# Patient Record
Sex: MALE | Race: WHITE | Employment: OTHER | URBAN - METROPOLITAN AREA
[De-identification: names, ages, dates, MRNs, and addresses within clinical notes are randomized per-mention and may not be internally consistent; named-entity substitution may affect disease eponyms.]

---

## 2017-01-10 RX ORDER — ISOSORBIDE DINITRATE 10 MG/1
TABLET ORAL
Qty: 270 TABLET | Refills: 0 | Status: SHIPPED | OUTPATIENT
Start: 2017-01-10 | End: 2017-05-18 | Stop reason: SDUPTHER

## 2017-01-26 LAB
ALBUMIN SERPL-MCNC: 4.3 G/DL
ALP BLD-CCNC: 75 U/L
ALT SERPL-CCNC: 24 U/L
AST SERPL-CCNC: 24 U/L
BASOPHILS ABSOLUTE: 0.1 /ΜL
BASOPHILS RELATIVE PERCENT: 1.2 %
BILIRUB SERPL-MCNC: 0.8 MG/DL (ref 0.1–1.4)
BUN BLDV-MCNC: 20 MG/DL
CALCIUM SERPL-MCNC: 9 MG/DL
CHLORIDE BLD-SCNC: 106 MMOL/L
CHOLESTEROL, TOTAL: 142 MG/DL
CHOLESTEROL/HDL RATIO: 3.6
CO2: 25 MMOL/L
CREAT SERPL-MCNC: 0.97 MG/DL
EOSINOPHILS ABSOLUTE: 0.1 /ΜL
EOSINOPHILS RELATIVE PERCENT: 2.4 %
GFR CALCULATED: NORMAL
GLUCOSE BLD-MCNC: 105 MG/DL
HCT VFR BLD CALC: 39 % (ref 41–53)
HDLC SERPL-MCNC: 39 MG/DL (ref 35–70)
HEMOGLOBIN: 13 G/DL (ref 13.5–17.5)
LDL CHOLESTEROL CALCULATED: 77 MG/DL (ref 0–160)
LYMPHOCYTES ABSOLUTE: 1.5 /ΜL
LYMPHOCYTES RELATIVE PERCENT: 26.5 %
MCH RBC QN AUTO: 30.6 PG
MCHC RBC AUTO-ENTMCNC: 33.4 G/DL
MCV RBC AUTO: 91.8 FL
MONOCYTES ABSOLUTE: 0.6 /ΜL
MONOCYTES RELATIVE PERCENT: 10 %
NEUTROPHILS ABSOLUTE: 3.4 /ΜL
NEUTROPHILS RELATIVE PERCENT: 59.9 %
PLATELET # BLD: 229 K/ΜL
PMV BLD AUTO: 7.8 FL
POTASSIUM SERPL-SCNC: 4.5 MMOL/L
RBC # BLD: 4.25 10^6/ΜL
SODIUM BLD-SCNC: 139 MMOL/L
TOTAL PROTEIN: 7.1
TRIGL SERPL-MCNC: 128 MG/DL
VLDLC SERPL CALC-MCNC: NORMAL MG/DL
WBC # BLD: 5.8 10^3/ML

## 2017-02-08 ENCOUNTER — OFFICE VISIT (OUTPATIENT)
Dept: CARDIOLOGY CLINIC | Age: 72
End: 2017-02-08

## 2017-02-08 VITALS
HEART RATE: 72 BPM | DIASTOLIC BLOOD PRESSURE: 72 MMHG | HEIGHT: 69 IN | WEIGHT: 298.4 LBS | OXYGEN SATURATION: 96 % | SYSTOLIC BLOOD PRESSURE: 116 MMHG | BODY MASS INDEX: 44.2 KG/M2

## 2017-02-08 DIAGNOSIS — I25.10 ATHEROSCLEROSIS OF NATIVE CORONARY ARTERY WITHOUT ANGINA PECTORIS, UNSPECIFIED WHETHER NATIVE OR TRANSPLANTED HEART: Primary | ICD-10-CM

## 2017-02-08 DIAGNOSIS — I10 ESSENTIAL HYPERTENSION, BENIGN: ICD-10-CM

## 2017-02-08 PROCEDURE — G8417 CALC BMI ABV UP PARAM F/U: HCPCS | Performed by: INTERNAL MEDICINE

## 2017-02-08 PROCEDURE — G8598 ASA/ANTIPLAT THER USED: HCPCS | Performed by: INTERNAL MEDICINE

## 2017-02-08 PROCEDURE — G8427 DOCREV CUR MEDS BY ELIG CLIN: HCPCS | Performed by: INTERNAL MEDICINE

## 2017-02-08 PROCEDURE — 1123F ACP DISCUSS/DSCN MKR DOCD: CPT | Performed by: INTERNAL MEDICINE

## 2017-02-08 PROCEDURE — 4040F PNEUMOC VAC/ADMIN/RCVD: CPT | Performed by: INTERNAL MEDICINE

## 2017-02-08 PROCEDURE — G8484 FLU IMMUNIZE NO ADMIN: HCPCS | Performed by: INTERNAL MEDICINE

## 2017-02-08 PROCEDURE — 1036F TOBACCO NON-USER: CPT | Performed by: INTERNAL MEDICINE

## 2017-02-08 PROCEDURE — 3017F COLORECTAL CA SCREEN DOC REV: CPT | Performed by: INTERNAL MEDICINE

## 2017-02-08 PROCEDURE — 99214 OFFICE O/P EST MOD 30 MIN: CPT | Performed by: INTERNAL MEDICINE

## 2017-02-08 PROCEDURE — 93000 ELECTROCARDIOGRAM COMPLETE: CPT | Performed by: INTERNAL MEDICINE

## 2017-05-18 RX ORDER — ISOSORBIDE DINITRATE 10 MG/1
TABLET ORAL
Qty: 270 TABLET | Refills: 0 | Status: SHIPPED | OUTPATIENT
Start: 2017-05-18 | End: 2017-09-10 | Stop reason: SDUPTHER

## 2017-09-12 RX ORDER — ISOSORBIDE DINITRATE 10 MG/1
TABLET ORAL
Qty: 270 TABLET | Refills: 5 | Status: SHIPPED | OUTPATIENT
Start: 2017-09-12 | End: 2022-09-07 | Stop reason: SDUPTHER

## 2017-12-18 ENCOUNTER — OFFICE VISIT (OUTPATIENT)
Dept: CARDIOLOGY CLINIC | Age: 72
End: 2017-12-18

## 2017-12-18 VITALS
WEIGHT: 297 LBS | HEIGHT: 69 IN | DIASTOLIC BLOOD PRESSURE: 72 MMHG | BODY MASS INDEX: 43.99 KG/M2 | OXYGEN SATURATION: 97 % | HEART RATE: 64 BPM | SYSTOLIC BLOOD PRESSURE: 112 MMHG

## 2017-12-18 DIAGNOSIS — I10 ESSENTIAL HYPERTENSION, BENIGN: ICD-10-CM

## 2017-12-18 DIAGNOSIS — I25.10 ATHEROSCLEROSIS OF NATIVE CORONARY ARTERY WITHOUT ANGINA PECTORIS, UNSPECIFIED WHETHER NATIVE OR TRANSPLANTED HEART: ICD-10-CM

## 2017-12-18 DIAGNOSIS — M54.6 ACUTE MIDLINE THORACIC BACK PAIN: Primary | ICD-10-CM

## 2017-12-18 DIAGNOSIS — E78.2 MIXED HYPERLIPIDEMIA: ICD-10-CM

## 2017-12-18 PROCEDURE — 4040F PNEUMOC VAC/ADMIN/RCVD: CPT | Performed by: NURSE PRACTITIONER

## 2017-12-18 PROCEDURE — G8598 ASA/ANTIPLAT THER USED: HCPCS | Performed by: NURSE PRACTITIONER

## 2017-12-18 PROCEDURE — G8417 CALC BMI ABV UP PARAM F/U: HCPCS | Performed by: NURSE PRACTITIONER

## 2017-12-18 PROCEDURE — 3017F COLORECTAL CA SCREEN DOC REV: CPT | Performed by: NURSE PRACTITIONER

## 2017-12-18 PROCEDURE — G8484 FLU IMMUNIZE NO ADMIN: HCPCS | Performed by: NURSE PRACTITIONER

## 2017-12-18 PROCEDURE — 1123F ACP DISCUSS/DSCN MKR DOCD: CPT | Performed by: NURSE PRACTITIONER

## 2017-12-18 PROCEDURE — 99214 OFFICE O/P EST MOD 30 MIN: CPT | Performed by: NURSE PRACTITIONER

## 2017-12-18 PROCEDURE — G8427 DOCREV CUR MEDS BY ELIG CLIN: HCPCS | Performed by: NURSE PRACTITIONER

## 2017-12-18 PROCEDURE — 1036F TOBACCO NON-USER: CPT | Performed by: NURSE PRACTITIONER

## 2017-12-18 NOTE — COMMUNICATION BODY
 NITROSTAT 0.4 MG SL tablet DISSOLVE 1 TAB UNDER TONGUE FOR CHEST PAIN - IF PAIN REMAINS AFTER 5 MIN, CALL 911 AND REPEAT DOSE. MAX 3 TABS IN 15 MINUTES 25 tablet 1       REVIEW OF SYSTEMS:    MUSCULOSKELETAL: + new muscle back      Objective:   PHYSICAL EXAM:    Vitals:    12/18/17 1115 12/18/17 1147   BP: 100/70 112/72   Site:  Left Arm   Cuff Size:  Large Adult   Pulse: 64    SpO2: 97%    Weight: 297 lb (134.7 kg)    Height: 5' 9\" (1.753 m)          VITALS:  /70   Pulse 64   Ht 5' 9\" (1.753 m)   Wt 297 lb (134.7 kg)   SpO2 97%   BMI 43.86 kg/m²    CONSTITUTIONAL: Cooperative, no apparent distress, and appears well nourished / over weight  PSYCH: Calm affect. SKIN: Warm and dry. HEENT: Sclera non-icteric, normocephalic, neck supple, no elevation of JVP, normal carotid pulses with no bruits and thyroid normal size. LUNGS:  No increased work of breathing and clear to auscultation, no crackles or wheezing  CARDIOVASCULAR:  Regular rate 68 and rhythm with no murmurs, gallops, rubs, or abnormal heart sounds, normal PMI. The apical impulses not displaced  JVP less than 8 cm H2O  Heart tones are crisp and normal  Cervical veins are not engorged  The carotid upstroke is normal in amplitude and contour without delay or bruit  JVP is not elevated  ABDOMEN:  Normal bowel sounds, non-distended and non-tender to palpation  EXT: No edema, no calf tenderness. Pulses are present bilaterally. DATA:        LABS: 10/16/17:    Na+ 140 K+ 4.9 chl 107 CO2 25 BUN 25 creatinine 0.86 glu 103   H/H 12.7/36.9    trig 140 HDL 33 LDL 78      Assessment:   1. Back pain   ~reproducible with movements    ~likely musculoskeletal in nature   ~has gotten relieve using [place] Dream which contains topical glucosamine chondroitin     2.  Atherosclerosis of native coronary artery of native heart without angina pectoris   ~stable : denies angina  ~CAD s/p PTCA RCA '95, '96, '04 & '09  ~instent stenosis RCA at 40%, FFR 0.89 ~taking isordil bid ; DAPT / statin / BB    3. Essential hypertension, benign   ~controlled    4. Mixed hyperlipidemia   ~HDL near goal otherwise well controlled  ~atorvastatin / zetia             I had the opportunity to review the clinical symptoms and presentation of RAMY Watkins. Plan:     1. May continue to use [place] Dream or   Hold ASA and take Aleve 220 mg bid for one week routinely then prn; resume ASA afterwards   2. F/U as scheduled    ~inquiring about taking Contrave (bupropion naltrexone) for weight loss. He will discuss with his primary cardiologist     Overall the patient is stable from CV standpoint    I have addresed the patient's cardiac risk factors and adjusted pharmacologic treatment as needed. In addition, I have reinforced the need for patient directed risk factor modification. Further evaluation will be based upon the patient's clinical course and testing results. All questions and concerns were addressed to the patient. Alternatives to my treatment were discussed. The patient is not currently smoking. The risks related to smoking were reviewed with the patient. Recommend maintaining a smoke-free lifestyle. Patient is on a beta-blocker  Patient is on an ace-i  Patient is on a statin    Dual Antiplatelet therapy / anti-coagulation has been recommended / prescribed for this patient. Education conducted on adverse reactions including bleeding was discussed. The patient verbalizes understanding not to stop medications without discussing with us. Discussed exercise: 30-60 minutes 7 days/week. Discussed Low saturated fat diet. Thank you for allowing to us to participate in the care of New Latisha.

## 2017-12-18 NOTE — PROGRESS NOTES
therapy / anti-coagulation has been recommended / prescribed for this patient. Education conducted on adverse reactions including bleeding was discussed. The patient verbalizes understanding not to stop medications without discussing with us. Discussed exercise: 30-60 minutes 7 days/week. Discussed Low saturated fat diet. Thank you for allowing to us to participate in the care of New Latisha.

## 2017-12-18 NOTE — LETTER
[address] [place] [address] Cardiology - [facility] Hollie Bernabe [address] [address]  Phone: [phone]  Fax: [phone]    Gissell Harris NP        December 18, 2017     Swapna Congress, [address] [address]    Patient: Mirna Lombard  MR Number: [id number]  YOB: 1945  Date of Visit: 12/18/2017    Dear Dr. Barcenas Congress:        Oriana Judd is 71 y.o. male who presents today with a history of CAD s/p PTCA RCA '95, '96, '04 & '09, HTN and hyperlipidemia . His last cath in August '15 showed patent stents but with instent stenosis of the RCA at 40%, FFR 0.89. CHIEF COMPLAINT / HPI:  Follow Up secondary to having a pain in his back. When he coughs or clears his throat, it hurts in his mid back. Moving certain ways hurt. He'd been putting crown molding up in his granddaughter's bedroom and doing a lot of stretching. His discomfort started a couple of days ago. He has not tried taking OTC tylenol. He used [place] Dream twice. Its helped. His family \"made\" him come in. Subjective:   He denies significant chest pain. There is no SOB/CHAVEZ. The patient denies orthopnea/PND. The patient does not have swelling. The patients weight is down a few pounds over the past few months . The patient is not experiencing palpitations or dizziness. These symptoms  show no change since the  last  OV. With regard to medication therapy the patient has been compliant with prescribed regimen. They have tolerated therapy to date. Current Outpatient Prescriptions   Medication Sig Dispense Refill    isosorbide dinitrate (ISORDIL) 10 MG tablet Take 1 tablet by mouth 3  times daily : TAKES  tablet 5    aspirin EC 81 MG EC tablet Take 1 tablet by mouth daily 30 tablet 3    metoprolol (LOPRESSOR) 50 MG tablet Take 50 mg by mouth 2 times daily      multivitamin (THERAGRAN) per tablet Take 1 tablet by mouth daily.  Takes 1/2 in am and 1/2 in pm      lisinopril (PRINIVIL;ZESTRIL) 20 MG tablet Take 20 mg by mouth daily.  pantoprazole (PROTONIX) 40 MG tablet Take 40 mg by mouth daily.  atorvastatin (LIPITOR) 80 MG tablet Take 80 mg by mouth daily.  ezetimibe (ZETIA) 10 MG tablet Take 10 mg by mouth daily.  clopidogrel (PLAVIX) 75 MG tablet Take 75 mg by mouth daily.  NITROSTAT 0.4 MG SL tablet DISSOLVE 1 TAB UNDER TONGUE FOR CHEST PAIN - IF PAIN REMAINS AFTER 5 MIN, CALL 911 AND REPEAT DOSE. MAX 3 TABS IN 15 MINUTES 25 tablet 1       REVIEW OF SYSTEMS:    MUSCULOSKELETAL: + new muscle back      Objective:   PHYSICAL EXAM:    Vitals:    12/18/17 1115 12/18/17 1147   BP: 100/70 112/72   Site:  Left Arm   Cuff Size:  Large Adult   Pulse: 64    SpO2: 97%    Weight: 297 lb (134.7 kg)    Height: 5' 9\" (1.753 m)          VITALS:  /70   Pulse 64   Ht 5' 9\" (1.753 m)   Wt 297 lb (134.7 kg)   SpO2 97%   BMI 43.86 kg/m²    CONSTITUTIONAL: Cooperative, no apparent distress, and appears well nourished / over weight  PSYCH: Calm affect. SKIN: Warm and dry. HEENT: Sclera non-icteric, normocephalic, neck supple, no elevation of JVP, normal carotid pulses with no bruits and thyroid normal size. LUNGS:  No increased work of breathing and clear to auscultation, no crackles or wheezing  CARDIOVASCULAR:  Regular rate 68 and rhythm with no murmurs, gallops, rubs, or abnormal heart sounds, normal PMI. The apical impulses not displaced  JVP less than 8 cm H2O  Heart tones are crisp and normal  Cervical veins are not engorged  The carotid upstroke is normal in amplitude and contour without delay or bruit  JVP is not elevated  ABDOMEN:  Normal bowel sounds, non-distended and non-tender to palpation  EXT: No edema, no calf tenderness. Pulses are present bilaterally.     DATA:        LABS: 10/16/17:    Na+ 140 K+ 4.9 chl 107 CO2 25 BUN 25 creatinine 0.86 glu 103   H/H 12.7/36.9    trig 140 HDL 33 LDL 78 Assessment:   1. Back pain   ~reproducible with movements    ~likely musculoskeletal in nature   ~has gotten relieve using [place] Dream which contains topical glucosamine chondroitin     2. Atherosclerosis of native coronary artery of native heart without angina pectoris   ~stable : denies angina  ~CAD s/p PTCA RCA '95, '96, '04 & '09  ~instent stenosis RCA at 40%, FFR 0.89   ~taking isordil bid ; DAPT / statin / BB    3. Essential hypertension, benign   ~controlled    4. Mixed hyperlipidemia   ~HDL near goal otherwise well controlled  ~atorvastatin / zetia             I had the opportunity to review the clinical symptoms and presentation of RAMY Watkins. Plan:     1. May continue to use [place] Dream or   Hold ASA and take Aleve 220 mg bid for one week routinely then prn; resume ASA afterwards   2. F/U as scheduled    ~inquiring about taking Contrave (bupropion naltrexone) for weight loss. He will discuss with his primary cardiologist     Overall the patient is stable from CV standpoint    I have addresed the patient's cardiac risk factors and adjusted pharmacologic treatment as needed. In addition, I have reinforced the need for patient directed risk factor modification. Further evaluation will be based upon the patient's clinical course and testing results. All questions and concerns were addressed to the patient. Alternatives to my treatment were discussed. The patient is not currently smoking. The risks related to smoking were reviewed with the patient. Recommend maintaining a smoke-free lifestyle. Patient is on a beta-blocker  Patient is on an ace-i  Patient is on a statin    Dual Antiplatelet therapy / anti-coagulation has been recommended / prescribed for this patient. Education conducted on adverse reactions including bleeding was discussed. The patient verbalizes understanding not to stop medications without discussing with us. Discussed exercise: 30-60 minutes 7 days/week. Discussed Low saturated fat diet. Thank you for allowing to us to participate in the care of Sameer Good. If you have questions, please do not hesitate to call me. I look forward to following D along with you.     Sincerely,        Nabila Brunner NP

## 2018-02-08 ENCOUNTER — OFFICE VISIT (OUTPATIENT)
Dept: CARDIOLOGY CLINIC | Age: 73
End: 2018-02-08

## 2018-02-08 VITALS
DIASTOLIC BLOOD PRESSURE: 60 MMHG | OXYGEN SATURATION: 97 % | WEIGHT: 289 LBS | HEIGHT: 69 IN | BODY MASS INDEX: 42.8 KG/M2 | SYSTOLIC BLOOD PRESSURE: 108 MMHG | HEART RATE: 56 BPM

## 2018-02-08 DIAGNOSIS — G47.33 OSA (OBSTRUCTIVE SLEEP APNEA): ICD-10-CM

## 2018-02-08 DIAGNOSIS — I10 ESSENTIAL HYPERTENSION: ICD-10-CM

## 2018-02-08 DIAGNOSIS — E78.2 MIXED HYPERLIPIDEMIA: ICD-10-CM

## 2018-02-08 DIAGNOSIS — I25.10 CORONARY ARTERY DISEASE INVOLVING NATIVE CORONARY ARTERY OF NATIVE HEART WITHOUT ANGINA PECTORIS: ICD-10-CM

## 2018-02-08 DIAGNOSIS — I50.32 CHRONIC DIASTOLIC CONGESTIVE HEART FAILURE (HCC): Primary | ICD-10-CM

## 2018-02-08 PROCEDURE — 99214 OFFICE O/P EST MOD 30 MIN: CPT | Performed by: INTERNAL MEDICINE

## 2018-02-08 PROCEDURE — 1036F TOBACCO NON-USER: CPT | Performed by: INTERNAL MEDICINE

## 2018-02-08 PROCEDURE — 4040F PNEUMOC VAC/ADMIN/RCVD: CPT | Performed by: INTERNAL MEDICINE

## 2018-02-08 PROCEDURE — G8484 FLU IMMUNIZE NO ADMIN: HCPCS | Performed by: INTERNAL MEDICINE

## 2018-02-08 PROCEDURE — G8417 CALC BMI ABV UP PARAM F/U: HCPCS | Performed by: INTERNAL MEDICINE

## 2018-02-08 PROCEDURE — 3017F COLORECTAL CA SCREEN DOC REV: CPT | Performed by: INTERNAL MEDICINE

## 2018-02-08 PROCEDURE — G8427 DOCREV CUR MEDS BY ELIG CLIN: HCPCS | Performed by: INTERNAL MEDICINE

## 2018-02-08 PROCEDURE — 1123F ACP DISCUSS/DSCN MKR DOCD: CPT | Performed by: INTERNAL MEDICINE

## 2018-02-08 PROCEDURE — G8598 ASA/ANTIPLAT THER USED: HCPCS | Performed by: INTERNAL MEDICINE

## 2018-02-08 NOTE — LETTER
[address] [place] [address] Cardiology - [address] [place] [address] [address] [facility] [address]  Phone: [phone]  Fax: [phone]    Radha Bettencourt MD        February 8, 2018     Jorge Alberto Leong, [address] [facility] [address]    Patient: Konrad Campos  MR Number: [id number]  YOB: 1945  Date of Visit: 2/8/2018    Dear Dr. Jorge Alberto Leong: Thank you for the request for consultation for Jaime Cody to me for the evaluation of Heart failure. Below are the relevant portions of my assessment and plan of care. Assessment:  - Diastolic heart failure - stable   - CAD - patent stents RCA on cath Sept 2015  - Hyperlipidemia; Total Cholesterol  - Hypertension BP: (108)/(60)   - SALLIE     Recommendation:  - He continues to do well and I will continue his current antianginal therapy (Isordil, Lopressor). His LDL is well controlled (67 Jan 18') and will continue high dose Lipitor and Zetia.   - His BP is controlled on Lisinopril. - I will see him back in 12 months. If you have questions, please do not hesitate to call me. I look forward to following D along with you.     Sincerely,        Radha Bettencourt MD

## 2018-02-08 NOTE — COMMUNICATION BODY
Assessment:  - Diastolic heart failure - stable   - CAD - patent stents RCA on cath Sept 2015  - Hyperlipidemia; Total Cholesterol  - Hypertension BP: (108)/(60)   - SALLIE     Recommendation:  - He continues to do well and I will continue his current antianginal therapy (Isordil, Lopressor). His LDL is well controlled (67 Jan 18') and will continue high dose Lipitor and Zetia.   - His BP is controlled on Lisinopril. - I will see him back in 12 months.

## 2018-02-08 NOTE — PROGRESS NOTES
Section of Cardiology                                     Cardiovascular Evaluation      PATIENT: Raysa Garzon  DATE: 2018  MRN: [id number]  CSN: [id number]  : 1945    Primary Care Doctor: Soila Dallas MD    Reason for evaluation:   6 Month Follow-Up for CAD    History of present illness:   Raysa Garzon is a 72 y.o. patient who presents with a history of CAD s/p PTCA RCA '95, '96, '04 & '09, HTN and hyperlipidemia . Gilbert Rodgers has a history of coronary stenting,most recently  to RCA. At this time, he had two cypher stents and one xience stent to the RCA. Last stress test 3/11/14. He wears CPAP at night for obstructive sleep apnea. In the past with stent placement he had improvement of his shortness of breath. Today he states he is doing well. He states he has been watching a lot of TV to watch the political issues. He states he is continuing to gain weight. He c/o bilateral arm numbness when he wakes up in the morning. He has been tolerating his medications well. He stays active taking care of his grand children. He denied chest pain, palpitations, syncope, dizziness or shortness of breath. Today he presents for follow up of CHF, CAD, HLD, HTN. He reports feeling well overall. He is taking all medications as prescribed and tolerating them well. Denies chest pain, shortness of breath, edema, dizziness, palpitations and syncope. Past Medical History:   has a past medical history of Arthritis; CAD (coronary artery disease); GERD (gastroesophageal reflux disease); Hyperlipidemia; and Hypertension. Surgical History:   has a past surgical history that includes Coronary angioplasty with stent; Coronary angioplasty (08/26/15); eye surgery; and eye surgery. Social History:   reports that he has quit smoking. He has never used smokeless tobacco. He reports that he drinks alcohol. He reports that he does not use drugs.      Family History:  No evidence for sudden JVD       Lungs:   Clear to auscultation bilaterally, respirations unlabored   Chest Wall:  No tenderness or deformity       Heart:  Regular rhythm and normal rate; S1, S2 are normal; no murmur noted; no rub or gallop       Abdomen:   Soft, non-tender, bowel sounds active all four quadrants,  no masses, no organomegaly       Extremities: Extremities normal, atraumatic, no cyanosis or edema   Pulses: 2+ and symmetric       Skin: Skin color, texture, turgor normal, no rashes or lesions       Pysch: Normal mood and affect       Neurologic: Normal gross motor and sensory exam.       DIAGNOSTIC WORK UP:  Lab Data:  CBC:   Lab Results   Component Value Date    WBC 9.8 11/09/2017    HGB 12.5 11/09/2017    HCT 37.2 11/09/2017    MCV 92.5 11/09/2017     11/09/2017     BMP:   Lab Results   Component Value Date     11/09/2017    K 4.2 11/09/2017    CL 99 11/09/2017    CO2 26 11/09/2017    BUN 19 11/09/2017    CREATININE 0.7 11/09/2017    CALCIUM 9.2 11/09/2017    GFRAA >60 11/09/2017    GFRAA >60 05/17/2013     LFTS:   Lab Results   Component Value Date    ALT 17 11/09/2017    AST 19 11/09/2017    ALKPHOS 79 11/09/2017    PROT 7.3 11/09/2017    AGRATIO 1.2 11/09/2017    BILITOT 1.0 11/09/2017     PT/INR:   No components found for: PTPATIENT,  PTINR  LIPID PANEL:   No components found for: CHLPL  Lab Results   Component Value Date    TRIG 128 01/26/2017    TRIG 112 08/26/2015    TRIG 109 07/27/2015     Lab Results   Component Value Date    HDL 39 01/26/2017    HDL 44 08/26/2015    HDL 46 07/27/2015     Lab Results   Component Value Date    LDLCALC 77 01/26/2017    LDLCALC 66 08/26/2015    LDLCALC 66 07/27/2015     TSH:   No results found for: TSH  FREET4:   No results found for: Quillian Curio:   No components found for: FREET3  BNP:   No results found for: BNP    Procedure/Imaging:  I have reviewed the below testing personally and my interpretation is below.     STRESS TEST:  3/11/14   Summary  There is normal isotope uptake at stress and rest. There is no evidence of  myocardial ischemia or scar. There are no regional wall motion abnormalities. Normal left ventricular systolic function with ejection fraction of 61 %. Normal myocardial perfusion study. Recommendation  Normal perfusion study but noted to have ischemic ST changes during stress. Patient may have balanced ischemia. Depending on clinical appropriateness  cardiac catheterization can be considered. CATH:  2009   3 CECI prox/mid RCA, 40% mid LAD, 20% LM    CATH: 09/2015  IMPRESSION:  1. Patent proximal to mid-right coronary artery stents with 40% discrete  in-stent restenosis of the mid-right coronary artery, stent, fractional flow  reserve across the lesion was 0.89, which is insignificant. 2. Normal left ventricular systolic function with ejection fraction of 55%. 3. Normal Left ventricular end-diastolic pressure, 11 mmHg    ECHO: 2/20/15 02/20/2015  Normal left ventricular systolic function, with estimated ejection fraction of 55%. There is mild concentric left ventricular hypertrophy. No regional wall motion abnormalities noted. Diastolic filling parameters suggests grade II diastolic dysfunction. Systolic pulmonary artery pressure (SPAP) is normal and estimated at 28 mmHg  (RA pressure 3 mmHg). Assessment:  - Diastolic heart failure - stable   - CAD - patent stents RCA on cath Sept 2015  - Hyperlipidemia; Total Cholesterol  - Hypertension BP: (108)/(60)   - SALLIE    Recommendation:  - He continues to do well and I will continue his current antianginal therapy (Isordil, Lopressor). His LDL is well controlled (67 Jan 18') and will continue high dose Lipitor and Zetia.   - His BP is controlled on Lisinopril. - I will see him back in 12 months. If you have questions, please do not hesitate to call me. I look forward to following RAMY Flores along with you.       Francisco Mckeon MD, [facility]  [phone] [facility] office  [phone]

## 2018-08-09 ENCOUNTER — OFFICE VISIT (OUTPATIENT)
Dept: ORTHOPEDIC SURGERY | Age: 73
End: 2018-08-09

## 2018-08-09 VITALS
HEIGHT: 68 IN | BODY MASS INDEX: 42.44 KG/M2 | SYSTOLIC BLOOD PRESSURE: 110 MMHG | HEART RATE: 59 BPM | WEIGHT: 280 LBS | DIASTOLIC BLOOD PRESSURE: 64 MMHG

## 2018-08-09 DIAGNOSIS — E66.01 OBESITY, CLASS III, BMI 40-49.9 (MORBID OBESITY) (HCC): ICD-10-CM

## 2018-08-09 DIAGNOSIS — M25.561 RIGHT KNEE PAIN, UNSPECIFIED CHRONICITY: Primary | ICD-10-CM

## 2018-08-09 DIAGNOSIS — M17.11 PRIMARY OSTEOARTHRITIS OF RIGHT KNEE: ICD-10-CM

## 2018-08-09 PROCEDURE — 1036F TOBACCO NON-USER: CPT | Performed by: ORTHOPAEDIC SURGERY

## 2018-08-09 PROCEDURE — G8417 CALC BMI ABV UP PARAM F/U: HCPCS | Performed by: ORTHOPAEDIC SURGERY

## 2018-08-09 PROCEDURE — 1123F ACP DISCUSS/DSCN MKR DOCD: CPT | Performed by: ORTHOPAEDIC SURGERY

## 2018-08-09 PROCEDURE — 99203 OFFICE O/P NEW LOW 30 MIN: CPT | Performed by: ORTHOPAEDIC SURGERY

## 2018-08-09 PROCEDURE — 1101F PT FALLS ASSESS-DOCD LE1/YR: CPT | Performed by: ORTHOPAEDIC SURGERY

## 2018-08-09 PROCEDURE — G8427 DOCREV CUR MEDS BY ELIG CLIN: HCPCS | Performed by: ORTHOPAEDIC SURGERY

## 2018-08-09 PROCEDURE — 3017F COLORECTAL CA SCREEN DOC REV: CPT | Performed by: ORTHOPAEDIC SURGERY

## 2018-08-09 PROCEDURE — L3170 FOOT PLAS HEEL STABI PRE OTS: HCPCS | Performed by: ORTHOPAEDIC SURGERY

## 2018-08-09 PROCEDURE — G8598 ASA/ANTIPLAT THER USED: HCPCS | Performed by: ORTHOPAEDIC SURGERY

## 2018-08-09 PROCEDURE — 4040F PNEUMOC VAC/ADMIN/RCVD: CPT | Performed by: ORTHOPAEDIC SURGERY

## 2018-08-09 NOTE — PROGRESS NOTES
Chief Complaint    Knee Pain (right knee x 6 months, atraumatic, started after using orthotics again for foot pain. )      History of Present Illness:  Salo Mann is a 68 y.o. male comes in today for evaluation treatment of ongoing right knee pain. The patient reports about a 6 month to 12 month history of pain in his right knee. He initially began seeing Dr. Cannon Cowden who has done a MRI and cortisone injection along with Visco supplementation his right knee with little or no improvement. He did have some initial relief with a cortisone injection but this has continued to cause pain and discomfort. He reports mainly medial knee pain with intermittent anterior knee pain. Weightbearing activities increase his pain and discomfort. Because of his lack of improvement and is for a 2nd opinion referred to us by his primary care physician Dr. Pilar Abbott.     Pain Assessment  Location of Pain: Knee  Location Modifiers: Right  Severity of Pain: 5  Quality of Pain: Aching, Throbbing, Sharp  Frequency of Pain: Intermittent  Limiting Behavior: Yes  Result of Injury: No  Work-Related Injury: No  Are there other pain locations you wish to document?: No]       Medical History:  Past Medical History:   Diagnosis Date    Arthritis     CAD (coronary artery disease)     GERD (gastroesophageal reflux disease)     Hyperlipidemia     Hypertension      Patient Active Problem List    Diagnosis Date Noted    Coronary atherosclerosis 09/29/2011     Priority: High    Mixed hyperlipidemia 09/29/2011     Priority: Medium    Essential hypertension 09/29/2011     Priority: Medium    Chronic diastolic congestive heart failure ([facility] [address] 02/08/2018    Coronary artery disease involving native coronary artery of native heart without angina pectoris 02/08/2018    Shortness of breath 02/20/2015    SALLIE (obstructive sleep apnea) 02/20/2015     Current Outpatient Prescriptions   Medication Sig Dispense Refill    isosorbide dinitrate (ISORDIL) 10 MG tablet Take 1 tablet by mouth 3  times daily 270 tablet 5    NITROSTAT 0.4 MG SL tablet DISSOLVE 1 TAB UNDER TONGUE FOR CHEST PAIN - IF PAIN REMAINS AFTER 5 MIN, CALL 911 AND REPEAT DOSE. MAX 3 TABS IN 15 MINUTES 25 tablet 1    aspirin EC 81 MG EC tablet Take 1 tablet by mouth daily 30 tablet 3    metoprolol (LOPRESSOR) 50 MG tablet Take 50 mg by mouth 2 times daily      multivitamin (THERAGRAN) per tablet Take 1 tablet by mouth daily. Takes 1/2 in am and 1/2 in pm      lisinopril (PRINIVIL;ZESTRIL) 20 MG tablet Take 20 mg by mouth daily.  pantoprazole (PROTONIX) 40 MG tablet Take 40 mg by mouth daily.  atorvastatin (LIPITOR) 80 MG tablet Take 80 mg by mouth daily.  ezetimibe (ZETIA) 10 MG tablet Take 10 mg by mouth daily.  clopidogrel (PLAVIX) 75 MG tablet Take 75 mg by mouth daily. No current facility-administered medications for this visit. Review of Systems:  Relevant review of systems reviewed and available in the patient's chart    Vital Signs:  Vitals:    08/09/18 0950   BP: 110/64   Pulse: 59       General Exam:   Constitutional: Patient is adequately groomed with no evidence of malnutrition  DTRs: Deep tendon reflexes are intact  Mental Status: The patient is oriented to time, place and person. The patient's mood and affect are appropriate. Lymphatic: The lymphatic examination bilaterally reveals all areas to be without enlargement or induration. Vascular: Examination reveals no swelling or calf tenderness. Peripheral pulses are palpable and 2+. Neurological: The patient has good coordination. There is no weakness or sensory deficit. Body mass index is 42.57 kg/m². Right Knee Examination:    Inspection:  No erythema or signs of infection. There are no cutaneous lesions    Palpation:  There is tenderness to palpation along the medial joint line.     Range of Motion:  3 extension to 120 of active flexion. Strength:  4+/5 quadriceps and hamstrings    Special Tests: The knee is stable to varus valgus stressing/anterior posterior drawer. Negative Homans test.                                 Skin: There are no rashes, ulcerations or lesions. Gait: mildly antalgic favoring the right side    Reflex 2+ patellar    Examination of the left knee reveals intact skin. There is no focal tenderness. The patient demonstrates full painless range of motion with regard to flexion and extension. Strength is 5/5 throughout all planes. Ligamentous stability is grossly intact. Examination of the right hip reveals intact skin. The patient demonstrates full painless range of motion with regards to flexion, abduction, internal and external rotation. There is no tenderness about the greater trochanter. There is a negative straight leg raise against resistance. Strength is 5/5 throughout all planes. Radiology:   X-rays obtained and reviewed in office:  Views 4 views including AP weightbearing, PA flexed weightbearing, lateral, and skyline  Location  right knee:    Impression:   There is advanced osteoarthritis of his medial compartment with sclerosis and osteophyte formation present. The patellofemoral joint demonstrates end-stage patellofemoral arthritis  No fractures are identified. Impression:  Encounter Diagnoses   Name Primary?  Right knee pain, unspecified chronicity Yes    Primary osteoarthritis of right knee        Office Procedures:  Orders Placed This Encounter   Procedures    XR KNEE RIGHT (MIN 4 VIEWS)       Treatment Plan:  I discussed with the patient the nature of osteoarthritis of the knee. We talked about treatment of arthritis and the various options that are involved with this. The patient understands that the treatments can vary from essentially doing nothing to a total joint replacement arthroplasty for arthritis.  I then went on to describe the utilization of glucosamine and chondroitin sulfate as a joint nutrition product. We talked about the fact that this is essentially a joint vitamin with typically minimal side effects. We also talked about utilization of prescription over-the-counter anti-inflammatory medications as the next option. We also discussed the possibility of brace wear or orthotic wear if the patient has significant varus alignment. We then went on to discuss the possibility of Visco supplementation with hyaluronate products. We talked about the typical course of this type of treatment and the fact that often times in the treatment for significant arthritis, this is successful less than half the time. We also talked about the corticosteroid injections and the fact that this can give a brief window of relief, but does not cure the problem; in fact, the pain often has a rebound effect in 6-10 weeks after the steroid has worn off. We also discussed arthroscopy surgery in attempts to debride the joint, but the fact that this is relatively unreliable treatment in the face of significant arthritis. It can occasionally be used, particularly if there is significant meniscus pathology. Lastly we discussed total joint replacement arthroplasty as the final and definitive step in treatment of arthritis. Patient realizes the magnitude of this type of treatment as well as having voiced a general understanding to the duration of the prosthesis. The patient voiced understanding to these continuum of treatment options. At this time the patient has failed conservative treatment with cortisone injection of supplementation. He is going to eventually require a total knee replacement but we do have some medical optimization and needs to be achieved. We would like for him to achieve a body mass index below 40 kg/m². We've discussed that this is likely approximately a 25-30 pound weight loss.   We've recommended aquatic therapy and the [facility] weight loss [facility] to help aid in his weight loss goal's. We've also recommended using a cane or crutch for protective weightbearing to alleviate the stress in the knee. We'll also get him fitted for a medial  brace and lateral heel wedges. W    bahman also discussed the use of medication such as Celebrex. He's previously taken this before in the past but has been told by his primary care physician that they would like to avoid him taking this. We will like for him to contact his primary care physician to see if he might be able to take a low-dose Celebrex either daily or every other day to help decrease some of his current symptoms. He understands that this will have to be cleared through his primary care physician 1st.  This would be temporary until we would be able to proceed with knee replacement surgery.

## 2018-08-20 ENCOUNTER — TELEPHONE (OUTPATIENT)
Dept: ORTHOPEDIC SURGERY | Age: 73
End: 2018-08-20

## 2018-08-21 DIAGNOSIS — M17.11 PRIMARY OSTEOARTHRITIS OF RIGHT KNEE: ICD-10-CM

## 2018-08-21 PROCEDURE — L1845 KO DOUBLE UPRIGHT PRE CST: HCPCS | Performed by: ORTHOPAEDIC SURGERY

## 2018-08-23 ENCOUNTER — OFFICE VISIT (OUTPATIENT)
Dept: BARIATRICS/WEIGHT MGMT | Age: 73
End: 2018-08-23

## 2018-08-23 VITALS
HEIGHT: 69 IN | SYSTOLIC BLOOD PRESSURE: 132 MMHG | BODY MASS INDEX: 43.69 KG/M2 | WEIGHT: 295 LBS | HEART RATE: 56 BPM | DIASTOLIC BLOOD PRESSURE: 84 MMHG

## 2018-08-23 DIAGNOSIS — M17.11 OSTEOARTHRITIS OF RIGHT KNEE, UNSPECIFIED OSTEOARTHRITIS TYPE: ICD-10-CM

## 2018-08-23 DIAGNOSIS — E66.01 MORBID OBESITY WITH BMI OF 40.0-44.9, ADULT (HCC): Primary | ICD-10-CM

## 2018-08-23 DIAGNOSIS — Z79.899 HIGH RISK MEDICATIONS (NOT ANTICOAGULANTS) LONG-TERM USE: ICD-10-CM

## 2018-08-23 DIAGNOSIS — Z71.3 DIETARY COUNSELING AND SURVEILLANCE: ICD-10-CM

## 2018-08-23 DIAGNOSIS — G47.33 OSA (OBSTRUCTIVE SLEEP APNEA): ICD-10-CM

## 2018-08-23 DIAGNOSIS — I10 ESSENTIAL HYPERTENSION: ICD-10-CM

## 2018-08-23 DIAGNOSIS — E66.01 MORBID OBESITY WITH BMI OF 40.0-44.9, ADULT (HCC): ICD-10-CM

## 2018-08-23 LAB
T4 FREE: 0.9 NG/DL (ref 0.9–1.8)
TSH REFLEX: 4.94 UIU/ML (ref 0.27–4.2)

## 2018-08-23 PROCEDURE — 3017F COLORECTAL CA SCREEN DOC REV: CPT | Performed by: FAMILY MEDICINE

## 2018-08-23 PROCEDURE — G8427 DOCREV CUR MEDS BY ELIG CLIN: HCPCS | Performed by: FAMILY MEDICINE

## 2018-08-23 PROCEDURE — 4040F PNEUMOC VAC/ADMIN/RCVD: CPT | Performed by: FAMILY MEDICINE

## 2018-08-23 PROCEDURE — 1123F ACP DISCUSS/DSCN MKR DOCD: CPT | Performed by: FAMILY MEDICINE

## 2018-08-23 PROCEDURE — G8417 CALC BMI ABV UP PARAM F/U: HCPCS | Performed by: FAMILY MEDICINE

## 2018-08-23 PROCEDURE — G8598 ASA/ANTIPLAT THER USED: HCPCS | Performed by: FAMILY MEDICINE

## 2018-08-23 PROCEDURE — 99204 OFFICE O/P NEW MOD 45 MIN: CPT | Performed by: FAMILY MEDICINE

## 2018-08-23 PROCEDURE — 1101F PT FALLS ASSESS-DOCD LE1/YR: CPT | Performed by: FAMILY MEDICINE

## 2018-08-23 PROCEDURE — 1036F TOBACCO NON-USER: CPT | Performed by: FAMILY MEDICINE

## 2018-08-23 ASSESSMENT — ENCOUNTER SYMPTOMS
RESPIRATORY NEGATIVE: 1
EYES NEGATIVE: 1
GASTROINTESTINAL NEGATIVE: 1

## 2018-08-23 ASSESSMENT — PATIENT HEALTH QUESTIONNAIRE - PHQ9
SUM OF ALL RESPONSES TO PHQ QUESTIONS 1-9: 0
SUM OF ALL RESPONSES TO PHQ9 QUESTIONS 1 & 2: 0
1. LITTLE INTEREST OR PLEASURE IN DOING THINGS: 0
2. FEELING DOWN, DEPRESSED OR HOPELESS: 0
SUM OF ALL RESPONSES TO PHQ QUESTIONS 1-9: 0

## 2018-08-23 NOTE — PROGRESS NOTES
medications to help you lose weight? [] Yes  [x] No    Have you ever been on a prescribed meal replacement program?  [] Yes  [x] No    The patient denies any significant cardiac or psychiatric disease. The patient denies a history thyroid disease. The patient denies a history of glaucoma. The patient denies a history of seizure disorders/epiliepsy. Dietitian's assessment reviewed and addressed with patient. Reviewed:  [x] Nutrition and the importance of regular protein intake       [x] Hidden CHO/carbohydrate sources  [x] Alcohol use  [x] Tobacco use  [x] Drug use- Denies   [x] Importance of exercise and reducing sedentary time  [x] PHQ-2: 0      Allergies   Allergen Reactions    Lasix [Furosemide]      Calf pain    Neosporin [Neomycin-Polymyx-Gramicid]      rash    Polysporin [Bacitracin-Polymyxin B]      rash         Current Outpatient Prescriptions:     isosorbide dinitrate (ISORDIL) 10 MG tablet, Take 1 tablet by mouth 3  times daily, Disp: 270 tablet, Rfl: 5    NITROSTAT 0.4 MG SL tablet, DISSOLVE 1 TAB UNDER TONGUE FOR CHEST PAIN - IF PAIN REMAINS AFTER 5 MIN, CALL 911 AND REPEAT DOSE. MAX 3 TABS IN 15 MINUTES, Disp: 25 tablet, Rfl: 1    aspirin EC 81 MG EC tablet, Take 1 tablet by mouth daily, Disp: 30 tablet, Rfl: 3    metoprolol (LOPRESSOR) 50 MG tablet, Take 50 mg by mouth 2 times daily, Disp: , Rfl:     multivitamin (THERAGRAN) per tablet, Take 1 tablet by mouth daily. Takes 1/2 in am and 1/2 in pm, Disp: , Rfl:     lisinopril (PRINIVIL;ZESTRIL) 20 MG tablet, Take 20 mg by mouth daily. , Disp: , Rfl:     pantoprazole (PROTONIX) 40 MG tablet, Take 40 mg by mouth daily. , Disp: , Rfl:     atorvastatin (LIPITOR) 80 MG tablet, Take 80 mg by mouth daily. , Disp: , Rfl:     ezetimibe (ZETIA) 10 MG tablet, Take 10 mg by mouth daily. , Disp: , Rfl:     clopidogrel (PLAVIX) 75 MG tablet, Take 75 mg by mouth daily.   , Disp: , Rfl:     Patient Active Problem List   Diagnosis    Mixed hyperlipidemia    Essential hypertension    Coronary atherosclerosis    Shortness of breath    SALLIE (obstructive sleep apnea)    Chronic diastolic congestive heart failure (HCC)    Coronary artery disease involving native coronary artery of native heart without angina pectoris       Past Medical History:   Diagnosis Date    Arthritis     CAD (coronary artery disease)     GERD (gastroesophageal reflux disease)     Hyperlipidemia     Hypertension     Sleep apnea        Past Surgical History:   Procedure Laterality Date    CORONARY ANGIOPLASTY  08/26/15    CORONARY ANGIOPLASTY WITH STENT PLACEMENT      EYE SURGERY      left eye    EYE SURGERY      right eye       Family History   Problem Relation Age of Onset    Heart Disease Mother     Heart Disease Father     Coronary Art Dis Father        Review of Systems   Constitutional:        Weight gain    HENT: Negative. Eyes: Negative. Respiratory: Negative. Cardiovascular: Negative. Gastrointestinal: Negative. Endocrine: Negative. Musculoskeletal: Negative. Neurological: Negative. Psychiatric/Behavioral: Negative. Physical Exam   Constitutional: He is oriented to person, place, and time. He appears well-developed and well-nourished. HENT:   Head: Normocephalic. Eyes: Conjunctivae and EOM are normal.   Neck: Normal range of motion. Neck supple. No thyromegaly present. Cardiovascular: Normal rate, regular rhythm and normal heart sounds. Exam reveals no gallop and no friction rub. No murmur heard. Pulmonary/Chest: Effort normal and breath sounds normal. No respiratory distress. He has no wheezes. He has no rales. Abdominal: Soft. He exhibits no mass. There is no tenderness. There is no rebound and no guarding. obese   Musculoskeletal: He exhibits no edema. Lymphadenopathy:     He has no cervical adenopathy. Neurological: He is alert and oriented to person, place, and time. Skin: Skin is warm and dry. hypertension I10 401.9 Stable on treatment. Explained that a 5-10% weight loss can also help improve this. 5. SALLIE (obstructive sleep apnea) G47.33 327.23 Consouled on the importance of sleep in weight management. Encourage compliance with CPAP. 6. High risk medications (not anticoagulants) long-term use Z79.899 V58.69 TSH with Reflex         Nutrition:  [] LCHF/Ketogenic [x] Low carb/low-calorie diet [] Low-calorie diet     []Maintenance        FITTE:   [] Cardio [] Resistance/stength exercises   [x] ACSM recommendations (150 minutes/week)-[facility] access provided to start aquatic exercises    [] Prevention of weight gain (150-250 minutes/week)        Behavior:   [x] Motivational interviewing performed    [] Referral for counseling  [x] Discussed strategies to overcome habits/challenges for focus      [] Stress management   [x] Stimulus control  [x] Sleep hygiene      Orders Placed This Encounter   Procedures    TSH with Reflex     Standing Status:   Future     Standing Expiration Date:   8/23/2019       No Follow-up on file. Greater than 50% of this 45 minute visit was used in direct counseling. This dictation was performed with a verbal recognition program (Dragon) and all efforts were made to ensure accuracy of this dictation. It is possible that there are still dictated errors within this note. If so, please bring any errors to my attention for correction.

## 2018-08-23 NOTE — PROGRESS NOTES
patient to feel comfortably full. Most days the patient eats until he is satisfied- stuffed. Patient describes level of activity as no. Needs to have knee replacement- plans to start water therapy. Goals  Weight: 195 lbs   Health Improvement: Needs to have a knee replacement     Assessment  Nutritional Needs: RMR=(9.99 x 134.1) + (6.25 x 175.3) - (4.92 x 73 y.o.) +5= 2086 kcal x 1.4 (sedentary activity factor)= 2920 kcal - 1000 (for 2 lb weight loss/week)= 1920 kcal.    Plan  Plan/Recommendations: General weight loss/lifestyle modification strategies discussed (elicit support from others; identify saboteurs; non-food rewards, etc). Optifast:  N/A  Diet Medications:  N/A     PES Statement:  Overweight/Obesity related to increased caloric intake and decreased physical activity as evidenced by BMI. Body mass index is 41.35 kg/m². Provided 1500 kcal LC meal plan and sample menu, limit liquid calories, include water therapy     Will follow up as necessary.     Valery Vogt

## 2018-08-23 NOTE — PATIENT INSTRUCTIONS
Patient Education        Learning About Obesity  What is obesity? Obesity means having so much body fat that your health is in danger. Having too much body fat can lead to type 2 diabetes, heart disease, high blood pressure, arthritis, sleep apnea, and stroke. Even if you don't feel bad now, think about these health risks. Do they seem like a good reason to start on a new path toward a healthier weight? Or do you have another personal, powerful reason for wanting to lose weight? Whatever it is, keep it in mind. It can be hard to change eating habits and exercise habits. But with your own reason and plan, you can do it. How do you know if your weight is in the obesity range? To know if your weight is in the obesity range, your doctor looks at your body mass index (BMI) and waist size. Your BMI is a number that is calculated from your weight and your height. To figure your BMI for yourself, get a BMI table from your doctor or use an online tool, such as http://www.mccollum.com/ on the [employer] of [employer]. A healthy BMI is from 18.5 to 24.9. If your BMI is from 30.0 to 39.9, you are considered to have obesity. If your BMI is over 40.0, you are considered to have extreme obesity. What causes obesity? When you take in more calories than you burn off, you gain weight. How you eat, how active you are, and other things affect how your body uses calories and whether you gain weight. If you have family members who have too much body fat, you may have inherited a tendency to gain weight. And your family also helps form your eating and lifestyle habits, which can lead to obesity. Also, our busy lives make it harder to plan and cook healthy meals. For many of us, it's easier to reach for prepared foods, go out to eat, or go to the drive-through. But these foods are often high in saturated fat and calories. Portions are often too large.   What can you do to reach a healthy weight? Focus on health, not diets. Diets are hard to stay on and don't work in the long run. It is very hard to stay with a diet that includes lots of big changes in your eating habits. Instead of a diet, focus on lifestyle changes that will improve your health and achieve the right balance of energy and calories. To lose weight, you need to burn more calories than you take in. You can do it by eating healthy foods in reasonable amounts and becoming more active, even a little bit every day. Making small changes over time can add up to a lot. Make a plan for change. Many people have found that naming their reasons for change and staying focused on their plan can make a big difference. Work with your doctor to create a plan that is right for you. · Ask yourself: Karin Macedo are my personal, most powerful reasons for wanting this change? What will my life look like when I've made the change? \"  · Set your long-term goal. Make it specific, such as \"I will lose x pounds. \"  · Break your long-term goal into smaller, short-term goals. Make these small steps specific and within your reach, things you know you can do. These steps are what keep you going from day to day. Talk with your doctor about other weight-loss options. If you have a BMI in a certain range and have not been able to lose weight with diet and exercise, medicine or surgery may be an option for you. Before your doctor will prescribe medicines or surgery, he or she will probably want you to be more active and follow your healthy eating plan for a period of time. These habits are key lifelong changes for managing your weight, with or without other medical treatment. And these changes can help you avoid weight-related health problems. How can you stay on your plan for change? Be ready. Choose to start during a time when there are few events that might trigger slip-ups, like holidays, social events, and high-stress periods.   Decide on your first

## 2018-09-12 ENCOUNTER — HOSPITAL ENCOUNTER (OUTPATIENT)
Dept: PHYSICAL THERAPY | Age: 73
Setting detail: THERAPIES SERIES
Discharge: HOME | End: 2018-09-12
Payer: MEDICARE

## 2018-09-12 PROCEDURE — 97161 PT EVAL LOW COMPLEX 20 MIN: CPT

## 2018-09-12 PROCEDURE — G8978 MOBILITY CURRENT STATUS: HCPCS

## 2018-09-12 PROCEDURE — G8979 MOBILITY GOAL STATUS: HCPCS

## 2018-09-12 PROCEDURE — 97530 THERAPEUTIC ACTIVITIES: CPT

## 2018-09-12 NOTE — FLOWSHEET NOTE
Physical Therapy Aquatic Flow Sheet  Date:  9/12/2018    Patient Name:  Peter Briones    Medical/Treatment Diagnosis Information:  · Diagnosis: R knee pain, OA  Insurance/Certification information:  PT Insurance Information: Medicare  Physician Information:  Referring Practitioner: Joy Wilkinson MD  Plan of care signed (Y/N):  N  Visit# / total visits:  1/10     Pain level: /10   Electronically signed by:  Torrie Liang PT    Medicare Cap (if applicable):  178 = total amount used, updated 9/12/2018    Key  B= Belt DB= Dumbells T= Theratube   H= Hydrotone N= Noodles W= Weights   P= Paddles S= Speedo equipment K= Kickboard     Exercises/Activities   Warm-up/Amb    Exercises      Slow forward   nv  HR/TR  nv    Slow sideways  nv  Marches  nv    Slow backwards  nv  Mini-squats  nv    Medium forward    4-way SLR  nv    Medium sideways    Hip circles/fig 8  nv    Small shuffle    Hamstring curls  nv    Jog    Knee extension      Braiding    Pelvic tilts  nv    Bicycling  nv  Scap squeezes          Shoulder flex/ext      Functional    Shoulder abd/add      Step    Shoulder H. abd/add      Lifting    Shoulder IR/ER      Hand to opp knee    Rowing      Push down squat    Bilateral pull down      UE PNF    Push/pull      LE PNF    Push downs      Wall push ups    Arm circles      SLS  nv  Elbow flex/ext          Chin tuck      Stretching    UT shrugs/rolls      Gastroc/Soleus    Rocking horse      Hamstring          SKTC    Other      Piriformis          Hip flexor          Ladder pull          Pec stretch          Post deltoid           Time In:      Timed Code Treatment Minutes:       Total Treatment Minutes:      Treatment/Activity Tolerance:   [] Patient tolerated treatment well [] Patient limited by fatigue   [] Patient limited by pain [] Patient limited by other medical complications  [] Other:     Prognosis: [] Good [] Fair  [] Poor    Patient Requires Follow-up:  [] Yes  [] No    Plan: [] Continue per plan of

## 2018-09-25 ENCOUNTER — HOSPITAL ENCOUNTER (OUTPATIENT)
Dept: PHYSICAL THERAPY | Age: 73
Setting detail: THERAPIES SERIES
Discharge: HOME | End: 2018-09-25
Payer: MEDICARE

## 2018-09-25 PROCEDURE — 97150 GROUP THERAPEUTIC PROCEDURES: CPT

## 2018-09-25 PROCEDURE — 97113 AQUATIC THERAPY/EXERCISES: CPT

## 2018-09-25 NOTE — FLOWSHEET NOTE
Requires Follow-up:  [x] Yes  [] No    Plan: [x] Continue per plan of care [] Alter current plan (see comments)   [] Plan of care initiated [] Hold pending MD visit [] Discharge    See Weekly Progress Note: [] Yes  [x] No  Next due:

## 2018-09-27 ENCOUNTER — HOSPITAL ENCOUNTER (OUTPATIENT)
Dept: PHYSICAL THERAPY | Age: 73
Setting detail: THERAPIES SERIES
Discharge: HOME | End: 2018-09-27
Payer: MEDICARE

## 2018-09-27 PROCEDURE — 97113 AQUATIC THERAPY/EXERCISES: CPT

## 2018-09-27 PROCEDURE — 97150 GROUP THERAPEUTIC PROCEDURES: CPT

## 2018-09-27 NOTE — FLOWSHEET NOTE
Physical Therapy Aquatic Flow Sheet  Date:  9/27/2018    Patient Name:  Kenneth Pat    Medical/Treatment Diagnosis Information:  · Diagnosis: R knee pain, OA  Insurance/Certification information:  PT Insurance Information: Medicare  Physician Information:  Referring Practitioner: Tucker Og MD  Plan of care signed (Y/N):  N  Visit# / total visits:  3/10     Pain level: 5/10   Electronically signed by:  Erin Malcolm PT    Medicare Cap (if applicable):  032 = total amount used, updated 9/27/2018    Key  B= Belt DB= Dumbells T= Theratube   H= Hydrotone N= Noodles W= Weights   P= Paddles S= Speedo equipment K= Kickboard     Exercises/Activities   Warm-up/Amb    Exercises      Slow forward   2 laps  HR/TR  20 B    Slow sideways    2 laps  Marches  2 min    Slow backwards    2 laps  Mini-squats  x15    Medium forward    4-way SLR  x15 B    Medium sideways    Hip circles/fig 8  x15 B    Small shuffle    Hamstring curls  x15 B    Jog    Knee extension      Braiding    Pelvic tilts  [id number]    Bicycling  2 min  Scap squeezes          Shoulder flex/ext      Functional    Shoulder abd/add      Step    Shoulder H. abd/add      Lifting    Shoulder IR/ER      Hand to opp knee    Rowing      Push down squat    Bilateral pull down      UE PNF    Push/pull      LE PNF    Push downs      Wall push ups    Arm circles      SLS  [id number]  Elbow flex/ext          Chin tuck      Stretching    UT shrugs/rolls      Gastroc/Soleus    Rocking horse      Hamstring          SKTC    Other      Piriformis          Hip flexor          Ladder pull          Pec stretch          Post deltoid           Time In:  1403    Timed Code Treatment Minutes:  15    Total Treatment Minutes:  44    Treatment/Activity Tolerance:   [x] Patient tolerated treatment well [] Patient limited by fatigue   [] Patient limited by pain [] Patient limited by other medical complications  [] Other:     Prognosis: [x] Good [] Fair  [] Poor    Patient Requires Follow-up:  [x] Yes  [] No    Plan: [x] Continue per plan of care [] Alter current plan (see comments)   [] Plan of care initiated [] Hold pending MD visit [] Discharge    See Weekly Progress Note: [] Yes  [x] No  Next due:

## 2018-10-02 ENCOUNTER — HOSPITAL ENCOUNTER (OUTPATIENT)
Dept: PHYSICAL THERAPY | Age: 73
Setting detail: THERAPIES SERIES
Discharge: HOME | End: 2018-10-02
Payer: MEDICARE

## 2018-10-02 PROCEDURE — 97113 AQUATIC THERAPY/EXERCISES: CPT

## 2018-10-02 PROCEDURE — 97150 GROUP THERAPEUTIC PROCEDURES: CPT

## 2018-10-04 ENCOUNTER — HOSPITAL ENCOUNTER (OUTPATIENT)
Dept: PHYSICAL THERAPY | Age: 73
Setting detail: THERAPIES SERIES
Discharge: HOME | End: 2018-10-04
Payer: MEDICARE

## 2018-10-04 PROCEDURE — 97113 AQUATIC THERAPY/EXERCISES: CPT

## 2018-10-04 PROCEDURE — 97150 GROUP THERAPEUTIC PROCEDURES: CPT

## 2018-10-05 ENCOUNTER — TELEPHONE (OUTPATIENT)
Dept: BARIATRICS/WEIGHT MGMT | Age: 73
End: 2018-10-05

## 2018-10-05 NOTE — TELEPHONE ENCOUNTER
[facility] He had an appointment yesterday in [facility] and was in the wrong building which made him miss the appointment. He made another appointment, but claims [facility] before because he has questions about what the appointment entails. I scheduled him another appointment in [facility] and his number to reach him is [phone].

## 2018-10-09 ENCOUNTER — HOSPITAL ENCOUNTER (OUTPATIENT)
Dept: PHYSICAL THERAPY | Age: 73
Setting detail: THERAPIES SERIES
Discharge: HOME | End: 2018-10-09
Payer: MEDICARE

## 2018-10-09 PROCEDURE — 97113 AQUATIC THERAPY/EXERCISES: CPT

## 2018-10-09 PROCEDURE — 97150 GROUP THERAPEUTIC PROCEDURES: CPT

## 2018-10-11 ENCOUNTER — HOSPITAL ENCOUNTER (OUTPATIENT)
Dept: PHYSICAL THERAPY | Age: 73
Setting detail: THERAPIES SERIES
Discharge: HOME | End: 2018-10-11
Payer: MEDICARE

## 2018-10-11 PROCEDURE — 97150 GROUP THERAPEUTIC PROCEDURES: CPT

## 2018-10-11 PROCEDURE — 97113 AQUATIC THERAPY/EXERCISES: CPT

## 2018-10-11 NOTE — FLOWSHEET NOTE
Physical Therapy Aquatic Flow Sheet  Date:  10/11/2018    Patient Name:  Jovana Gary    Medical/Treatment Diagnosis Information:  · Diagnosis: R knee pain, OA  Insurance/Certification information:  PT Insurance Information: Medicare  Physician Information:  Referring Practitioner: Red Castellanos MD  Plan of care signed (Y/N):  N  Visit# / total visits:  7/10     Pain level: 1-2/10   Electronically signed by:  Niru Seaman PT, DPT #[id number]    Medicare Cap (if applicable):  227 = total amount used, updated 10/11/2018    Key  B= Belt DB= Dumbells T= Theratube   H= Hydrotone N= Noodles W= Weights   P= Paddles S= Speedo equipment K= Kickboard     Exercises/Activities   Warm-up/Amb    Exercises      Slow forward   2 laps  HR/TR  20 B    Slow sideways    2 laps  Marches  3 min    Slow backwards    2 laps  Mini-squats  3x15    Medium forward    4-way SLR  x25 B    Medium sideways    Hip circles/fig 8  x20 B    Small shuffle    Hamstring curls  x30 B    Jog    Knee extension      Braiding    Pelvic tilts  [id number]    Bicycling  3'  Scap squeezes          Shoulder flex/ext      Functional    Shoulder abd/add      Step  3x10  Shoulder H. abd/add      Lifting    Shoulder IR/ER      Hand to opp knee    Rowing      Push down squat    Bilateral pull down      UE PNF    Push/pull      LE PNF    Push downs      Wall push ups    Arm circles      SLS  [id number] B  Elbow flex/ext          Chin tuck      Stretching    UT shrugs/rolls      Gastroc/Soleus  2x30 sec B  Rocking horse      Hamstring          SKTC    Other      Piriformis          Hip flexor          Ladder pull          Pec stretch          Post deltoid           Time In:  1400    Timed Code Treatment Minutes:  15    Total Treatment Minutes:  35    Treatment/Activity Tolerance:   [x] Patient tolerated treatment well [] Patient limited by fatigue   [] Patient limited by pain [] Patient limited by other medical complications  [] Other:     Prognosis: [x] Good [] Fair  []

## 2018-10-16 ENCOUNTER — HOSPITAL ENCOUNTER (OUTPATIENT)
Dept: PHYSICAL THERAPY | Age: 73
Setting detail: THERAPIES SERIES
Discharge: HOME | End: 2018-10-16
Payer: MEDICARE

## 2018-10-16 ENCOUNTER — HOSPITAL ENCOUNTER (OUTPATIENT)
Dept: PHYSICAL THERAPY | Age: 73
Setting detail: THERAPIES SERIES
End: 2018-10-16
Payer: MEDICARE

## 2018-10-16 PROCEDURE — 97113 AQUATIC THERAPY/EXERCISES: CPT

## 2018-10-16 PROCEDURE — 97150 GROUP THERAPEUTIC PROCEDURES: CPT

## 2018-10-18 ENCOUNTER — HOSPITAL ENCOUNTER (OUTPATIENT)
Dept: PHYSICAL THERAPY | Age: 73
Setting detail: THERAPIES SERIES
Discharge: HOME | End: 2018-10-18
Payer: MEDICARE

## 2018-10-18 PROCEDURE — 97110 THERAPEUTIC EXERCISES: CPT

## 2018-10-18 NOTE — FLOWSHEET NOTE
be indep in HEP   Short term goal 2: pt will perform 20 mins of stationary biking 2-3 times per week ind  Short term goal 3: pt will improve SL balance time to 15 sec or greater B without UE support  Short term goal 4: pt will demonstrate decreased Trendelenberg B during gait  Short term goal 5: pt will demonstrate improved core activation during ther ex and functional activity            Plan: [x] Continue per plan of care [] Alter current plan (see comments)   [x] Plan of care initiated [] Hold pending MD visit [] Discharge      Plan for Next Session:   See above      Re-Certification Due Date:  11/12/2018    Signature:  Abram Yadav, PT  , DPT #[id number]

## 2018-10-18 NOTE — FLOWSHEET NOTE
Physical Therapy    Pt d/c from aquatic therapy today, all remaining aquatic sessions cancelled as pt will be participating in land based PT 2x/wk for 4 wks at this time.     Luiz Desir, PT, DPT #[id number]

## 2018-10-23 ENCOUNTER — APPOINTMENT (OUTPATIENT)
Dept: PHYSICAL THERAPY | Age: 73
End: 2018-10-23
Payer: MEDICARE

## 2018-10-24 ENCOUNTER — HOSPITAL ENCOUNTER (OUTPATIENT)
Dept: PHYSICAL THERAPY | Age: 73
Setting detail: THERAPIES SERIES
Discharge: HOME | End: 2018-10-24
Payer: MEDICARE

## 2018-10-24 PROCEDURE — 97110 THERAPEUTIC EXERCISES: CPT

## 2018-10-24 NOTE — FLOWSHEET NOTE
Physical Therapy Daily Treatment Note    Date:  10/24/2018    Patient Name:  Tisha Alvarado    :  1945  MRN: [id number]  Restrictions/Precautions:    Medical/Treatment Diagnosis Information:  · Diagnosis: R knee pain, OA  Insurance/Certification information:  PT Insurance Information: Medicare  Physician Information:  Referring Practitioner: Erika Garner MD  Plan of care signed (Y/N):  N  Visit# / total visits:  10/17     G-Code (if applicable):         PT G-Codes  Functional Assessment Tool Used: LEFS  Score: At initial eval-33  10/18-56/80 (70% physical function)  Functional Limitation: Mobility: Walking and moving around  Mobility: Walking and Moving Around Current Status (): At least 40 percent but less than 60 percent impaired, limited or restricted  Mobility: Walking and Moving Around Goal Status (): At least 20 percent but less than 40 percent impaired, limited or restricted    Medicare Cap (if applicable):  532 = total amount used, updated 10/24/2018    Time in:  9:26    Timed Treatment: 38 Total Treatment Time:  44  ________________________________________________________________________________________    Pain Level:    0-5/10  SUBJECTIVE:  No pain at rest, 5/10 with activity. Plans to go to the pool after today's visit. OBJECTIVE: good balance. Needs cues to remain straight with hip ABD due to weakness.     Exercise/Equipment Resistance/Repetitions Other comments          recumbent bike X5', lvl 1 HEP 2-3 x/wk for 10-15 mins            Bridges with SB under LEs 2x10      clamshells x15 B Added to HEP     TA sets  -with march  -with KFO NV  NV  NV      SLR 2x10 R Added to HEP     Hip machine ABD 10# 2x10 B With cues     Hip stacking 2x30\" B with 8# ball      Standing balance   1/2 roll x1' without LOB  Rocker board A/P, lat x1' each     TKE with SC [place] 5\"x15 R      FSU 4\" x10 B                                                       Other Therapeutic Activities:      Manual

## 2018-10-25 ENCOUNTER — APPOINTMENT (OUTPATIENT)
Dept: PHYSICAL THERAPY | Age: 73
End: 2018-10-25
Payer: MEDICARE

## 2018-10-26 ENCOUNTER — HOSPITAL ENCOUNTER (OUTPATIENT)
Dept: PHYSICAL THERAPY | Age: 73
Setting detail: THERAPIES SERIES
Discharge: HOME | End: 2018-10-26
Payer: MEDICARE

## 2018-10-26 PROCEDURE — 97110 THERAPEUTIC EXERCISES: CPT

## 2018-10-26 NOTE — FLOWSHEET NOTE
Physical Therapy Daily Treatment Note    Date:  10/26/2018    Patient Name:  Roge Henderson    :  1945  MRN: [id number]  Restrictions/Precautions:    Medical/Treatment Diagnosis Information:  · Diagnosis: R knee pain, OA  Insurance/Certification information:  PT Insurance Information: Medicare  Physician Information:  Referring Practitioner: Manish Meeks MD  Plan of care signed (Y/N):  N  Visit# / total visits:       G-Code (if applicable):         PT G-Codes  Functional Assessment Tool Used: LEFS  Score: At initial eval-33  10/18-56/80 (70% physical function)  Functional Limitation: Mobility: Walking and moving around  Mobility: Walking and Moving Around Current Status (): At least 40 percent but less than 60 percent impaired, limited or restricted  Mobility: Walking and Moving Around Goal Status (): At least 20 percent but less than 40 percent impaired, limited or restricted    Medicare Cap (if applicable):  809*** = total amount used, updated 10/26/2018    Time in:  9:26    Timed Treatment: 36 Total Treatment Time:  ***  ________________________________________________________________________________________    Pain Level:    0-5/10  SUBJECTIVE:  No c/o from LV. Has not started HEP from LV yet. OBJECTIVE: good balance. Needs cues to remain straight with hip ABD due to weakness.     Exercise/Equipment Resistance/Repetitions Other comments     TG x5'    recumbent bike X5', lvl 1 after tx HEP 2-3 x/wk for 10-15 mins            Bridges with SB under LEs 2x12      clamshells x15 B HEP     TA sets  -with march  -with KFO NV  NV  NV      SLR  HEP     Hip machine ABD 10# 2x10 B With cues     Hip stacking 2x30\" B with 8# ball      Standing balance   Rocker board A/P, lat x1' each     TKE with SC [place] 5\"x15 R Yellow NV     FSU 4\" 2x10 R                                                       Other Therapeutic Activities:      Manual Treatments:         Modalities:      Test/Measurements:

## 2018-10-30 ENCOUNTER — APPOINTMENT (OUTPATIENT)
Dept: PHYSICAL THERAPY | Age: 73
End: 2018-10-30
Payer: MEDICARE

## 2018-10-31 ENCOUNTER — HOSPITAL ENCOUNTER (OUTPATIENT)
Dept: PHYSICAL THERAPY | Age: 73
Setting detail: THERAPIES SERIES
Discharge: HOME | End: 2018-10-31
Payer: MEDICARE

## 2018-10-31 PROCEDURE — 97110 THERAPEUTIC EXERCISES: CPT

## 2018-10-31 NOTE — FLOWSHEET NOTE
Physical Therapy Daily Treatment Note    Date:  10/31/2018    Patient Name:  Mark Lagos    :  1945  MRN: [id number]  Restrictions/Precautions:    Medical/Treatment Diagnosis Information:  · Diagnosis: R knee pain, OA  Insurance/Certification information:  PT Insurance Information: Medicare  Physician Information:  Referring Practitioner: Kendall Case MD  Plan of care signed (Y/N):  N  Visit# / total visits:       G-Code (if applicable):         PT G-Codes  Functional Assessment Tool Used: LEFS  Score: At initial eval-33  10/18-56/80 (70% physical function)  Functional Limitation: Mobility: Walking and moving around  Mobility: Walking and Moving Around Current Status (): At least 40 percent but less than 60 percent impaired, limited or restricted  Mobility: Walking and Moving Around Goal Status (): At least 20 percent but less than 40 percent impaired, limited or restricted    Medicare Cap (if applicable):  741 = total amount used, updated 10/31/2018    Time in:  10:02    Timed Treatment: 34 Total Treatment Time:  48  ________________________________________________________________________________________    Pain Level:    7/10  SUBJECTIVE:  No c/o from LV. Has not started HEP from  yet. OBJECTIVE: limited by R knee pain today. Exercise/Equipment Resistance/Repetitions Other comments     TG x5'    recumbent bike  HEP 2-3 x/wk for 10-15 mins     Nu step seat 10 L3->L5 x10'      Bridges with SB under LEs 2x12      clamshells x15 B HEP     TA sets  -with march  -with KFO NV  NV  NV      SLR 2x10 R HEP     Hip machine ABD 10#1x15 B With cues     Hip stacking 2x30\" B with 8# ball      Standing balance   Rocker board A/P, lat x1' each     TKE with SC yellow 5\"x15 R      FSU 4\" 2x10 B                                                       Other Therapeutic Activities:      Manual Treatments:         Modalities:      Test/Measurements:           ASSESSMENT: remains status quo.   R knee

## 2018-11-01 ENCOUNTER — APPOINTMENT (OUTPATIENT)
Dept: PHYSICAL THERAPY | Age: 73
End: 2018-11-01
Payer: MEDICARE

## 2018-11-08 ENCOUNTER — HOSPITAL ENCOUNTER (OUTPATIENT)
Dept: PHYSICAL THERAPY | Age: 73
Setting detail: THERAPIES SERIES
Discharge: HOME | End: 2018-11-08
Payer: MEDICARE

## 2018-11-08 PROCEDURE — 97110 THERAPEUTIC EXERCISES: CPT

## 2018-11-08 NOTE — FLOWSHEET NOTE
Physical Therapy Daily Treatment Note    Date:  2018    Patient Name:  Belkys Vigil    :  1945  MRN: [id number]  Restrictions/Precautions:    Medical/Treatment Diagnosis Information:  · Diagnosis: R knee pain, OA  Insurance/Certification information:  PT Insurance Information: Medicare  Physician Information:  Referring Practitioner: Annita Marshall MD  Plan of care signed (Y/N):  N  Visit# / total visits:       G-Code (if applicable):         PT G-Codes  Functional Assessment Tool Used: LEFS  Score: At initial eval-33  10/18-56/80 (70% physical function)  Functional Limitation: Mobility: Walking and moving around  Mobility: Walking and Moving Around Current Status (): At least 40 percent but less than 60 percent impaired, limited or restricted  Mobility: Walking and Moving Around Goal Status (): At least 20 percent but less than 40 percent impaired, limited or restricted    Medicare Cap (if applicable):  315= total amount used, updated 2018    Time in:  9:32    Timed Treatment: 28 Total Treatment Time:  47  ________________________________________________________________________________________    Pain Level:    7/10  SUBJECTIVE:  \"sore as can be\"  OBJECTIVE: limited by R knee pain today. Exercise/Equipment Resistance/Repetitions Other comments     TG x5'    recumbent bike  HEP 2-3 x/wk for 10-15 mins          Bridges with SB under LEs 2x15      clamshells x15 B HEP     TA sets  -with march  -with KFO NV  NV  NV      SLR 2x10 R HEP     Hip machine ABD 10#1x15 B With cues     Hip stacking 2x30\" B with 8# ball      Standing balance   Rocker board A/P, lat x1' each     TKE with SC yellow 5\"x15 R      FSU 4\" 2x10 B                                         Nu step seat 12 L5xx10'             Other Therapeutic Activities:      Manual Treatments:         Modalities:      Test/Measurements:           ASSESSMENT: remains status quo. R knee painful today.     Treatment/Activity

## 2018-11-12 ENCOUNTER — HOSPITAL ENCOUNTER (OUTPATIENT)
Dept: PHYSICAL THERAPY | Age: 73
Setting detail: THERAPIES SERIES
Discharge: HOME | End: 2018-11-12
Payer: MEDICARE

## 2018-11-12 PROCEDURE — 97110 THERAPEUTIC EXERCISES: CPT

## 2018-11-12 NOTE — FLOWSHEET NOTE
Physical Therapy Daily Treatment Note    Date:  2018    Patient Name:  Mich Ruggiero    :  1945  MRN: [id number]  Restrictions/Precautions:    Medical/Treatment Diagnosis Information:  · Diagnosis: R knee pain, OA  Insurance/Certification information:  PT Insurance Information: Medicare  Physician Information:  Referring Practitioner: Storm Palacios MD  Plan of care signed (Y/N):  N  Visit# / total visits:       G-Code (if applicable):         PT G-Codes  Functional Assessment Tool Used: LEFS  Score: At initial eval-33  10/18-56/80 (70% physical function)  Functional Limitation: Mobility: Walking and moving around  Mobility: Walking and Moving Around Current Status (): At least 40 percent but less than 60 percent impaired, limited or restricted  Mobility: Walking and Moving Around Goal Status (): At least 20 percent but less than 40 percent impaired, limited or restricted    Medicare Cap (if applicable):  807= total amount used, updated 2018    Time in:  9:30    Timed Treatment: 30 Total Treatment Time:  40  ________________________________________________________________________________________    Pain Level:   2/10  SUBJECTIVE:  Feeling OK after his PT visits. OBJECTIVE:     Exercise/Equipment Resistance/Repetitions Other comments     TG x5'    recumbent bike  HEP 2-3 x/wk for 10-15 mins          Bridges with SB under LEs 2x15      clamshells x15 B HEP     TA sets  -with march  -with KFO NV  NV  NV      SLR 3x12 R HEP     Hip machine ABD 10#1x15 B With cues     Hip stacking 2x30\" B with 8# ball      Standing balance   Rocker board A/P, lat x1' each     TKE with SC yellow 5\"x15 R      FSU                                          Nu step seat 12 L5x10'             Other Therapeutic Activities:      Manual Treatments:         Modalities:      Test/Measurements:           ASSESSMENT: remains status quo. R knee painful today.     Treatment/Activity Tolerance:   [x] Patient

## 2018-11-14 ENCOUNTER — HOSPITAL ENCOUNTER (OUTPATIENT)
Dept: PHYSICAL THERAPY | Age: 73
Setting detail: THERAPIES SERIES
Discharge: HOME | End: 2018-11-14
Payer: MEDICARE

## 2018-11-14 PROCEDURE — 97110 THERAPEUTIC EXERCISES: CPT

## 2018-11-19 ENCOUNTER — HOSPITAL ENCOUNTER (OUTPATIENT)
Dept: PHYSICAL THERAPY | Age: 73
Setting detail: THERAPIES SERIES
Discharge: HOME | End: 2018-11-19
Payer: MEDICARE

## 2018-11-19 PROCEDURE — 97110 THERAPEUTIC EXERCISES: CPT

## 2018-11-21 ENCOUNTER — HOSPITAL ENCOUNTER (OUTPATIENT)
Dept: PHYSICAL THERAPY | Age: 73
Setting detail: THERAPIES SERIES
Discharge: HOME | End: 2018-11-21
Payer: MEDICARE

## 2018-11-21 PROCEDURE — G8980 MOBILITY D/C STATUS: HCPCS

## 2018-11-21 PROCEDURE — G8979 MOBILITY GOAL STATUS: HCPCS

## 2018-11-21 PROCEDURE — 97110 THERAPEUTIC EXERCISES: CPT

## 2018-11-29 ENCOUNTER — OFFICE VISIT (OUTPATIENT)
Dept: BARIATRICS/WEIGHT MGMT | Age: 73
End: 2018-11-29
Payer: MEDICARE

## 2018-11-29 VITALS
DIASTOLIC BLOOD PRESSURE: 74 MMHG | RESPIRATION RATE: 16 BRPM | WEIGHT: 268 LBS | BODY MASS INDEX: 39.69 KG/M2 | HEIGHT: 69 IN | HEART RATE: 60 BPM | SYSTOLIC BLOOD PRESSURE: 134 MMHG

## 2018-11-29 DIAGNOSIS — R79.89 ELEVATED TSH: ICD-10-CM

## 2018-11-29 DIAGNOSIS — E66.9 OBESITY (BMI 30-39.9): Primary | ICD-10-CM

## 2018-11-29 PROCEDURE — 99213 OFFICE O/P EST LOW 20 MIN: CPT | Performed by: NURSE PRACTITIONER

## 2018-11-29 PROCEDURE — G8417 CALC BMI ABV UP PARAM F/U: HCPCS | Performed by: NURSE PRACTITIONER

## 2018-11-29 PROCEDURE — 1123F ACP DISCUSS/DSCN MKR DOCD: CPT | Performed by: NURSE PRACTITIONER

## 2018-11-29 PROCEDURE — G8484 FLU IMMUNIZE NO ADMIN: HCPCS | Performed by: NURSE PRACTITIONER

## 2018-11-29 PROCEDURE — 3017F COLORECTAL CA SCREEN DOC REV: CPT | Performed by: NURSE PRACTITIONER

## 2018-11-29 PROCEDURE — G8598 ASA/ANTIPLAT THER USED: HCPCS | Performed by: NURSE PRACTITIONER

## 2018-11-29 PROCEDURE — 1036F TOBACCO NON-USER: CPT | Performed by: NURSE PRACTITIONER

## 2018-11-29 PROCEDURE — G8427 DOCREV CUR MEDS BY ELIG CLIN: HCPCS | Performed by: NURSE PRACTITIONER

## 2018-11-29 PROCEDURE — 4040F PNEUMOC VAC/ADMIN/RCVD: CPT | Performed by: NURSE PRACTITIONER

## 2018-11-29 PROCEDURE — 1101F PT FALLS ASSESS-DOCD LE1/YR: CPT | Performed by: NURSE PRACTITIONER

## 2018-11-29 ASSESSMENT — ENCOUNTER SYMPTOMS
RESPIRATORY NEGATIVE: 1
GASTROINTESTINAL NEGATIVE: 1

## 2018-11-29 NOTE — PROGRESS NOTES
08/23/2018   Component Date Value Ref Range Status    TSH 08/23/2018 4.94* 0.27 - 4.20 uIU/mL Final    T4 Free 08/23/2018 0.9  0.9 - 1.8 ng/dL Final         Assessment and Plan:    ICD-10-CM    1. Obesity (BMI 30-39. 9) E66.9 Congratulated on weight loss so far. Reinforced meal plan and dietician's recommendations. Goal to include breakfast daily. Continue to reduce alcohol intake. Increase water intake. Continue exercise as tolerate. 1500 calorie low carb meal plan    Follow up in  3 months   2. Elevated TSH R79.89 Recommended follow up with PCP to discuss results and potentially initiate treatment. Nutrition plan: [] LCHF/Ketogenic   [x] Modified low-calorie diet (low carb/low-fazal)               [] Low-calorie diet    []Maintenance       []Other    Exercise: [x]Cardio     []Resistance/strength training      []ACSM recommendations (150 minutes/week in active weight loss)                              Behavior: [x]Motivational interviewing performed    [] Referralfor counseling                         [x]Discussed strategies to overcome habits/challenges for focus         [] Stress management   [] Stimulus control                    [] Sleep hygiene    Reviewed:  [x] Nutrition and the importance of regularprotein intake  [x] Hidden carbohydrate sources  [x] Alcohol use  [x] Tobacco use   [x] Importance of exercise and reducing sedentary time  [x] Labs reviewed- TSH elevated (4.94), normal T4 Free       No orders of the defined types were placed in this encounter.       Follow up in 3 months

## 2018-11-29 NOTE — PATIENT INSTRUCTIONS
Key Low Carb, High Healthy Fat Dietary Points:    - Meats (preferably organic or grass fed) are great sources of protein and are low in carbohydrates. Jacek Golds with coconut, olive, avocado, or almond oils. - When buying dairy, choose regular or full fat options. - Choose vegetables that grow above ground as they are generally lower in carbohydrates. - Avoid bread, rice, potatoes, pasta and all sources of simple sugars (desserts, soda, breakfast cereals). - Choose beverages that are calorie and sugar free, such as water or flavored tsai. - When eating fruit, choose berries as a snack option a few times a week.

## 2018-12-04 ENCOUNTER — OFFICE VISIT (OUTPATIENT)
Dept: ORTHOPEDIC SURGERY | Age: 73
End: 2018-12-04
Payer: MEDICARE

## 2018-12-04 VITALS — WEIGHT: 268.08 LBS | HEIGHT: 69 IN | BODY MASS INDEX: 39.71 KG/M2

## 2018-12-04 DIAGNOSIS — E66.9 OBESITY WITH BODY MASS INDEX OF 30.0-39.9: ICD-10-CM

## 2018-12-04 DIAGNOSIS — M17.11 PRIMARY OSTEOARTHRITIS OF RIGHT KNEE: Primary | ICD-10-CM

## 2018-12-04 PROCEDURE — 3017F COLORECTAL CA SCREEN DOC REV: CPT | Performed by: PHYSICIAN ASSISTANT

## 2018-12-04 PROCEDURE — 1101F PT FALLS ASSESS-DOCD LE1/YR: CPT | Performed by: PHYSICIAN ASSISTANT

## 2018-12-04 PROCEDURE — 99213 OFFICE O/P EST LOW 20 MIN: CPT | Performed by: PHYSICIAN ASSISTANT

## 2018-12-04 PROCEDURE — G8484 FLU IMMUNIZE NO ADMIN: HCPCS | Performed by: PHYSICIAN ASSISTANT

## 2018-12-04 PROCEDURE — G8598 ASA/ANTIPLAT THER USED: HCPCS | Performed by: PHYSICIAN ASSISTANT

## 2018-12-04 PROCEDURE — G8427 DOCREV CUR MEDS BY ELIG CLIN: HCPCS | Performed by: PHYSICIAN ASSISTANT

## 2018-12-04 PROCEDURE — 1036F TOBACCO NON-USER: CPT | Performed by: PHYSICIAN ASSISTANT

## 2018-12-04 PROCEDURE — 1123F ACP DISCUSS/DSCN MKR DOCD: CPT | Performed by: PHYSICIAN ASSISTANT

## 2018-12-04 PROCEDURE — G8417 CALC BMI ABV UP PARAM F/U: HCPCS | Performed by: PHYSICIAN ASSISTANT

## 2018-12-04 PROCEDURE — 4040F PNEUMOC VAC/ADMIN/RCVD: CPT | Performed by: PHYSICIAN ASSISTANT

## 2018-12-29 ENCOUNTER — TELEPHONE (OUTPATIENT)
Dept: OTHER | Age: 73
End: 2018-12-29

## 2019-01-22 ENCOUNTER — TELEPHONE (OUTPATIENT)
Dept: BARIATRICS/WEIGHT MGMT | Age: 74
End: 2019-01-22

## 2019-01-28 DIAGNOSIS — M17.11 PRIMARY OSTEOARTHRITIS OF RIGHT KNEE: Primary | ICD-10-CM

## 2019-01-30 ENCOUNTER — NURSE ONLY (OUTPATIENT)
Dept: ORTHOPEDIC SURGERY | Age: 74
End: 2019-01-30
Payer: MEDICARE

## 2019-01-30 DIAGNOSIS — M17.11 PRIMARY OSTEOARTHRITIS OF RIGHT KNEE: Primary | ICD-10-CM

## 2019-01-30 PROCEDURE — 99999 PR OFFICE/OUTPT VISIT,PROCEDURE ONLY: CPT | Performed by: PHYSICIAN ASSISTANT

## 2019-02-06 ENCOUNTER — NURSE ONLY (OUTPATIENT)
Dept: ORTHOPEDIC SURGERY | Age: 74
End: 2019-02-06
Payer: MEDICARE

## 2019-02-06 DIAGNOSIS — M17.11 PRIMARY OSTEOARTHRITIS OF RIGHT KNEE: Primary | ICD-10-CM

## 2019-02-06 PROCEDURE — 99999 PR OFFICE/OUTPT VISIT,PROCEDURE ONLY: CPT | Performed by: PHYSICIAN ASSISTANT

## 2019-02-07 ENCOUNTER — OFFICE VISIT (OUTPATIENT)
Dept: BARIATRICS/WEIGHT MGMT | Age: 74
End: 2019-02-07
Payer: MEDICARE

## 2019-02-07 VITALS
SYSTOLIC BLOOD PRESSURE: 132 MMHG | RESPIRATION RATE: 16 BRPM | BODY MASS INDEX: 39.25 KG/M2 | DIASTOLIC BLOOD PRESSURE: 84 MMHG | HEART RATE: 70 BPM | HEIGHT: 69 IN | WEIGHT: 265 LBS

## 2019-02-07 DIAGNOSIS — E66.9 OBESITY (BMI 30-39.9): Primary | ICD-10-CM

## 2019-02-07 PROCEDURE — 1036F TOBACCO NON-USER: CPT | Performed by: NURSE PRACTITIONER

## 2019-02-07 PROCEDURE — 99213 OFFICE O/P EST LOW 20 MIN: CPT | Performed by: NURSE PRACTITIONER

## 2019-02-07 PROCEDURE — G8598 ASA/ANTIPLAT THER USED: HCPCS | Performed by: NURSE PRACTITIONER

## 2019-02-07 PROCEDURE — 4040F PNEUMOC VAC/ADMIN/RCVD: CPT | Performed by: NURSE PRACTITIONER

## 2019-02-07 PROCEDURE — G8417 CALC BMI ABV UP PARAM F/U: HCPCS | Performed by: NURSE PRACTITIONER

## 2019-02-07 PROCEDURE — 3017F COLORECTAL CA SCREEN DOC REV: CPT | Performed by: NURSE PRACTITIONER

## 2019-02-07 PROCEDURE — 1101F PT FALLS ASSESS-DOCD LE1/YR: CPT | Performed by: NURSE PRACTITIONER

## 2019-02-07 PROCEDURE — G8427 DOCREV CUR MEDS BY ELIG CLIN: HCPCS | Performed by: NURSE PRACTITIONER

## 2019-02-07 PROCEDURE — G8484 FLU IMMUNIZE NO ADMIN: HCPCS | Performed by: NURSE PRACTITIONER

## 2019-02-07 PROCEDURE — 1123F ACP DISCUSS/DSCN MKR DOCD: CPT | Performed by: NURSE PRACTITIONER

## 2019-02-07 ASSESSMENT — ENCOUNTER SYMPTOMS
GASTROINTESTINAL NEGATIVE: 1
RESPIRATORY NEGATIVE: 1

## 2019-02-13 ENCOUNTER — NURSE ONLY (OUTPATIENT)
Dept: ORTHOPEDIC SURGERY | Age: 74
End: 2019-02-13
Payer: MEDICARE

## 2019-02-13 ENCOUNTER — OFFICE VISIT (OUTPATIENT)
Dept: CARDIOLOGY CLINIC | Age: 74
End: 2019-02-13
Payer: MEDICARE

## 2019-02-13 VITALS
BODY MASS INDEX: 39.4 KG/M2 | HEIGHT: 69 IN | DIASTOLIC BLOOD PRESSURE: 70 MMHG | OXYGEN SATURATION: 98 % | WEIGHT: 266 LBS | HEART RATE: 62 BPM | SYSTOLIC BLOOD PRESSURE: 118 MMHG

## 2019-02-13 DIAGNOSIS — E66.9 CLASS 2 OBESITY WITH BODY MASS INDEX (BMI) OF 39.0 TO 39.9 IN ADULT, UNSPECIFIED OBESITY TYPE, UNSPECIFIED WHETHER SERIOUS COMORBIDITY PRESENT: ICD-10-CM

## 2019-02-13 DIAGNOSIS — I50.32 CHRONIC DIASTOLIC CONGESTIVE HEART FAILURE (HCC): ICD-10-CM

## 2019-02-13 DIAGNOSIS — I25.10 CORONARY ARTERY DISEASE INVOLVING NATIVE CORONARY ARTERY OF NATIVE HEART WITHOUT ANGINA PECTORIS: Primary | ICD-10-CM

## 2019-02-13 DIAGNOSIS — I10 ESSENTIAL HYPERTENSION: ICD-10-CM

## 2019-02-13 DIAGNOSIS — E78.2 MIXED HYPERLIPIDEMIA: ICD-10-CM

## 2019-02-13 DIAGNOSIS — M17.11 PRIMARY OSTEOARTHRITIS OF RIGHT KNEE: Primary | ICD-10-CM

## 2019-02-13 DIAGNOSIS — I25.10 ATHEROSCLEROSIS OF NATIVE CORONARY ARTERY WITHOUT ANGINA PECTORIS, UNSPECIFIED WHETHER NATIVE OR TRANSPLANTED HEART: ICD-10-CM

## 2019-02-13 PROCEDURE — G8417 CALC BMI ABV UP PARAM F/U: HCPCS | Performed by: INTERNAL MEDICINE

## 2019-02-13 PROCEDURE — G8598 ASA/ANTIPLAT THER USED: HCPCS | Performed by: INTERNAL MEDICINE

## 2019-02-13 PROCEDURE — 99213 OFFICE O/P EST LOW 20 MIN: CPT | Performed by: INTERNAL MEDICINE

## 2019-02-13 PROCEDURE — 4040F PNEUMOC VAC/ADMIN/RCVD: CPT | Performed by: INTERNAL MEDICINE

## 2019-02-13 PROCEDURE — 1036F TOBACCO NON-USER: CPT | Performed by: INTERNAL MEDICINE

## 2019-02-13 PROCEDURE — 1123F ACP DISCUSS/DSCN MKR DOCD: CPT | Performed by: INTERNAL MEDICINE

## 2019-02-13 PROCEDURE — 99999 PR OFFICE/OUTPT VISIT,PROCEDURE ONLY: CPT | Performed by: PHYSICIAN ASSISTANT

## 2019-02-13 PROCEDURE — 3017F COLORECTAL CA SCREEN DOC REV: CPT | Performed by: INTERNAL MEDICINE

## 2019-02-13 PROCEDURE — G8427 DOCREV CUR MEDS BY ELIG CLIN: HCPCS | Performed by: INTERNAL MEDICINE

## 2019-02-13 PROCEDURE — 1101F PT FALLS ASSESS-DOCD LE1/YR: CPT | Performed by: INTERNAL MEDICINE

## 2019-02-13 PROCEDURE — G8484 FLU IMMUNIZE NO ADMIN: HCPCS | Performed by: INTERNAL MEDICINE

## 2019-02-28 ENCOUNTER — HOSPITAL ENCOUNTER (OUTPATIENT)
Dept: PHYSICAL THERAPY | Age: 74
Setting detail: THERAPIES SERIES
Discharge: HOME | End: 2019-02-28
Payer: MEDICARE

## 2019-02-28 PROCEDURE — 97161 PT EVAL LOW COMPLEX 20 MIN: CPT

## 2019-02-28 PROCEDURE — 97110 THERAPEUTIC EXERCISES: CPT

## 2019-03-05 ENCOUNTER — HOSPITAL ENCOUNTER (OUTPATIENT)
Dept: PHYSICAL THERAPY | Age: 74
Setting detail: THERAPIES SERIES
Discharge: HOME | End: 2019-03-05
Payer: MEDICARE

## 2019-03-05 PROCEDURE — 97150 GROUP THERAPEUTIC PROCEDURES: CPT

## 2019-03-05 PROCEDURE — 97113 AQUATIC THERAPY/EXERCISES: CPT

## 2019-03-07 ENCOUNTER — HOSPITAL ENCOUNTER (OUTPATIENT)
Dept: PHYSICAL THERAPY | Age: 74
Setting detail: THERAPIES SERIES
Discharge: HOME | End: 2019-03-07
Payer: MEDICARE

## 2019-03-07 PROCEDURE — 97113 AQUATIC THERAPY/EXERCISES: CPT

## 2019-03-07 PROCEDURE — 97150 GROUP THERAPEUTIC PROCEDURES: CPT

## 2019-03-12 ENCOUNTER — HOSPITAL ENCOUNTER (OUTPATIENT)
Dept: PHYSICAL THERAPY | Age: 74
Setting detail: THERAPIES SERIES
Discharge: HOME | End: 2019-03-12
Payer: MEDICARE

## 2019-03-12 PROCEDURE — 97113 AQUATIC THERAPY/EXERCISES: CPT

## 2019-03-12 PROCEDURE — 97150 GROUP THERAPEUTIC PROCEDURES: CPT

## 2019-03-14 ENCOUNTER — HOSPITAL ENCOUNTER (OUTPATIENT)
Dept: PHYSICAL THERAPY | Age: 74
Setting detail: THERAPIES SERIES
Discharge: HOME | End: 2019-03-14
Payer: MEDICARE

## 2019-03-14 PROCEDURE — 97113 AQUATIC THERAPY/EXERCISES: CPT

## 2019-03-14 PROCEDURE — 97150 GROUP THERAPEUTIC PROCEDURES: CPT

## 2019-03-19 ENCOUNTER — HOSPITAL ENCOUNTER (OUTPATIENT)
Dept: PHYSICAL THERAPY | Age: 74
Setting detail: THERAPIES SERIES
Discharge: HOME | End: 2019-03-19
Payer: MEDICARE

## 2019-03-19 PROCEDURE — 97113 AQUATIC THERAPY/EXERCISES: CPT

## 2019-03-19 PROCEDURE — 97150 GROUP THERAPEUTIC PROCEDURES: CPT

## 2019-03-21 ENCOUNTER — APPOINTMENT (OUTPATIENT)
Dept: PHYSICAL THERAPY | Age: 74
End: 2019-03-21
Payer: MEDICARE

## 2019-03-26 ENCOUNTER — HOSPITAL ENCOUNTER (OUTPATIENT)
Dept: PHYSICAL THERAPY | Age: 74
Setting detail: THERAPIES SERIES
Discharge: HOME | End: 2019-03-26
Payer: MEDICARE

## 2019-03-26 PROCEDURE — 97150 GROUP THERAPEUTIC PROCEDURES: CPT

## 2019-03-26 PROCEDURE — 97113 AQUATIC THERAPY/EXERCISES: CPT

## 2019-03-26 PROCEDURE — 97164 PT RE-EVAL EST PLAN CARE: CPT

## 2019-03-28 ENCOUNTER — HOSPITAL ENCOUNTER (OUTPATIENT)
Dept: PHYSICAL THERAPY | Age: 74
Setting detail: THERAPIES SERIES
Discharge: HOME | End: 2019-03-28
Payer: MEDICARE

## 2019-03-28 PROCEDURE — 97113 AQUATIC THERAPY/EXERCISES: CPT

## 2019-03-28 PROCEDURE — 97150 GROUP THERAPEUTIC PROCEDURES: CPT

## 2019-03-29 ENCOUNTER — OFFICE VISIT (OUTPATIENT)
Dept: ORTHOPEDIC SURGERY | Age: 74
End: 2019-03-29
Payer: MEDICARE

## 2019-03-29 VITALS — BODY MASS INDEX: 39.84 KG/M2 | WEIGHT: 269 LBS | HEIGHT: 69 IN

## 2019-03-29 DIAGNOSIS — M17.11 PRIMARY OSTEOARTHRITIS OF RIGHT KNEE: Primary | ICD-10-CM

## 2019-03-29 PROCEDURE — 99213 OFFICE O/P EST LOW 20 MIN: CPT | Performed by: PHYSICIAN ASSISTANT

## 2019-03-29 PROCEDURE — G8428 CUR MEDS NOT DOCUMENT: HCPCS | Performed by: PHYSICIAN ASSISTANT

## 2019-03-29 PROCEDURE — 4040F PNEUMOC VAC/ADMIN/RCVD: CPT | Performed by: PHYSICIAN ASSISTANT

## 2019-03-29 PROCEDURE — 3017F COLORECTAL CA SCREEN DOC REV: CPT | Performed by: PHYSICIAN ASSISTANT

## 2019-03-29 PROCEDURE — G8598 ASA/ANTIPLAT THER USED: HCPCS | Performed by: PHYSICIAN ASSISTANT

## 2019-03-29 PROCEDURE — G8484 FLU IMMUNIZE NO ADMIN: HCPCS | Performed by: PHYSICIAN ASSISTANT

## 2019-03-29 PROCEDURE — 1123F ACP DISCUSS/DSCN MKR DOCD: CPT | Performed by: PHYSICIAN ASSISTANT

## 2019-03-29 PROCEDURE — G8417 CALC BMI ABV UP PARAM F/U: HCPCS | Performed by: PHYSICIAN ASSISTANT

## 2019-03-29 PROCEDURE — 1036F TOBACCO NON-USER: CPT | Performed by: PHYSICIAN ASSISTANT

## 2019-04-02 ENCOUNTER — HOSPITAL ENCOUNTER (OUTPATIENT)
Dept: PHYSICAL THERAPY | Age: 74
Setting detail: THERAPIES SERIES
Discharge: HOME | End: 2019-04-02
Payer: MEDICARE

## 2019-04-02 PROCEDURE — 97113 AQUATIC THERAPY/EXERCISES: CPT

## 2019-04-02 PROCEDURE — 97150 GROUP THERAPEUTIC PROCEDURES: CPT

## 2019-04-02 NOTE — FLOWSHEET NOTE
Physical Therapy Aquatic Flow Sheet  Date:  4/2/2019    Patient Name:  Micheal Fuller    Restrictions:    Diagnosis:  R knee OA  Treatment Diagnosis:    Insurance/Certification information:  [employer] of care signed (Y/N):    Visit# / total visits:  9/12  Pain level: 4/10  Electronically signed by:  Ben Dawn    Medicare Cap (if applicable):  884= total amount used, updated 4/2/2019    Key  B= Belt DB= Dumbells T= Theratube   H= Hydrotone N= Noodles W= Weights   P= Paddles S= Speedo equipment K= Kickboard     Exercises/Activities   Warm-up/Amb    Exercises      Slow forward  2 laps  HR/TR  20x    Slow sideways  2 laps  Marches  2 min    Slow backwards  2 laps  Mini-squats  20x B    Medium forward    4-way SLR  20x B    Medium sideways    Hip circles/fig 8  20x B    Small shuffle    Hamstring curls  15x B    Jog    Knee extension      Braiding    Pelvic tilts  10x5\"     Bicycling  2 min  Scap squeezes          Shoulder flex/ext  x15    Functional    Shoulder abd/add  x15    Step x15 B  Shoulder H. abd/add  x15    Lifting    Shoulder IR/ER      Hand to opp knee    Rowing      Push down squat    Bilateral pull down      UE PNF    Push/pull      LE PNF    Push downs      Wall push ups    Arm circles      SLS  x60 B  Elbow flex/ext          Chin tuck      Stretching    UT shrugs/rolls      Gastroc/Soleus  2x30 secs B  Rocking horse      Hamstring          SKTC    Other      Piriformis          Hip flexor          Ladder pull          Pec stretch          Post deltoid           Time In:  2:05    Timed Code Treatment Minutes:  15    Total Treatment Minutes:  30    Treatment/Activity Tolerance:   [x] Patient tolerated treatment well [] Patient limited by fatigue   [] Patient limited by pain [] Patient limited by other medical complications  [] Other:     Prognosis: [x] Good [] Fair  [] Poor    Patient Requires Follow-up:  [x] Yes  [] No    Plan: [x] Continue per plan of care [] Alter current plan (see comments)   [] Plan of care initiated [] Hold pending MD visit [] Discharge    See Weekly Progress Note: [] Yes  [x] No  Next due:

## 2019-04-04 ENCOUNTER — HOSPITAL ENCOUNTER (OUTPATIENT)
Dept: PHYSICAL THERAPY | Age: 74
Setting detail: THERAPIES SERIES
Discharge: HOME | End: 2019-04-04
Payer: MEDICARE

## 2019-04-04 PROCEDURE — 97113 AQUATIC THERAPY/EXERCISES: CPT

## 2019-04-04 PROCEDURE — 97150 GROUP THERAPEUTIC PROCEDURES: CPT

## 2019-04-04 NOTE — FLOWSHEET NOTE
Physical Therapy Aquatic Flow Sheet  Date:  4/4/2019    Patient Name:  Noam Reynolds    Restrictions:    Diagnosis:  R knee OA  Treatment Diagnosis:    Insurance/Certification information:  [employer] of care signed (Y/N):    Visit# / total visits:  10/12  Pain level: 2/10  Electronically signed by:  Sky Maria    Medicare Cap (if applicable):  211= total amount used, updated 4/4/2019    Key  B= Belt DB= Dumbells T= Theratube   H= Hydrotone N= Noodles W= Weights   P= Paddles S= Speedo equipment K= Kickboard     Exercises/Activities   Warm-up/Amb    Exercises      Slow forward  2 laps  HR/TR  20x    Slow sideways  2 laps  Marches  2 min    Slow backwards  2 laps  Mini-squats  20x B    Medium forward    4-way SLR  20x B    Medium sideways    Hip circles/fig 8  20x B    Small shuffle    Hamstring curls  15x B    Jog    Knee extension      Braiding    Pelvic tilts  10x5\"     Bicycling  2 min  Scap squeezes          Shoulder flex/ext  x15    Functional    Shoulder abd/add  x15    Step x15 B  Shoulder H. abd/add  x15    Lifting    Shoulder IR/ER  x15    Hand to opp knee    Rowing      Push down squat    Bilateral pull down      UE PNF    Push/pull      LE PNF    Push downs      Wall push ups    Arm circles      SLS  x60 B  Elbow flex/ext          Chin tuck      Stretching    UT shrugs/rolls      Gastroc/Soleus  2x30 secs B  Rocking horse      Hamstring          SKTC    Other      Piriformis          Hip flexor          Ladder pull          Pec stretch          Post deltoid           Time In:  2:05    Timed Code Treatment Minutes:  15    Total Treatment Minutes:  30    Treatment/Activity Tolerance:   [x] Patient tolerated treatment well [] Patient limited by fatigue   [] Patient limited by pain [] Patient limited by other medical complications  [] Other:     Prognosis: [x] Good [] Fair  [] Poor    Patient Requires Follow-up:  [x] Yes  [] No    Plan: [x] Continue per plan of care [] Alter current plan (see comments)   [] Plan of care initiated [] Hold pending MD visit [] Discharge    See Weekly Progress Note: [] Yes  [x] No  Next due:

## 2019-04-09 ENCOUNTER — APPOINTMENT (OUTPATIENT)
Dept: PHYSICAL THERAPY | Age: 74
End: 2019-04-09
Payer: MEDICARE

## 2019-04-11 ENCOUNTER — APPOINTMENT (OUTPATIENT)
Dept: PHYSICAL THERAPY | Age: 74
End: 2019-04-11
Payer: MEDICARE

## 2019-04-16 ENCOUNTER — APPOINTMENT (OUTPATIENT)
Dept: PHYSICAL THERAPY | Age: 74
End: 2019-04-16
Payer: MEDICARE

## 2019-04-18 ENCOUNTER — APPOINTMENT (OUTPATIENT)
Dept: PHYSICAL THERAPY | Age: 74
End: 2019-04-18
Payer: MEDICARE

## 2019-04-23 ENCOUNTER — APPOINTMENT (OUTPATIENT)
Dept: PHYSICAL THERAPY | Age: 74
End: 2019-04-23
Payer: MEDICARE

## 2019-04-25 ENCOUNTER — APPOINTMENT (OUTPATIENT)
Dept: PHYSICAL THERAPY | Age: 74
End: 2019-04-25
Payer: MEDICARE

## 2019-04-30 ENCOUNTER — APPOINTMENT (OUTPATIENT)
Dept: PHYSICAL THERAPY | Age: 74
End: 2019-04-30
Payer: MEDICARE

## 2019-07-25 ENCOUNTER — OFFICE VISIT (OUTPATIENT)
Dept: BARIATRICS/WEIGHT MGMT | Age: 74
End: 2019-07-25
Payer: MEDICARE

## 2019-07-25 VITALS
BODY MASS INDEX: 40.51 KG/M2 | SYSTOLIC BLOOD PRESSURE: 107 MMHG | DIASTOLIC BLOOD PRESSURE: 60 MMHG | HEIGHT: 69 IN | HEART RATE: 60 BPM | WEIGHT: 273.5 LBS

## 2019-07-25 DIAGNOSIS — E66.01 MORBID OBESITY WITH BMI OF 40.0-44.9, ADULT (HCC): Primary | ICD-10-CM

## 2019-07-25 PROCEDURE — 1123F ACP DISCUSS/DSCN MKR DOCD: CPT | Performed by: NURSE PRACTITIONER

## 2019-07-25 PROCEDURE — G8427 DOCREV CUR MEDS BY ELIG CLIN: HCPCS | Performed by: NURSE PRACTITIONER

## 2019-07-25 PROCEDURE — G8417 CALC BMI ABV UP PARAM F/U: HCPCS | Performed by: NURSE PRACTITIONER

## 2019-07-25 PROCEDURE — 3017F COLORECTAL CA SCREEN DOC REV: CPT | Performed by: NURSE PRACTITIONER

## 2019-07-25 PROCEDURE — G8598 ASA/ANTIPLAT THER USED: HCPCS | Performed by: NURSE PRACTITIONER

## 2019-07-25 PROCEDURE — 4040F PNEUMOC VAC/ADMIN/RCVD: CPT | Performed by: NURSE PRACTITIONER

## 2019-07-25 PROCEDURE — 1036F TOBACCO NON-USER: CPT | Performed by: NURSE PRACTITIONER

## 2019-07-25 PROCEDURE — 99213 OFFICE O/P EST LOW 20 MIN: CPT | Performed by: NURSE PRACTITIONER

## 2019-07-25 ASSESSMENT — ENCOUNTER SYMPTOMS
RESPIRATORY NEGATIVE: 1
GASTROINTESTINAL NEGATIVE: 1

## 2019-07-25 NOTE — PROGRESS NOTES
dietician's recommendations. Plan to restart meal plan in a week, once [place] campground. Goal to get back on track with his diet prior to his surgery so he can be eating well during his recovery. 1500 calorie LC meal plan    Follow up in 6 weeks    Z68.41        Nutrition plan: [] LCHF/Ketogenic   [x] Modified low-calorie diet (low carb/low-fazal)               [] Low-calorie diet    []Maintenance       []Other    Exercise: [x]Cardio     []Resistance/strength training      []ACSM recommendations (150 minutes/week in active weight loss)                              Behavior: [x]Motivational interviewing performed    [] Referralfor counseling                         []Discussed strategies to overcome habits/challenges for focus         [] Stress management   [] Stimulus control                    [] Sleep hygiene    Reviewed:  [x] Nutrition and the importance of regularprotein intake  [x] Hidden carbohydrate sources  [x] Alcohol use  [x] Tobacco use   [x] Importance of exercise and reducing sedentary time    Total weight loss: 21.5 pounds      No orders of the defined types were placed in this encounter.       Follow up in 6 weeks

## 2019-07-26 ENCOUNTER — OFFICE VISIT (OUTPATIENT)
Dept: ORTHOPEDIC SURGERY | Age: 74
End: 2019-07-26
Payer: MEDICARE

## 2019-07-26 VITALS — BODY MASS INDEX: 40.73 KG/M2 | HEIGHT: 69 IN | WEIGHT: 275 LBS

## 2019-07-26 DIAGNOSIS — E66.01 CLASS 3 SEVERE OBESITY WITH BODY MASS INDEX (BMI) OF 40.0 TO 44.9 IN ADULT, UNSPECIFIED OBESITY TYPE, UNSPECIFIED WHETHER SERIOUS COMORBIDITY PRESENT (HCC): ICD-10-CM

## 2019-07-26 DIAGNOSIS — M17.11 PRIMARY OSTEOARTHRITIS OF RIGHT KNEE: Primary | ICD-10-CM

## 2019-07-26 PROCEDURE — G8598 ASA/ANTIPLAT THER USED: HCPCS | Performed by: PHYSICIAN ASSISTANT

## 2019-07-26 PROCEDURE — G8427 DOCREV CUR MEDS BY ELIG CLIN: HCPCS | Performed by: PHYSICIAN ASSISTANT

## 2019-07-26 PROCEDURE — 1036F TOBACCO NON-USER: CPT | Performed by: PHYSICIAN ASSISTANT

## 2019-07-26 PROCEDURE — 99214 OFFICE O/P EST MOD 30 MIN: CPT | Performed by: PHYSICIAN ASSISTANT

## 2019-07-26 PROCEDURE — 1123F ACP DISCUSS/DSCN MKR DOCD: CPT | Performed by: PHYSICIAN ASSISTANT

## 2019-07-26 PROCEDURE — 3017F COLORECTAL CA SCREEN DOC REV: CPT | Performed by: PHYSICIAN ASSISTANT

## 2019-07-26 PROCEDURE — G8417 CALC BMI ABV UP PARAM F/U: HCPCS | Performed by: PHYSICIAN ASSISTANT

## 2019-07-26 PROCEDURE — 4040F PNEUMOC VAC/ADMIN/RCVD: CPT | Performed by: PHYSICIAN ASSISTANT

## 2019-07-26 NOTE — PROGRESS NOTES
Chief Complaint    Knee Pain (RT KNEE CONTINUED PAIN, INJECTION HELPED BUT DIDN'T LAST BUT A DAY OR SO)      History of Present Illness:  Markel Moore is a 69 y.o. male presents to the office today for a follow-up visit. Patient being treated for advanced osteoarthritis right knee. He has had pain for greater than 5 years without any recent injury or trauma. His pain symptoms are concentrated over his medial and patellofemoral joint. Increased pain with activities and improvement with rest.  His pain is started affecting his ability to perform ADLs and the quality of his life. He has tried multiple cortisone injections, viscosupplementation injections, braces, heel wedges, and anti-inflammatory medication without significant relief of his pain symptoms. His last injection only provided 1 day of relief. PAIN:   Pain Assessment  Location of Pain: Knee  Location Modifiers: Right  Severity of Pain: 10  Frequency of Pain: Intermittent  Aggravating Factors: Walking, Standing, Stairs  Limiting Behavior: Yes  Result of Injury: No  Work-Related Injury: No  Are there other pain locations you wish to document?: No    The patients chronic pain has gradually worsening over the last 5 years. The patient rates pain on a level of 10/10. Pain impacts patients ability to drive, sleep and climb stairs.       Medical History:     Past Medical History:   Diagnosis Date    Arthritis     CAD (coronary artery disease)     GERD (gastroesophageal reflux disease)     Hyperlipidemia     Hypertension     Sleep apnea      Patient Active Problem List    Diagnosis Date Noted    Coronary atherosclerosis 09/29/2011     Priority: High    Mixed hyperlipidemia 09/29/2011     Priority: Medium    Essential hypertension 09/29/2011     Priority: Medium    Primary osteoarthritis of right knee 12/04/2018    Chronic diastolic congestive heart failure ([facility] [address] 02/08/2018    Coronary artery disease involving native coronary artery of the rehabilitation process involved with this operation and options that involved not only the hospitalization but outpatient physical therapy and independent home exercise program.  The patient also realizes the need for a knee brace and ambulatory aids in the rehabilitation process as well as the very significant role that the patient plays in terms of rehabilitation after this type of operation. The patient also understands that although the patient typically functional by 6-8 weeks postop that it may take 9 months to year for full recovery. All questions were answered.     Demand matching tool completed

## 2019-07-26 NOTE — LETTER
The undersigned represents that he or she is duly authorized to execute to this consent for and on the behalf of the above named patient.    ________________________________             __________________________________________  Witness                                                                         Parent/Spouse/Guardian/Other:_________________    Medical Record#  Insurance  Smartphone:  Yes   Or   No  Email:                 You have signed a consent to have a total joint replacement surgery performed. Before you can proceed with surgery the following things must be done. Please use this form as a checklist.      _____   Please schedule your Physical Therapy functional evaluation. _____   Please take your lab orders and get your blood work done at a [facility].    _____  [facility] will need to follow email/text instructions for [facility] to complete registration and the medical questionnaire prior to your physical therapy evaluation. Do not schedule an appointment with your primary care physician until you have a surgery date. This pre-op exam has to be within 30 days of the surgery. _____  CT Scans will be scheduled by our office.    _____  Information about the pre-op class will be in your surgery packet that will be mailed to you after you are scheduled for surgery. Once you have completed both the labs and the Physical Therapy evaluation please call Daniel Rodriguez @ [phone] to let her know. Once verification of the PT Evaluation and completed labs has been determined you will be called and set up for surgery. This may take 1-2 days to check results and return your phone call.  You will not be called about lab results if everything is normal.

## 2019-08-22 DIAGNOSIS — M25.561 RIGHT KNEE PAIN, UNSPECIFIED CHRONICITY: Primary | ICD-10-CM

## 2019-08-23 ENCOUNTER — TELEPHONE (OUTPATIENT)
Dept: CARDIOLOGY CLINIC | Age: 74
End: 2019-08-23

## 2019-08-23 ENCOUNTER — HOSPITAL ENCOUNTER (OUTPATIENT)
Dept: PHYSICAL THERAPY | Age: 74
Setting detail: THERAPIES SERIES
Discharge: HOME | End: 2019-08-23
Payer: MEDICARE

## 2019-08-23 ENCOUNTER — HOSPITAL ENCOUNTER (OUTPATIENT)
Age: 74
Discharge: HOME | End: 2019-08-23
Payer: MEDICARE

## 2019-08-23 LAB
ALBUMIN SERPL-MCNC: 4.3 G/DL (ref 3.4–5)
ANION GAP SERPL CALCULATED.3IONS-SCNC: 13 MMOL/L (ref 3–16)
APTT: 32.6 SEC (ref 26–36)
BASOPHILS ABSOLUTE: 0.1 K/UL (ref 0–0.2)
BASOPHILS RELATIVE PERCENT: 1.1 %
BILIRUBIN URINE: NEGATIVE
BLOOD, URINE: NEGATIVE
BUN BLDV-MCNC: 26 MG/DL (ref 7–20)
CALCIUM SERPL-MCNC: 9.2 MG/DL (ref 8.3–10.6)
CHLORIDE BLD-SCNC: 102 MMOL/L (ref 99–110)
CLARITY: CLEAR
CO2: 24 MMOL/L (ref 21–32)
COLOR: YELLOW
CREAT SERPL-MCNC: 0.9 MG/DL (ref 0.8–1.3)
EOSINOPHILS ABSOLUTE: 0.1 K/UL (ref 0–0.6)
EOSINOPHILS RELATIVE PERCENT: 2.2 %
GFR AFRICAN AMERICAN: >60
GFR NON-AFRICAN AMERICAN: >60
GLUCOSE BLD-MCNC: 92 MG/DL (ref 70–99)
GLUCOSE URINE: NEGATIVE MG/DL
HCT VFR BLD CALC: 34.7 % (ref 40.5–52.5)
HEMOGLOBIN: 11.8 G/DL (ref 13.5–17.5)
INR BLD: 1.06 (ref 0.86–1.14)
KETONES, URINE: NEGATIVE MG/DL
LEUKOCYTE ESTERASE, URINE: NEGATIVE
LYMPHOCYTES ABSOLUTE: 1.6 K/UL (ref 1–5.1)
LYMPHOCYTES RELATIVE PERCENT: 26 %
MCH RBC QN AUTO: 32.1 PG (ref 26–34)
MCHC RBC AUTO-ENTMCNC: 34 G/DL (ref 31–36)
MCV RBC AUTO: 94.6 FL (ref 80–100)
MICROSCOPIC EXAMINATION: NORMAL
MONOCYTES ABSOLUTE: 0.6 K/UL (ref 0–1.3)
MONOCYTES RELATIVE PERCENT: 9.8 %
NEUTROPHILS ABSOLUTE: 3.8 K/UL (ref 1.7–7.7)
NEUTROPHILS RELATIVE PERCENT: 60.9 %
NITRITE, URINE: NEGATIVE
PDW BLD-RTO: 13.3 % (ref 12.4–15.4)
PH UA: 6.5 (ref 5–8)
PLATELET # BLD: 205 K/UL (ref 135–450)
PMV BLD AUTO: 7.9 FL (ref 5–10.5)
POTASSIUM SERPL-SCNC: 4.6 MMOL/L (ref 3.5–5.1)
PROTEIN UA: NEGATIVE MG/DL
PROTHROMBIN TIME: 12.1 SEC (ref 9.8–13)
RBC # BLD: 3.67 M/UL (ref 4.2–5.9)
SODIUM BLD-SCNC: 139 MMOL/L (ref 136–145)
SPECIFIC GRAVITY UA: 1.01 (ref 1–1.03)
TRANSFERRIN: 248 MG/DL (ref 200–360)
URINE TYPE: NORMAL
UROBILINOGEN, URINE: 1 E.U./DL
WBC # BLD: 6.3 K/UL (ref 4–11)

## 2019-08-23 PROCEDURE — 85730 THROMBOPLASTIN TIME PARTIAL: CPT

## 2019-08-23 PROCEDURE — 82040 ASSAY OF SERUM ALBUMIN: CPT

## 2019-08-23 PROCEDURE — 81003 URINALYSIS AUTO W/O SCOPE: CPT

## 2019-08-23 PROCEDURE — 84466 ASSAY OF TRANSFERRIN: CPT

## 2019-08-23 PROCEDURE — 97110 THERAPEUTIC EXERCISES: CPT

## 2019-08-23 PROCEDURE — 85025 COMPLETE CBC W/AUTO DIFF WBC: CPT

## 2019-08-23 PROCEDURE — 80048 BASIC METABOLIC PNL TOTAL CA: CPT

## 2019-08-23 PROCEDURE — 36415 COLL VENOUS BLD VENIPUNCTURE: CPT

## 2019-08-23 PROCEDURE — 83036 HEMOGLOBIN GLYCOSYLATED A1C: CPT

## 2019-08-23 PROCEDURE — 87086 URINE CULTURE/COLONY COUNT: CPT

## 2019-08-23 PROCEDURE — 85610 PROTHROMBIN TIME: CPT

## 2019-08-23 PROCEDURE — 97161 PT EVAL LOW COMPLEX 20 MIN: CPT

## 2019-08-23 ASSESSMENT — PAIN DESCRIPTION - LOCATION: LOCATION: KNEE

## 2019-08-23 ASSESSMENT — PAIN SCALES - GENERAL: PAINLEVEL_OUTOF10: 9

## 2019-08-23 ASSESSMENT — PAIN DESCRIPTION - ORIENTATION: ORIENTATION: RIGHT

## 2019-08-23 ASSESSMENT — PAIN DESCRIPTION - DESCRIPTORS: DESCRIPTORS: ACHING;STABBING;SHARP

## 2019-08-23 NOTE — PROGRESS NOTES
Physical Therapy  Initial Assessment  Date: 2019  Patient Name: Daphne Jeter  MRN: [id number]  : 1945          Restrictions   none per patient    Subjective   General  Chart Reviewed: Yes  Patient assessed for rehabilitation services?: Yes  Additional Pertinent Hx: CAD, CABG  Family / Caregiver Present: No  Referring Practitioner: CARINE Ayon  Referral Date : 19  Diagnosis: M17.11 R knee OA  General Comment  Comments: PLOF: , indep in ADLs, retired  PT Visit Information  Onset Date: 19  PT Insurance Information: Medicare and [employer]ARP  Subjective  Subjective: Pt reports that he is getting his knee replaced on 19 with Dr. Marcia Lloyd. Seeing Dr. Marcia Lloyd on Monday to confirm surgery date. Pt reports that he is limited with all weight bearing. \"I don't know when it's going to hit, but sometimes it feels like I'm being stabbed with an ice pick. \"  Pain Screening  Patient Currently in Pain: Yes  Pain Assessment  Pain Assessment: 0-10  Pain Level: 9(0/10 at rest, 9/10 when at its worst)  Pain Location: Knee  Pain Orientation: Right  Pain Descriptors: Aching;Stabbing; Sharp  Vital Signs  Patient Currently in Pain: Yes    Social/Functional History  Social/Functional History  Lives With: Spouse  Type of Home: House  Home Layout: Two level; Able to Live on Main level with bedroom/bathroom; Bed/Bath upstairs  Bathroom Shower/Tub: Tub/Shower unit; Walk-in shower  Bathroom Toilet: Standard  [facility]: [facility]  ADL Assistance: Independent  Homemaking Assistance: Independent  Homemaking Responsibilities: Yes  Ambulation Assistance: Independent  Transfer Assistance: Independent  Active : Yes  Mode of Transportation: Car    Objective     Observation/Palpation  Posture: Fair  Palpation: no TTP. No pain or excessive movement with varus and valgus strain  Observation: Pt stands with significant genuvalgus, R worse than L.  During ambulation, pt demonstrates antalgic gait R, R hip in ABD, minimal stance [phone] Fax: [phone]     To: CARINE Graham     From: Roe Kimball PT     Patient: Lai Dawson      : 1945  Diagnosis:  R knee OA    Date: 2019  Treatment Diagnosis:      Physical Therapy Certification/Re-Certification Form  The following patient has been evaluated for physical therapy services and for therapy to continue, Medicare requires monthly physician review of the treatment plan. Please review the attached evaluation and/or summary of the patient's plan of care, and verify that you agree therapy should continue by signing the attached document and sending it back to our office. Plan of Care/Treatment to date:  [x] Therapeutic Exercise   [x] Modalities:  [x] Therapeutic Activity      [] Ultrasound  [] Electrical Stimulation   [x] Gait Training      [] Cervical Traction [] Lumbar Traction  [x] Neuromuscular Re-education  [] Hot/Coldpack [] Iontophoresis    [x] Instruction in HEP      Other:  [x] Manual Therapy       []                        [x] Aquatic Therapy       []                      ? Frequency/Duration:  # Days per week: [] 1 day # Weeks: [] 1 week [] 5 weeks      [x] 2 days? [] 2 weeks [] 6 weeks     [] 3 days   [] 3 weeks [] 7 weeks     [] 4 days   [x] 4 weeks [] 8 weeks    Rehab Potential: [] Excellent [x] Good [] Fair  [] Poor       Electronically signed by:  Roe Kimball PT      If you have any questions or concerns, please don't hesitate to call.   Thank you for your referral.    Physician Signature:________________________________Date:__________________  By signing above, therapists plan is approved by physician

## 2019-08-24 LAB
ESTIMATED AVERAGE GLUCOSE: 105.4 MG/DL
HBA1C MFR BLD: 5.3 %

## 2019-08-25 LAB — URINE CULTURE, ROUTINE: NORMAL

## 2019-08-26 ENCOUNTER — OFFICE VISIT (OUTPATIENT)
Dept: ORTHOPEDIC SURGERY | Age: 74
End: 2019-08-26
Payer: MEDICARE

## 2019-08-26 VITALS — BODY MASS INDEX: 40.14 KG/M2 | HEIGHT: 69 IN | WEIGHT: 271 LBS

## 2019-08-26 DIAGNOSIS — M17.11 PRIMARY OSTEOARTHRITIS OF RIGHT KNEE: Primary | ICD-10-CM

## 2019-08-26 PROCEDURE — G8598 ASA/ANTIPLAT THER USED: HCPCS | Performed by: ORTHOPAEDIC SURGERY

## 2019-08-26 PROCEDURE — 4040F PNEUMOC VAC/ADMIN/RCVD: CPT | Performed by: ORTHOPAEDIC SURGERY

## 2019-08-26 PROCEDURE — G8417 CALC BMI ABV UP PARAM F/U: HCPCS | Performed by: ORTHOPAEDIC SURGERY

## 2019-08-26 PROCEDURE — 1123F ACP DISCUSS/DSCN MKR DOCD: CPT | Performed by: ORTHOPAEDIC SURGERY

## 2019-08-26 PROCEDURE — 3017F COLORECTAL CA SCREEN DOC REV: CPT | Performed by: ORTHOPAEDIC SURGERY

## 2019-08-26 PROCEDURE — 99212 OFFICE O/P EST SF 10 MIN: CPT | Performed by: ORTHOPAEDIC SURGERY

## 2019-08-26 PROCEDURE — L1830 KO IMMOB CANVAS LONG PRE OTS: HCPCS | Performed by: ORTHOPAEDIC SURGERY

## 2019-08-26 PROCEDURE — G8427 DOCREV CUR MEDS BY ELIG CLIN: HCPCS | Performed by: ORTHOPAEDIC SURGERY

## 2019-08-26 PROCEDURE — 1036F TOBACCO NON-USER: CPT | Performed by: ORTHOPAEDIC SURGERY

## 2019-08-26 NOTE — PROGRESS NOTES
02/08/2018    Shortness of breath 02/20/2015    SALLIE (obstructive sleep apnea) 02/20/2015     Past Surgical History:   Procedure Laterality Date    CORONARY ANGIOPLASTY  08/26/15    CORONARY ANGIOPLASTY WITH STENT PLACEMENT      EYE SURGERY      left eye    EYE SURGERY      right eye     Family History   Problem Relation Age of Onset    Heart Disease Mother     Heart Disease Father     Coronary Art Dis Father      Social History     Socioeconomic History    Marital status:      Spouse name: None    Number of children: None    Years of education: None    Highest education level: None   Occupational History    None   Social Needs    Financial resource strain: None    Food insecurity:     Worry: None     Inability: None    Transportation needs:     Medical: None     Non-medical: None   Tobacco Use    Smoking status: Former Smoker    Smokeless tobacco: Never Used    Tobacco comment: Quit smoking 40 years ago   Substance and Sexual Activity    Alcohol use:  Yes     Alcohol/week: 0.0 standard drinks     Comment: 3-4 drinks a week    Drug use: No    Sexual activity: Yes     Partners: Female   Lifestyle    Physical activity:     Days per week: None     Minutes per session: None    Stress: None   Relationships    Social connections:     Talks on phone: None     Gets together: None     Attends Jainism service: None     Active member of club or organization: None     Attends meetings of clubs or organizations: None     Relationship status: None    Intimate partner violence:     Fear of current or ex partner: None     Emotionally abused: None     Physically abused: None     Forced sexual activity: None   Other Topics Concern    None   Social History Narrative    None     Current Outpatient Medications   Medication Sig Dispense Refill    isosorbide dinitrate (ISORDIL) 10 MG tablet Take 1 tablet by mouth 3  times daily (Patient taking differently: Take 1 tablet by mouth 3 times daily (pt takes

## 2019-08-27 ENCOUNTER — HOSPITAL ENCOUNTER (OUTPATIENT)
Dept: PHYSICAL THERAPY | Age: 74
Setting detail: THERAPIES SERIES
Discharge: HOME | End: 2019-08-27
Payer: MEDICARE

## 2019-08-27 PROCEDURE — 97150 GROUP THERAPEUTIC PROCEDURES: CPT

## 2019-08-27 PROCEDURE — 97113 AQUATIC THERAPY/EXERCISES: CPT

## 2019-08-27 NOTE — FLOWSHEET NOTE
Physical Therapy Aquatic Flow Sheet  Date:  8/27/2019    Patient Name:  Vikki Cervantes    Restrictions:    Medical/Treatment Diagnosis Information:  · Diagnosis: M17.11 R knee OA  Insurance/Certification information:  PT Insurance Information: Medicare and [facility] Joseph Canchola  Physician Information:  Referring Practitioner: CARINE Alvarez  Plan of care signed (Y/N):  N  Visit# / total visits:  2/8      Pain level: /10   Electronically signed by:  Morgan Toscano PT    Medicare Cap (if applicable):  312 = total amount used, updated 8/27/2019    Key  B= Belt DB= Dumbells T= Theratube   H= Hydrotone N= Noodles W= Weights   P= Paddles S= Speedo equipment K= Kickboard     Exercises/Activities   Warm-up/Amb    Exercises      Slow forward  2 laps  HR/TR      Slow sideways  2 laps  Marches  2 min     Slow backwards  2 laps  Mini-squats  15x    Medium forward    4-way SLR  15x    Medium sideways    Hip circles/fig 8  15x    Small shuffle    Hamstring curls  15x    Jog    Knee extension  15x    Braiding    Pelvic tilts      Bicycling  2 min  Scap squeezes          Shoulder flex/ext      Functional    Shoulder abd/add      Step  nv  Shoulder H. abd/add      Lifting    Shoulder IR/ER      Hand to opp knee    Rowing      Push down squat    Bilateral pull down      UE PNF    Push/pull      LE PNF    Push downs      Wall push ups    Arm circles      SLS  x60\"   Elbow flex/ext          Chin tuck      Stretching    UT shrugs/rolls      Gastroc/Soleus  2x20\" B  Rocking horse      Hamstring  2x20\" B        SKTC  nv  Other      Piriformis          Hip flexor          Ladder pull          Pec stretch          Post deltoid           Time In:  2:20    Timed Code Treatment Minutes:  15    Total Treatment Minutes:  30    Treatment/Activity Tolerance:   [x] Patient tolerated treatment well [] Patient limited by fatigue   [] Patient limited by pain [] Patient limited by other medical complications  [] Other:     Prognosis: [x] Good [] Fair  [] Poor    Patient Requires Follow-up:  [x] Yes  [] No    Plan: [x] Continue per plan of care [] Alter current plan (see comments)   [] Plan of care initiated [] Hold pending MD visit [] Discharge    See Weekly Progress Note: [] Yes  [x] No  Next due:

## 2019-08-28 ENCOUNTER — HOSPITAL ENCOUNTER (OUTPATIENT)
Dept: PHYSICAL THERAPY | Age: 74
Setting detail: THERAPIES SERIES
Discharge: HOME | End: 2019-08-28
Payer: MEDICARE

## 2019-08-28 ENCOUNTER — TELEPHONE (OUTPATIENT)
Dept: CARDIOLOGY CLINIC | Age: 74
End: 2019-08-28

## 2019-08-28 PROCEDURE — 97110 THERAPEUTIC EXERCISES: CPT | Performed by: PHYSICAL THERAPIST

## 2019-08-28 PROCEDURE — 97161 PT EVAL LOW COMPLEX 20 MIN: CPT | Performed by: PHYSICAL THERAPIST

## 2019-08-28 NOTE — PLAN OF CARE
[address] [place], [address] [address]    Physical Therapy Certification    Dear  Dr Jose David Jauregui,    We had the pleasure of evaluating the following patient for physical therapy services at [address] [place] [address]. A summary of our findings can be found in the initial assessment below. This includes our plan of care. If you have any questions or concerns regarding these findings, please do not hesitate to contact me at the office phone number checked above. Thank you for the referral.       Physician Signature:_______________________________Date:__________________  By signing above (or electronic signature), therapists plan is approved by physician    Patient: oJey Erazo   : 1945   MRN: [id number]  Referring Physician:  Jose David Jauregui      Evaluation Date: 2019      Medical Diagnosis Information:   M17.11 R knee OA     M25.561 R knee pain                                        Insurance information:    [place]   Precautions/ Contra-indications:   Latex Allergy:  [x]NO      []YES  Preferred Language for Healthcare:   [x]English       []other:    SUBJECTIVE: Patient stated complaint:knee oa having surgery    Relevant Medical History:  Functional Disability Index: lefs 69%    Pain Scale: 10/10                [x] Patient history, allergies, meds reviewed. Medical chart reviewed. See intake form. Review Of Systems (ROS):  [x]Performed Review of systems (Integumentary, CardioPulmonary, Neurological) by intake and observation. Intake form has been scanned into medical record. Patient has been instructed to contact their primary care physician regarding ROS issues if not already being addressed at this time.       Co-morbidities/Complexities (which will affect course of rehabilitation):   [x]None           Arthritic conditions   []Rheumatoid arthritis (M05.9)  []Osteoarthritis (M19.91)   Cardiovascular conditions

## 2019-08-28 NOTE — DISCHARGE SUMMARY
GOALS:   Patient stated goal: To be prepared for surgery      Therapist goals for Patient:    Short Term Goals: To be achieved in: 1 weeks  1. Independent in HEP and progression per patient tolerance, in order to prevent re-injury and to prepare for surgery by strength and flexibility therex . Progression Towards Functional goals:  [] Patient is progressing as expected towards functional goals listed. [] Progression is slowed due to complexities listed. [] Progression has been slowed due to co-morbidities.   [x] Plan just implemented, too soon to assess goals progression  [] Other:     ASSESSMENT:  See eval    Treatment/Activity Tolerance:  [x] Patient tolerated treatment well [] Patient limited by fatique  [] Patient limited by pain  [] Patient limited by other medical complications  [] Other:     Prognosis: [x] Good [] Fair  [] Poor          Patient Requires Follow-up: [] Yes  [] No    PLAN: See eval       [] Continue per plan of care [] Alter current plan (see comments)  [] Plan of care initiated [x] Discharge     Electronically signed by: Segun Yanes PT

## 2019-08-29 ENCOUNTER — HOSPITAL ENCOUNTER (OUTPATIENT)
Dept: PHYSICAL THERAPY | Age: 74
Setting detail: THERAPIES SERIES
Discharge: HOME | End: 2019-08-29
Payer: MEDICARE

## 2019-08-29 PROCEDURE — 97113 AQUATIC THERAPY/EXERCISES: CPT

## 2019-08-29 PROCEDURE — 97150 GROUP THERAPEUTIC PROCEDURES: CPT

## 2019-09-03 ENCOUNTER — HOSPITAL ENCOUNTER (OUTPATIENT)
Dept: CT IMAGING | Age: 74
Discharge: HOME | End: 2019-09-03
Payer: MEDICARE

## 2019-09-03 ENCOUNTER — HOSPITAL ENCOUNTER (OUTPATIENT)
Dept: PHYSICAL THERAPY | Age: 74
Setting detail: THERAPIES SERIES
Discharge: HOME | End: 2019-09-03
Payer: MEDICARE

## 2019-09-03 DIAGNOSIS — M25.561 RIGHT KNEE PAIN, UNSPECIFIED CHRONICITY: ICD-10-CM

## 2019-09-03 PROCEDURE — 97113 AQUATIC THERAPY/EXERCISES: CPT

## 2019-09-03 PROCEDURE — 73700 CT LOWER EXTREMITY W/O DYE: CPT

## 2019-09-03 PROCEDURE — 97150 GROUP THERAPEUTIC PROCEDURES: CPT

## 2019-09-05 ENCOUNTER — HOSPITAL ENCOUNTER (OUTPATIENT)
Dept: PHYSICAL THERAPY | Age: 74
Setting detail: THERAPIES SERIES
Discharge: HOME | End: 2019-09-05
Payer: MEDICARE

## 2019-09-05 ENCOUNTER — OFFICE VISIT (OUTPATIENT)
Dept: BARIATRICS/WEIGHT MGMT | Age: 74
End: 2019-09-05
Payer: MEDICARE

## 2019-09-05 VITALS
WEIGHT: 270 LBS | SYSTOLIC BLOOD PRESSURE: 124 MMHG | HEIGHT: 69 IN | HEART RATE: 60 BPM | RESPIRATION RATE: 16 BRPM | BODY MASS INDEX: 39.99 KG/M2 | DIASTOLIC BLOOD PRESSURE: 74 MMHG

## 2019-09-05 DIAGNOSIS — E66.9 OBESITY (BMI 30-39.9): Primary | ICD-10-CM

## 2019-09-05 PROCEDURE — 3017F COLORECTAL CA SCREEN DOC REV: CPT | Performed by: NURSE PRACTITIONER

## 2019-09-05 PROCEDURE — 1123F ACP DISCUSS/DSCN MKR DOCD: CPT | Performed by: NURSE PRACTITIONER

## 2019-09-05 PROCEDURE — 99213 OFFICE O/P EST LOW 20 MIN: CPT | Performed by: NURSE PRACTITIONER

## 2019-09-05 PROCEDURE — 97113 AQUATIC THERAPY/EXERCISES: CPT

## 2019-09-05 PROCEDURE — 1036F TOBACCO NON-USER: CPT | Performed by: NURSE PRACTITIONER

## 2019-09-05 PROCEDURE — 97150 GROUP THERAPEUTIC PROCEDURES: CPT

## 2019-09-05 PROCEDURE — G8417 CALC BMI ABV UP PARAM F/U: HCPCS | Performed by: NURSE PRACTITIONER

## 2019-09-05 PROCEDURE — G8427 DOCREV CUR MEDS BY ELIG CLIN: HCPCS | Performed by: NURSE PRACTITIONER

## 2019-09-05 PROCEDURE — G8598 ASA/ANTIPLAT THER USED: HCPCS | Performed by: NURSE PRACTITIONER

## 2019-09-05 PROCEDURE — 4040F PNEUMOC VAC/ADMIN/RCVD: CPT | Performed by: NURSE PRACTITIONER

## 2019-09-05 ASSESSMENT — ENCOUNTER SYMPTOMS
RESPIRATORY NEGATIVE: 1
GASTROINTESTINAL NEGATIVE: 1

## 2019-09-05 NOTE — FLOWSHEET NOTE
Poor    Patient Requires Follow-up:  [x] Yes  [] No    Plan: [x] Continue per plan of care [] Alter current plan (see comments)   [] Plan of care initiated [] Hold pending MD visit [] Discharge    See Weekly Progress Note: [] Yes  [x] No  Next due:

## 2019-09-06 ENCOUNTER — TELEPHONE (OUTPATIENT)
Dept: ORTHOPEDIC SURGERY | Age: 74
End: 2019-09-06

## 2019-09-10 ENCOUNTER — HOSPITAL ENCOUNTER (OUTPATIENT)
Dept: PHYSICAL THERAPY | Age: 74
Setting detail: THERAPIES SERIES
Discharge: HOME | End: 2019-09-10
Payer: MEDICARE

## 2019-09-10 PROCEDURE — 97150 GROUP THERAPEUTIC PROCEDURES: CPT

## 2019-09-10 PROCEDURE — 97113 AQUATIC THERAPY/EXERCISES: CPT

## 2019-09-10 NOTE — FLOWSHEET NOTE
Physical Therapy Aquatic Flow Sheet  Date:  9/10/2019    Patient Name:  Anamika Ramos    Restrictions:    Medical/Treatment Diagnosis Information:  · Diagnosis: M17.11 R knee OA  Insurance/Certification information:  PT Insurance Information: Medicare and [facility] Joseph Canchola  Physician Information:  Referring Practitioner: CARINE Rothman  Plan of care signed (Y/N):  Y  Visit# / total visits:  6/8      Pain level: 1/10   Electronically signed by:  Nilsa Og PT    Medicare Cap (if applicable):  316.72 = total amount used, updated 9/10/2019    Key  B= Belt DB= Dumbells T= Theratube   H= Hydrotone N= Noodles W= Weights   P= Paddles S= Speedo equipment K= Kickboard     Exercises/Activities   Warm-up/Amb    Exercises      Slow forward  2 laps  HR/TR  x20B    Slow sideways  2 laps  Marches  2 min     Slow backwards  2 laps  Mini-squats  20x    Medium forward    4-way SLR  20x    Medium sideways    Hip circles/fig 8  20x    Small shuffle    Hamstring curls  20x    Jog    Knee extension  20x    Braiding    Pelvic tilts      Bicycling  2 min  Scap squeezes          Shoulder flex/ext      Functional    Shoulder abd/add      Step  15x  Shoulder H. abd/add      Lifting    Shoulder IR/ER      Hand to opp knee    Rowing      Push down squat    Bilateral pull down      UE PNF    Push/pull      LE PNF    Push downs      Wall push ups    Arm circles      SLS  x60\"   Elbow flex/ext          Chin tuck      Stretching    UT shrugs/rolls      Gastroc/Soleus  2x20\" B  Rocking horse      Hamstring  2x20\" B        SKTC    Other      Piriformis          Hip flexor          Ladder pull          Pec stretch          Post deltoid           Time In:  2:00    Timed Code Treatment Minutes:  15    Total Treatment Minutes:  40    Treatment/Activity Tolerance:   [x] Patient tolerated treatment well [] Patient limited by fatigue   [] Patient limited by pain [] Patient limited by other medical complications  [] Other:     Prognosis: [x] Good [] Fair  [] Resolving.

## 2019-09-12 ENCOUNTER — HOSPITAL ENCOUNTER (OUTPATIENT)
Dept: PHYSICAL THERAPY | Age: 74
Setting detail: THERAPIES SERIES
Discharge: HOME | End: 2019-09-12
Payer: MEDICARE

## 2019-09-12 PROCEDURE — 97150 GROUP THERAPEUTIC PROCEDURES: CPT

## 2019-09-12 PROCEDURE — 97113 AQUATIC THERAPY/EXERCISES: CPT

## 2019-09-13 RX ORDER — THIAMINE HCL 100 MG
2500 TABLET ORAL DAILY
COMMUNITY

## 2019-09-13 RX ORDER — KETOCONAZOLE 20 MG/G
CREAM TOPICAL DAILY
COMMUNITY

## 2019-09-13 NOTE — PROGRESS NOTES
Obstructive Sleep Apnea (SALLIE) Screening     Patient:  Kellie Santos    YOB: 1945      Medical Record #:  [id number]                     Date:  9/13/2019     1. Are you a loud and/or regular snorer? []  Yes       [x] No    2. Have you been observed to gasp or stop breathing during sleep? []  Yes       [x] No    3. Do you feel tired or groggy upon awakening or do you awaken with a headache?           []  Yes       [x] No    4. Are you often tired or fatigued during the wake time hours? []  Yes       [x] No    5. Do you fall asleep sitting, reading, watching TV or driving? []  Yes       [x] No    6. Do you often have problems with memory or concentration? []  Yes       [x] No    **If patient's score is ? 3 they are considered high risk for SALLIE. Notify the anesthesiologist of the high risk and document in focus note. Note:  If the patient's BMI is more than 35 kg m¯² , has neck circumference > 40 cm, and/or high blood pressure the risk is greater (© American Sleep Apnea Association, 2006). CPAP, will bring in.

## 2019-09-17 ENCOUNTER — ANESTHESIA EVENT (OUTPATIENT)
Dept: OPERATING ROOM | Age: 74
End: 2019-09-17
Payer: MEDICARE

## 2019-09-17 NOTE — ANESTHESIA PRE PROCEDURE
11.8 08/23/2019    HCT 34.7 08/23/2019     08/23/2019     CMP:     08/23/2019    K 4.6 08/23/2019     08/23/2019    CO2 24 08/23/2019    BUN 26 08/23/2019    CREATININE 0.9 08/23/2019    GLUCOSE 92 08/23/2019    PROT 7.3 11/09/2017    CALCIUM 9.2 08/23/2019    BILITOT 1.0 11/09/2017    ALKPHOS 79 11/09/2017    AST 19 11/09/2017    ALT 17 11/09/2017     Coags:    PROTIME 12.1 08/23/2019    INR 1.06 08/23/2019     Anesthesia Evaluation  Patient summary reviewed and Nursing notes reviewed  Airway: Mallampati: II  TM distance: >3 FB   Neck ROM: limited  Comment: Thick neck girth  Mouth opening: > = 3 FB Dental:          Pulmonary:   (+) sleep apnea: on CPAP,      (-) COPD and asthma                           Cardiovascular:  Exercise tolerance: good (>4 METS),   (+) hypertension:, CAD: obstructive, CABG/stent: no interval change, CHF:, hyperlipidemia    (-)  angina and  CHAVEZ    ECG reviewed      Echocardiogram reviewed         Beta Blocker:  Dose within 24 Hrs      ROS comment: EKG:  S Br 54; nl axis; no acute ischemic changes    ECHO: Normal left ventricular systolic function, with estimated ejection fraction of 55%. There is mild concentric left ventricular hypertrophy. No regional wall motion abnormalities noted. Diastolic filling parameters suggests grade II diastolic dysfunction. Systolic pulmonary artery pressure (SPAP) is normal     Cardiac Cath: 1. Patent proximal to mid-right coronary artery stents with 40% discrete in-stent restenosis of the mid-right coronary artery, stent, fractional flow reserve across the lesion was 0.89, which is insignificant. 2.  Normal left ventricular systolic function with ejection fraction of 55%. 3.  Normal Left ventricular end-diastolic pressure, 11 mmHg.      Neuro/Psych:      (-) seizures, TIA and CVA           GI/Hepatic/Renal:   (+) GERD: well controlled, morbid obesity     (-) liver disease and no renal disease       Endo/Other:    (+) : arthritis: OA., .    (-) diabetes mellitus, hypothyroidism               Abdominal:           Vascular:     - DVT and PE. Anesthesia Plan      spinal     ASA 3     (AC and IPACK Block(s) for post-op pain management per surgeon request.)  Induction: intravenous. MIPS: Prophylactic antiemetics administered. Anesthetic plan and risks discussed with patient and spouse. Plan discussed with CRNA.           Laura Schaffer MD

## 2019-09-18 ENCOUNTER — HOSPITAL ENCOUNTER (OUTPATIENT)
Age: 74
Discharge: HOME | End: 2019-09-19
Attending: ORTHOPAEDIC SURGERY | Admitting: ORTHOPAEDIC SURGERY
Payer: MEDICARE

## 2019-09-18 ENCOUNTER — ANESTHESIA (OUTPATIENT)
Dept: OPERATING ROOM | Age: 74
End: 2019-09-18
Payer: MEDICARE

## 2019-09-18 VITALS
DIASTOLIC BLOOD PRESSURE: 63 MMHG | TEMPERATURE: 98.4 F | RESPIRATION RATE: 8 BRPM | SYSTOLIC BLOOD PRESSURE: 96 MMHG | OXYGEN SATURATION: 99 %

## 2019-09-18 DIAGNOSIS — Z96.651 STATUS POST TOTAL RIGHT KNEE REPLACEMENT: ICD-10-CM

## 2019-09-18 DIAGNOSIS — M17.11 PRIMARY OSTEOARTHRITIS OF RIGHT KNEE: Primary | ICD-10-CM

## 2019-09-18 PROBLEM — E66.01 MORBID OBESITY (HCC): Status: ACTIVE | Noted: 2019-09-18

## 2019-09-18 LAB
ABO/RH: NORMAL
ANTIBODY SCREEN: NORMAL
GLUCOSE BLD-MCNC: 150 MG/DL (ref 70–99)
GLUCOSE BLD-MCNC: 172 MG/DL (ref 70–99)
PERFORMED ON: ABNORMAL
PERFORMED ON: ABNORMAL

## 2019-09-18 PROCEDURE — 97165 OT EVAL LOW COMPLEX 30 MIN: CPT

## 2019-09-18 PROCEDURE — 7100000001 HC PACU RECOVERY - ADDTL 15 MIN: Performed by: ORTHOPAEDIC SURGERY

## 2019-09-18 PROCEDURE — 2580000003 HC RX 258: Performed by: PHYSICIAN ASSISTANT

## 2019-09-18 PROCEDURE — 97110 THERAPEUTIC EXERCISES: CPT

## 2019-09-18 PROCEDURE — 2580000003 HC RX 258: Performed by: ORTHOPAEDIC SURGERY

## 2019-09-18 PROCEDURE — 6360000002 HC RX W HCPCS: Performed by: ORTHOPAEDIC SURGERY

## 2019-09-18 PROCEDURE — 2580000003 HC RX 258: Performed by: ANESTHESIOLOGY

## 2019-09-18 PROCEDURE — 64447 NJX AA&/STRD FEMORAL NRV IMG: CPT | Performed by: ANESTHESIOLOGY

## 2019-09-18 PROCEDURE — C1776 JOINT DEVICE (IMPLANTABLE): HCPCS | Performed by: ORTHOPAEDIC SURGERY

## 2019-09-18 PROCEDURE — APPNB30 APP NON BILLABLE TIME 0-30 MINS: Performed by: PHYSICIAN ASSISTANT

## 2019-09-18 PROCEDURE — 2720000010 HC SURG SUPPLY STERILE: Performed by: ORTHOPAEDIC SURGERY

## 2019-09-18 PROCEDURE — 2500000003 HC RX 250 WO HCPCS: Performed by: NURSE ANESTHETIST, CERTIFIED REGISTERED

## 2019-09-18 PROCEDURE — 2700000000 HC OXYGEN THERAPY PER DAY

## 2019-09-18 PROCEDURE — 7100000000 HC PACU RECOVERY - FIRST 15 MIN: Performed by: ORTHOPAEDIC SURGERY

## 2019-09-18 PROCEDURE — 76942 ECHO GUIDE FOR BIOPSY: CPT | Performed by: ANESTHESIOLOGY

## 2019-09-18 PROCEDURE — 6360000002 HC RX W HCPCS: Performed by: ANESTHESIOLOGY

## 2019-09-18 PROCEDURE — 2500000003 HC RX 250 WO HCPCS: Performed by: ANESTHESIOLOGY

## 2019-09-18 PROCEDURE — 1200000000 HC SEMI PRIVATE

## 2019-09-18 PROCEDURE — C1713 ANCHOR/SCREW BN/BN,TIS/BN: HCPCS | Performed by: ORTHOPAEDIC SURGERY

## 2019-09-18 PROCEDURE — 6360000002 HC RX W HCPCS: Performed by: PHYSICIAN ASSISTANT

## 2019-09-18 PROCEDURE — 6360000002 HC RX W HCPCS: Performed by: NURSE ANESTHETIST, CERTIFIED REGISTERED

## 2019-09-18 PROCEDURE — 2709999900 HC NON-CHARGEABLE SUPPLY: Performed by: ORTHOPAEDIC SURGERY

## 2019-09-18 PROCEDURE — 3600000005 HC SURGERY LEVEL 5 BASE: Performed by: ORTHOPAEDIC SURGERY

## 2019-09-18 PROCEDURE — 36415 COLL VENOUS BLD VENIPUNCTURE: CPT

## 2019-09-18 PROCEDURE — 86901 BLOOD TYPING SEROLOGIC RH(D): CPT

## 2019-09-18 PROCEDURE — 86900 BLOOD TYPING SEROLOGIC ABO: CPT

## 2019-09-18 PROCEDURE — 3700000001 HC ADD 15 MINUTES (ANESTHESIA): Performed by: ORTHOPAEDIC SURGERY

## 2019-09-18 PROCEDURE — 97116 GAIT TRAINING THERAPY: CPT

## 2019-09-18 PROCEDURE — 94761 N-INVAS EAR/PLS OXIMETRY MLT: CPT

## 2019-09-18 PROCEDURE — 6370000000 HC RX 637 (ALT 250 FOR IP): Performed by: ANESTHESIOLOGY

## 2019-09-18 PROCEDURE — 3700000000 HC ANESTHESIA ATTENDED CARE: Performed by: ORTHOPAEDIC SURGERY

## 2019-09-18 PROCEDURE — 2500000003 HC RX 250 WO HCPCS: Performed by: PHYSICIAN ASSISTANT

## 2019-09-18 PROCEDURE — 3600000015 HC SURGERY LEVEL 5 ADDTL 15MIN: Performed by: ORTHOPAEDIC SURGERY

## 2019-09-18 PROCEDURE — 6370000000 HC RX 637 (ALT 250 FOR IP): Performed by: PHYSICIAN ASSISTANT

## 2019-09-18 PROCEDURE — 88311 DECALCIFY TISSUE: CPT

## 2019-09-18 PROCEDURE — 86850 RBC ANTIBODY SCREEN: CPT

## 2019-09-18 PROCEDURE — 88305 TISSUE EXAM BY PATHOLOGIST: CPT

## 2019-09-18 PROCEDURE — 97530 THERAPEUTIC ACTIVITIES: CPT

## 2019-09-18 PROCEDURE — 2500000003 HC RX 250 WO HCPCS: Performed by: ORTHOPAEDIC SURGERY

## 2019-09-18 PROCEDURE — C9290 INJ, BUPIVACAINE LIPOSOME: HCPCS | Performed by: ORTHOPAEDIC SURGERY

## 2019-09-18 PROCEDURE — 97161 PT EVAL LOW COMPLEX 20 MIN: CPT

## 2019-09-18 DEVICE — INSERT TIB BEAR SZ 5 THK11MM KNEE X3 CNDYL STABILIZING: Type: IMPLANTABLE DEVICE | Site: KNEE | Status: FUNCTIONAL

## 2019-09-18 DEVICE — BASEPLATE TIB SZ 5 KNEE TRITANIUM CEM PRI LO PROF TRIATHLON: Type: IMPLANTABLE DEVICE | Site: KNEE | Status: FUNCTIONAL

## 2019-09-18 DEVICE — CEMENT BNE 40 GM RADIOPAQUE BA SIMPLEX P: Type: IMPLANTABLE DEVICE | Site: KNEE | Status: FUNCTIONAL

## 2019-09-18 DEVICE — IMPLANTABLE DEVICE: Type: IMPLANTABLE DEVICE | Site: KNEE | Status: FUNCTIONAL

## 2019-09-18 DEVICE — COMPONENT PAT DIA33MM THK9MM KNEE X3 SYMMETRIC TRIATHLON: Type: IMPLANTABLE DEVICE | Site: PATELLA | Status: FUNCTIONAL

## 2019-09-18 RX ORDER — BUPIVACAINE HYDROCHLORIDE 2.5 MG/ML
INJECTION, SOLUTION EPIDURAL; INFILTRATION; INTRACAUDAL PRN
Status: DISCONTINUED | OUTPATIENT
Start: 2019-09-18 | End: 2019-09-18 | Stop reason: SDUPTHER

## 2019-09-18 RX ORDER — DEXTROSE MONOHYDRATE 25 G/50ML
12.5 INJECTION, SOLUTION INTRAVENOUS PRN
Status: DISCONTINUED | OUTPATIENT
Start: 2019-09-18 | End: 2019-09-19 | Stop reason: HOSPADM

## 2019-09-18 RX ORDER — MIDAZOLAM HYDROCHLORIDE 1 MG/ML
INJECTION INTRAMUSCULAR; INTRAVENOUS PRN
Status: DISCONTINUED | OUTPATIENT
Start: 2019-09-18 | End: 2019-09-18 | Stop reason: SDUPTHER

## 2019-09-18 RX ORDER — ISOSORBIDE DINITRATE 10 MG/1
10 TABLET ORAL 2 TIMES DAILY
Status: DISCONTINUED | OUTPATIENT
Start: 2019-09-18 | End: 2019-09-19 | Stop reason: HOSPADM

## 2019-09-18 RX ORDER — HYDROMORPHONE HCL 110MG/55ML
PATIENT CONTROLLED ANALGESIA SYRINGE INTRAVENOUS PRN
Status: DISCONTINUED | OUTPATIENT
Start: 2019-09-18 | End: 2019-09-18 | Stop reason: SDUPTHER

## 2019-09-18 RX ORDER — PROPOFOL 10 MG/ML
INJECTION, EMULSION INTRAVENOUS PRN
Status: DISCONTINUED | OUTPATIENT
Start: 2019-09-18 | End: 2019-09-18 | Stop reason: SDUPTHER

## 2019-09-18 RX ORDER — SODIUM CHLORIDE, SODIUM LACTATE, POTASSIUM CHLORIDE, CALCIUM CHLORIDE 600; 310; 30; 20 MG/100ML; MG/100ML; MG/100ML; MG/100ML
INJECTION, SOLUTION INTRAVENOUS CONTINUOUS
Status: DISCONTINUED | OUTPATIENT
Start: 2019-09-18 | End: 2019-09-19 | Stop reason: HOSPADM

## 2019-09-18 RX ORDER — ONDANSETRON 2 MG/ML
4 INJECTION INTRAMUSCULAR; INTRAVENOUS
Status: DISCONTINUED | OUTPATIENT
Start: 2019-09-18 | End: 2019-09-18 | Stop reason: HOSPADM

## 2019-09-18 RX ORDER — PANTOPRAZOLE SODIUM 40 MG/1
40 TABLET, DELAYED RELEASE ORAL DAILY
Status: DISCONTINUED | OUTPATIENT
Start: 2019-09-18 | End: 2019-09-19 | Stop reason: HOSPADM

## 2019-09-18 RX ORDER — ATORVASTATIN CALCIUM 80 MG/1
80 TABLET, FILM COATED ORAL DAILY
Status: DISCONTINUED | OUTPATIENT
Start: 2019-09-18 | End: 2019-09-19 | Stop reason: HOSPADM

## 2019-09-18 RX ORDER — SODIUM CHLORIDE 0.9 % (FLUSH) 0.9 %
10 SYRINGE (ML) INJECTION PRN
Status: DISCONTINUED | OUTPATIENT
Start: 2019-09-18 | End: 2019-09-19 | Stop reason: HOSPADM

## 2019-09-18 RX ORDER — MORPHINE SULFATE 4 MG/ML
4 INJECTION, SOLUTION INTRAMUSCULAR; INTRAVENOUS
Status: DISCONTINUED | OUTPATIENT
Start: 2019-09-18 | End: 2019-09-19 | Stop reason: HOSPADM

## 2019-09-18 RX ORDER — NICOTINE POLACRILEX 4 MG
15 LOZENGE BUCCAL PRN
Status: DISCONTINUED | OUTPATIENT
Start: 2019-09-18 | End: 2019-09-19 | Stop reason: HOSPADM

## 2019-09-18 RX ORDER — ACETAMINOPHEN 500 MG
1000 TABLET ORAL ONCE
Status: COMPLETED | OUTPATIENT
Start: 2019-09-18 | End: 2019-09-18

## 2019-09-18 RX ORDER — DOCUSATE SODIUM 100 MG/1
100 CAPSULE, LIQUID FILLED ORAL 2 TIMES DAILY
Status: DISCONTINUED | OUTPATIENT
Start: 2019-09-18 | End: 2019-09-19 | Stop reason: HOSPADM

## 2019-09-18 RX ORDER — FENTANYL CITRATE 50 UG/ML
25 INJECTION, SOLUTION INTRAMUSCULAR; INTRAVENOUS EVERY 5 MIN PRN
Status: DISCONTINUED | OUTPATIENT
Start: 2019-09-18 | End: 2019-09-18 | Stop reason: HOSPADM

## 2019-09-18 RX ORDER — KETOROLAC TROMETHAMINE 30 MG/ML
15 INJECTION, SOLUTION INTRAMUSCULAR; INTRAVENOUS EVERY 6 HOURS
Status: DISCONTINUED | OUTPATIENT
Start: 2019-09-18 | End: 2019-09-19 | Stop reason: HOSPADM

## 2019-09-18 RX ORDER — LISINOPRIL 20 MG/1
20 TABLET ORAL DAILY
Status: DISCONTINUED | OUTPATIENT
Start: 2019-09-18 | End: 2019-09-19 | Stop reason: HOSPADM

## 2019-09-18 RX ORDER — ONDANSETRON 2 MG/ML
4 INJECTION INTRAMUSCULAR; INTRAVENOUS EVERY 6 HOURS PRN
Status: DISCONTINUED | OUTPATIENT
Start: 2019-09-18 | End: 2019-09-19 | Stop reason: HOSPADM

## 2019-09-18 RX ORDER — CELECOXIB 100 MG/1
100 CAPSULE ORAL ONCE
Status: COMPLETED | OUTPATIENT
Start: 2019-09-18 | End: 2019-09-18

## 2019-09-18 RX ORDER — ONDANSETRON 2 MG/ML
INJECTION INTRAMUSCULAR; INTRAVENOUS PRN
Status: DISCONTINUED | OUTPATIENT
Start: 2019-09-18 | End: 2019-09-18 | Stop reason: SDUPTHER

## 2019-09-18 RX ORDER — FAMOTIDINE 20 MG/1
20 TABLET, FILM COATED ORAL 2 TIMES DAILY
Status: DISCONTINUED | OUTPATIENT
Start: 2019-09-18 | End: 2019-09-19

## 2019-09-18 RX ORDER — DEXAMETHASONE SODIUM PHOSPHATE 10 MG/ML
INJECTION INTRAMUSCULAR; INTRAVENOUS PRN
Status: DISCONTINUED | OUTPATIENT
Start: 2019-09-18 | End: 2019-09-18 | Stop reason: SDUPTHER

## 2019-09-18 RX ORDER — CYCLOBENZAPRINE HCL 10 MG
10 TABLET ORAL 3 TIMES DAILY PRN
Status: DISCONTINUED | OUTPATIENT
Start: 2019-09-18 | End: 2019-09-19 | Stop reason: HOSPADM

## 2019-09-18 RX ORDER — GABAPENTIN 300 MG/1
300 CAPSULE ORAL ONCE
Status: COMPLETED | OUTPATIENT
Start: 2019-09-18 | End: 2019-09-18

## 2019-09-18 RX ORDER — LIDOCAINE HYDROCHLORIDE 20 MG/ML
INJECTION, SOLUTION INFILTRATION; PERINEURAL PRN
Status: DISCONTINUED | OUTPATIENT
Start: 2019-09-18 | End: 2019-09-18 | Stop reason: SDUPTHER

## 2019-09-18 RX ORDER — SODIUM CHLORIDE 0.9 % (FLUSH) 0.9 %
10 SYRINGE (ML) INJECTION EVERY 12 HOURS SCHEDULED
Status: DISCONTINUED | OUTPATIENT
Start: 2019-09-18 | End: 2019-09-19 | Stop reason: HOSPADM

## 2019-09-18 RX ORDER — OXYCODONE HYDROCHLORIDE AND ACETAMINOPHEN 5; 325 MG/1; MG/1
1 TABLET ORAL PRN
Status: COMPLETED | OUTPATIENT
Start: 2019-09-18 | End: 2019-09-18

## 2019-09-18 RX ORDER — SODIUM CHLORIDE, SODIUM LACTATE, POTASSIUM CHLORIDE, CALCIUM CHLORIDE 600; 310; 30; 20 MG/100ML; MG/100ML; MG/100ML; MG/100ML
INJECTION, SOLUTION INTRAVENOUS CONTINUOUS
Status: DISCONTINUED | OUTPATIENT
Start: 2019-09-18 | End: 2019-09-18

## 2019-09-18 RX ORDER — PROMETHAZINE HYDROCHLORIDE 25 MG/ML
6.25 INJECTION, SOLUTION INTRAMUSCULAR; INTRAVENOUS
Status: DISCONTINUED | OUTPATIENT
Start: 2019-09-18 | End: 2019-09-18 | Stop reason: HOSPADM

## 2019-09-18 RX ORDER — OXYCODONE HYDROCHLORIDE AND ACETAMINOPHEN 5; 325 MG/1; MG/1
2 TABLET ORAL PRN
Status: COMPLETED | OUTPATIENT
Start: 2019-09-18 | End: 2019-09-18

## 2019-09-18 RX ORDER — DEXTROSE MONOHYDRATE 50 MG/ML
100 INJECTION, SOLUTION INTRAVENOUS PRN
Status: DISCONTINUED | OUTPATIENT
Start: 2019-09-18 | End: 2019-09-19 | Stop reason: HOSPADM

## 2019-09-18 RX ORDER — OXYCODONE HYDROCHLORIDE 5 MG/1
10 TABLET ORAL EVERY 4 HOURS PRN
Status: DISCONTINUED | OUTPATIENT
Start: 2019-09-18 | End: 2019-09-19 | Stop reason: HOSPADM

## 2019-09-18 RX ORDER — FENTANYL CITRATE 50 UG/ML
INJECTION, SOLUTION INTRAMUSCULAR; INTRAVENOUS PRN
Status: DISCONTINUED | OUTPATIENT
Start: 2019-09-18 | End: 2019-09-18 | Stop reason: SDUPTHER

## 2019-09-18 RX ORDER — LIDOCAINE HYDROCHLORIDE 10 MG/ML
2 INJECTION, SOLUTION INFILTRATION; PERINEURAL
Status: DISCONTINUED | OUTPATIENT
Start: 2019-09-18 | End: 2019-09-18 | Stop reason: HOSPADM

## 2019-09-18 RX ORDER — MORPHINE SULFATE 2 MG/ML
2 INJECTION, SOLUTION INTRAMUSCULAR; INTRAVENOUS
Status: DISCONTINUED | OUTPATIENT
Start: 2019-09-18 | End: 2019-09-19 | Stop reason: HOSPADM

## 2019-09-18 RX ORDER — CHOLECALCIFEROL (VITAMIN D3) 125 MCG
2500 CAPSULE ORAL DAILY
Status: DISCONTINUED | OUTPATIENT
Start: 2019-09-18 | End: 2019-09-19 | Stop reason: HOSPADM

## 2019-09-18 RX ORDER — METOPROLOL TARTRATE 50 MG/1
50 TABLET, FILM COATED ORAL 2 TIMES DAILY
Status: DISCONTINUED | OUTPATIENT
Start: 2019-09-18 | End: 2019-09-19 | Stop reason: HOSPADM

## 2019-09-18 RX ORDER — MEPERIDINE HYDROCHLORIDE 50 MG/ML
12.5 INJECTION INTRAMUSCULAR; INTRAVENOUS; SUBCUTANEOUS EVERY 5 MIN PRN
Status: DISCONTINUED | OUTPATIENT
Start: 2019-09-18 | End: 2019-09-18 | Stop reason: HOSPADM

## 2019-09-18 RX ORDER — ACETAMINOPHEN 325 MG/1
650 TABLET ORAL EVERY 6 HOURS
Status: DISCONTINUED | OUTPATIENT
Start: 2019-09-18 | End: 2019-09-19 | Stop reason: HOSPADM

## 2019-09-18 RX ORDER — HYDRALAZINE HYDROCHLORIDE 20 MG/ML
5 INJECTION INTRAMUSCULAR; INTRAVENOUS EVERY 10 MIN PRN
Status: DISCONTINUED | OUTPATIENT
Start: 2019-09-18 | End: 2019-09-18 | Stop reason: HOSPADM

## 2019-09-18 RX ORDER — EZETIMIBE 10 MG/1
10 TABLET ORAL DAILY
Status: DISCONTINUED | OUTPATIENT
Start: 2019-09-18 | End: 2019-09-19 | Stop reason: HOSPADM

## 2019-09-18 RX ORDER — OXYCODONE HYDROCHLORIDE 5 MG/1
5 TABLET ORAL EVERY 4 HOURS PRN
Status: DISCONTINUED | OUTPATIENT
Start: 2019-09-18 | End: 2019-09-19 | Stop reason: HOSPADM

## 2019-09-18 RX ADMIN — DEXAMETHASONE SODIUM PHOSPHATE 10 MG: 10 INJECTION INTRAMUSCULAR; INTRAVENOUS at 08:56

## 2019-09-18 RX ADMIN — HYDROMORPHONE HYDROCHLORIDE 0.5 MG: 1 INJECTION, SOLUTION INTRAMUSCULAR; INTRAVENOUS; SUBCUTANEOUS at 11:30

## 2019-09-18 RX ADMIN — OXYCODONE HYDROCHLORIDE 10 MG: 5 TABLET ORAL at 19:30

## 2019-09-18 RX ADMIN — PANTOPRAZOLE SODIUM 40 MG: 40 TABLET, DELAYED RELEASE ORAL at 18:11

## 2019-09-18 RX ADMIN — HYDROMORPHONE HYDROCHLORIDE 0.5 MG: 1 INJECTION, SOLUTION INTRAMUSCULAR; INTRAVENOUS; SUBCUTANEOUS at 11:39

## 2019-09-18 RX ADMIN — FENTANYL CITRATE 100 MCG: 50 INJECTION INTRAMUSCULAR; INTRAVENOUS at 08:43

## 2019-09-18 RX ADMIN — HYDROMORPHONE HYDROCHLORIDE 0.5 MG: 2 INJECTION INTRAMUSCULAR; INTRAVENOUS; SUBCUTANEOUS at 09:13

## 2019-09-18 RX ADMIN — LISINOPRIL 20 MG: 20 TABLET ORAL at 18:11

## 2019-09-18 RX ADMIN — HYDROMORPHONE HYDROCHLORIDE 0.5 MG: 2 INJECTION INTRAMUSCULAR; INTRAVENOUS; SUBCUTANEOUS at 09:29

## 2019-09-18 RX ADMIN — OXYCODONE HYDROCHLORIDE AND ACETAMINOPHEN 2 TABLET: 5; 325 TABLET ORAL at 14:17

## 2019-09-18 RX ADMIN — HYDROMORPHONE HYDROCHLORIDE 0.5 MG: 1 INJECTION, SOLUTION INTRAMUSCULAR; INTRAVENOUS; SUBCUTANEOUS at 12:08

## 2019-09-18 RX ADMIN — Medication 3 G: at 08:48

## 2019-09-18 RX ADMIN — ACETAMINOPHEN 1000 MG: 500 TABLET ORAL at 07:05

## 2019-09-18 RX ADMIN — CYANOCOBALAMIN TAB 500 MCG 2500 MCG: 500 TAB at 18:13

## 2019-09-18 RX ADMIN — CEFAZOLIN SODIUM 3 G: 10 INJECTION, POWDER, FOR SOLUTION INTRAVENOUS at 18:16

## 2019-09-18 RX ADMIN — BUPIVACAINE HYDROCHLORIDE 20 ML: 2.5 INJECTION, SOLUTION EPIDURAL; INFILTRATION; INTRACAUDAL; PERINEURAL at 07:41

## 2019-09-18 RX ADMIN — ACETAMINOPHEN 650 MG: 325 TABLET ORAL at 22:11

## 2019-09-18 RX ADMIN — PROPOFOL 200 MG: 10 INJECTION, EMULSION INTRAVENOUS at 08:43

## 2019-09-18 RX ADMIN — ISOSORBIDE DINITRATE 10 MG: 10 TABLET ORAL at 20:39

## 2019-09-18 RX ADMIN — FAMOTIDINE 20 MG: 20 TABLET ORAL at 20:39

## 2019-09-18 RX ADMIN — INSULIN LISPRO 1 UNITS: 100 INJECTION, SOLUTION INTRAVENOUS; SUBCUTANEOUS at 20:41

## 2019-09-18 RX ADMIN — DOCUSATE SODIUM 100 MG: 100 CAPSULE, LIQUID FILLED ORAL at 20:39

## 2019-09-18 RX ADMIN — KETOROLAC TROMETHAMINE 15 MG: 30 INJECTION, SOLUTION INTRAMUSCULAR at 22:12

## 2019-09-18 RX ADMIN — SODIUM CHLORIDE, POTASSIUM CHLORIDE, SODIUM LACTATE AND CALCIUM CHLORIDE: 600; 310; 30; 20 INJECTION, SOLUTION INTRAVENOUS at 10:20

## 2019-09-18 RX ADMIN — KETOROLAC TROMETHAMINE 15 MG: 30 INJECTION, SOLUTION INTRAMUSCULAR at 16:42

## 2019-09-18 RX ADMIN — ATORVASTATIN CALCIUM 80 MG: 80 TABLET, FILM COATED ORAL at 18:13

## 2019-09-18 RX ADMIN — CELECOXIB 100 MG: 100 CAPSULE ORAL at 07:06

## 2019-09-18 RX ADMIN — LIDOCAINE HYDROCHLORIDE 60 MG: 20 INJECTION, SOLUTION INFILTRATION; PERINEURAL at 08:43

## 2019-09-18 RX ADMIN — FENTANYL CITRATE 100 MCG: 50 INJECTION INTRAMUSCULAR; INTRAVENOUS at 07:39

## 2019-09-18 RX ADMIN — GABAPENTIN 300 MG: 300 CAPSULE ORAL at 07:06

## 2019-09-18 RX ADMIN — PHENYLEPHRINE HYDROCHLORIDE 100 MCG: 10 INJECTION INTRAVENOUS at 10:06

## 2019-09-18 RX ADMIN — BUPIVACAINE HYDROCHLORIDE 30 ML: 2.5 INJECTION, SOLUTION EPIDURAL; INFILTRATION; INTRACAUDAL; PERINEURAL at 07:43

## 2019-09-18 RX ADMIN — TRANEXAMIC ACID 1500 G: 100 INJECTION, SOLUTION INTRAVENOUS at 08:55

## 2019-09-18 RX ADMIN — MIDAZOLAM HYDROCHLORIDE 2 MG: 2 INJECTION, SOLUTION INTRAMUSCULAR; INTRAVENOUS at 07:39

## 2019-09-18 RX ADMIN — INSULIN LISPRO 1 UNITS: 100 INJECTION, SOLUTION INTRAVENOUS; SUBCUTANEOUS at 18:08

## 2019-09-18 RX ADMIN — ONDANSETRON 4 MG: 2 INJECTION INTRAMUSCULAR; INTRAVENOUS at 08:56

## 2019-09-18 RX ADMIN — HYDROMORPHONE HYDROCHLORIDE 0.5 MG: 1 INJECTION, SOLUTION INTRAMUSCULAR; INTRAVENOUS; SUBCUTANEOUS at 13:32

## 2019-09-18 RX ADMIN — PHENYLEPHRINE HYDROCHLORIDE 100 MCG: 10 INJECTION INTRAVENOUS at 10:04

## 2019-09-18 RX ADMIN — SODIUM CHLORIDE, POTASSIUM CHLORIDE, SODIUM LACTATE AND CALCIUM CHLORIDE: 600; 310; 30; 20 INJECTION, SOLUTION INTRAVENOUS at 07:06

## 2019-09-18 RX ADMIN — ACETAMINOPHEN 650 MG: 325 TABLET ORAL at 16:39

## 2019-09-18 ASSESSMENT — PULMONARY FUNCTION TESTS
PIF_VALUE: 11
PIF_VALUE: 13
PIF_VALUE: 13
PIF_VALUE: 11
PIF_VALUE: 13
PIF_VALUE: 12
PIF_VALUE: 13
PIF_VALUE: 2
PIF_VALUE: 12
PIF_VALUE: 12
PIF_VALUE: 11
PIF_VALUE: 13
PIF_VALUE: 13
PIF_VALUE: 11
PIF_VALUE: 10
PIF_VALUE: 11
PIF_VALUE: 0
PIF_VALUE: 0
PIF_VALUE: 12
PIF_VALUE: 11
PIF_VALUE: 10
PIF_VALUE: 12
PIF_VALUE: 0
PIF_VALUE: 0
PIF_VALUE: 12
PIF_VALUE: 12
PIF_VALUE: 15
PIF_VALUE: 12
PIF_VALUE: 12
PIF_VALUE: 11
PIF_VALUE: 13
PIF_VALUE: 11
PIF_VALUE: 13
PIF_VALUE: 18
PIF_VALUE: 12
PIF_VALUE: 3
PIF_VALUE: 13
PIF_VALUE: 13
PIF_VALUE: 12
PIF_VALUE: 12
PIF_VALUE: 13
PIF_VALUE: 13
PIF_VALUE: 12
PIF_VALUE: 12
PIF_VALUE: 11
PIF_VALUE: 12
PIF_VALUE: 13
PIF_VALUE: 0
PIF_VALUE: 13
PIF_VALUE: 13
PIF_VALUE: 12
PIF_VALUE: 12
PIF_VALUE: 13
PIF_VALUE: 13
PIF_VALUE: 11
PIF_VALUE: 13
PIF_VALUE: 12
PIF_VALUE: 11
PIF_VALUE: 13
PIF_VALUE: 12
PIF_VALUE: 0
PIF_VALUE: 12
PIF_VALUE: 11
PIF_VALUE: 11
PIF_VALUE: 13
PIF_VALUE: 13
PIF_VALUE: 0
PIF_VALUE: 11
PIF_VALUE: 13
PIF_VALUE: 12
PIF_VALUE: 13
PIF_VALUE: 14
PIF_VALUE: 13
PIF_VALUE: 11
PIF_VALUE: 13
PIF_VALUE: 13
PIF_VALUE: 11
PIF_VALUE: 11
PIF_VALUE: 10
PIF_VALUE: 0
PIF_VALUE: 12
PIF_VALUE: 12
PIF_VALUE: 11
PIF_VALUE: 12
PIF_VALUE: 14
PIF_VALUE: 1
PIF_VALUE: 11
PIF_VALUE: 11
PIF_VALUE: 14
PIF_VALUE: 11
PIF_VALUE: 13
PIF_VALUE: 13
PIF_VALUE: 0
PIF_VALUE: 12
PIF_VALUE: 12
PIF_VALUE: 11
PIF_VALUE: 0
PIF_VALUE: 13
PIF_VALUE: 13
PIF_VALUE: 11
PIF_VALUE: 13
PIF_VALUE: 12
PIF_VALUE: 12
PIF_VALUE: 10
PIF_VALUE: 12
PIF_VALUE: 0
PIF_VALUE: 11
PIF_VALUE: 11
PIF_VALUE: 13
PIF_VALUE: 11
PIF_VALUE: 0

## 2019-09-18 ASSESSMENT — PAIN SCALES - GENERAL
PAINLEVEL_OUTOF10: 4
PAINLEVEL_OUTOF10: 10
PAINLEVEL_OUTOF10: 8
PAINLEVEL_OUTOF10: 3
PAINLEVEL_OUTOF10: 7
PAINLEVEL_OUTOF10: 5
PAINLEVEL_OUTOF10: 7
PAINLEVEL_OUTOF10: 4
PAINLEVEL_OUTOF10: 7
PAINLEVEL_OUTOF10: 7
PAINLEVEL_OUTOF10: 6
PAINLEVEL_OUTOF10: 0
PAINLEVEL_OUTOF10: 7

## 2019-09-18 ASSESSMENT — PAIN DESCRIPTION - ORIENTATION
ORIENTATION: RIGHT

## 2019-09-18 ASSESSMENT — PAIN DESCRIPTION - DESCRIPTORS
DESCRIPTORS: ACHING
DESCRIPTORS: ACHING;TIGHTNESS
DESCRIPTORS: ACHING
DESCRIPTORS: ACHING
DESCRIPTORS: ACHING;CONSTANT
DESCRIPTORS: ACHING;BURNING;CONSTANT

## 2019-09-18 ASSESSMENT — PAIN DESCRIPTION - PAIN TYPE
TYPE: SURGICAL PAIN

## 2019-09-18 ASSESSMENT — PAIN DESCRIPTION - LOCATION
LOCATION: KNEE

## 2019-09-18 ASSESSMENT — PAIN DESCRIPTION - ONSET
ONSET: ON-GOING
ONSET: ON-GOING

## 2019-09-18 ASSESSMENT — PAIN DESCRIPTION - FREQUENCY
FREQUENCY: CONTINUOUS
FREQUENCY: CONTINUOUS

## 2019-09-18 ASSESSMENT — PAIN - FUNCTIONAL ASSESSMENT: PAIN_FUNCTIONAL_ASSESSMENT: 0-10

## 2019-09-18 NOTE — PROGRESS NOTES
4 Eyes Skin Assessment     The patient is being assess for   Admission    I agree that 2 RN's have performed a thorough Head to Toe Skin Assessment on the patient. ALL assessment sites listed below have been assessed. Areas assessed by both nurses:   [x]   Head, Face, and Ears   [x]   Shoulders, Back, and Chest, Abdomen  [x]   Arms, Elbows, and Hands   [x]   Coccyx, Sacrum, and Ischium  [x]   Legs, Feet, and Heels        Right upper arm scratch and Left forearm scratch        Co-signer eSignature: {Esignature:[id number]}    Does the Patient have Skin Breakdown?   No          Erik Prevention initiated:  No   Wound Care Orders initiated:  No      WOC nurse consulted for Pressure Injury (Stage 3,4, Unstageable, DTI, NWPT, Complex wounds)and New or Established Ostomies:  No      Primary Nurse eSignature: Electronically signed by Josue Myers RN on 9/18/19 at 7:51 AM

## 2019-09-18 NOTE — PROGRESS NOTES
Bearing Restrictions  Right Lower Extremity Weight Bearing: Weight Bearing As Tolerated  Required Braces or Orthoses  Right Lower Extremity Brace: Knee Immobilizer  Vision/Hearing  Vision: Impaired  Vision Exceptions: Wears glasses for reading  Hearing: Exceptions to [facility]  Hearing Exceptions: Hard of hearing/hearing concerns     Subjective  General  Chart Reviewed: Yes  Patient assessed for rehabilitation services?: Yes  Response To Previous Treatment: Not applicable  Family / Caregiver Present: Yes(wife)  Referring Practitioner: Rc Knapp MD  Referral Date : 09/18/19  Diagnosis: R knee OA  Follows Commands: Within Functional Limits  General Comment  Comments: Pt resting in bed in PACU upon entry, rN cleared pt for therapy  Subjective  Subjective: Pt pleasant and agreeable to PT  Pain Screening  Patient Currently in Pain: Yes  Pain Assessment  Pain Assessment: 0-10(2/10 at rest, 7/10 with ther ex)  Pain Location: Knee  Pain Orientation: Right  Non-Pharmaceutical Pain Intervention(s): Ambulation/Increased Activity;Repositioned; Therapeutic presence;Cold applied  Vital Signs  Patient Currently in Pain: Yes  Pre Treatment Pain Screening  Intervention List: Patient able to continue with treatment    Orientation  Orientation  Overall Orientation Status: Within Normal Limits  Social/Functional History  Social/Functional History  Lives With: Spouse  Type of Home: House  Home Layout: Two level, Able to Live on Main level with bedroom/bathroom  Home Access: Stairs to enter without rails  Entrance Stairs - Number of Steps: 1 BLANCHE at threshhold  Bathroom Shower/Tub: Walk-in shower  Bathroom Toilet: Standard  Bathroom Equipment: Built-in shower seat, Grab bars in [address]: Rolling walker, Cane  ADL Assistance: Independent  Homemaking Responsibilities: No  Ambulation Assistance: Independent(ocassionally needs SPC)  Transfer Assistance: Independent  Active : Yes  Occupation: Retired  Type of occupation: works for railroad  Leisure & Hobbies: hang out with granddaughters, cruise         Objective          AROM RLE (degrees)  RLE General AROM: hip and ankle WFL, knee 0 - 90 degrees  AROM LLE (degrees)  LLE AROM : WNL  Strength RLE  Comment: Not formally assessd, observed to be at least 3+/5 throughout  Strength LLE  Strength LLE: WNL  Comment: groaaly 5/5 throughout        Bed mobility  Scooting: Stand by assistance(to EOB and to scoot to [facility])  Transfers  Sit to Stand: Contact guard assistance  Stand to sit: Contact guard assistance  Ambulation  Ambulation?: Yes  Ambulation 1  Surface: level tile  Device: Standard Walker  Other Apparatus: Knee Immobilizer  Assistance: Contact guard assistance  Quality of Gait: skow but steady step to gait pattern, no LOB noted  Distance: 75 ft  Stairs/Curb  Stairs?: No     Balance  Posture: Good  Sitting - Static: Good  Sitting - Dynamic: Good  Standing - Static: Good;-  Standing - Dynamic: Good;-  Exercises  Straight Leg Raise: x 10 R with assist first 5, indep last 5  Quad Sets: x 10 B  Heelslides: x 10 R  Gluteal Sets: x 10 B  Knee Short Arc Quad: x 10 R  Knee Passive Range of Motion: 0-94 degrees  Ankle Pumps: x 20 B     Plan   Plan  Times per week: BID x 7  Current Treatment Recommendations: Strengthening, Gait Training, ROM, Stair training, Balance Training, Functional Mobility Training, Endurance Training, Transfer Training  Safety Devices  Type of devices:  All fall risk precautions in place, Left in bed, Call light within reach, Nurse notified, Gait belt, Patient at risk for falls      AM-PAC Score  AM-PAC Inpatient Mobility Raw Score : 18 (09/18/19 1500)  AM-PAC Inpatient T-Scale Score : 43.63 (09/18/19 1500)  Mobility Inpatient CMS 0-100% Score: 46.58 (09/18/19 1500)  Mobility Inpatient CMS G-Code Modifier : CK (09/18/19 1500)          Goals  Short term goals  Time Frame for Short term goals: 9/20/19 unless noted  Short term goal 1: Pt will perform bed mobility with mod I by 9/19/19  Short term goal 2: Pt will perform transfers with supervision  Short term goal 3: Pt will ambulate 150 ft with SW and SBA  Short term goal 4: Pt will perform 10+ reps of LE exerecise ofr strengtening and balance  Patient Goals   Patient goals : \"to go home\"       Therapy Time   Individual Concurrent Group Co-treatment   Time In 1320         Time Out 1355         Minutes 35         Timed Code Treatment Minutes: 25 Minutes     If pt is discharged prior to next therapy session, this note will serve as discharge summary.   Rolena Georgette, PT

## 2019-09-18 NOTE — PROGRESS NOTES
Immobilizer    Subjective   General  Chart Reviewed: Yes  Patient assessed for rehabilitation services?: Yes  Family / Caregiver Present: Yes(wife)  Subjective  Subjective: Pt agreeable to therapy  General Comment  Comments: RN approved therapy  Patient Currently in Pain: Yes  Pain Assessment  Pain Assessment: 0-10  Pain Level: 10  Pain Type: Surgical pain  Pain Location: Knee  Pain Orientation: Right  Pain Descriptors: Aching;Burning;Constant  Non-Pharmaceutical Pain Intervention(s): Emotional support;Repositioned      Social/Functional History  Social/Functional History  Lives With: Spouse  Type of Home: House  Home Layout: Two level, Able to Live on Main level with bedroom/bathroom  Bathroom Shower/Tub: Walk-in shower  Bathroom Toilet: Standard  Bathroom Equipment: Built-in shower seat, Grab bars in shower  Home Equipment: Rolling walker, Cane  ADL Assistance: Independent  Homemaking Responsibilities: No  Ambulation Assistance: Independent(ocassionally needs SPC)  Transfer Assistance: Independent  Active : Yes  Occupation: Retired  Type of occupation: works for [employer]  Leisure & Hobbies: hang out with granddaughters, cruise       Objective   Vision: Impaired  Vision Exceptions: Wears glasses for reading  Hearing: Exceptions to [facility]  Hearing Exceptions: Hard of hearing/hearing concerns    Orientation  Overall Orientation Status: Within Functional Limits     Balance  Sitting Balance: Supervision  Standing Balance: Contact guard assistance(bariatric SW)  Standing Balance  Time: ~7-8 minutes   Activity: ambulation with SW in preparation for household distances   Functional Mobility  Functional - Mobility Device: Standard Walker  Activity: Other  Assist Level: Contact guard assistance  ADL  Feeding: Modified independent ;Dentures  UE Dressing: Minimal assistance(don gown)  LE Dressing: Dependent/Total(socks)  Tone RUE  RUE Tone: Normotonic  Tone LUE  LUE Tone: Normotonic  Coordination  Movements Are Fluid And

## 2019-09-18 NOTE — ANESTHESIA POSTPROCEDURE EVALUATION
Department of Anesthesiology  Postprocedure Note    Patient: Vikki Cervantes  MRN: [id number]  YOB: 1945  Date of evaluation: 9/18/2019    Procedure Summary     Date:  09/18/19 Room / Location:  [facility] OR 04 / [facility] OR    Anesthesia Start:  7055 Anesthesia Stop:  7398    Procedure:  RIGHT TOTALKNEE MAKOPLASTY WITH ADDUCTOR CANAL BLOCK FOR PAIN CONTROL             HARRIET BOSS  CPT CODE - 23668 (Right Knee) Diagnosis:       Primary osteoarthritis of right knee      (OSTEOARTHRITIS RIGHT KNEE)    Surgeon:  Kaylee Giang MD Responsible Provider:  Xiomara Sanabria MD    Anesthesia Type:  spinal ASA Status:  3     Anesthesia Type: spinal    Jenna Phase I: Jenna Score: 6    Jenna Phase II:      Last vitals: Reviewed and per EMR flowsheets.      Anesthesia Post Evaluation   Anesthetic Problems: no   Cardiovascular System Stable: yes  Respiratory Function: Airway Patent yes  ETT no  Ventilator no  Level of consciousness: awake, alert and oriented  Post-op pain: adequate analgesia  Hydration Adequate: yes  Nausea/Vomiting:no  Other Issues:     Dania Haji MD

## 2019-09-19 ENCOUNTER — TELEPHONE (OUTPATIENT)
Dept: CARDIOLOGY CLINIC | Age: 74
End: 2019-09-19

## 2019-09-19 ENCOUNTER — APPOINTMENT (OUTPATIENT)
Dept: PHYSICAL THERAPY | Age: 74
End: 2019-09-19
Payer: MEDICARE

## 2019-09-19 VITALS
OXYGEN SATURATION: 99 % | HEIGHT: 69 IN | TEMPERATURE: 98.9 F | BODY MASS INDEX: 40.14 KG/M2 | WEIGHT: 271 LBS | DIASTOLIC BLOOD PRESSURE: 69 MMHG | RESPIRATION RATE: 15 BRPM | SYSTOLIC BLOOD PRESSURE: 127 MMHG | HEART RATE: 64 BPM

## 2019-09-19 LAB
ANION GAP SERPL CALCULATED.3IONS-SCNC: 11 MMOL/L (ref 3–16)
BASOPHILS ABSOLUTE: 0 K/UL (ref 0–0.2)
BASOPHILS RELATIVE PERCENT: 0.4 %
BUN BLDV-MCNC: 29 MG/DL (ref 7–20)
CALCIUM SERPL-MCNC: 8.4 MG/DL (ref 8.3–10.6)
CHLORIDE BLD-SCNC: 103 MMOL/L (ref 99–110)
CO2: 24 MMOL/L (ref 21–32)
CREAT SERPL-MCNC: 0.9 MG/DL (ref 0.8–1.3)
EOSINOPHILS ABSOLUTE: 0 K/UL (ref 0–0.6)
EOSINOPHILS RELATIVE PERCENT: 0.4 %
GFR AFRICAN AMERICAN: >60
GFR NON-AFRICAN AMERICAN: >60
GLUCOSE BLD-MCNC: 104 MG/DL (ref 70–99)
GLUCOSE BLD-MCNC: 118 MG/DL (ref 70–99)
GLUCOSE BLD-MCNC: 125 MG/DL (ref 70–99)
HCT VFR BLD CALC: 31.6 % (ref 40.5–52.5)
HEMOGLOBIN: 10.8 G/DL (ref 13.5–17.5)
LYMPHOCYTES ABSOLUTE: 1.1 K/UL (ref 1–5.1)
LYMPHOCYTES RELATIVE PERCENT: 11.6 %
MCH RBC QN AUTO: 32.2 PG (ref 26–34)
MCHC RBC AUTO-ENTMCNC: 34.3 G/DL (ref 31–36)
MCV RBC AUTO: 94.1 FL (ref 80–100)
MONOCYTES ABSOLUTE: 1 K/UL (ref 0–1.3)
MONOCYTES RELATIVE PERCENT: 10.5 %
NEUTROPHILS ABSOLUTE: 7.3 K/UL (ref 1.7–7.7)
NEUTROPHILS RELATIVE PERCENT: 77.1 %
PDW BLD-RTO: 13.7 % (ref 12.4–15.4)
PERFORMED ON: ABNORMAL
PERFORMED ON: ABNORMAL
PLATELET # BLD: 194 K/UL (ref 135–450)
PMV BLD AUTO: 7.4 FL (ref 5–10.5)
POTASSIUM REFLEX MAGNESIUM: 4.3 MMOL/L (ref 3.5–5.1)
RBC # BLD: 3.36 M/UL (ref 4.2–5.9)
SODIUM BLD-SCNC: 138 MMOL/L (ref 136–145)
WBC # BLD: 9.4 K/UL (ref 4–11)

## 2019-09-19 PROCEDURE — 80048 BASIC METABOLIC PNL TOTAL CA: CPT

## 2019-09-19 PROCEDURE — 2580000003 HC RX 258: Performed by: PHYSICIAN ASSISTANT

## 2019-09-19 PROCEDURE — 6370000000 HC RX 637 (ALT 250 FOR IP): Performed by: PHYSICIAN ASSISTANT

## 2019-09-19 PROCEDURE — 36415 COLL VENOUS BLD VENIPUNCTURE: CPT

## 2019-09-19 PROCEDURE — APPSS45 APP SPLIT SHARED TIME 31-45 MINUTES: Performed by: PHYSICIAN ASSISTANT

## 2019-09-19 PROCEDURE — 97116 GAIT TRAINING THERAPY: CPT

## 2019-09-19 PROCEDURE — 97530 THERAPEUTIC ACTIVITIES: CPT

## 2019-09-19 PROCEDURE — 85025 COMPLETE CBC W/AUTO DIFF WBC: CPT

## 2019-09-19 PROCEDURE — 97110 THERAPEUTIC EXERCISES: CPT

## 2019-09-19 PROCEDURE — 97535 SELF CARE MNGMENT TRAINING: CPT

## 2019-09-19 PROCEDURE — 6360000002 HC RX W HCPCS: Performed by: PHYSICIAN ASSISTANT

## 2019-09-19 RX ORDER — OXYCODONE HYDROCHLORIDE 5 MG/1
5 TABLET ORAL EVERY 6 HOURS PRN
Qty: 28 TABLET | Refills: 0 | Status: SHIPPED | OUTPATIENT
Start: 2019-09-19 | End: 2019-10-01 | Stop reason: SDUPTHER

## 2019-09-19 RX ORDER — MELOXICAM 15 MG/1
15 TABLET ORAL DAILY
Qty: 30 TABLET | Refills: 0 | Status: SHIPPED | OUTPATIENT
Start: 2019-09-19 | End: 2019-09-19 | Stop reason: SDUPTHER

## 2019-09-19 RX ORDER — MELOXICAM 15 MG/1
15 TABLET ORAL DAILY
Qty: 30 TABLET | Refills: 0 | Status: SHIPPED | OUTPATIENT
Start: 2019-09-19 | End: 2020-12-10

## 2019-09-19 RX ORDER — ACETAMINOPHEN 325 MG/1
650 TABLET ORAL EVERY 6 HOURS
Qty: 120 TABLET | Refills: 0 | COMMUNITY
Start: 2019-09-19

## 2019-09-19 RX ADMIN — KETOROLAC TROMETHAMINE 15 MG: 30 INJECTION, SOLUTION INTRAMUSCULAR at 04:19

## 2019-09-19 RX ADMIN — DOCUSATE SODIUM 100 MG: 100 CAPSULE, LIQUID FILLED ORAL at 09:29

## 2019-09-19 RX ADMIN — ISOSORBIDE DINITRATE 10 MG: 10 TABLET ORAL at 09:29

## 2019-09-19 RX ADMIN — ACETAMINOPHEN 650 MG: 325 TABLET ORAL at 04:19

## 2019-09-19 RX ADMIN — KETOROLAC TROMETHAMINE 15 MG: 30 INJECTION, SOLUTION INTRAMUSCULAR at 14:47

## 2019-09-19 RX ADMIN — Medication 10 ML: at 09:34

## 2019-09-19 RX ADMIN — CEFAZOLIN SODIUM 3 G: 10 INJECTION, POWDER, FOR SOLUTION INTRAVENOUS at 01:19

## 2019-09-19 RX ADMIN — LISINOPRIL 20 MG: 20 TABLET ORAL at 09:32

## 2019-09-19 RX ADMIN — ACETAMINOPHEN 650 MG: 325 TABLET ORAL at 16:35

## 2019-09-19 RX ADMIN — OXYCODONE HYDROCHLORIDE 10 MG: 5 TABLET ORAL at 14:47

## 2019-09-19 RX ADMIN — ACETAMINOPHEN 650 MG: 325 TABLET ORAL at 12:02

## 2019-09-19 RX ADMIN — METOPROLOL TARTRATE 50 MG: 50 TABLET ORAL at 09:32

## 2019-09-19 RX ADMIN — OXYCODONE HYDROCHLORIDE 10 MG: 5 TABLET ORAL at 01:27

## 2019-09-19 RX ADMIN — CYANOCOBALAMIN TAB 500 MCG 2500 MCG: 500 TAB at 09:30

## 2019-09-19 RX ADMIN — PANTOPRAZOLE SODIUM 40 MG: 40 TABLET, DELAYED RELEASE ORAL at 09:29

## 2019-09-19 RX ADMIN — APIXABAN 2.5 MG: 2.5 TABLET, FILM COATED ORAL at 09:29

## 2019-09-19 RX ADMIN — KETOROLAC TROMETHAMINE 15 MG: 30 INJECTION, SOLUTION INTRAMUSCULAR at 09:29

## 2019-09-19 RX ADMIN — SODIUM CHLORIDE, POTASSIUM CHLORIDE, SODIUM LACTATE AND CALCIUM CHLORIDE: 600; 310; 30; 20 INJECTION, SOLUTION INTRAVENOUS at 01:19

## 2019-09-19 RX ADMIN — ATORVASTATIN CALCIUM 80 MG: 80 TABLET, FILM COATED ORAL at 09:32

## 2019-09-19 ASSESSMENT — PAIN DESCRIPTION - PAIN TYPE
TYPE: SURGICAL PAIN

## 2019-09-19 ASSESSMENT — PAIN DESCRIPTION - ONSET
ONSET: ON-GOING
ONSET: ON-GOING

## 2019-09-19 ASSESSMENT — PAIN SCALES - GENERAL
PAINLEVEL_OUTOF10: 7
PAINLEVEL_OUTOF10: 8
PAINLEVEL_OUTOF10: 7
PAINLEVEL_OUTOF10: 0
PAINLEVEL_OUTOF10: 4
PAINLEVEL_OUTOF10: 1
PAINLEVEL_OUTOF10: 1
PAINLEVEL_OUTOF10: 0
PAINLEVEL_OUTOF10: 8

## 2019-09-19 ASSESSMENT — PAIN DESCRIPTION - LOCATION
LOCATION: KNEE

## 2019-09-19 ASSESSMENT — PAIN DESCRIPTION - ORIENTATION
ORIENTATION: RIGHT

## 2019-09-19 ASSESSMENT — PAIN DESCRIPTION - DESCRIPTORS
DESCRIPTORS: ACHING
DESCRIPTORS: ACHING

## 2019-09-19 ASSESSMENT — PAIN DESCRIPTION - FREQUENCY
FREQUENCY: CONTINUOUS
FREQUENCY: CONTINUOUS

## 2019-09-19 NOTE — PROGRESS NOTES
or Orthoses?: No(DC KI due to pt able to perform 10 SLR)  Lower Extremity Weight Bearing Restrictions  Right Lower Extremity Weight Bearing: Weight Bearing As Tolerated  Required Braces or Orthoses  Right Lower Extremity Brace: Knee Immobilizer  Subjective   General  Chart Reviewed: Yes  Response To Previous Treatment: Patient with no complaints from previous session. Family / Caregiver Present: No  Referring Practitioner: Gordon Vera MD  Subjective  Subjective: Pt pleasant and agreeable to PT  General Comment  Comments: Pt resting in bed  upon entry, RN cleared pt for therapy  Pain Screening  Patient Currently in Pain: Yes  Pain Assessment  Pain Assessment: 0-10  Pain Level: 1  Pain Type: Surgical pain  Pain Location: Knee  Pain Orientation: Right  Non-Pharmaceutical Pain Intervention(s): Ambulation/Increased Activity;Repositioned; Therapeutic presence;Cold applied  Vital Signs  Patient Currently in Pain: Yes       Orientation  Orientation  Overall Orientation Status: Within Normal Limits       Objective   Bed mobility  Supine to Sit: Supervision(HOB elevated)  Sit to Supine: Supervision  Scooting: Supervision(to EOB)  Transfers  Sit to Stand: Supervision  Stand to sit: Supervision  Bed to Chair: Supervision(with RW)  Ambulation  Ambulation?: Yes  Ambulation 1  Surface: level tile  Device: Standard Walker;Rolling Walker  Assistance: Supervision  Quality of Gait: skow but steady step to gait pattern, no LOB noted  Distance: 75 ft with SW, 150 ft with RW   Comments: Pt has RW at home, trialed RW, pt with safe, slow step to pattern, steady with no LOB     Balance  Posture: Good  Sitting - Static: Good  Sitting - Dynamic: Good  Standing - Static: Good;-  Standing - Dynamic: Good;-  Exercises  Straight Leg Raise: x 10 R indep  Quad Sets: x 15 B  Heelslides: x 15 R  Gluteal Sets: x 15 B  Knee Short Arc Quad: x 15 R  Knee Passive Range of Motion: 0-94 degrees  Ankle Pumps: x 20 B  Comments: pt up in chair for drop and dangle

## 2019-09-19 NOTE — PROGRESS NOTES
Occupational Therapy  Facility/Department: [place] [id number] - MED SURG/ORTHO  Daily Treatment Note  NAME: Gill Boateng  : 1945  MRN: [id number]    Date of Service: 2019    Discharge Recommendations:  Home with assist PRN       Assessment   Performance deficits / Impairments: Decreased functional mobility ; Decreased safe awareness;Decreased balance;Decreased ADL status; Decreased endurance  Assessment: Pt demonstrating progress with functional transfers and mobility, grossly SBA-Supervision. Pt verbalizes understanding of education on car transfers as well as shower safety. Continue OT per POC. Prognosis: Good  OT Education: OT Role;Plan of Care;Transfer Training;ADL Adaptive Strategies  REQUIRES OT FOLLOW UP: Yes  Activity Tolerance  Activity Tolerance: Patient Tolerated treatment well  Safety Devices  Safety Devices in place: Yes  Type of devices: Left in chair;Nurse notified;Call light within reach         Patient Diagnosis(es): The encounter diagnosis was Primary osteoarthritis of right knee. has a past medical history of Arthritis, CAD (coronary artery disease), Cancer ([facility] [address] GERD (gastroesophageal reflux disease), Hyperlipidemia, Hypertension, and Sleep apnea. has a past surgical history that includes Coronary angioplasty with stent; Coronary angioplasty (08/26/15); eye surgery; eye surgery; skin biopsy; Colonoscopy; Endoscopy, colon, diagnostic; Cataract removal (Bilateral); and Total knee arthroplasty (Right, 2019).     Restrictions  Restrictions/Precautions  Restrictions/Precautions: General Precautions, Fall Risk, Weight Bearing  Required Braces or Orthoses?: No(per PT staff d/c KI)  Lower Extremity Weight Bearing Restrictions  Right Lower Extremity Weight Bearing: Weight Bearing As Tolerated  Required Braces or Orthoses  Right Lower Extremity Brace: Knee Immobilizer     Subjective   General  Chart Reviewed: Yes  Patient assessed for rehabilitation services?: Yes  Response to previous

## 2019-09-19 NOTE — CARE COORDINATION
Met with patient at bedside, discussed role of nurse navigator and gave contact information. Reviewed reasons to call with questions or concerns, importance of TEDS, Incentive spirometer, pain medication, and physical therapy. Pt set up with orthovitals and understands when and how to use. Will follow up with call to patient in a few days.     Jose Antonio Venegas  Orthopedic Nurse Navigator  Phone number: [phone]

## 2019-09-20 ENCOUNTER — TELEPHONE (OUTPATIENT)
Dept: ORTHOPEDIC SURGERY | Age: 74
End: 2019-09-20

## 2019-09-20 DIAGNOSIS — Z96.651 STATUS POST TOTAL RIGHT KNEE REPLACEMENT: ICD-10-CM

## 2019-09-20 NOTE — TELEPHONE ENCOUNTER
Pt returned call. Gave pt SRJ message. Pt took Eliquis this AM with ASA and will take tonights dose.  Pt will restart plavix 9/21 AM.  TY

## 2019-09-23 ENCOUNTER — HOSPITAL ENCOUNTER (OUTPATIENT)
Dept: PHYSICAL THERAPY | Age: 74
Setting detail: THERAPIES SERIES
Discharge: HOME | End: 2019-09-23
Payer: MEDICARE

## 2019-09-23 DIAGNOSIS — Z96.651 STATUS POST TOTAL RIGHT KNEE REPLACEMENT: Primary | ICD-10-CM

## 2019-09-23 DIAGNOSIS — M17.11 PRIMARY OSTEOARTHRITIS OF RIGHT KNEE: ICD-10-CM

## 2019-09-23 PROCEDURE — 97161 PT EVAL LOW COMPLEX 20 MIN: CPT | Performed by: PHYSICAL THERAPIST

## 2019-09-23 PROCEDURE — 97110 THERAPEUTIC EXERCISES: CPT | Performed by: PHYSICAL THERAPIST

## 2019-09-23 PROCEDURE — 97016 VASOPNEUMATIC DEVICE THERAPY: CPT | Performed by: PHYSICAL THERAPIST

## 2019-09-23 PROCEDURE — 97112 NEUROMUSCULAR REEDUCATION: CPT | Performed by: PHYSICAL THERAPIST

## 2019-09-23 NOTE — PLAN OF CARE
[facility], [address] [facility] [address], [address] [facility] [address]  Phone: [phone]   Fax:     [phone]       Physical Therapy Certification    Dear Referring Practitioner: Jameel Agustin,    We had the pleasure of evaluating the following patient for physical therapy services at [address] [place] [address]. A summary of our findings can be found in the initial assessment below. This includes our plan of care. If you have any questions or concerns regarding these findings, please do not hesitate to contact me at the office phone number checked above. Thank you for the referral.       Physician Signature:_______________________________Date:__________________  By signing above (or electronic signature), therapists plan is approved by physician      Patient: Carlos Avila   : 1945   MRN: [id number]  Referring Physician: Referring Practitioner: Jameel Agustin      Evaluation Date: 2019      Medical Diagnosis Information:  Diagnosis: PT: R TKR secondary to OA (19)   Treatment Diagnosis: R knee pain secondary to OA M17.11                                         Insurance information: PT Insurance Information: Medicare     Precautions/ Contra-indications: TKR  Latex Allergy:  [x]NO      []YES  Preferred Language for Healthcare:   [x]English       []other:    SUBJECTIVE: Patient stated complaint:Patient reports pain in knee for 2-3 years. Arrives s/p R TKR 19.  Patient went home following DC from hospital.    Relevant Medical History: (-) latex allergy, PM, CA. (+) CAD  Functional Disability Index:  LEFS: 64% disability    Pain Scale: 10/10 (5/10 at rest)  Easing factors: rest, ice, medication  Provocative factors: standing, walking, stairs     Type: []Constant   [x]Intermittent  []Radiating [x]Localized []other:     Numbness/Tingling: numbness knee joint    Occupation/School: Retired    Living Status/Prior Level of Function: Independent with ADLs and IADLs, able to live on main floor. Bedroom/ bathroom on main floor. Arrives with wheeled walker. [address] [employer]. OBJECTIVE:     ROM LEFT RIGHT   HIP Flex     HIP Abd     HIP Ext     HIP IR     HIP ER     Knee ext 0 Lacking 4   Knee Flex 124 66   Ankle PF     Ankle DF     Ankle In     Ankle Ev     Strength  LEFT RIGHT   HIP Flexors 5 NT   HIP Abductors 5    HIP Ext     Hip ER     Knee EXT (quad) 5 Quad atrophy   Knee Flex (HS) 5    Ankle DF     Ankle PF     Ankle Inv     Ankle EV          Circumference  Mid apex  7 cm prox             Reflexes/Sensation:    [x]Dermatomes/Myotomes intact    [x]Reflexes equal and normal bilaterally   []Other:    Joint mobility:   []Normal    [x]Hypo   []Hyper    Palpation: NT    Functional Mobility/Transfers: I with transfers, pain with bed mobility. A of UE for RLE movement    Posture: arrives with compression stocking around knee joint, educated on thigh length    Bandages/Dressings/Incisions: incision covered without drainage    Gait: (include devices/WB status) ambulating with wheeled walker, decreased TKE on RLE     Orthopedic Special Tests: na                       [x] Patient history, allergies, meds reviewed. Medical chart reviewed. See intake form. Review Of Systems (ROS):  [x]Performed Review of systems (Integumentary, CardioPulmonary, Neurological) by intake and observation. Intake form has been scanned into medical record. Patient has been instructed to contact their primary care physician regarding ROS issues if not already being addressed at this time.       Co-morbidities/Complexities (which will affect course of rehabilitation):   []None           Arthritic conditions   []Rheumatoid arthritis (M05.9)  []Osteoarthritis (M19.91)   Cardiovascular conditions   [x]Hypertension (I10)  []Hyperlipidemia (E78.5)  []Angina pectoris (I20)  [x]Atherosclerosis (I70)   Musculoskeletal conditions

## 2019-09-24 ENCOUNTER — APPOINTMENT (OUTPATIENT)
Dept: PHYSICAL THERAPY | Age: 74
End: 2019-09-24
Payer: MEDICARE

## 2019-09-25 ENCOUNTER — TELEPHONE (OUTPATIENT)
Dept: ORTHOPEDIC SURGERY | Age: 74
End: 2019-09-25

## 2019-09-25 DIAGNOSIS — Z96.651 STATUS POST TOTAL RIGHT KNEE REPLACEMENT: ICD-10-CM

## 2019-09-26 ENCOUNTER — HOSPITAL ENCOUNTER (OUTPATIENT)
Dept: PHYSICAL THERAPY | Age: 74
Setting detail: THERAPIES SERIES
Discharge: HOME | End: 2019-09-26
Payer: MEDICARE

## 2019-09-26 ENCOUNTER — APPOINTMENT (OUTPATIENT)
Dept: PHYSICAL THERAPY | Age: 74
End: 2019-09-26
Payer: MEDICARE

## 2019-09-26 PROCEDURE — 97016 VASOPNEUMATIC DEVICE THERAPY: CPT | Performed by: PHYSICAL THERAPIST

## 2019-09-26 PROCEDURE — 97110 THERAPEUTIC EXERCISES: CPT | Performed by: PHYSICAL THERAPIST

## 2019-09-26 PROCEDURE — 97140 MANUAL THERAPY 1/> REGIONS: CPT | Performed by: PHYSICAL THERAPIST

## 2019-09-26 PROCEDURE — 97112 NEUROMUSCULAR REEDUCATION: CPT | Performed by: PHYSICAL THERAPIST

## 2019-10-01 ENCOUNTER — HOSPITAL ENCOUNTER (OUTPATIENT)
Dept: PHYSICAL THERAPY | Age: 74
Setting detail: THERAPIES SERIES
Discharge: HOME | End: 2019-10-01
Payer: MEDICARE

## 2019-10-01 ENCOUNTER — OFFICE VISIT (OUTPATIENT)
Dept: ORTHOPEDIC SURGERY | Age: 74
End: 2019-10-01

## 2019-10-01 DIAGNOSIS — Z96.651 STATUS POST TOTAL RIGHT KNEE REPLACEMENT: Primary | ICD-10-CM

## 2019-10-01 DIAGNOSIS — M17.11 PRIMARY OSTEOARTHRITIS OF RIGHT KNEE: ICD-10-CM

## 2019-10-01 PROCEDURE — 99024 POSTOP FOLLOW-UP VISIT: CPT | Performed by: PHYSICIAN ASSISTANT

## 2019-10-01 PROCEDURE — 97140 MANUAL THERAPY 1/> REGIONS: CPT

## 2019-10-01 PROCEDURE — 97016 VASOPNEUMATIC DEVICE THERAPY: CPT

## 2019-10-01 PROCEDURE — 97110 THERAPEUTIC EXERCISES: CPT

## 2019-10-01 PROCEDURE — 97112 NEUROMUSCULAR REEDUCATION: CPT

## 2019-10-01 RX ORDER — CLOPIDOGREL BISULFATE 75 MG/1
TABLET ORAL
COMMUNITY

## 2019-10-01 RX ORDER — OXYCODONE HYDROCHLORIDE 5 MG/1
5 TABLET ORAL EVERY 6 HOURS PRN
Qty: 28 TABLET | Refills: 0 | Status: SHIPPED | OUTPATIENT
Start: 2019-10-01 | End: 2019-10-15 | Stop reason: SDUPTHER

## 2019-10-02 ENCOUNTER — TELEPHONE (OUTPATIENT)
Dept: ORTHOPEDIC SURGERY | Age: 74
End: 2019-10-02

## 2019-10-03 ENCOUNTER — HOSPITAL ENCOUNTER (OUTPATIENT)
Dept: PHYSICAL THERAPY | Age: 74
Setting detail: THERAPIES SERIES
Discharge: HOME | End: 2019-10-03
Payer: MEDICARE

## 2019-10-03 PROCEDURE — 97112 NEUROMUSCULAR REEDUCATION: CPT | Performed by: PHYSICAL THERAPIST

## 2019-10-03 PROCEDURE — 97110 THERAPEUTIC EXERCISES: CPT | Performed by: PHYSICAL THERAPIST

## 2019-10-03 PROCEDURE — 97140 MANUAL THERAPY 1/> REGIONS: CPT | Performed by: PHYSICAL THERAPIST

## 2019-10-03 PROCEDURE — 97016 VASOPNEUMATIC DEVICE THERAPY: CPT | Performed by: PHYSICAL THERAPIST

## 2019-10-08 ENCOUNTER — HOSPITAL ENCOUNTER (OUTPATIENT)
Dept: PHYSICAL THERAPY | Age: 74
Setting detail: THERAPIES SERIES
Discharge: HOME | End: 2019-10-08
Payer: MEDICARE

## 2019-10-08 PROCEDURE — 97016 VASOPNEUMATIC DEVICE THERAPY: CPT | Performed by: PHYSICAL THERAPIST

## 2019-10-08 PROCEDURE — 97110 THERAPEUTIC EXERCISES: CPT | Performed by: PHYSICAL THERAPIST

## 2019-10-08 PROCEDURE — 97140 MANUAL THERAPY 1/> REGIONS: CPT | Performed by: PHYSICAL THERAPIST

## 2019-10-08 PROCEDURE — 97112 NEUROMUSCULAR REEDUCATION: CPT | Performed by: PHYSICAL THERAPIST

## 2019-10-10 ENCOUNTER — HOSPITAL ENCOUNTER (OUTPATIENT)
Dept: PHYSICAL THERAPY | Age: 74
Setting detail: THERAPIES SERIES
Discharge: HOME | End: 2019-10-10
Payer: MEDICARE

## 2019-10-10 PROCEDURE — 97016 VASOPNEUMATIC DEVICE THERAPY: CPT | Performed by: PHYSICAL THERAPIST

## 2019-10-10 PROCEDURE — 97110 THERAPEUTIC EXERCISES: CPT | Performed by: PHYSICAL THERAPIST

## 2019-10-10 PROCEDURE — 97140 MANUAL THERAPY 1/> REGIONS: CPT | Performed by: PHYSICAL THERAPIST

## 2019-10-10 PROCEDURE — 97112 NEUROMUSCULAR REEDUCATION: CPT | Performed by: PHYSICAL THERAPIST

## 2019-10-11 ENCOUNTER — TELEPHONE (OUTPATIENT)
Dept: ORTHOPEDIC SURGERY | Age: 74
End: 2019-10-11

## 2019-10-11 DIAGNOSIS — Z96.651 STATUS POST TOTAL RIGHT KNEE REPLACEMENT: ICD-10-CM

## 2019-10-15 ENCOUNTER — OFFICE VISIT (OUTPATIENT)
Dept: ORTHOPEDIC SURGERY | Age: 74
End: 2019-10-15

## 2019-10-15 ENCOUNTER — HOSPITAL ENCOUNTER (OUTPATIENT)
Dept: PHYSICAL THERAPY | Age: 74
Setting detail: THERAPIES SERIES
Discharge: HOME | End: 2019-10-15
Payer: MEDICARE

## 2019-10-15 DIAGNOSIS — Z96.651 STATUS POST TOTAL RIGHT KNEE REPLACEMENT: Primary | ICD-10-CM

## 2019-10-15 PROCEDURE — G0283 ELEC STIM OTHER THAN WOUND: HCPCS | Performed by: PHYSICAL THERAPIST

## 2019-10-15 PROCEDURE — 97110 THERAPEUTIC EXERCISES: CPT | Performed by: PHYSICAL THERAPIST

## 2019-10-15 PROCEDURE — 99024 POSTOP FOLLOW-UP VISIT: CPT | Performed by: PHYSICIAN ASSISTANT

## 2019-10-15 PROCEDURE — 97140 MANUAL THERAPY 1/> REGIONS: CPT | Performed by: PHYSICAL THERAPIST

## 2019-10-15 RX ORDER — OXYCODONE HYDROCHLORIDE 5 MG/1
5 TABLET ORAL EVERY 12 HOURS PRN
Qty: 14 TABLET | Refills: 0 | Status: SHIPPED | OUTPATIENT
Start: 2019-10-15 | End: 2019-10-15 | Stop reason: SDUPTHER

## 2019-10-15 RX ORDER — MELOXICAM 15 MG/1
15 TABLET ORAL DAILY PRN
Qty: 90 TABLET | Refills: 0 | Status: CANCELLED | OUTPATIENT
Start: 2019-10-15

## 2019-10-15 RX ORDER — OXYCODONE HYDROCHLORIDE 5 MG/1
5 TABLET ORAL EVERY 12 HOURS PRN
Qty: 14 TABLET | Refills: 0 | Status: SHIPPED | OUTPATIENT
Start: 2019-10-15 | End: 2019-10-22

## 2019-10-16 ENCOUNTER — TELEPHONE (OUTPATIENT)
Dept: ORTHOPEDIC SURGERY | Age: 74
End: 2019-10-16

## 2019-10-17 ENCOUNTER — APPOINTMENT (OUTPATIENT)
Dept: PHYSICAL THERAPY | Age: 74
End: 2019-10-17
Payer: MEDICARE

## 2019-10-18 ENCOUNTER — HOSPITAL ENCOUNTER (OUTPATIENT)
Dept: PHYSICAL THERAPY | Age: 74
Setting detail: THERAPIES SERIES
Discharge: HOME | End: 2019-10-18
Payer: MEDICARE

## 2019-10-18 PROCEDURE — 97140 MANUAL THERAPY 1/> REGIONS: CPT | Performed by: PHYSICAL THERAPIST

## 2019-10-18 PROCEDURE — 97110 THERAPEUTIC EXERCISES: CPT | Performed by: PHYSICAL THERAPIST

## 2019-10-18 PROCEDURE — 97016 VASOPNEUMATIC DEVICE THERAPY: CPT | Performed by: PHYSICAL THERAPIST

## 2019-10-22 ENCOUNTER — HOSPITAL ENCOUNTER (OUTPATIENT)
Dept: PHYSICAL THERAPY | Age: 74
Setting detail: THERAPIES SERIES
Discharge: HOME | End: 2019-10-22
Payer: MEDICARE

## 2019-10-22 PROCEDURE — 97110 THERAPEUTIC EXERCISES: CPT | Performed by: PHYSICAL THERAPIST

## 2019-10-22 PROCEDURE — 97140 MANUAL THERAPY 1/> REGIONS: CPT | Performed by: PHYSICAL THERAPIST

## 2019-10-22 PROCEDURE — 97016 VASOPNEUMATIC DEVICE THERAPY: CPT | Performed by: PHYSICAL THERAPIST

## 2019-10-24 ENCOUNTER — HOSPITAL ENCOUNTER (OUTPATIENT)
Dept: PHYSICAL THERAPY | Age: 74
Setting detail: THERAPIES SERIES
Discharge: HOME | End: 2019-10-24
Payer: MEDICARE

## 2019-10-24 PROCEDURE — 97112 NEUROMUSCULAR REEDUCATION: CPT | Performed by: PHYSICAL THERAPIST

## 2019-10-24 PROCEDURE — 97016 VASOPNEUMATIC DEVICE THERAPY: CPT | Performed by: PHYSICAL THERAPIST

## 2019-10-24 PROCEDURE — 97110 THERAPEUTIC EXERCISES: CPT | Performed by: PHYSICAL THERAPIST

## 2019-10-25 DIAGNOSIS — Z96.651 STATUS POST TOTAL RIGHT KNEE REPLACEMENT: Primary | ICD-10-CM

## 2019-10-25 RX ORDER — OXYCODONE HYDROCHLORIDE 5 MG/1
5 TABLET ORAL 2 TIMES DAILY PRN
Qty: 14 TABLET | Refills: 0 | Status: SHIPPED | OUTPATIENT
Start: 2019-10-25 | End: 2019-10-31 | Stop reason: SDUPTHER

## 2019-10-29 ENCOUNTER — HOSPITAL ENCOUNTER (OUTPATIENT)
Dept: PHYSICAL THERAPY | Age: 74
Setting detail: THERAPIES SERIES
Discharge: HOME | End: 2019-10-29
Payer: MEDICARE

## 2019-10-29 PROCEDURE — 97110 THERAPEUTIC EXERCISES: CPT

## 2019-10-29 PROCEDURE — 97016 VASOPNEUMATIC DEVICE THERAPY: CPT

## 2019-10-29 PROCEDURE — 97112 NEUROMUSCULAR REEDUCATION: CPT

## 2019-10-31 ENCOUNTER — HOSPITAL ENCOUNTER (OUTPATIENT)
Dept: PHYSICAL THERAPY | Age: 74
Setting detail: THERAPIES SERIES
Discharge: HOME | End: 2019-10-31
Payer: MEDICARE

## 2019-10-31 DIAGNOSIS — Z96.651 STATUS POST TOTAL RIGHT KNEE REPLACEMENT: ICD-10-CM

## 2019-10-31 PROCEDURE — 97112 NEUROMUSCULAR REEDUCATION: CPT | Performed by: PHYSICAL THERAPIST

## 2019-10-31 PROCEDURE — 97016 VASOPNEUMATIC DEVICE THERAPY: CPT | Performed by: PHYSICAL THERAPIST

## 2019-10-31 PROCEDURE — 97110 THERAPEUTIC EXERCISES: CPT | Performed by: PHYSICAL THERAPIST

## 2019-10-31 RX ORDER — OXYCODONE HYDROCHLORIDE 5 MG/1
5 TABLET ORAL 2 TIMES DAILY PRN
Qty: 14 TABLET | Refills: 0 | Status: SHIPPED | OUTPATIENT
Start: 2019-10-31 | End: 2019-11-12 | Stop reason: SDUPTHER

## 2019-11-05 ENCOUNTER — HOSPITAL ENCOUNTER (OUTPATIENT)
Dept: PHYSICAL THERAPY | Age: 74
Setting detail: THERAPIES SERIES
Discharge: HOME | End: 2019-11-05
Payer: MEDICARE

## 2019-11-05 PROCEDURE — 97016 VASOPNEUMATIC DEVICE THERAPY: CPT

## 2019-11-05 PROCEDURE — 97112 NEUROMUSCULAR REEDUCATION: CPT

## 2019-11-05 PROCEDURE — 97110 THERAPEUTIC EXERCISES: CPT

## 2019-11-07 ENCOUNTER — HOSPITAL ENCOUNTER (OUTPATIENT)
Dept: PHYSICAL THERAPY | Age: 74
Setting detail: THERAPIES SERIES
Discharge: HOME | End: 2019-11-07
Payer: MEDICARE

## 2019-11-07 PROCEDURE — 97112 NEUROMUSCULAR REEDUCATION: CPT

## 2019-11-07 PROCEDURE — 97016 VASOPNEUMATIC DEVICE THERAPY: CPT

## 2019-11-07 PROCEDURE — 97110 THERAPEUTIC EXERCISES: CPT

## 2019-11-08 ENCOUNTER — APPOINTMENT (OUTPATIENT)
Dept: PHYSICAL THERAPY | Age: 74
End: 2019-11-08
Payer: MEDICARE

## 2019-11-12 ENCOUNTER — OFFICE VISIT (OUTPATIENT)
Dept: ORTHOPEDIC SURGERY | Age: 74
End: 2019-11-12

## 2019-11-12 ENCOUNTER — HOSPITAL ENCOUNTER (OUTPATIENT)
Dept: PHYSICAL THERAPY | Age: 74
Setting detail: THERAPIES SERIES
Discharge: HOME | End: 2019-11-12
Payer: MEDICARE

## 2019-11-12 DIAGNOSIS — Z96.651 STATUS POST TOTAL RIGHT KNEE REPLACEMENT: Primary | ICD-10-CM

## 2019-11-12 DIAGNOSIS — M25.551 PAIN OF RIGHT HIP JOINT: ICD-10-CM

## 2019-11-12 PROCEDURE — 97110 THERAPEUTIC EXERCISES: CPT | Performed by: PHYSICAL THERAPIST

## 2019-11-12 PROCEDURE — 97112 NEUROMUSCULAR REEDUCATION: CPT | Performed by: PHYSICAL THERAPIST

## 2019-11-12 PROCEDURE — 97016 VASOPNEUMATIC DEVICE THERAPY: CPT | Performed by: PHYSICAL THERAPIST

## 2019-11-12 PROCEDURE — 99024 POSTOP FOLLOW-UP VISIT: CPT | Performed by: PHYSICIAN ASSISTANT

## 2019-11-12 RX ORDER — OXYCODONE HYDROCHLORIDE 5 MG/1
5 TABLET ORAL 2 TIMES DAILY PRN
Qty: 14 TABLET | Refills: 0 | Status: SHIPPED | OUTPATIENT
Start: 2019-11-12 | End: 2019-11-19

## 2019-11-14 ENCOUNTER — HOSPITAL ENCOUNTER (OUTPATIENT)
Dept: PHYSICAL THERAPY | Age: 74
Setting detail: THERAPIES SERIES
Discharge: HOME | End: 2019-11-14
Payer: MEDICARE

## 2019-11-14 PROCEDURE — 97112 NEUROMUSCULAR REEDUCATION: CPT | Performed by: PHYSICAL THERAPIST

## 2019-11-14 PROCEDURE — 97016 VASOPNEUMATIC DEVICE THERAPY: CPT | Performed by: PHYSICAL THERAPIST

## 2019-11-14 PROCEDURE — 97110 THERAPEUTIC EXERCISES: CPT | Performed by: PHYSICAL THERAPIST

## 2019-11-19 ENCOUNTER — HOSPITAL ENCOUNTER (OUTPATIENT)
Dept: PHYSICAL THERAPY | Age: 74
Setting detail: THERAPIES SERIES
Discharge: HOME | End: 2019-11-19
Payer: MEDICARE

## 2019-11-19 PROCEDURE — 97016 VASOPNEUMATIC DEVICE THERAPY: CPT | Performed by: PHYSICAL THERAPIST

## 2019-11-19 PROCEDURE — 97140 MANUAL THERAPY 1/> REGIONS: CPT | Performed by: PHYSICAL THERAPIST

## 2019-11-19 PROCEDURE — 97110 THERAPEUTIC EXERCISES: CPT | Performed by: PHYSICAL THERAPIST

## 2019-11-21 ENCOUNTER — HOSPITAL ENCOUNTER (OUTPATIENT)
Dept: PHYSICAL THERAPY | Age: 74
Setting detail: THERAPIES SERIES
Discharge: HOME | End: 2019-11-21
Payer: MEDICARE

## 2019-11-21 DIAGNOSIS — Z96.651 STATUS POST TOTAL RIGHT KNEE REPLACEMENT: Primary | ICD-10-CM

## 2019-11-21 PROCEDURE — 97110 THERAPEUTIC EXERCISES: CPT | Performed by: PHYSICAL THERAPIST

## 2019-11-21 PROCEDURE — 97016 VASOPNEUMATIC DEVICE THERAPY: CPT | Performed by: PHYSICAL THERAPIST

## 2019-11-21 PROCEDURE — 97112 NEUROMUSCULAR REEDUCATION: CPT | Performed by: PHYSICAL THERAPIST

## 2019-11-21 RX ORDER — OXYCODONE HYDROCHLORIDE 5 MG/1
5 TABLET ORAL EVERY 6 HOURS PRN
Qty: 28 TABLET | Refills: 0 | Status: SHIPPED | OUTPATIENT
Start: 2019-11-21 | End: 2019-11-28

## 2019-11-27 ENCOUNTER — HOSPITAL ENCOUNTER (OUTPATIENT)
Dept: PHYSICAL THERAPY | Age: 74
Setting detail: THERAPIES SERIES
Discharge: HOME | End: 2019-11-27
Payer: MEDICARE

## 2019-11-27 PROCEDURE — 97110 THERAPEUTIC EXERCISES: CPT | Performed by: PHYSICAL THERAPIST

## 2019-11-27 PROCEDURE — 97016 VASOPNEUMATIC DEVICE THERAPY: CPT | Performed by: PHYSICAL THERAPIST

## 2019-11-27 PROCEDURE — 97112 NEUROMUSCULAR REEDUCATION: CPT | Performed by: PHYSICAL THERAPIST

## 2019-12-04 RX ORDER — AMOXICILLIN AND CLAVULANATE POTASSIUM 875; 125 MG/1; MG/1
1 TABLET, FILM COATED ORAL 2 TIMES DAILY
Qty: 6 TABLET | Refills: 0 | Status: SHIPPED | OUTPATIENT
Start: 2019-12-04 | End: 2019-12-07

## 2019-12-12 ENCOUNTER — OFFICE VISIT (OUTPATIENT)
Dept: ORTHOPEDIC SURGERY | Age: 74
End: 2019-12-12

## 2019-12-12 VITALS
SYSTOLIC BLOOD PRESSURE: 113 MMHG | BODY MASS INDEX: 40.13 KG/M2 | DIASTOLIC BLOOD PRESSURE: 62 MMHG | HEART RATE: 54 BPM | HEIGHT: 69 IN | WEIGHT: 270.95 LBS

## 2019-12-12 DIAGNOSIS — Z96.651 STATUS POST TOTAL RIGHT KNEE REPLACEMENT: Primary | ICD-10-CM

## 2019-12-12 PROCEDURE — 3017F COLORECTAL CA SCREEN DOC REV: CPT | Performed by: ORTHOPAEDIC SURGERY

## 2019-12-12 PROCEDURE — G8484 FLU IMMUNIZE NO ADMIN: HCPCS | Performed by: ORTHOPAEDIC SURGERY

## 2019-12-12 PROCEDURE — 4040F PNEUMOC VAC/ADMIN/RCVD: CPT | Performed by: ORTHOPAEDIC SURGERY

## 2019-12-12 PROCEDURE — 1036F TOBACCO NON-USER: CPT | Performed by: ORTHOPAEDIC SURGERY

## 2019-12-12 PROCEDURE — 1123F ACP DISCUSS/DSCN MKR DOCD: CPT | Performed by: ORTHOPAEDIC SURGERY

## 2019-12-12 PROCEDURE — 99024 POSTOP FOLLOW-UP VISIT: CPT | Performed by: ORTHOPAEDIC SURGERY

## 2019-12-12 PROCEDURE — G8598 ASA/ANTIPLAT THER USED: HCPCS | Performed by: ORTHOPAEDIC SURGERY

## 2019-12-12 PROCEDURE — G8417 CALC BMI ABV UP PARAM F/U: HCPCS | Performed by: ORTHOPAEDIC SURGERY

## 2019-12-12 PROCEDURE — G8427 DOCREV CUR MEDS BY ELIG CLIN: HCPCS | Performed by: ORTHOPAEDIC SURGERY

## 2020-06-22 RX ORDER — AMOXICILLIN AND CLAVULANATE POTASSIUM 875; 125 MG/1; MG/1
1 TABLET, FILM COATED ORAL 2 TIMES DAILY
Qty: 6 TABLET | Refills: 0 | Status: SHIPPED | OUTPATIENT
Start: 2020-06-22 | End: 2020-06-25

## 2020-11-11 ENCOUNTER — OFFICE VISIT (OUTPATIENT)
Dept: CARDIOLOGY CLINIC | Age: 75
End: 2020-11-11
Payer: MEDICARE

## 2020-11-11 VITALS
HEIGHT: 70 IN | BODY MASS INDEX: 42.45 KG/M2 | HEART RATE: 52 BPM | WEIGHT: 296.5 LBS | DIASTOLIC BLOOD PRESSURE: 66 MMHG | SYSTOLIC BLOOD PRESSURE: 110 MMHG | OXYGEN SATURATION: 98 %

## 2020-11-11 PROCEDURE — 99214 OFFICE O/P EST MOD 30 MIN: CPT | Performed by: NURSE PRACTITIONER

## 2020-11-11 PROCEDURE — 1036F TOBACCO NON-USER: CPT | Performed by: NURSE PRACTITIONER

## 2020-11-11 PROCEDURE — 3017F COLORECTAL CA SCREEN DOC REV: CPT | Performed by: NURSE PRACTITIONER

## 2020-11-11 PROCEDURE — G8427 DOCREV CUR MEDS BY ELIG CLIN: HCPCS | Performed by: NURSE PRACTITIONER

## 2020-11-11 PROCEDURE — 1123F ACP DISCUSS/DSCN MKR DOCD: CPT | Performed by: NURSE PRACTITIONER

## 2020-11-11 PROCEDURE — G8417 CALC BMI ABV UP PARAM F/U: HCPCS | Performed by: NURSE PRACTITIONER

## 2020-11-11 PROCEDURE — 4040F PNEUMOC VAC/ADMIN/RCVD: CPT | Performed by: NURSE PRACTITIONER

## 2020-11-11 PROCEDURE — G8484 FLU IMMUNIZE NO ADMIN: HCPCS | Performed by: NURSE PRACTITIONER

## 2020-11-11 ASSESSMENT — ENCOUNTER SYMPTOMS: SHORTNESS OF BREATH: 1

## 2020-11-11 NOTE — PATIENT INSTRUCTIONS
Echocardiogram and stress test, call [address] [place] [address] to schedule  Fasting blood work  Follow up in 3 months

## 2020-11-11 NOTE — LETTER
[address]   Cardiology Note              Date:  November 11, 2020  Patientname: Zaynab Hidalgo  YOB: 1945    Primary Care physician: Kennedi Santiago MD    HISTORY OF PRESENT ILLNESS: Zaynab Hidalgo is a 77 y.o. male with a history of CAD, CHF, HTN, HLD, SALLIE. He had PTCA RCA 1995, 1996, 2004, 2009. [address] [facility] 2015 showed stable CAD, patent RCA stents. Echo 2/2015 showed EF 55%. Today he presents for follow up for CAD, HTN, HLD. He has noticed occasional fatigue, shortness of breath and right shoulder pain following exertion recently. These symptoms are similar to prior to PCI. He has no chest pain, dizziness, syncope, edema. BP controlled at home. He is typically pretty active. Past Medical History:   has a past medical history of Arthritis, CAD (coronary artery disease), Cancer ([facility] [address] GERD (gastroesophageal reflux disease), Hyperlipidemia, Hypertension, Osteoarthritis, and Sleep apnea. Past Surgical History:   has a past surgical history that includes Coronary angioplasty with stent; Coronary angioplasty (08/26/15); eye surgery; eye surgery; skin biopsy; Colonoscopy; Endoscopy, colon, diagnostic; Cataract removal (Bilateral); Total knee arthroplasty (Right, 9/18/2019); joint replacement; and knee surgery. Home Medications:    Prior to Admission medications    Medication Sig Start Date End Date Taking? Authorizing Provider   clopidogrel (PLAVIX) 75 MG tablet Take by mouth    Historical Provider, MD   acetaminophen (TYLENOL) 325 MG tablet Take 2 tablets by mouth every 6 hours 9/19/19   CARINE Fierro   meloxicam SILVANO RODRIGUEZ [facility]) 15 MG tablet Take 1 tablet by mouth daily 9/19/19   CARINE Fierro   ketoconazole (NIZORAL) 2 % cream Apply topically daily Indications: rash on chest R/T electrode pads Apply topically daily.     Historical Provider, MD   Cyanocobalamin (VITAMIN B-12) 2500 MCG SUBL Place 2,500 mcg under the tongue daily    Historical Provider, MD carbonyl iron (FEOSOL) 45 MG TABS Take by mouth daily    Historical Provider, MD   isosorbide dinitrate (ISORDIL) 10 MG tablet Take 1 tablet by mouth 3  times daily  Patient taking differently: 2 times daily  9/12/17   Marco Antonio Kerns MD   aspirin EC 81 MG EC tablet Take 1 tablet by mouth daily  Patient taking differently: Take 81 mg by mouth daily Indications: patient didnt stop for surgery-OK per Dr. Stone./Dr. Tonny Penaloza.  8/26/15   Marco Antonio Kerns MD   metoprolol (LOPRESSOR) 50 MG tablet Take 50 mg by mouth 2 times daily    Historical Provider, MD   multivitamin [facility]) per tablet Take 1 tablet by mouth daily. Takes 1/2 in am and 1/2 in pm    Historical Provider, MD   lisinopril (PRINIVIL;ZESTRIL) 20 MG tablet Take 20 mg by mouth daily. Historical Provider, MD   pantoprazole (PROTONIX) 40 MG tablet Take 40 mg by mouth daily. Historical Provider, MD   atorvastatin (LIPITOR) 80 MG tablet Take 80 mg by mouth daily. Historical Provider, MD   ezetimibe (ZETIA) 10 MG tablet Take 10 mg by mouth daily. Historical Provider, MD     Allergies:  Lasix [furosemide]; Neosporin [neomycin-polymyx-gramicid]; and Polysporin [bacitracin-polymyxin b]    Social History:   reports that he has quit smoking. He has never used smokeless tobacco. He reports current alcohol use of about 2.0 standard drinks of alcohol per week. He reports that he does not use drugs. Family History: family history includes Coronary Art Dis in his father; Heart Disease in his father and mother. Review of Systems   Review of Systems   Constitutional: Positive for fatigue. Respiratory: Positive for shortness of breath. Cardiovascular: Negative. Neurological: Negative.       OBJECTIVE:    Vital signs:    /66   Pulse 52   Ht 5' 9.5\" (1.765 m)   Wt 296 lb 8 oz (134.5 kg)   SpO2 98%   BMI 43.16 kg/m²      Physical Exam:  Constitutional:  Comfortable and alert, NAD, appears stated age, obese Eyes: PERRL, sclera nonicteric  Neck:  Supple, no masses, no thyroidmegaly, no JVD  Skin:  Warm and dry; no rash or lesions  Heart:  Regular, normal apex, S1 and S2 normal, no M/G/R  Lungs:  Normal respiratory effort; clear; no wheezing/rhonchi/rales  Abdomen: soft, non tender, + bowel sounds  Extremities:  No edema or cyanosis; no clubbing  Neuro: alert and oriented, moves legs and arms equally, normal mood and affect    Data Reviewed:      Echo 2/2015:  Normal left ventricular systolic function, with estimated ejection fraction   of 55%. There is mild concentric left ventricular hypertrophy. No regional wall motion abnormalities noted. Diastolic filling parameters suggests grade II diastolic dysfunction. Systolic pulmonary artery pressure (SPAP) is normal and estimated at 28 mmHg   (RA pressure 3 mmHg). Coronary angiogram 8/26/2015:  INDICATIONS:  Angina equivalent, coronary artery disease with multiple PCIs. PROCEDURES PERFORMED:  1.  Bilateral coronary angiography. 2.  Left heart catheterization. 3.  Left ventriculogram.  4.  Fractional flow reserve of the right coronary artery. 3.  Right iliofemoral angiography. DESCRIPTION:  The right groin was anesthetized, and a short 5-Swedish sheath  was inserted in the right common femoral artery. JL4 and 3DRC catheters were  used to perform the left and right coronary angiographies. A pigtail  catheter was then used to cross the aortic valve in the standard fashion. Pressures were recorded. LV-gram was then done in VILLALTA projection. The  catheter was then pulled back, and gradients were recorded. The patient was  noted to have an in-stent restenosis of the right coronary artery stent, and  decision was made to proceed with the FFR. Sheath was upgraded to 6-Swedish. A JR4 guide was then used to engage the right coronary artery. A [place] FFR  wire was then advanced distal to the lesion.   Hyperemia was then induced by intravenous adenosine. It was 0.89, which was insignificant. IV heparin was  used for anticoagulation. The catheter and the guide were then pulled back,  and right iliofemoral angiography was performed. Then, Perclose was used to  close. There were no complications. FINDINGS:  1. Hemodynamics:  /56, mean of 79, . LVEDP was 11 mmHg. There  was no gradient noted across the aortic valve. 2.  LV-gram revealed normal left ventricular systolic function with ejection  fraction of 55% to 60%. There was trace mitral regurgitation. CORONARY ANGIOGRAPHY:  1. Left main was a large-caliber vessel that bifurcated. It was long. It  was normal.  2.  Left circumflex was a small vessel that continued in the posterior AV  groove as a very small-caliber vessel. It gave off 2 obtuse marginal  branches. The first was small to medium. The second was very small. They  were all normal.  3.  The LAD was a medium-caliber vessel that reached the apex, but did not  wrap around it. It became a very small-caliber vessel close to the apex. It  gave off 2 diagonal branches. The first was very small, the second was  medium-caliber, and they were without significant disease. 4.  The right coronary artery was a large-caliber vessel and dominant. There  were stents noted in the proximal to midsegment, which were patent with a 40%  in-stent restenosis of the stents in the mid-right coronary artery. It gave  off a large-caliber right posterior descending artery that had a 40% stenosis  in the midsegment. The right posterior descending artery reached the apex  and wrapped around it. It also gave off a medium-caliber right  posterolateral branch. IMPRESSION:  1. Patent proximal to mid-right coronary artery stents with 40% discrete  in-stent restenosis of the mid-right coronary artery, stent, fractional flow  reserve across the lesion was 0.89, which is insignificant. 2.  Normal left ventricular systolic function with ejection fraction of 55%. 3.  Normal Left ventricular end-diastolic pressure, 11 mmHg. RECOMMENDATIONS:  The patient's stents are patent, and in-stent restenosis is  insignificant. He never had angina prior to his stent placement, but he is  having angina-equivalent symptoms and will start him on oral nitrates. In  the meantime, he will continue with aspirin, Lipitor, Zetia, as well as  Lopressor. Cardiology Labs Reviewed:   CBC: No results for input(s): WBC, HGB, HCT, PLT in the last 72 hours. BMP:No results for input(s): NA, K, CO2, BUN, CREATININE, LABGLOM, GLUCOSE in the last 72 hours. PT/INR: No results for input(s): PROTIME, INR in the last 72 hours. APTT:No results for input(s): APTT in the last 72 hours. FASTING LIPID PANEL:  Lab Results   Component Value Date    HDL 39 01/26/2017    LDLCALC 77 01/26/2017    TRIG 128 01/26/2017     LIVER PROFILE:No results for input(s): AST, ALT, ALB in the last 72 hours. BNP:   Lab Results   Component Value Date    PROBNP 18 07/27/2015     Reviewed all labs and imaging today    Assessment:   Fatigue: possible angina equivalent  Shortness of breath: possible angina equivalent  Shoulder pain: possible angina equivalent  CAD: concern for progression; patent stents on angiogram 2015; s/p multiple PCIs RCA  Chronic diastolic CHF: compensated  HTN: controlled  HLD: controlled, LDL 63 in 2019, statin  SALLIE    Plan:   1. Echocardiogram and stress test for shortness of breath/fatigue; it has been 5 years since last ischemic evaluation  2. Update yearly labs; lipids/LFTs, BMP, CBC  3. Continue aspirin, plavix, statin, lopressor, isordil, zetia  4. Check BP at home and call the office if consistently out of goal range  5.  Follow up in 3 months    Ernesto Florez, [address]  [phone]

## 2020-11-11 NOTE — PROGRESS NOTES
[facility]   Cardiology Note              Date:  November 11, 2020  Patientname: Aguila Rome  YOB: 1945    Primary Care physician: Sheryl Winkler MD    HISTORY OF PRESENT ILLNESS: Aguila Rome is a 77 y.o. male with a history of CAD, CHF, HTN, HLD, SALLIE. He had PTCA RCA 1995, 1996, 2004, 2009. [address] [facility] 2015 showed stable CAD, patent RCA stents. Echo 2/2015 showed EF 55%. Today he presents for follow up for CAD, HTN, HLD. He has noticed occasional fatigue, shortness of breath and right shoulder pain following exertion recently. These symptoms are similar to prior to PCI. He has no chest pain, dizziness, syncope, edema. BP controlled at home. He is typically pretty active. Past Medical History:   has a past medical history of Arthritis, CAD (coronary artery disease), Cancer ([facility] [address] GERD (gastroesophageal reflux disease), Hyperlipidemia, Hypertension, Osteoarthritis, and Sleep apnea. Past Surgical History:   has a past surgical history that includes Coronary angioplasty with stent; Coronary angioplasty (08/26/15); eye surgery; eye surgery; skin biopsy; Colonoscopy; Endoscopy, colon, diagnostic; Cataract removal (Bilateral); Total knee arthroplasty (Right, 9/18/2019); joint replacement; and knee surgery. Home Medications:    Prior to Admission medications    Medication Sig Start Date End Date Taking? Authorizing Provider   clopidogrel (PLAVIX) 75 MG tablet Take by mouth    Historical Provider, MD   acetaminophen (TYLENOL) 325 MG tablet Take 2 tablets by mouth every 6 hours 9/19/19   CARINE Agustin   meloxicam [facility]) 15 MG tablet Take 1 tablet by mouth daily 9/19/19   CARINE Agustin   ketoconazole (NIZORAL) 2 % cream Apply topically daily Indications: rash on chest R/T electrode pads Apply topically daily.     Historical Provider, MD   Cyanocobalamin (VITAMIN B-12) 2500 MCG SUBL Place 2,500 mcg under the tongue daily    Historical Provider, MD   carbonyl iron (FEOSOL) 45 MG no masses, no thyroidmegaly, no JVD  Skin:  Warm and dry; no rash or lesions  Heart:  Regular, normal apex, S1 and S2 normal, no M/G/R  Lungs:  Normal respiratory effort; clear; no wheezing/rhonchi/rales  Abdomen: soft, non tender, + bowel sounds  Extremities:  No edema or cyanosis; no clubbing  Neuro: alert and oriented, moves legs and arms equally, normal mood and affect    Data Reviewed:      Echo 2/2015:  Normal left ventricular systolic function, with estimated ejection fraction   of 55%. There is mild concentric left ventricular hypertrophy. No regional wall motion abnormalities noted. Diastolic filling parameters suggests grade II diastolic dysfunction. Systolic pulmonary artery pressure (SPAP) is normal and estimated at 28 mmHg   (RA pressure 3 mmHg). Coronary angiogram 8/26/2015:  INDICATIONS:  Angina equivalent, coronary artery disease with multiple PCIs. PROCEDURES PERFORMED:  1.  Bilateral coronary angiography. 2.  Left heart catheterization. 3.  Left ventriculogram.  4.  Fractional flow reserve of the right coronary artery. 3.  Right iliofemoral angiography. DESCRIPTION:  The right groin was anesthetized, and a short 5-Jamaican sheath  was inserted in the right common femoral artery. JL4 and 3DRC catheters were  used to perform the left and right coronary angiographies. A pigtail  catheter was then used to cross the aortic valve in the standard fashion. Pressures were recorded. LV-gram was then done in VILLALTA projection. The  catheter was then pulled back, and gradients were recorded. The patient was  noted to have an in-stent restenosis of the right coronary artery stent, and  decision was made to proceed with the FFR. Sheath was upgraded to 6-Jamaican. A JR4 guide was then used to engage the right coronary artery. A [place] FFR  wire was then advanced distal to the lesion. Hyperemia was then induced by  intravenous adenosine. It was 0.89, which was insignificant.   IV heparin was  used for anticoagulation. The catheter and the guide were then pulled back,  and right iliofemoral angiography was performed. Then, Perclose was used to  close. There were no complications. FINDINGS:  1. Hemodynamics:  /56, mean of 79, . LVEDP was 11 mmHg. There  was no gradient noted across the aortic valve. 2.  LV-gram revealed normal left ventricular systolic function with ejection  fraction of 55% to 60%. There was trace mitral regurgitation. CORONARY ANGIOGRAPHY:  1. Left main was a large-caliber vessel that bifurcated. It was long. It  was normal.  2.  Left circumflex was a small vessel that continued in the posterior AV  groove as a very small-caliber vessel. It gave off 2 obtuse marginal  branches. The first was small to medium. The second was very small. They  were all normal.  3.  The LAD was a medium-caliber vessel that reached the apex, but did not  wrap around it. It became a very small-caliber vessel close to the apex. It  gave off 2 diagonal branches. The first was very small, the second was  medium-caliber, and they were without significant disease. 4.  The right coronary artery was a large-caliber vessel and dominant. There  were stents noted in the proximal to midsegment, which were patent with a 40%  in-stent restenosis of the stents in the mid-right coronary artery. It gave  off a large-caliber right posterior descending artery that had a 40% stenosis  in the midsegment. The right posterior descending artery reached the apex  and wrapped around it. It also gave off a medium-caliber right  posterolateral branch. IMPRESSION:  1. Patent proximal to mid-right coronary artery stents with 40% discrete  in-stent restenosis of the mid-right coronary artery, stent, fractional flow  reserve across the lesion was 0.89, which is insignificant. 2.  Normal left ventricular systolic function with ejection fraction of 55%.   3.  Normal Left ventricular end-diastolic pressure, 11 mmHg. RECOMMENDATIONS:  The patient's stents are patent, and in-stent restenosis is  insignificant. He never had angina prior to his stent placement, but he is  having angina-equivalent symptoms and will start him on oral nitrates. In  the meantime, he will continue with aspirin, Lipitor, Zetia, as well as  Lopressor. Cardiology Labs Reviewed:   CBC: No results for input(s): WBC, HGB, HCT, PLT in the last 72 hours. BMP:No results for input(s): NA, K, CO2, BUN, CREATININE, LABGLOM, GLUCOSE in the last 72 hours. PT/INR: No results for input(s): PROTIME, INR in the last 72 hours. APTT:No results for input(s): APTT in the last 72 hours. FASTING LIPID PANEL:  Lab Results   Component Value Date    HDL 39 01/26/2017    LDLCALC 77 01/26/2017    TRIG 128 01/26/2017     LIVER PROFILE:No results for input(s): AST, ALT, ALB in the last 72 hours. BNP:   Lab Results   Component Value Date    PROBNP 18 07/27/2015     Reviewed all labs and imaging today    Assessment:   Fatigue: possible angina equivalent  Shortness of breath: possible angina equivalent  Shoulder pain: possible angina equivalent  CAD: concern for progression; patent stents on angiogram 2015; s/p multiple PCIs RCA  Chronic diastolic CHF: compensated  HTN: controlled  HLD: controlled, LDL 63 in 2019, statin  SALLIE    Plan:   1. Echocardiogram and stress test for shortness of breath/fatigue; it has been 5 years since last ischemic evaluation  2. Update yearly labs; lipids/LFTs, BMP, CBC  3. Continue aspirin, plavix, statin, lopressor, isordil, zetia  4. Check BP at home and call the office if consistently out of goal range  5.  Follow up in 3 months    Leonid Valverde, PRATEEK-CNP  [facility]  [phone]

## 2020-11-23 ENCOUNTER — TELEPHONE (OUTPATIENT)
Dept: ORTHOPEDIC SURGERY | Age: 75
End: 2020-11-23

## 2020-11-23 RX ORDER — AMOXICILLIN AND CLAVULANATE POTASSIUM 875; 125 MG/1; MG/1
1 TABLET, FILM COATED ORAL 2 TIMES DAILY
Qty: 6 TABLET | Refills: 1 | Status: SHIPPED | OUTPATIENT
Start: 2020-11-23 | End: 2020-11-29

## 2020-11-30 ENCOUNTER — HOSPITAL ENCOUNTER (OUTPATIENT)
Age: 75
Discharge: HOME | End: 2020-11-30
Payer: MEDICARE

## 2020-11-30 LAB
ALBUMIN SERPL-MCNC: 4.1 G/DL (ref 3.4–5)
ALP BLD-CCNC: 83 U/L (ref 40–129)
ALT SERPL-CCNC: 21 U/L (ref 10–40)
ANION GAP SERPL CALCULATED.3IONS-SCNC: 12 MMOL/L (ref 3–16)
AST SERPL-CCNC: 23 U/L (ref 15–37)
BASOPHILS ABSOLUTE: 0.1 K/UL (ref 0–0.2)
BASOPHILS RELATIVE PERCENT: 1.3 %
BILIRUB SERPL-MCNC: 0.5 MG/DL (ref 0–1)
BILIRUBIN DIRECT: <0.2 MG/DL (ref 0–0.3)
BILIRUBIN, INDIRECT: NORMAL MG/DL (ref 0–1)
BUN BLDV-MCNC: 21 MG/DL (ref 7–20)
CALCIUM SERPL-MCNC: 9 MG/DL (ref 8.3–10.6)
CHLORIDE BLD-SCNC: 104 MMOL/L (ref 99–110)
CHOLESTEROL, TOTAL: 128 MG/DL (ref 0–199)
CO2: 22 MMOL/L (ref 21–32)
CREAT SERPL-MCNC: 0.8 MG/DL (ref 0.8–1.3)
EOSINOPHILS ABSOLUTE: 0.2 K/UL (ref 0–0.6)
EOSINOPHILS RELATIVE PERCENT: 3.1 %
GFR AFRICAN AMERICAN: >60
GFR NON-AFRICAN AMERICAN: >60
GLUCOSE BLD-MCNC: 101 MG/DL (ref 70–99)
HCT VFR BLD CALC: 37.6 % (ref 40.5–52.5)
HDLC SERPL-MCNC: 45 MG/DL (ref 40–60)
HEMOGLOBIN: 12.8 G/DL (ref 13.5–17.5)
LDL CHOLESTEROL CALCULATED: 64 MG/DL
LYMPHOCYTES ABSOLUTE: 1.2 K/UL (ref 1–5.1)
LYMPHOCYTES RELATIVE PERCENT: 22.6 %
MCH RBC QN AUTO: 31.6 PG (ref 26–34)
MCHC RBC AUTO-ENTMCNC: 34 G/DL (ref 31–36)
MCV RBC AUTO: 93.1 FL (ref 80–100)
MONOCYTES ABSOLUTE: 0.5 K/UL (ref 0–1.3)
MONOCYTES RELATIVE PERCENT: 8.5 %
NEUTROPHILS ABSOLUTE: 3.5 K/UL (ref 1.7–7.7)
NEUTROPHILS RELATIVE PERCENT: 64.5 %
PDW BLD-RTO: 13.9 % (ref 12.4–15.4)
PLATELET # BLD: 234 K/UL (ref 135–450)
PMV BLD AUTO: 7.8 FL (ref 5–10.5)
POTASSIUM SERPL-SCNC: 4.3 MMOL/L (ref 3.5–5.1)
RBC # BLD: 4.04 M/UL (ref 4.2–5.9)
SODIUM BLD-SCNC: 138 MMOL/L (ref 136–145)
TOTAL PROTEIN: 7.3 G/DL (ref 6.4–8.2)
TRIGL SERPL-MCNC: 96 MG/DL (ref 0–150)
VLDLC SERPL CALC-MCNC: 19 MG/DL
WBC # BLD: 5.5 K/UL (ref 4–11)

## 2020-11-30 PROCEDURE — 80076 HEPATIC FUNCTION PANEL: CPT

## 2020-11-30 PROCEDURE — 80048 BASIC METABOLIC PNL TOTAL CA: CPT

## 2020-11-30 PROCEDURE — 36415 COLL VENOUS BLD VENIPUNCTURE: CPT

## 2020-11-30 PROCEDURE — 85025 COMPLETE CBC W/AUTO DIFF WBC: CPT

## 2020-11-30 PROCEDURE — 80061 LIPID PANEL: CPT

## 2020-12-01 ENCOUNTER — TELEPHONE (OUTPATIENT)
Dept: CARDIOLOGY CLINIC | Age: 75
End: 2020-12-01

## 2020-12-01 NOTE — TELEPHONE ENCOUNTER
Created telephone encounter. Spoke with Akosua Harvey, who is on HIPAA form, relayed message per [employer] regarding LABS. Pt verbalized understanding.

## 2020-12-03 ENCOUNTER — HOSPITAL ENCOUNTER (OUTPATIENT)
Dept: CARDIOLOGY | Age: 75
Discharge: HOME | End: 2020-12-03
Payer: MEDICARE

## 2020-12-03 LAB
LV EF: 55 %
LV EF: 57 %
LVEF MODALITY: NORMAL
LVEF MODALITY: NORMAL

## 2020-12-03 PROCEDURE — 93306 TTE W/DOPPLER COMPLETE: CPT

## 2020-12-03 PROCEDURE — A9502 TC99M TETROFOSMIN: HCPCS | Performed by: NURSE PRACTITIONER

## 2020-12-03 PROCEDURE — 78452 HT MUSCLE IMAGE SPECT MULT: CPT

## 2020-12-03 PROCEDURE — 3430000000 HC RX DIAGNOSTIC RADIOPHARMACEUTICAL: Performed by: NURSE PRACTITIONER

## 2020-12-03 PROCEDURE — 93017 CV STRESS TEST TRACING ONLY: CPT

## 2020-12-03 RX ADMIN — TETROFOSMIN 32.4 MILLICURIE: 1.38 INJECTION, POWDER, LYOPHILIZED, FOR SOLUTION INTRAVENOUS at 12:50

## 2020-12-03 RX ADMIN — TETROFOSMIN 11.8 MILLICURIE: 1.38 INJECTION, POWDER, LYOPHILIZED, FOR SOLUTION INTRAVENOUS at 11:30

## 2020-12-04 ENCOUNTER — TELEPHONE (OUTPATIENT)
Dept: CARDIOLOGY CLINIC | Age: 75
End: 2020-12-04

## 2020-12-04 NOTE — TELEPHONE ENCOUNTER
It would be okay to hold his Plavix 5 to 7 days before the procedure, he should resume the Plavix the day after the procedure. Thanks.

## 2020-12-10 ENCOUNTER — OFFICE VISIT (OUTPATIENT)
Dept: ORTHOPEDIC SURGERY | Age: 75
End: 2020-12-10
Payer: MEDICARE

## 2020-12-10 VITALS — HEIGHT: 70 IN | BODY MASS INDEX: 42.37 KG/M2 | WEIGHT: 296 LBS

## 2020-12-10 PROCEDURE — 99213 OFFICE O/P EST LOW 20 MIN: CPT | Performed by: ORTHOPAEDIC SURGERY

## 2020-12-10 PROCEDURE — G8417 CALC BMI ABV UP PARAM F/U: HCPCS | Performed by: ORTHOPAEDIC SURGERY

## 2020-12-10 PROCEDURE — 1036F TOBACCO NON-USER: CPT | Performed by: ORTHOPAEDIC SURGERY

## 2020-12-10 PROCEDURE — 4040F PNEUMOC VAC/ADMIN/RCVD: CPT | Performed by: ORTHOPAEDIC SURGERY

## 2020-12-10 PROCEDURE — 1123F ACP DISCUSS/DSCN MKR DOCD: CPT | Performed by: ORTHOPAEDIC SURGERY

## 2020-12-10 PROCEDURE — G8427 DOCREV CUR MEDS BY ELIG CLIN: HCPCS | Performed by: ORTHOPAEDIC SURGERY

## 2020-12-10 PROCEDURE — G8484 FLU IMMUNIZE NO ADMIN: HCPCS | Performed by: ORTHOPAEDIC SURGERY

## 2020-12-10 PROCEDURE — 3017F COLORECTAL CA SCREEN DOC REV: CPT | Performed by: ORTHOPAEDIC SURGERY

## 2020-12-10 NOTE — PROGRESS NOTES
Chief Complaint    Follow-up (s/p 15 mos RIGHT TKR Ki, 9/18/19)      History of Present Illness:  Fabián James is a 77 y.o. male. This is a 1 year annual checkup on their right total knee replacement. Brooke Cope doing very well with little to no complaints. He still reports some occasional stiffness and soreness from time to time but this is minimal.  They're preoperative pain is gone. They deny any fevers, chills, constitutional symptoms. 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    GERD (gastroesophageal reflux disease)     Hyperlipidemia     Hypertension     Osteoarthritis     Sleep apnea      Patient Active Problem List    Diagnosis Date Noted    Coronary atherosclerosis 09/29/2011     Priority: High    Mixed hyperlipidemia 09/29/2011     Priority: Medium    Essential hypertension 09/29/2011     Priority: Medium    Primary localized osteoarthritis of right knee 09/18/2019    Status post total right knee replacement 09/18/2019    Morbid obesity ([facility] [address] 09/18/2019    Primary osteoarthritis of right knee 12/04/2018    Chronic diastolic congestive heart failure ([facility] [address] 02/08/2018    CAD (coronary artery disease) 02/08/2018    Shortness of breath 02/20/2015    SALLIE (obstructive sleep apnea) 02/20/2015     Current Outpatient Medications   Medication Sig Dispense Refill    clopidogrel (PLAVIX) 75 MG tablet Take by mouth      acetaminophen (TYLENOL) 325 MG tablet Take 2 tablets by mouth every 6 hours 120 tablet 0    ketoconazole (NIZORAL) 2 % cream Apply topically daily Indications: rash on chest R/T electrode pads Apply topically daily.       Cyanocobalamin (VITAMIN B-12) 2500 MCG SUBL Place 2,500 mcg under the tongue daily      carbonyl iron (FEOSOL) 45 MG TABS Take by mouth daily      isosorbide dinitrate (ISORDIL) 10 MG tablet Take 1 tablet by mouth 3  times daily (Patient taking differently: 2 times daily ) 270 tablet 5    aspirin EC 81 MG EC tablet Take 1 tablet by mouth daily (Patient taking differently: Take 81 mg by mouth daily Indications: patient didnt stop for surgery-OK per Dr. Stone./Dr. Sivan Gao. ) 30 tablet 3    metoprolol (LOPRESSOR) 50 MG tablet Take 50 mg by mouth 2 times daily      multivitamin (THERAGRAN) per tablet Take 1 tablet by mouth daily. Takes 1/2 in am and 1/2 in pm      lisinopril (PRINIVIL;ZESTRIL) 20 MG tablet Take 20 mg by mouth daily.  pantoprazole (PROTONIX) 40 MG tablet Take 40 mg by mouth daily.  atorvastatin (LIPITOR) 80 MG tablet Take 80 mg by mouth daily.  ezetimibe (ZETIA) 10 MG tablet Take 10 mg by mouth daily. No current facility-administered medications for this visit. Review of Systems:  Relevant review of systems  dated 12/10/2020 was reviewed and available in the patient's chart under the media tab. Vital Signs:  Ht 5' 10\" (1.778 m)   Wt 296 lb (134.3 kg)   BMI 42.47 kg/m²     General Exam:   Constitutional: Patient is adequately groomed with no evidence of malnutrition  DTRs: Deep tendon reflexes are intact  Mental Status: The patient is oriented to time, place and person. The patient's mood and affect are appropriate. Lymphatic: The lymphatic examination bilaterally reveals all areas to be without enlargement or induration. Vascular: Examination reveals no swelling or calf tenderness. Peripheral pulses are palpable and 2+. Neurological: The patient has good coordination. There is no weakness or sensory deficit. Right knee Examination:    Inspection:  There is a well-healed surgical incision without any signs of infection.     Palpation:  Nontender to palpation    Range of Motion:  0-120° of knee flexion    Strength:  5/5 quad and hamstring strength    Special Tests:  Stable to varus and valgus stress testing    Skin: There are no rashes, ulcerations or lesions. Gait: Normal gait pattern    Reflex normal deep tendon reflexes    Additional Comments:       Additional Examinations:         Contralateral Exam: Examination of the left knee reveals intact skin. There is no focal tenderness. The patient demonstrates full painless range of motion with regard to flexion and extension. Strength is 5/5 throughout all planes. Ligamentous stability is grossly intact. Radiology:     X-rays obtained and reviewed in office:  Views 3 views including AP, lateral, skyline  Location right knee  Impression there is excellent alignment of the prosthesis with no is a septic or aseptic loosening, polyethylene wear, or periprosthetic fractures. Impression:  Encounter Diagnosis   Name Primary?  History of total knee replacement, right Yes       Office Procedures:  Orders Placed This Encounter   Procedures    XR KNEE RIGHT (3 VIEWS)     Standing Status:   Future     Number of Occurrences:   1     Standing Expiration Date:   12/9/2021       Treatment Plan: At this time the patient is doing very well at 1 year postop. Would recommend a continued maintenance program for strengthening and endurance. She continually work at maintaining healthy bodyweight. We'll plan on seeing them back every 4-5 years for routine checkup or sooner if there is any problems.

## 2020-12-11 ENCOUNTER — TELEPHONE (OUTPATIENT)
Dept: CARDIOLOGY | Age: 75
End: 2020-12-11

## 2020-12-18 ENCOUNTER — TELEPHONE (OUTPATIENT)
Dept: CARDIOLOGY CLINIC | Age: 75
End: 2020-12-18

## 2020-12-18 NOTE — TELEPHONE ENCOUNTER
Pt requesting order for NITRO   Pt is going to [facility] with his Granddaughter and wants to have to be on the safe side.       [facility] [address], [address]   [address] [facility] [employer] [employer], [address] [place] [id number]   Phone:  [phone]  Fax:  [phone]

## 2020-12-21 RX ORDER — NITROGLYCERIN 0.4 MG/1
0.4 TABLET SUBLINGUAL EVERY 5 MIN PRN
Qty: 25 TABLET | Refills: 3 | Status: SHIPPED | OUTPATIENT
Start: 2020-12-21

## 2021-01-08 ENCOUNTER — TELEPHONE (OUTPATIENT)
Dept: CARDIOLOGY CLINIC | Age: 76
End: 2021-01-08

## 2021-01-08 NOTE — TELEPHONE ENCOUNTER
Pt calling. Pt would like to speak to nurse. Pt has been having in his back. Pt tripped and fell. Pt said to be safe he wanted to know if JJP wanted him to still have another stress test? It has not been ordered yet. Please call pt to advise.

## 2021-01-12 NOTE — TELEPHONE ENCOUNTER
I have not seen the patient for 2 years.   He was seen by Benedicto Mallory in November and she had ordered stress test.  Having orthopedic issues okay to switch to [facility]

## 2021-01-12 NOTE — TELEPHONE ENCOUNTER
SHAHLAI Patient had the stress test.  He has follow up with Justice Alatorre in February. He will keep this follow up. Patient denies chest pain, sob, palpitations, dizziness or syncope at this time. He will discuss coronary angiography in more detail with Brianne Brown at that apt. He just wanted to inform us that he had fallen down on a couple of narrow steps while carrying a tub. But he is fine and has no complaints.

## 2021-03-01 NOTE — PROGRESS NOTES
[address]   Cardiology Note              Date:  March 1, 2021  Patientname: Chani Aquino  YOB: 1945    Primary Care physician: Krishan De La Paz MD    HISTORY OF PRESENT ILLNESS: Chani Aquino is a 77 y.o. male with a history of CAD, CHF, HTN, HLD, SALLIE. He had PTCA RCA 1995, 1996, 2004, 2009. [address] [facility] 2015 showed stable CAD, patent RCA stents. Echo 2/2015 showed EF 55%. Stress test 12/2020 abnormal. Echo showed EF 55%. Today he presents for follow up for abnormal stress test, CAD, HTN, HLD. He feels good. Denies chest pain, shortness of breath, palpitations and dizziness. He is pretty active at home with yard work and shoveling snow recently. Home BP controlled. Past Medical History:   has a past medical history of Arthritis, CAD (coronary artery disease), Cancer ([address] GERD (gastroesophageal reflux disease), Hyperlipidemia, Hypertension, Osteoarthritis, and Sleep apnea. Past Surgical History:   has a past surgical history that includes Coronary angioplasty with stent; Coronary angioplasty (08/26/15); eye surgery; eye surgery; skin biopsy; Colonoscopy; Endoscopy, colon, diagnostic; Cataract removal (Bilateral); Total knee arthroplasty (Right, 9/18/2019); joint replacement; and knee surgery. Home Medications:    Prior to Admission medications    Medication Sig Start Date End Date Taking? Authorizing Provider   nitroGLYCERIN (NITROSTAT) 0.4 MG SL tablet Place 1 tablet under the tongue every 5 minutes as needed for Chest pain 12/21/20   Vesna Machuca MD   clopidogrel (PLAVIX) 75 MG tablet Take by mouth    Historical Provider, MD   acetaminophen (TYLENOL) 325 MG tablet Take 2 tablets by mouth every 6 hours 9/19/19   CARINE Belcher   ketoconazole (NIZORAL) 2 % cream Apply topically daily Indications: rash on chest R/T electrode pads Apply topically daily.     Historical Provider, MD   Cyanocobalamin (VITAMIN B-12) 2500 MCG SUBL Place 2,500 mcg under the tongue daily    Historical Provider, MD   carbonyl iron (FEOSOL) 45 MG TABS Take by mouth daily    Historical Provider, MD   isosorbide dinitrate (ISORDIL) 10 MG tablet Take 1 tablet by mouth 3  times daily  Patient taking differently: 2 times daily  9/12/17   Anjum Welch MD   aspirin EC 81 MG EC tablet Take 1 tablet by mouth daily  Patient taking differently: Take 81 mg by mouth daily Indications: patient didnt stop for surgery-OK per Dr. Stone./Dr. Mai Mandujano.  8/26/15   Anjum Welch MD   metoprolol (LOPRESSOR) 50 MG tablet Take 50 mg by mouth 2 times daily    Historical Provider, MD   multivitamin [facility]) per tablet Take 1 tablet by mouth daily. Takes 1/2 in am and 1/2 in pm    Historical Provider, MD   lisinopril (PRINIVIL;ZESTRIL) 20 MG tablet Take 20 mg by mouth daily. Historical Provider, MD   pantoprazole (PROTONIX) 40 MG tablet Take 40 mg by mouth daily. Historical Provider, MD   atorvastatin (LIPITOR) 80 MG tablet Take 80 mg by mouth daily. Historical Provider, MD   ezetimibe (ZETIA) 10 MG tablet Take 10 mg by mouth daily. Historical Provider, MD     Allergies:  Lasix [furosemide], Neosporin [neomycin-polymyx-gramicid], and Polysporin [bacitracin-polymyxin b]    Social History:   reports that he has quit smoking. He has never used smokeless tobacco. He reports current alcohol use of about 2.0 standard drinks of alcohol per week. He reports that he does not use drugs. Family History: family history includes Coronary Art Dis in his father; Heart Disease in his father and mother. Review of Systems   Review of Systems   Constitutional: Negative for fatigue. Respiratory: Negative for shortness of breath. Cardiovascular: Negative. Neurological: Negative.       OBJECTIVE:    Vital signs:    /68   Pulse 59   Ht 5' 10\" (1.778 m)   Wt (!) 301 lb (136.5 kg)   SpO2 98%   BMI 43.19 kg/m²      Physical Exam:  Constitutional:  Comfortable and alert, NAD, appears stated age, obese  Eyes: PERRL, sclera nonicteric  Neck:  Supple, no masses, no thyroidmegaly, no JVD  Skin:  Warm and dry; no rash or lesions  Heart:  Regular, normal apex, S1 and S2 normal, no M/G/R  Lungs:  Normal respiratory effort; clear; no wheezing/rhonchi/rales  Abdomen: soft, non tender, + bowel sounds  Extremities:  No edema or cyanosis; no clubbing  Neuro: alert and oriented, moves legs and arms equally, normal mood and affect    Data Reviewed:      Stress test 12/2020:  Low normal LVEF 57%    Basal inferolateral hypokinesis    Mixed inferolateral defect consistent with mixed ischemia/scar        Overall, this would be considered an abnormal, intermediate risk, study     Echo 12/2020:   Normal left ventricle systolic function with an estimated ejection fraction   of 55%. No regional wall motion abnormalities are seen. Normal left ventricular diastolic filling pressure. MIld mitral regurgitation. Trace tricuspid regurgitation. Systolic pulmonary artery pressure (SPAP) is normal and estimated at 19 mmHg   (right atrial pressure 3 mmHg). Echo 2/2015:  Normal left ventricular systolic function, with estimated ejection fraction   of 55%. There is mild concentric left ventricular hypertrophy. No regional wall motion abnormalities noted. Diastolic filling parameters suggests grade II diastolic dysfunction. Systolic pulmonary artery pressure (SPAP) is normal and estimated at 28 mmHg   (RA pressure 3 mmHg). Coronary angiogram 8/26/2015:  INDICATIONS:  Angina equivalent, coronary artery disease with multiple PCIs. PROCEDURES PERFORMED:  1.  Bilateral coronary angiography. 2.  Left heart catheterization. 3.  Left ventriculogram.  4.  Fractional flow reserve of the right coronary artery. 3.  Right iliofemoral angiography. DESCRIPTION:  The right groin was anesthetized, and a short 5-Niuean sheath  was inserted in the right common femoral artery.   JL4 and 3DRC catheters were  used to perform the left and right the stents in the mid-right coronary artery. It gave  off a large-caliber right posterior descending artery that had a 40% stenosis  in the midsegment. The right posterior descending artery reached the apex  and wrapped around it. It also gave off a medium-caliber right  posterolateral branch. IMPRESSION:  1. Patent proximal to mid-right coronary artery stents with 40% discrete  in-stent restenosis of the mid-right coronary artery, stent, fractional flow  reserve across the lesion was 0.89, which is insignificant. 2.  Normal left ventricular systolic function with ejection fraction of 55%. 3.  Normal Left ventricular end-diastolic pressure, 11 mmHg. RECOMMENDATIONS:  The patient's stents are patent, and in-stent restenosis is  insignificant. He never had angina prior to his stent placement, but he is  having angina-equivalent symptoms and will start him on oral nitrates. In  the meantime, he will continue with aspirin, Lipitor, Zetia, as well as  Lopressor. Cardiology Labs Reviewed:   CBC: No results for input(s): WBC, HGB, HCT, PLT in the last 72 hours. BMP:No results for input(s): NA, K, CO2, BUN, CREATININE, LABGLOM, GLUCOSE in the last 72 hours. PT/INR: No results for input(s): PROTIME, INR in the last 72 hours. APTT:No results for input(s): APTT in the last 72 hours. FASTING LIPID PANEL:  Lab Results   Component Value Date    HDL 45 11/30/2020    LDLCALC 64 11/30/2020    TRIG 96 11/30/2020     LIVER PROFILE:No results for input(s): AST, ALT, ALB in the last 72 hours. BNP:   Lab Results   Component Value Date    PROBNP 18 07/27/2015     Reviewed all labs and imaging today    Assessment:   Abnormal stress test 12/2020  CAD: concern for progression; stress test abnormal 12/2020; patent stents on angiogram 2015; s/p multiple PCIs RCA  Chronic diastolic CHF: compensated  HTN: controlled  HLD: controlled, LDL 64, statin  SALLIE    Plan:   1. Discussed stress test results with patient.  Given that he now feels well with no symptoms, he would like to defer LHC. If symptoms reoccur, he will let the office know. 2. Continue aspirin, plavix, statin, lopressor, isordil, zetia  3. Check BP at home and call the office if consistently out of goal range  4. Stay active along with a healthy diet  5.  Follow up in 6 months with Dr. Teresa Stewart, APRN-CNP  [facility]  [phone]

## 2021-03-02 ENCOUNTER — OFFICE VISIT (OUTPATIENT)
Dept: CARDIOLOGY CLINIC | Age: 76
End: 2021-03-02
Payer: MEDICARE

## 2021-03-02 VITALS
BODY MASS INDEX: 43.09 KG/M2 | SYSTOLIC BLOOD PRESSURE: 110 MMHG | HEIGHT: 70 IN | WEIGHT: 301 LBS | OXYGEN SATURATION: 98 % | HEART RATE: 59 BPM | DIASTOLIC BLOOD PRESSURE: 68 MMHG

## 2021-03-02 DIAGNOSIS — I25.110 CORONARY ARTERY DISEASE INVOLVING NATIVE CORONARY ARTERY OF NATIVE HEART WITH UNSTABLE ANGINA PECTORIS (HCC): Primary | ICD-10-CM

## 2021-03-02 DIAGNOSIS — I10 ESSENTIAL HYPERTENSION: ICD-10-CM

## 2021-03-02 DIAGNOSIS — E78.5 DYSLIPIDEMIA: ICD-10-CM

## 2021-03-02 PROCEDURE — 3017F COLORECTAL CA SCREEN DOC REV: CPT | Performed by: NURSE PRACTITIONER

## 2021-03-02 PROCEDURE — G8417 CALC BMI ABV UP PARAM F/U: HCPCS | Performed by: NURSE PRACTITIONER

## 2021-03-02 PROCEDURE — 1123F ACP DISCUSS/DSCN MKR DOCD: CPT | Performed by: NURSE PRACTITIONER

## 2021-03-02 PROCEDURE — 4040F PNEUMOC VAC/ADMIN/RCVD: CPT | Performed by: NURSE PRACTITIONER

## 2021-03-02 PROCEDURE — 99214 OFFICE O/P EST MOD 30 MIN: CPT | Performed by: NURSE PRACTITIONER

## 2021-03-02 PROCEDURE — G8427 DOCREV CUR MEDS BY ELIG CLIN: HCPCS | Performed by: NURSE PRACTITIONER

## 2021-03-02 PROCEDURE — 1036F TOBACCO NON-USER: CPT | Performed by: NURSE PRACTITIONER

## 2021-03-02 PROCEDURE — G8484 FLU IMMUNIZE NO ADMIN: HCPCS | Performed by: NURSE PRACTITIONER

## 2021-03-02 ASSESSMENT — ENCOUNTER SYMPTOMS: SHORTNESS OF BREATH: 0

## 2021-03-02 NOTE — LETTER
[address]   Cardiology Note              Date:  March 1, 2021  Patientname: Conrad Vilchis  YOB: 1945    Primary Care physician: Victor Manuel Graham MD    HISTORY OF PRESENT ILLNESS: Conrad Vilchis is a 77 y.o. male with a history of CAD, CHF, HTN, HLD, SALLIE. He had PTCA RCA 1995, 1996, 2004, 2009. [address] [facility] 2015 showed stable CAD, patent RCA stents. Echo 2/2015 showed EF 55%. Stress test 12/2020 abnormal. Echo showed EF 55%. Today he presents for follow up for abnormal stress test, CAD, HTN, HLD. He feels good. Denies chest pain, shortness of breath, palpitations and dizziness. He is pretty active at home with yard work and shoveling snow recently. Home BP controlled. Past Medical History:   has a past medical history of Arthritis, CAD (coronary artery disease), Cancer ([address] GERD (gastroesophageal reflux disease), Hyperlipidemia, Hypertension, Osteoarthritis, and Sleep apnea. Past Surgical History:   has a past surgical history that includes Coronary angioplasty with stent; Coronary angioplasty (08/26/15); eye surgery; eye surgery; skin biopsy; Colonoscopy; Endoscopy, colon, diagnostic; Cataract removal (Bilateral); Total knee arthroplasty (Right, 9/18/2019); joint replacement; and knee surgery. Home Medications:    Prior to Admission medications    Medication Sig Start Date End Date Taking? Authorizing Provider   nitroGLYCERIN (NITROSTAT) 0.4 MG SL tablet Place 1 tablet under the tongue every 5 minutes as needed for Chest pain 12/21/20   Kane Randall MD   clopidogrel (PLAVIX) 75 MG tablet Take by mouth    Historical Provider, MD   acetaminophen (TYLENOL) 325 MG tablet Take 2 tablets by mouth every 6 hours 9/19/19   CARINE Patton   ketoconazole (NIZORAL) 2 % cream Apply topically daily Indications: rash on chest R/T electrode pads Apply topically daily.     Historical Provider, MD Cyanocobalamin (VITAMIN B-12) 2500 MCG SUBL Place 2,500 mcg under the tongue daily    Historical Provider, MD   carbonyl iron (FEOSOL) 45 MG TABS Take by mouth daily    Historical Provider, MD   isosorbide dinitrate (ISORDIL) 10 MG tablet Take 1 tablet by mouth 3  times daily  Patient taking differently: 2 times daily  9/12/17   Marques Richardson MD   aspirin EC 81 MG EC tablet Take 1 tablet by mouth daily  Patient taking differently: Take 81 mg by mouth daily Indications: patient didnt stop for surgery-OK per Dr. Stone./Dr. Charisse Andujar.  8/26/15   Marques Richardson MD   metoprolol (LOPRESSOR) 50 MG tablet Take 50 mg by mouth 2 times daily    Historical Provider, MD   multivitamin [facility]) per tablet Take 1 tablet by mouth daily. Takes 1/2 in am and 1/2 in pm    Historical Provider, MD   lisinopril (PRINIVIL;ZESTRIL) 20 MG tablet Take 20 mg by mouth daily. Historical Provider, MD   pantoprazole (PROTONIX) 40 MG tablet Take 40 mg by mouth daily. Historical Provider, MD   atorvastatin (LIPITOR) 80 MG tablet Take 80 mg by mouth daily. Historical Provider, MD   ezetimibe (ZETIA) 10 MG tablet Take 10 mg by mouth daily. Historical Provider, MD     Allergies:  Lasix [furosemide], Neosporin [neomycin-polymyx-gramicid], and Polysporin [bacitracin-polymyxin b]    Social History:   reports that he has quit smoking. He has never used smokeless tobacco. He reports current alcohol use of about 2.0 standard drinks of alcohol per week. He reports that he does not use drugs. Family History: family history includes Coronary Art Dis in his father; Heart Disease in his father and mother. Review of Systems   Review of Systems   Constitutional: Negative for fatigue. Respiratory: Negative for shortness of breath. Cardiovascular: Negative. Neurological: Negative.       OBJECTIVE:    Vital signs: /68   Pulse 59   Ht 5' 10\" (1.778 m)   Wt (!) 301 lb (136.5 kg)   SpO2 98%   BMI 43.19 kg/m²      Physical Exam:  Constitutional:  Comfortable and alert, NAD, appears stated age, obese  Eyes: PERRL, sclera nonicteric  Neck:  Supple, no masses, no thyroidmegaly, no JVD  Skin:  Warm and dry; no rash or lesions  Heart:  Regular, normal apex, S1 and S2 normal, no M/G/R  Lungs:  Normal respiratory effort; clear; no wheezing/rhonchi/rales  Abdomen: soft, non tender, + bowel sounds  Extremities:  No edema or cyanosis; no clubbing  Neuro: alert and oriented, moves legs and arms equally, normal mood and affect    Data Reviewed:      Stress test 12/2020:  Low normal LVEF 57%    Basal inferolateral hypokinesis    Mixed inferolateral defect consistent with mixed ischemia/scar        Overall, this would be considered an abnormal, intermediate risk, study     Echo 12/2020:   Normal left ventricle systolic function with an estimated ejection fraction   of 55%. No regional wall motion abnormalities are seen. Normal left ventricular diastolic filling pressure. MIld mitral regurgitation. Trace tricuspid regurgitation. Systolic pulmonary artery pressure (SPAP) is normal and estimated at 19 mmHg   (right atrial pressure 3 mmHg). Echo 2/2015:  Normal left ventricular systolic function, with estimated ejection fraction   of 55%. There is mild concentric left ventricular hypertrophy. No regional wall motion abnormalities noted. Diastolic filling parameters suggests grade II diastolic dysfunction. Systolic pulmonary artery pressure (SPAP) is normal and estimated at 28 mmHg   (RA pressure 3 mmHg). Coronary angiogram 8/26/2015:  INDICATIONS:  Angina equivalent, coronary artery disease with multiple PCIs. PROCEDURES PERFORMED:  1.  Bilateral coronary angiography. 2.  Left heart catheterization. 3.  Left ventriculogram.  4.  Fractional flow reserve of the right coronary artery. 3.  Right iliofemoral angiography. DESCRIPTION:  The right groin was anesthetized, and a short 5-Portuguese sheath  was inserted in the right common femoral artery. JL4 and 3DRC catheters were  used to perform the left and right coronary angiographies. A pigtail  catheter was then used to cross the aortic valve in the standard fashion. Pressures were recorded. LV-gram was then done in VILLALTA projection. The  catheter was then pulled back, and gradients were recorded. The patient was  noted to have an in-stent restenosis of the right coronary artery stent, and  decision was made to proceed with the FFR. Sheath was upgraded to 6-Portuguese. A JR4 guide was then used to engage the right coronary artery. A [place] FFR  wire was then advanced distal to the lesion. Hyperemia was then induced by  intravenous adenosine. It was 0.89, which was insignificant. IV heparin was  used for anticoagulation. The catheter and the guide were then pulled back,  and right iliofemoral angiography was performed. Then, Perclose was used to  close. There were no complications. FINDINGS:  1. Hemodynamics:  /56, mean of 79, . LVEDP was 11 mmHg. There  was no gradient noted across the aortic valve. 2.  LV-gram revealed normal left ventricular systolic function with ejection  fraction of 55% to 60%. There was trace mitral regurgitation. CORONARY ANGIOGRAPHY:  1. Left main was a large-caliber vessel that bifurcated. It was long. It  was normal.  2.  Left circumflex was a small vessel that continued in the posterior AV  groove as a very small-caliber vessel. It gave off 2 obtuse marginal  branches. The first was small to medium. The second was very small. They  were all normal.  3.  The LAD was a medium-caliber vessel that reached the apex, but did not  wrap around it. It became a very small-caliber vessel close to the apex. It  gave off 2 diagonal branches.   The first was very small, the second was medium-caliber, and they were without significant disease. 4.  The right coronary artery was a large-caliber vessel and dominant. There  were stents noted in the proximal to midsegment, which were patent with a 40%  in-stent restenosis of the stents in the mid-right coronary artery. It gave  off a large-caliber right posterior descending artery that had a 40% stenosis  in the midsegment. The right posterior descending artery reached the apex  and wrapped around it. It also gave off a medium-caliber right  posterolateral branch. IMPRESSION:  1. Patent proximal to mid-right coronary artery stents with 40% discrete  in-stent restenosis of the mid-right coronary artery, stent, fractional flow  reserve across the lesion was 0.89, which is insignificant. 2.  Normal left ventricular systolic function with ejection fraction of 55%. 3.  Normal Left ventricular end-diastolic pressure, 11 mmHg. RECOMMENDATIONS:  The patient's stents are patent, and in-stent restenosis is  insignificant. He never had angina prior to his stent placement, but he is  having angina-equivalent symptoms and will start him on oral nitrates. In  the meantime, he will continue with aspirin, Lipitor, Zetia, as well as  Lopressor. Cardiology Labs Reviewed:   CBC: No results for input(s): WBC, HGB, HCT, PLT in the last 72 hours. BMP:No results for input(s): NA, K, CO2, BUN, CREATININE, LABGLOM, GLUCOSE in the last 72 hours. PT/INR: No results for input(s): PROTIME, INR in the last 72 hours. APTT:No results for input(s): APTT in the last 72 hours. FASTING LIPID PANEL:  Lab Results   Component Value Date    HDL 45 11/30/2020    LDLCALC 64 11/30/2020    TRIG 96 11/30/2020     LIVER PROFILE:No results for input(s): AST, ALT, ALB in the last 72 hours.   BNP:   Lab Results   Component Value Date    PROBNP 18 07/27/2015     Reviewed all labs and imaging today    Assessment:   Abnormal stress test 12/2020 CAD: concern for progression; stress test abnormal 12/2020; patent stents on angiogram 2015; s/p multiple PCIs RCA  Chronic diastolic CHF: compensated  HTN: controlled  HLD: controlled, LDL 64, statin  SALLIE    Plan:   1. Discussed stress test results with patient. Given that he now feels well with no symptoms, he would like to defer LHC. If symptoms reoccur, he will let the office know. 2. Continue aspirin, plavix, statin, lopressor, isordil, zetia  3. Check BP at home and call the office if consistently out of goal range  4. Stay active along with a healthy diet  5.  Follow up in 6 months with Dr. Sharlene Navarrete, APRN-CNP  [facility]  [phone]

## 2021-03-02 NOTE — PATIENT INSTRUCTIONS
If symptoms worsen, consider angiogram  Continue current medications  Check BP at home and call the office if consistently out of goal range  Stay active along with a healthy diet  Follow up in 6 months with Dr. Fabian Miller

## 2021-05-20 NOTE — TELEPHONE ENCOUNTER
Let pt know that low risk for surgery.  Ok to hold plavix 1 wk prior to surgery The patient is a 72-year-old female, , retired (, non-caregiver domiciled at home with her  with a prior psychiatric diagnosis of ANSON and MDD, 2 previous inpatient psychiatric hospitalizations (most recently [facility] 2/2020), prior SA in 11/2019 by overdosing on pills, no history of NSSIB, no history of violence/aggression, no hx of legal issues, daily marijuana use, PMH of HTN, BIB self due to worsening depression and anxiety in the setting of death of long time therapist one month ago.    Patient reports feeling anxious, able to relate that it could be because of planned discharge, is worried about anxiety getting worse after discharge; denies nausea/ dizziness/ stiffness. Appetite and sleep are adequate. Patient denies active SI/HI.  Plan: Will c/w Venlafaxine ER to 225mg, Mirtazapine 30mg at bedtime (offered to increase to 45mg but patient wants to think about it), and Clonazepam 1mg daily and add 0.5mg at 3:00pm.

## 2021-07-20 ENCOUNTER — TELEPHONE (OUTPATIENT)
Dept: CARDIOLOGY CLINIC | Age: 76
End: 2021-07-20

## 2021-07-20 NOTE — TELEPHONE ENCOUNTER
Pt called stating his right leg is swollen and red. He stated after his last stress test there was a discussion about possibly doing a 2nd stress test. The pt felt more comfortable with getting a message to [facility] regarding this as his wife stated the lower extrem swelling could be due to his cardiac condition. I also scheduled pt to see NPLR on 7-26-21 at 215p. Please send to [facility] if she has anything to add to this.

## 2021-07-21 NOTE — TELEPHONE ENCOUNTER
Pt says he sees NPLR on Monday. He says he is fine and is in the process of leaving the house. He will see NPLR on Monday.

## 2021-07-21 NOTE — TELEPHONE ENCOUNTER
Spoke with patient, he states swelling is to RLE only, no pain and no heat noted to touch. He states no weight gain or SOB. Pt did not have recent HR, BP, or weight. He states he is heading out of town today and he wishes to address with provider on scheduled appointment next week.

## 2021-07-21 NOTE — TELEPHONE ENCOUNTER
Need a little more information. Any pain with swelling or heat? If so, concerned for DVT and would need ultrasound of right leg. Any weight gain, shortness of breath, other symptoms? BP and HR? He had an abnormal stress test last year and deferred LHC because he was feeling well.

## 2021-07-22 ASSESSMENT — ENCOUNTER SYMPTOMS: SHORTNESS OF BREATH: 0

## 2021-07-22 NOTE — PROGRESS NOTES
[facility]   Cardiology Note              Date:  July 22, 2021  Patientname: Sidra Reis  YOB: 1945    Primary Care physician: Sukhdev Frankel MD    HISTORY OF PRESENT ILLNESS: Sidra Reis is a 77 y.o. male with a history of CAD, CHF, HTN, HLD, SALLIE. He had PTCA RCA 1995, 1996, 2004, 2009. [address] [facility] [address] 2015 showed stable CAD, patent RCA stents. Echo 2/2015 showed EF 55%. Stress test 12/2020 abnormal. Echo showed EF 55%. Today he presents for urgent follow up for leg edema and redness. He states that his legs are always swollen, does not think it is worse recently. He has right leg redness. He has no leg pain. He denies chest pain, shortness of breath, palpitations, weight gain, orthopnea and dizziness. He is very active at home and tolerates well. Past Medical History:   has a past medical history of Arthritis, CAD (coronary artery disease), Cancer ([facility] [address] GERD (gastroesophageal reflux disease), Hyperlipidemia, Hypertension, Osteoarthritis, and Sleep apnea. Past Surgical History:   has a past surgical history that includes Coronary angioplasty with stent; Coronary angioplasty (08/26/15); eye surgery; eye surgery; skin biopsy; Colonoscopy; Endoscopy, colon, diagnostic; Cataract removal (Bilateral); Total knee arthroplasty (Right, 9/18/2019); joint replacement; and knee surgery. Home Medications:    Prior to Admission medications    Medication Sig Start Date End Date Taking? Authorizing Provider   nitroGLYCERIN (NITROSTAT) 0.4 MG SL tablet Place 1 tablet under the tongue every 5 minutes as needed for Chest pain 12/21/20   Bennie Tena MD   clopidogrel (PLAVIX) 75 MG tablet Take by mouth    Historical Provider, MD   acetaminophen (TYLENOL) 325 MG tablet Take 2 tablets by mouth every 6 hours 9/19/19   Kandy Ganser, PA   ketoconazole (NIZORAL) 2 % cream Apply topically daily Indications: rash on chest R/T electrode pads Apply topically daily.     Historical Provider, MD m)   Wt 300 lb (136.1 kg)   SpO2 98%   BMI 43.05 kg/m²      Physical Exam:  Constitutional:  Comfortable and alert, NAD, appears stated age, obese  Eyes: PERRL, sclera nonicteric  Neck:  Supple, no masses, no thyroidmegaly, no JVD  Skin:  Warm and dry; no rash or lesions  Heart:  Regular, normal apex, S1 and S2 normal, no M/G/R  Lungs:  Normal respiratory effort; clear; no wheezing/rhonchi/rales  Abdomen: soft, non tender, + bowel sounds  Extremities:  2+ BLE edema, RLE redness, no heat  Neuro: alert and oriented, moves legs and arms equally, normal mood and affect    Data Reviewed:      Stress test 12/2020:  Low normal LVEF 57%    Basal inferolateral hypokinesis    Mixed inferolateral defect consistent with mixed ischemia/scar        Overall, this would be considered an abnormal, intermediate risk, study     Echo 12/2020:   Normal left ventricle systolic function with an estimated ejection fraction   of 55%. No regional wall motion abnormalities are seen. Normal left ventricular diastolic filling pressure. MIld mitral regurgitation. Trace tricuspid regurgitation. Systolic pulmonary artery pressure (SPAP) is normal and estimated at 19 mmHg   (right atrial pressure 3 mmHg). Echo 2/2015:  Normal left ventricular systolic function, with estimated ejection fraction   of 55%. There is mild concentric left ventricular hypertrophy. No regional wall motion abnormalities noted. Diastolic filling parameters suggests grade II diastolic dysfunction. Systolic pulmonary artery pressure (SPAP) is normal and estimated at 28 mmHg   (RA pressure 3 mmHg). Coronary angiogram 8/26/2015:  INDICATIONS:  Angina equivalent, coronary artery disease with multiple PCIs. PROCEDURES PERFORMED:  1.  Bilateral coronary angiography. 2.  Left heart catheterization. 3.  Left ventriculogram.  4.  Fractional flow reserve of the right coronary artery. 3.  Right iliofemoral angiography.   DESCRIPTION:  The right groin was anesthetized, and a short 5-L[place]ian sheath  was inserted in the right common femoral artery. JL4 and 3DRC catheters were  used to perform the left and right coronary angiographies. A pigtail  catheter was then used to cross the aortic valve in the standard fashion. Pressures were recorded. LV-gram was then done in VILLALTA projection. The  catheter was then pulled back, and gradients were recorded. The patient was  noted to have an in-stent restenosis of the right coronary artery stent, and  decision was made to proceed with the FFR. Sheath was upgraded to 6-L[place]ian. A JR4 guide was then used to engage the right coronary artery. A [place] FFR  wire was then advanced distal to the lesion. Hyperemia was then induced by  intravenous adenosine. It was 0.89, which was insignificant. IV heparin was  used for anticoagulation. The catheter and the guide were then pulled back,  and right iliofemoral angiography was performed. Then, Perclose was used to  close. There were no complications. FINDINGS:  1. Hemodynamics:  /56, mean of 79, . LVEDP was 11 mmHg. There  was no gradient noted across the aortic valve. 2.  LV-gram revealed normal left ventricular systolic function with ejection  fraction of 55% to 60%. There was trace mitral regurgitation. CORONARY ANGIOGRAPHY:  1. Left main was a large-caliber vessel that bifurcated. It was long. It  was normal.  2.  Left circumflex was a small vessel that continued in the posterior AV  groove as a very small-caliber vessel. It gave off 2 obtuse marginal  branches. The first was small to medium. The second was very small. They  were all normal.  3.  The LAD was a medium-caliber vessel that reached the apex, but did not  wrap around it. It became a very small-caliber vessel close to the apex. It  gave off 2 diagonal branches. The first was very small, the second was  medium-caliber, and they were without significant disease.   4.  The right coronary artery was a large-caliber vessel and dominant. There  were stents noted in the proximal to midsegment, which were patent with a 40%  in-stent restenosis of the stents in the mid-right coronary artery. It gave  off a large-caliber right posterior descending artery that had a 40% stenosis  in the midsegment. The right posterior descending artery reached the apex  and wrapped around it. It also gave off a medium-caliber right  posterolateral branch. IMPRESSION:  1. Patent proximal to mid-right coronary artery stents with 40% discrete  in-stent restenosis of the mid-right coronary artery, stent, fractional flow  reserve across the lesion was 0.89, which is insignificant. 2.  Normal left ventricular systolic function with ejection fraction of 55%. 3.  Normal Left ventricular end-diastolic pressure, 11 mmHg. RECOMMENDATIONS:  The patient's stents are patent, and in-stent restenosis is  insignificant. He never had angina prior to his stent placement, but he is  having angina-equivalent symptoms and will start him on oral nitrates. In  the meantime, he will continue with aspirin, Lipitor, Zetia, as well as  Lopressor. Cardiology Labs Reviewed:   CBC: No results for input(s): WBC, HGB, HCT, PLT in the last 72 hours. BMP:No results for input(s): NA, K, CO2, BUN, CREATININE, LABGLOM, GLUCOSE in the last 72 hours. PT/INR: No results for input(s): PROTIME, INR in the last 72 hours. APTT:No results for input(s): APTT in the last 72 hours. FASTING LIPID PANEL:  Lab Results   Component Value Date    HDL 45 11/30/2020    LDLCALC 64 11/30/2020    TRIG 96 11/30/2020     LIVER PROFILE:No results for input(s): AST, ALT, ALB in the last 72 hours.   BNP:   Lab Results   Component Value Date    PROBNP 18 07/27/2015     Reviewed all labs and imaging today    Assessment:   Edema: chronic but possibly worse recently  Abnormal stress test 12/2020  CAD: concern for progression; stress test abnormal 12/2020; patent stents on angiogram 2015; s/p multiple PCIs RCA  Chronic diastolic CHF: appears compensated  HTN: controlled  HLD: controlled, LDL 64, statin  SALLIE    Plan:   1. Check BMP, BNP, BLE venous ultrasound due to edema  2. Continue medical management for abnormal stress test/CAD given he is feeling well overall with no angina. Reconsider if symptoms worsen. 3. Continue aspirin, plavix, statin, lopressor, isordil, zetia, lisinopril; maintained on DAPT due to multiple PCIs  4. Compression stockings, low salt diet, elevate legs  5. Check BP at home and call the office if consistently out of goal range  6. Stay active along with a healthy diet  7.  Follow up as planned with Dr. Todd Adams, APRN-CNP  [address]  [phone]

## 2021-07-26 ENCOUNTER — OFFICE VISIT (OUTPATIENT)
Dept: CARDIOLOGY CLINIC | Age: 76
End: 2021-07-26
Payer: MEDICARE

## 2021-07-26 VITALS
BODY MASS INDEX: 42.95 KG/M2 | OXYGEN SATURATION: 98 % | DIASTOLIC BLOOD PRESSURE: 56 MMHG | HEIGHT: 70 IN | WEIGHT: 300 LBS | HEART RATE: 55 BPM | SYSTOLIC BLOOD PRESSURE: 126 MMHG

## 2021-07-26 DIAGNOSIS — R60.0 LOCALIZED EDEMA: Primary | ICD-10-CM

## 2021-07-26 DIAGNOSIS — I25.10 CORONARY ARTERY DISEASE INVOLVING NATIVE CORONARY ARTERY OF NATIVE HEART WITHOUT ANGINA PECTORIS: ICD-10-CM

## 2021-07-26 DIAGNOSIS — E78.5 DYSLIPIDEMIA: ICD-10-CM

## 2021-07-26 DIAGNOSIS — I10 ESSENTIAL HYPERTENSION: ICD-10-CM

## 2021-07-26 PROCEDURE — 4040F PNEUMOC VAC/ADMIN/RCVD: CPT | Performed by: NURSE PRACTITIONER

## 2021-07-26 PROCEDURE — G8417 CALC BMI ABV UP PARAM F/U: HCPCS | Performed by: NURSE PRACTITIONER

## 2021-07-26 PROCEDURE — 99214 OFFICE O/P EST MOD 30 MIN: CPT | Performed by: NURSE PRACTITIONER

## 2021-07-26 PROCEDURE — 1123F ACP DISCUSS/DSCN MKR DOCD: CPT | Performed by: NURSE PRACTITIONER

## 2021-07-26 PROCEDURE — G8427 DOCREV CUR MEDS BY ELIG CLIN: HCPCS | Performed by: NURSE PRACTITIONER

## 2021-07-26 PROCEDURE — 3017F COLORECTAL CA SCREEN DOC REV: CPT | Performed by: NURSE PRACTITIONER

## 2021-07-26 PROCEDURE — 1036F TOBACCO NON-USER: CPT | Performed by: NURSE PRACTITIONER

## 2021-07-26 RX ORDER — MULTIVIT WITH MINERALS/LUTEIN
1000 TABLET ORAL DAILY
COMMUNITY

## 2021-07-26 NOTE — LETTER
[facility]   Cardiology Note              Date:  July 22, 2021  Patientname: Leo Zee  YOB: 1945    Primary Care physician: Awais Mosquera MD    HISTORY OF PRESENT ILLNESS: Leo Zee is a 77 y.o. male with a history of CAD, CHF, HTN, HLD, SALLIE. He had PTCA RCA 1995, 1996, 2004, 2009. [facility] 2015 showed stable CAD, patent RCA stents. Echo 2/2015 showed EF 55%. Stress test 12/2020 abnormal. Echo showed EF 55%. Today he presents for urgent follow up for leg edema and redness. He states that his legs are always swollen, does not think it is worse recently. He has right leg redness. He has no leg pain. He denies chest pain, shortness of breath, palpitations, weight gain, orthopnea and dizziness. He is very active at home and tolerates well. Past Medical History:   has a past medical history of Arthritis, CAD (coronary artery disease), Cancer ([address] GERD (gastroesophageal reflux disease), Hyperlipidemia, Hypertension, Osteoarthritis, and Sleep apnea. Past Surgical History:   has a past surgical history that includes Coronary angioplasty with stent; Coronary angioplasty (08/26/15); eye surgery; eye surgery; skin biopsy; Colonoscopy; Endoscopy, colon, diagnostic; Cataract removal (Bilateral); Total knee arthroplasty (Right, 9/18/2019); joint replacement; and knee surgery. Home Medications:    Prior to Admission medications    Medication Sig Start Date End Date Taking? Authorizing Provider   nitroGLYCERIN (NITROSTAT) 0.4 MG SL tablet Place 1 tablet under the tongue every 5 minutes as needed for Chest pain 12/21/20   Jourdan Yu MD   clopidogrel (PLAVIX) 75 MG tablet Take by mouth    Historical Provider, MD   acetaminophen (TYLENOL) 325 MG tablet Take 2 tablets by mouth every 6 hours 9/19/19   CARINE Andujar   ketoconazole (NIZORAL) 2 % cream Apply topically daily Indications: rash on chest R/T electrode pads Apply topically daily.     Historical Provider, MD Cyanocobalamin (VITAMIN B-12) 2500 MCG SUBL Place 2,500 mcg under the tongue daily    Historical Provider, MD   carbonyl iron (FEOSOL) 45 MG TABS Take by mouth daily    Historical Provider, MD   isosorbide dinitrate (ISORDIL) 10 MG tablet Take 1 tablet by mouth 3  times daily  Patient taking differently: 2 times daily  9/12/17   Dayna Fournier MD   aspirin EC 81 MG EC tablet Take 1 tablet by mouth daily  Patient taking differently: Take 81 mg by mouth daily Indications: patient didnt stop for surgery-OK per Dr. Stone./Dr. Donis Howell.  8/26/15   Dayna Fournier MD   metoprolol (LOPRESSOR) 50 MG tablet Take 50 mg by mouth 2 times daily    Historical Provider, MD   multivitamin [facility]) per tablet Take 1 tablet by mouth daily. Takes 1/2 in am and 1/2 in pm    Historical Provider, MD   lisinopril (PRINIVIL;ZESTRIL) 20 MG tablet Take 20 mg by mouth daily. Historical Provider, MD   pantoprazole (PROTONIX) 40 MG tablet Take 40 mg by mouth daily. Historical Provider, MD   atorvastatin (LIPITOR) 80 MG tablet Take 80 mg by mouth daily. Historical Provider, MD   ezetimibe (ZETIA) 10 MG tablet Take 10 mg by mouth daily. Historical Provider, MD     Allergies:  Lasix [furosemide], Neosporin [neomycin-polymyx-gramicid], and Polysporin [bacitracin-polymyxin b]    Social History:   reports that he has quit smoking. He has never used smokeless tobacco. He reports current alcohol use of about 2.0 standard drinks of alcohol per week. He reports that he does not use drugs. Family History: family history includes Coronary Art Dis in his father; Heart Disease in his father and mother. Review of Systems   Review of Systems   Constitutional: Negative for fatigue. Respiratory: Negative for shortness of breath. Cardiovascular: Positive for leg swelling. Negative for chest pain and palpitations. Neurological: Negative.       OBJECTIVE:    Vital signs:    BP (!) 126/56   Pulse 55   Ht 5' 10\" (1.778 m)   Wt 300 lb (136.1 kg)   SpO2 98%   BMI 43.05 kg/m²      Physical Exam:  Constitutional:  Comfortable and alert, NAD, appears stated age, obese  Eyes: PERRL, sclera nonicteric  Neck:  Supple, no masses, no thyroidmegaly, no JVD  Skin:  Warm and dry; no rash or lesions  Heart:  Regular, normal apex, S1 and S2 normal, no M/G/R  Lungs:  Normal respiratory effort; clear; no wheezing/rhonchi/rales  Abdomen: soft, non tender, + bowel sounds  Extremities:  2+ BLE edema, RLE redness, no heat  Neuro: alert and oriented, moves legs and arms equally, normal mood and affect    Data Reviewed:      Stress test 12/2020:  Low normal LVEF 57%    Basal inferolateral hypokinesis    Mixed inferolateral defect consistent with mixed ischemia/scar        Overall, this would be considered an abnormal, intermediate risk, study     Echo 12/2020:   Normal left ventricle systolic function with an estimated ejection fraction   of 55%. No regional wall motion abnormalities are seen. Normal left ventricular diastolic filling pressure. MIld mitral regurgitation. Trace tricuspid regurgitation. Systolic pulmonary artery pressure (SPAP) is normal and estimated at 19 mmHg   (right atrial pressure 3 mmHg). Echo 2/2015:  Normal left ventricular systolic function, with estimated ejection fraction   of 55%. There is mild concentric left ventricular hypertrophy. No regional wall motion abnormalities noted. Diastolic filling parameters suggests grade II diastolic dysfunction. Systolic pulmonary artery pressure (SPAP) is normal and estimated at 28 mmHg   (RA pressure 3 mmHg). Coronary angiogram 8/26/2015:  INDICATIONS:  Angina equivalent, coronary artery disease with multiple PCIs. PROCEDURES PERFORMED:  1.  Bilateral coronary angiography. 2.  Left heart catheterization. 3.  Left ventriculogram.  4.  Fractional flow reserve of the right coronary artery. 3.  Right iliofemoral angiography.   DESCRIPTION:  The right groin was anesthetized, and a short 5-[place] sheath  was inserted in the right common femoral artery. JL4 and 3DRC catheters were  used to perform the left and right coronary angiographies. A pigtail  catheter was then used to cross the aortic valve in the standard fashion. Pressures were recorded. LV-gram was then done in VILLALTA projection. The  catheter was then pulled back, and gradients were recorded. The patient was  noted to have an in-stent restenosis of the right coronary artery stent, and  decision was made to proceed with the FFR. Sheath was upgraded to 6-St Helenian. A JR4 guide was then used to engage the right coronary artery. A [place] FFR  wire was then advanced distal to the lesion. Hyperemia was then induced by  intravenous adenosine. It was 0.89, which was insignificant. IV heparin was  used for anticoagulation. The catheter and the guide were then pulled back,  and right iliofemoral angiography was performed. Then, Perclose was used to  close. There were no complications. FINDINGS:  1. Hemodynamics:  /56, mean of 79, . LVEDP was 11 mmHg. There  was no gradient noted across the aortic valve. 2.  LV-gram revealed normal left ventricular systolic function with ejection  fraction of 55% to 60%. There was trace mitral regurgitation. CORONARY ANGIOGRAPHY:  1. Left main was a large-caliber vessel that bifurcated. It was long. It  was normal.  2.  Left circumflex was a small vessel that continued in the posterior AV  groove as a very small-caliber vessel. It gave off 2 obtuse marginal  branches. The first was small to medium. The second was very small. They  were all normal.  3.  The LAD was a medium-caliber vessel that reached the apex, but did not  wrap around it. It became a very small-caliber vessel close to the apex. It  gave off 2 diagonal branches. The first was very small, the second was  medium-caliber, and they were without significant disease.   4.  The right coronary artery was a large-caliber vessel and dominant. There  were stents noted in the proximal to midsegment, which were patent with a 40%  in-stent restenosis of the stents in the mid-right coronary artery. It gave  off a large-caliber right posterior descending artery that had a 40% stenosis  in the midsegment. The right posterior descending artery reached the apex  and wrapped around it. It also gave off a medium-caliber right  posterolateral branch. IMPRESSION:  1. Patent proximal to mid-right coronary artery stents with 40% discrete  in-stent restenosis of the mid-right coronary artery, stent, fractional flow  reserve across the lesion was 0.89, which is insignificant. 2.  Normal left ventricular systolic function with ejection fraction of 55%. 3.  Normal Left ventricular end-diastolic pressure, 11 mmHg. RECOMMENDATIONS:  The patient's stents are patent, and in-stent restenosis is  insignificant. He never had angina prior to his stent placement, but he is  having angina-equivalent symptoms and will start him on oral nitrates. In  the meantime, he will continue with aspirin, Lipitor, Zetia, as well as  Lopressor. Cardiology Labs Reviewed:   CBC: No results for input(s): WBC, HGB, HCT, PLT in the last 72 hours. BMP:No results for input(s): NA, K, CO2, BUN, CREATININE, LABGLOM, GLUCOSE in the last 72 hours. PT/INR: No results for input(s): PROTIME, INR in the last 72 hours. APTT:No results for input(s): APTT in the last 72 hours. FASTING LIPID PANEL:  Lab Results   Component Value Date    HDL 45 11/30/2020    LDLCALC 64 11/30/2020    TRIG 96 11/30/2020     LIVER PROFILE:No results for input(s): AST, ALT, ALB in the last 72 hours.   BNP:   Lab Results   Component Value Date    PROBNP 18 07/27/2015     Reviewed all labs and imaging today    Assessment:   Edema: chronic but possibly worse recently  Abnormal stress test 12/2020  CAD: concern for progression; stress test abnormal 12/2020; patent stents on angiogram 2015; s/p multiple PCIs RCA  Chronic diastolic CHF: appears compensated  HTN: controlled  HLD: controlled, LDL 64, statin  SALLIE    Plan:   1. Check BMP, BNP, BLE venous ultrasound due to edema  2. Continue medical management for abnormal stress test/CAD given he is feeling well overall with no angina. Reconsider if symptoms worsen. 3. Continue aspirin, plavix, statin, lopressor, isordil, zetia, lisinopril; maintained on DAPT due to multiple PCIs  4. Compression stockings, low salt diet, elevate legs  5. Check BP at home and call the office if consistently out of goal range  6. Stay active along with a healthy diet  7.  Follow up as planned with Dr. Lyndsey Blanco, APRN-CNP  [facility]  [phone]

## 2021-08-04 ENCOUNTER — HOSPITAL ENCOUNTER (OUTPATIENT)
Age: 76
Discharge: HOME | End: 2021-08-04
Payer: MEDICARE

## 2021-08-04 ENCOUNTER — HOSPITAL ENCOUNTER (OUTPATIENT)
Dept: VASCULAR LAB | Age: 76
Discharge: HOME | End: 2021-08-04
Payer: MEDICARE

## 2021-08-04 DIAGNOSIS — R60.0 LOCALIZED EDEMA: ICD-10-CM

## 2021-08-04 LAB
ANION GAP SERPL CALCULATED.3IONS-SCNC: 13 MMOL/L (ref 3–16)
BASOPHILS ABSOLUTE: 0 K/UL (ref 0–0.2)
BASOPHILS RELATIVE PERCENT: 0.7 %
BUN BLDV-MCNC: 23 MG/DL (ref 7–20)
CALCIUM SERPL-MCNC: 8.9 MG/DL (ref 8.3–10.6)
CHLORIDE BLD-SCNC: 104 MMOL/L (ref 99–110)
CO2: 20 MMOL/L (ref 21–32)
CREAT SERPL-MCNC: 0.6 MG/DL (ref 0.8–1.3)
EOSINOPHILS ABSOLUTE: 0.1 K/UL (ref 0–0.6)
EOSINOPHILS RELATIVE PERCENT: 1.9 %
GFR AFRICAN AMERICAN: >60
GFR NON-AFRICAN AMERICAN: >60
GLUCOSE BLD-MCNC: 104 MG/DL (ref 70–99)
HCT VFR BLD CALC: 35.6 % (ref 40.5–52.5)
HEMOGLOBIN: 12.4 G/DL (ref 13.5–17.5)
LYMPHOCYTES ABSOLUTE: 1.8 K/UL (ref 1–5.1)
LYMPHOCYTES RELATIVE PERCENT: 26 %
MCH RBC QN AUTO: 31.7 PG (ref 26–34)
MCHC RBC AUTO-ENTMCNC: 34.7 G/DL (ref 31–36)
MCV RBC AUTO: 91.3 FL (ref 80–100)
MONOCYTES ABSOLUTE: 0.6 K/UL (ref 0–1.3)
MONOCYTES RELATIVE PERCENT: 8.2 %
NEUTROPHILS ABSOLUTE: 4.4 K/UL (ref 1.7–7.7)
NEUTROPHILS RELATIVE PERCENT: 63.2 %
PDW BLD-RTO: 14.5 % (ref 12.4–15.4)
PLATELET # BLD: 222 K/UL (ref 135–450)
PMV BLD AUTO: 7.9 FL (ref 5–10.5)
POTASSIUM SERPL-SCNC: 4.5 MMOL/L (ref 3.5–5.1)
PRO-BNP: 36 PG/ML (ref 0–449)
RBC # BLD: 3.9 M/UL (ref 4.2–5.9)
SODIUM BLD-SCNC: 137 MMOL/L (ref 136–145)
WBC # BLD: 7 K/UL (ref 4–11)

## 2021-08-04 PROCEDURE — 83880 ASSAY OF NATRIURETIC PEPTIDE: CPT

## 2021-08-04 PROCEDURE — 80048 BASIC METABOLIC PNL TOTAL CA: CPT

## 2021-08-04 PROCEDURE — 85025 COMPLETE CBC W/AUTO DIFF WBC: CPT

## 2021-08-04 PROCEDURE — 93970 EXTREMITY STUDY: CPT

## 2021-08-04 PROCEDURE — 36415 COLL VENOUS BLD VENIPUNCTURE: CPT

## 2021-08-05 ENCOUNTER — TELEPHONE (OUTPATIENT)
Dept: CARDIOLOGY CLINIC | Age: 76
End: 2021-08-05

## 2021-08-05 NOTE — TELEPHONE ENCOUNTER
----- Message from Nicki Merlin, APRN - CNP sent at 8/5/2021  9:40 AM EDT -----  (I am covering for Vikas Cavazos CNP who is away from the office today.)   Labs look good. No changes. Edema and redness to legs not due to CHF. Continue current treatment plan.       Thank you,   Nicki Merlin, APRN - CNP, CNP

## 2021-08-09 ENCOUNTER — TELEPHONE (OUTPATIENT)
Dept: CARDIOLOGY CLINIC | Age: 76
End: 2021-08-09

## 2021-08-09 NOTE — TELEPHONE ENCOUNTER
Created telephone encounter. Per Pt HIPAA from can leave results on machine. LMOM relaying message per NP. Pt to call the office with any concerns.

## 2021-08-09 NOTE — TELEPHONE ENCOUNTER
----- Message from PRATEEK Monson CNP sent at 8/5/2021  9:38 AM EDT -----  (I am covering for Vito Vela CNP who is away from the office today.)   Circulation to legs good. No sign of blockage. Not the cause of his edema and redness.    PRATEEK Monson CNP

## 2021-09-02 ENCOUNTER — OFFICE VISIT (OUTPATIENT)
Dept: CARDIOLOGY CLINIC | Age: 76
End: 2021-09-02
Payer: MEDICARE

## 2021-09-02 VITALS
WEIGHT: 301 LBS | HEIGHT: 70 IN | OXYGEN SATURATION: 97 % | BODY MASS INDEX: 43.09 KG/M2 | DIASTOLIC BLOOD PRESSURE: 60 MMHG | SYSTOLIC BLOOD PRESSURE: 112 MMHG | HEART RATE: 67 BPM

## 2021-09-02 DIAGNOSIS — I10 ESSENTIAL HYPERTENSION: ICD-10-CM

## 2021-09-02 DIAGNOSIS — I50.32 CHRONIC DIASTOLIC CONGESTIVE HEART FAILURE (HCC): ICD-10-CM

## 2021-09-02 DIAGNOSIS — I25.10 CORONARY ARTERY DISEASE, UNSPECIFIED VESSEL OR LESION TYPE, UNSPECIFIED WHETHER ANGINA PRESENT, UNSPECIFIED WHETHER NATIVE OR TRANSPLANTED HEART: ICD-10-CM

## 2021-09-02 DIAGNOSIS — E78.2 MIXED HYPERLIPIDEMIA: Primary | ICD-10-CM

## 2021-09-02 PROCEDURE — 4040F PNEUMOC VAC/ADMIN/RCVD: CPT | Performed by: INTERNAL MEDICINE

## 2021-09-02 PROCEDURE — 3017F COLORECTAL CA SCREEN DOC REV: CPT | Performed by: INTERNAL MEDICINE

## 2021-09-02 PROCEDURE — 1036F TOBACCO NON-USER: CPT | Performed by: INTERNAL MEDICINE

## 2021-09-02 PROCEDURE — G8417 CALC BMI ABV UP PARAM F/U: HCPCS | Performed by: INTERNAL MEDICINE

## 2021-09-02 PROCEDURE — 99214 OFFICE O/P EST MOD 30 MIN: CPT | Performed by: INTERNAL MEDICINE

## 2021-09-02 PROCEDURE — G8427 DOCREV CUR MEDS BY ELIG CLIN: HCPCS | Performed by: INTERNAL MEDICINE

## 2021-09-02 PROCEDURE — 1123F ACP DISCUSS/DSCN MKR DOCD: CPT | Performed by: INTERNAL MEDICINE

## 2021-09-02 NOTE — PATIENT INSTRUCTIONS
Patient Plan:  1. Discussed risks and benefits of coming off of Plavix. -Recommend continuing Plavix at this time. 2. Establish care with a new PCP. 3. Follow up with NP in 1 year.

## 2021-09-02 NOTE — LETTER
[facility]   Cardiovascular Evaluation    PATIENT: Anish Patrick  DATE: 2021  MRN: [id number]  CSN: [id number]  : 1945      Primary Care Doctor: Tulio Smith MD  Reason for evaluation:   6 Month Follow-Up, Coronary Artery Disease, Congestive Heart Failure, Hypertension, and Hyperlipidemia      Subjective:   History of present illness on initial date of evaluation:   Anish Patrick is a 77 y.o. patient who presents for follow up for CAD, HTN, HLD, and diastolic heart failure. He has has history of CAD s/p PTCA RCA , , , . Last cardiac cath was in . At his office visit 2019, he stated he has been feeling well. He is tolerating his medications. Denied any bruising or bleeding. He is able to lay flat to sleep. He wears CPAP nightly. Echo 2015 showed EF 55%. Stress test 2020 abnormal. Echo showed EF 55%. At his office visit 2021, patient presented for urgent follow up for leg edema and redness. He states that his legs are always swollen, does not think it is worse recently. He has right leg redness. He has no leg pain. He denied chest pain, shortness of breath, palpitations, weight gain, orthopnea and dizziness. He is very active at home and tolerates well. Duplex of BLE 2021 was unremarkable. Today, patient reports he feels well. He reports that he has had acid reflux problems for the last few years. He reports he had an EDG last year that was normal. He reports that it typically occurs with activity. He describes the sensation as a pressure. He reports is feels unlike the feeling he felt prior to his [facility]. He reports he is able to mow his yard without any issues. He reports his PCP just retired ([facility]). He is taking all medications as prescribed and tolerates them well. Denies recent issues with bleeding. Patient denies current edema, sob, palpitations, dizziness or syncope.     Patient Active Problem List   Diagnosis    Mixed hyperlipidemia    Essential hypertension    Coronary atherosclerosis    Shortness of breath    SALLIE (obstructive sleep apnea)    Chronic diastolic congestive heart failure (HCC)    CAD (coronary artery disease)    Primary osteoarthritis of right knee    Primary localized osteoarthritis of right knee    Status post total right knee replacement    Morbid obesity ([address]         Past Medical History:   has a past medical history of Arthritis, CAD (coronary artery disease), Cancer ([address] GERD (gastroesophageal reflux disease), Hyperlipidemia, Hypertension, Osteoarthritis, and Sleep apnea. Surgical History:   has a past surgical history that includes Coronary angioplasty with stent; Coronary angioplasty (08/26/15); eye surgery; eye surgery; skin biopsy; Colonoscopy; Endoscopy, colon, diagnostic; Cataract removal (Bilateral); Total knee arthroplasty (Right, 9/18/2019); joint replacement; and knee surgery. Social History:   reports that he has quit smoking. He has never used smokeless tobacco. He reports current alcohol use of about 2.0 standard drinks of alcohol per week. He reports that he does not use drugs. Family History:  No evidence for sudden cardiac death or premature CAD    Home Medications:  Reviewed and are listed in nursing record. and/or listed below  Current Outpatient Medications   Medication Sig Dispense Refill    vitamin E 1000 units capsule Take 1,000 Units by mouth daily      Multiple Vitamins-Minerals (EYE VITAMINS) CAPS Take 2 capsules by mouth 2 times daily      nitroGLYCERIN (NITROSTAT) 0.4 MG SL tablet Place 1 tablet under the tongue every 5 minutes as needed for Chest pain 25 tablet 3    clopidogrel (PLAVIX) 75 MG tablet Take by mouth      acetaminophen (TYLENOL) 325 MG tablet Take 2 tablets by mouth every 6 hours 120 tablet 0    ketoconazole (NIZORAL) 2 % cream Apply topically daily Indications: rash on chest R/T electrode pads Apply topically daily.       Cyanocobalamin (VITAMIN B-12) 2500 MCG SUBL Place 2,500 mcg under the tongue daily      carbonyl iron (FEOSOL) 45 MG TABS Take by mouth daily      isosorbide dinitrate (ISORDIL) 10 MG tablet Take 1 tablet by mouth 3  times daily (Patient taking differently: 2 times daily ) 270 tablet 5    aspirin EC 81 MG EC tablet Take 1 tablet by mouth daily (Patient taking differently: Take 81 mg by mouth daily Indications: patient didnt stop for surgery-OK per Dr. Stone./Dr. Ernst Chen. ) 30 tablet 3    metoprolol (LOPRESSOR) 50 MG tablet Take 50 mg by mouth 2 times daily      multivitamin (THERAGRAN) per tablet Take 1 tablet by mouth daily. Takes 1/2 in am and 1/2 in pm      lisinopril (PRINIVIL;ZESTRIL) 20 MG tablet Take 20 mg by mouth daily.  pantoprazole (PROTONIX) 40 MG tablet Take 40 mg by mouth daily.  atorvastatin (LIPITOR) 80 MG tablet Take 80 mg by mouth daily.  ezetimibe (ZETIA) 10 MG tablet Take 10 mg by mouth daily. No current facility-administered medications for this visit. Allergies:  Lasix [furosemide], Neosporin [neomycin-polymyx-gramicid], and Polysporin [bacitracin-polymyxin b]     Review of Systems:   A 14 point review of symptoms completed. Pertinent positives identified in the HPI, all other review of symptoms negative as below.     Objective:   PHYSICAL EXAM:    Vitals:    09/02/21 1257   BP: 112/60   Pulse: 67   SpO2: 97%    Weight: (!) 301 lb (136.5 kg)     Wt Readings from Last 3 Encounters:   09/02/21 (!) 301 lb (136.5 kg)   07/26/21 300 lb (136.1 kg)   03/02/21 (!) 301 lb (136.5 kg)       General Appearance:  Alert, cooperative, no distress, appears stated age   Head:  Normocephalic, atraumatic   Eyes:  PERRL, conjunctiva/corneas clear   Nose: Nares normal, no drainage or sinus tenderness   Throat: Lips, mucosa, and tongue normal   Neck: Supple, symmetrical, trachea midline, NL thyroid no carotid bruit or JVD   Lungs:   CTAB, respirations unlabored   Chest Wall:  No tenderness or Normal left ventricular systolic function, with estimated ejection fraction of 55%. There is mild concentric left ventricular hypertrophy. No regional wall motion abnormalities noted. Diastolic filling parameters suggests grade II diastolic dysfunction. Systolic pulmonary artery pressure (SPAP) is normal and estimated at 28 mmHg  (RA pressure 3 mmHg). STRESS TEST:  3/11/14   Summary  There is normal isotope uptake at stress and rest. There is no evidence of  myocardial ischemia or scar. There are no regional wall motion abnormalities. Normal left ventricular systolic function with ejection fraction of 61 %. Normal myocardial perfusion study. Recommendation  Normal perfusion study but noted to have ischemic ST changes during stress. Patient may have balanced ischemia. Depending on clinical appropriateness  cardiac catheterization can be considered. Cardiac CATH:  2009   3 CECI prox/mid RCA, 40% mid LAD, 20% LM     Cardiac CATH: 09/2015  IMPRESSION:  1. Patent proximal to mid-right coronary artery stents with 40% discrete  in-stent restenosis of the mid-right coronary artery, stent, fractional flow  reserve across the lesion was 0.89, which is insignificant. 2. Normal left ventricular systolic function with ejection fraction of 55%. 3. Normal Left ventricular end-diastolic pressure, 11 mmHg    VASCULAR/OTHER IMAGING:    Assessment and Plan   Veronica Harrington is a 77 y.o. male who presents today for the following problems:      1. CAD: controlled   - 2009 PCI to RCA  2. HTN: controlled  3. HLD: well Controlled  4. SALLIE   5. Chronic Diastolic CHF: controlled    MD Plan:  1. Patient continues to do very well with no angina or CHF  2. Cholesterol is well controlled on 80 mg of Lipitor  3. Long discussion with patient about do antiplatelet therapy.   He does have some in-stent restenosis and possible stent and stent and after discussion he would like to continue with Plavix and aspirin   - We will continue electively okay to hold plavix for procedures for 1 wk as needed       Patient Active Problem List   Diagnosis    Mixed hyperlipidemia    Essential hypertension    Coronary atherosclerosis    Shortness of breath    SALLIE (obstructive sleep apnea)    Chronic diastolic congestive heart failure (HCC)    CAD (coronary artery disease)    Primary osteoarthritis of right knee    Primary localized osteoarthritis of right knee    Status post total right knee replacement    Morbid obesity ([facility] [address]     Patient Plan:  1. Discussed risks and benefits of coming off of Plavix. -Recommend continuing Plavix at this time. 2. Establish care with a new PCP. 3. Follow up with ME in 1 year. QUALITY MEASURES  1. Tobacco Cessation Counseling: NA  2. Retake of BP if >140/90:   NA  3. Documentation to PCP/referring for new patient:  Sent to PCP at close of office visit  4. CAD patient on anti-platelet: Yes  5. CAD patient on STATIN therapy:  Yes  6. Patient with CHF and aFib on anticoagulation:  NA     This note has been scribed in the presence of Alex Gill MD, by Jose Luis Win RN.      Nicola Rosa MD, personally performed the services described in this documentation as scribed by the above signed scribe in my presence, and it is both accurate and complete to the best of our ability and knowledge. I agree with the details independently gathered by my clinical support staff, while the remaining scribed note accurately describes my personal service to the patient. The above RN is working as a scribe for and in the presence of myself . Working as a scribe, the RN may have prepopulated components of this note with my historical intellectual property under my direct supervision. Any additions to this intellectual property were performed at my direction. Furthermore, the content and accuracy of this note have been reviewed by me to the best of my ability.        Alex Gill MD, [facility] [address] [facility]  [phone] [facility]  [phone] [address]  9/2/2021  1:11 PM

## 2021-09-02 NOTE — PROGRESS NOTES
[address]  [facility]   Cardiovascular Evaluation    PATIENT: Isiah Montgomery  DATE: 2021  MRN: [id number]  CSN: [id number]  : 1945      Primary Care Doctor: Alysha Mcdaniel MD  Reason for evaluation:   6 Month Follow-Up, Coronary Artery Disease, Congestive Heart Failure, Hypertension, and Hyperlipidemia      Subjective:   History of present illness on initial date of evaluation:   Isiah Montgomery is a 77 y.o. patient who presents for follow up for CAD, HTN, HLD, and diastolic heart failure. He has has history of CAD s/p PTCA RCA , , , . Last cardiac cath was in . At his office visit 2019, he stated he has been feeling well. He is tolerating his medications. Denied any bruising or bleeding. He is able to lay flat to sleep. He wears CPAP nightly. Echo 2015 showed EF 55%. Stress test 2020 abnormal. Echo showed EF 55%. At his office visit 2021, patient presented for urgent follow up for leg edema and redness. He states that his legs are always swollen, does not think it is worse recently. He has right leg redness. He has no leg pain. He denied chest pain, shortness of breath, palpitations, weight gain, orthopnea and dizziness. He is very active at home and tolerates well. Duplex of BLE 2021 was unremarkable. Today, patient reports he feels well. He reports that he has had acid reflux problems for the last few years. He reports he had an EDG last year that was normal. He reports that it typically occurs with activity. He describes the sensation as a pressure. He reports is feels unlike the feeling he felt prior to his [facility]. He reports he is able to mow his yard without any issues. He reports his PCP just retired ([facility]). He is taking all medications as prescribed and tolerates them well. Denies recent issues with bleeding. Patient denies current edema, sob, palpitations, dizziness or syncope.     Patient Active Problem List   Diagnosis    Mixed hyperlipidemia    Essential hypertension    Coronary atherosclerosis    Shortness of breath    SALLIE (obstructive sleep apnea)    Chronic diastolic congestive heart failure (HCC)    CAD (coronary artery disease)    Primary osteoarthritis of right knee    Primary localized osteoarthritis of right knee    Status post total right knee replacement    Morbid obesity ([address]         Past Medical History:   has a past medical history of Arthritis, CAD (coronary artery disease), Cancer ([address] GERD (gastroesophageal reflux disease), Hyperlipidemia, Hypertension, Osteoarthritis, and Sleep apnea. Surgical History:   has a past surgical history that includes Coronary angioplasty with stent; Coronary angioplasty (08/26/15); eye surgery; eye surgery; skin biopsy; Colonoscopy; Endoscopy, colon, diagnostic; Cataract removal (Bilateral); Total knee arthroplasty (Right, 9/18/2019); joint replacement; and knee surgery. Social History:   reports that he has quit smoking. He has never used smokeless tobacco. He reports current alcohol use of about 2.0 standard drinks of alcohol per week. He reports that he does not use drugs. Family History:  No evidence for sudden cardiac death or premature CAD    Home Medications:  Reviewed and are listed in nursing record. and/or listed below  Current Outpatient Medications   Medication Sig Dispense Refill    vitamin E 1000 units capsule Take 1,000 Units by mouth daily      Multiple Vitamins-Minerals (EYE VITAMINS) CAPS Take 2 capsules by mouth 2 times daily      nitroGLYCERIN (NITROSTAT) 0.4 MG SL tablet Place 1 tablet under the tongue every 5 minutes as needed for Chest pain 25 tablet 3    clopidogrel (PLAVIX) 75 MG tablet Take by mouth      acetaminophen (TYLENOL) 325 MG tablet Take 2 tablets by mouth every 6 hours 120 tablet 0    ketoconazole (NIZORAL) 2 % cream Apply topically daily Indications: rash on chest R/T electrode pads Apply topically daily.       Cyanocobalamin (VITAMIN B-12) 2500 MCG SUBL Place 2,500 mcg under the tongue daily      carbonyl iron (FEOSOL) 45 MG TABS Take by mouth daily      isosorbide dinitrate (ISORDIL) 10 MG tablet Take 1 tablet by mouth 3  times daily (Patient taking differently: 2 times daily ) 270 tablet 5    aspirin EC 81 MG EC tablet Take 1 tablet by mouth daily (Patient taking differently: Take 81 mg by mouth daily Indications: patient didnt stop for surgery-OK per Dr. Stone./Dr. Ledy Gonzales. ) 30 tablet 3    metoprolol (LOPRESSOR) 50 MG tablet Take 50 mg by mouth 2 times daily      multivitamin (THERAGRAN) per tablet Take 1 tablet by mouth daily. Takes 1/2 in am and 1/2 in pm      lisinopril (PRINIVIL;ZESTRIL) 20 MG tablet Take 20 mg by mouth daily.  pantoprazole (PROTONIX) 40 MG tablet Take 40 mg by mouth daily.  atorvastatin (LIPITOR) 80 MG tablet Take 80 mg by mouth daily.  ezetimibe (ZETIA) 10 MG tablet Take 10 mg by mouth daily. No current facility-administered medications for this visit. Allergies:  Lasix [furosemide], Neosporin [neomycin-polymyx-gramicid], and Polysporin [bacitracin-polymyxin b]     Review of Systems:   A 14 point review of symptoms completed. Pertinent positives identified in the HPI, all other review of symptoms negative as below.     Objective:   PHYSICAL EXAM:    Vitals:    09/02/21 1257   BP: 112/60   Pulse: 67   SpO2: 97%    Weight: (!) 301 lb (136.5 kg)     Wt Readings from Last 3 Encounters:   09/02/21 (!) 301 lb (136.5 kg)   07/26/21 300 lb (136.1 kg)   03/02/21 (!) 301 lb (136.5 kg)       General Appearance:  Alert, cooperative, no distress, appears stated age   Head:  Normocephalic, atraumatic   Eyes:  PERRL, conjunctiva/corneas clear   Nose: Nares normal, no drainage or sinus tenderness   Throat: Lips, mucosa, and tongue normal   Neck: Supple, symmetrical, trachea midline, NL thyroid no carotid bruit or JVD   Lungs:   CTAB, respirations unlabored   Chest Wall:  No tenderness or deformity   Heart:  Regular rhythm and normal rate; S1, S2 are normal;   no murmur noted; no rub or gallop   Abdomen:   Soft, non-tender, +BS x 4, no masses, no organomegaly   Extremities: Extremities normal, atraumatic, no cyanosis or edema   Pulses: 2+ and symmetric   Skin: Skin color, texture, turgor normal, no rashes or lesions   Pysch: Normal mood and affect   Neurologic: Normal gross motor and sensory exam.         LABS   CBC:      Lab Results   Component Value Date    WBC 7.0 2021    RBC 3.90 2021    HGB 12.4 2021    HCT 35.6 2021    MCV 91.3 2021    RDW 14.5 2021     2021     CMP:  Lab Results   Component Value Date     2021    K 4.5 2021    K 4.3 2019     2021    CO2 20 2021    BUN 23 2021    CREATININE 0.6 2021    GFRAA >60 2021    GFRAA >60 2013    AGRATIO 1.2 2017    LABGLOM >60 2021    GLUCOSE 104 2021    PROT 7.3 2020    CALCIUM 8.9 2021    BILITOT 0.5 2020    ALKPHOS 83 2020    AST 23 2020    ALT 21 2020     PT/INR:   No results found for: PTINR  Liver:  No components found for: CHLPL  Lab Results   Component Value Date    ALT 21 2020    AST 23 2020    ALKPHOS 83 2020    BILITOT 0.5 2020     Lab Results   Component Value Date    LABA1C 5.3 2019     Lipids:         Lab Results   Component Value Date    TRIG 96 2020    TRIG 128 2017    TRIG 112 2015            Lab Results   Component Value Date    HDL 45 2020    HDL 39 2017    HDL 44 2015            Lab Results   Component Value Date    LDLCALC 64 2020    LDLCALC 77 2017    LDLCALC 66 2015            Lab Results   Component Value Date    LABVLDL 19 2020    LABVLDL 22 2015    LABVLDL 2015         CARDIAC DATA   EK2017 Sinus douglas, no ischemia and infarcts    ECHO: 2/20/15 continue electively okay to hold plavix for procedures for 1 wk as needed       Patient Active Problem List   Diagnosis    Mixed hyperlipidemia    Essential hypertension    Coronary atherosclerosis    Shortness of breath    SALLIE (obstructive sleep apnea)    Chronic diastolic congestive heart failure (HCC)    CAD (coronary artery disease)    Primary osteoarthritis of right knee    Primary localized osteoarthritis of right knee    Status post total right knee replacement    Morbid obesity ([facility] [address]     Patient Plan:  1. Discussed risks and benefits of coming off of Plavix. -Recommend continuing Plavix at this time. 2. Establish care with a new PCP. 3. Follow up with ME in 1 year. QUALITY MEASURES  1. Tobacco Cessation Counseling: NA  2. Retake of BP if >140/90:   NA  3. Documentation to PCP/referring for new patient:  Sent to PCP at close of office visit  4. CAD patient on anti-platelet: Yes  5. CAD patient on STATIN therapy:  Yes  6. Patient with CHF and aFib on anticoagulation:  NA     This note has been scribed in the presence of Shira Toscano MD, by Jessica Marcelo RN.      Mark Messina MD, personally performed the services described in this documentation as scribed by the above signed scribe in my presence, and it is both accurate and complete to the best of our ability and knowledge. I agree with the details independently gathered by my clinical support staff, while the remaining scribed note accurately describes my personal service to the patient. The above RN is working as a scribe for and in the presence of myself . Working as a scribe, the RN may have prepopulated components of this note with my historical intellectual property under my direct supervision. Any additions to this intellectual property were performed at my direction. Furthermore, the content and accuracy of this note have been reviewed by me to the best of my ability.        Shira Toscano MD, [facility] [address] [facility]  [phone] [facility]  [phone] [facility]  9/2/2021  1:11 PM

## 2022-07-28 ENCOUNTER — HOSPITAL ENCOUNTER (OUTPATIENT)
Dept: MRI IMAGING | Age: 77
Discharge: HOME | End: 2022-07-28
Payer: MEDICARE

## 2022-07-28 DIAGNOSIS — M25.562 LEFT KNEE PAIN, UNSPECIFIED CHRONICITY: ICD-10-CM

## 2022-07-28 DIAGNOSIS — S83.242A ACUTE MEDIAL MENISCUS TEAR OF LEFT KNEE, INITIAL ENCOUNTER: ICD-10-CM

## 2022-07-28 PROCEDURE — 73721 MRI JNT OF LWR EXTRE W/O DYE: CPT

## 2022-09-07 ENCOUNTER — OFFICE VISIT (OUTPATIENT)
Dept: CARDIOLOGY CLINIC | Age: 77
End: 2022-09-07
Payer: MEDICARE

## 2022-09-07 VITALS
DIASTOLIC BLOOD PRESSURE: 60 MMHG | HEIGHT: 70 IN | WEIGHT: 308.5 LBS | BODY MASS INDEX: 44.16 KG/M2 | OXYGEN SATURATION: 98 % | HEART RATE: 55 BPM | SYSTOLIC BLOOD PRESSURE: 120 MMHG

## 2022-09-07 DIAGNOSIS — I10 ESSENTIAL HYPERTENSION: ICD-10-CM

## 2022-09-07 DIAGNOSIS — E66.01 OBESITY, CLASS III, BMI 40-49.9 (MORBID OBESITY) (HCC): ICD-10-CM

## 2022-09-07 DIAGNOSIS — G47.33 OSA (OBSTRUCTIVE SLEEP APNEA): ICD-10-CM

## 2022-09-07 DIAGNOSIS — E78.2 MIXED HYPERLIPIDEMIA: ICD-10-CM

## 2022-09-07 DIAGNOSIS — I25.10 CORONARY ARTERY DISEASE, UNSPECIFIED VESSEL OR LESION TYPE, UNSPECIFIED WHETHER ANGINA PRESENT, UNSPECIFIED WHETHER NATIVE OR TRANSPLANTED HEART: Primary | ICD-10-CM

## 2022-09-07 DIAGNOSIS — I50.32 CHRONIC DIASTOLIC CONGESTIVE HEART FAILURE (HCC): ICD-10-CM

## 2022-09-07 PROCEDURE — 1123F ACP DISCUSS/DSCN MKR DOCD: CPT | Performed by: NURSE PRACTITIONER

## 2022-09-07 PROCEDURE — G8417 CALC BMI ABV UP PARAM F/U: HCPCS | Performed by: NURSE PRACTITIONER

## 2022-09-07 PROCEDURE — G8427 DOCREV CUR MEDS BY ELIG CLIN: HCPCS | Performed by: NURSE PRACTITIONER

## 2022-09-07 PROCEDURE — 99214 OFFICE O/P EST MOD 30 MIN: CPT | Performed by: NURSE PRACTITIONER

## 2022-09-07 PROCEDURE — 1036F TOBACCO NON-USER: CPT | Performed by: NURSE PRACTITIONER

## 2022-09-07 RX ORDER — AMMONIUM LACTATE 12 G/100G
1 LOTION TOPICAL DAILY
COMMUNITY
Start: 2021-09-22

## 2022-09-07 RX ORDER — TRIAMCINOLONE ACETONIDE 1 MG/G
CREAM TOPICAL
COMMUNITY
Start: 2022-03-30

## 2022-09-07 RX ORDER — ISOSORBIDE DINITRATE 10 MG/1
10 TABLET ORAL 2 TIMES DAILY
Qty: 180 TABLET | Refills: 3
Start: 2022-09-07

## 2022-09-07 RX ORDER — ASPIRIN 81 MG/1
81 TABLET ORAL DAILY
Qty: 30 TABLET | Refills: 3 | COMMUNITY
Start: 2022-09-07

## 2022-09-07 NOTE — PROGRESS NOTES
[facility]   Cardiac Evaluation    Primary Care Doctor:  Leonarda Blanco DO    Chief Complaint   Patient presents with    Follow-up    Hyperlipidemia        Assessment:    1. Coronary artery disease, unspecified vessel or lesion type, unspecified whether angina present, unspecified whether native or transplanted heart    2. Obesity, Class III, BMI 40-49.9 (morbid obesity) ([address]    3. Chronic diastolic congestive heart failure ([address]    4. Essential hypertension    5. Mixed hyperlipidemia    6. SALLIE (obstructive sleep apnea)        Plan:   No change in current heart medicines  No need for further blood work  Follow up with us in 1 year    Vitals:    09/07/22 1524   BP: 120/60   Pulse: 55   SpO2: 98%   Weight: (!) 308 lb 8 oz (139.9 kg)   Height: 5' 10\" (1.778 m)       Primary Cardiologist: Dr. Danie Jiménez     History of Present Illness:   I had the pleasure of seeing Graciela Moreno (41 y.o.) in follow up for CAD, CHF, HTN, HLD, SALLIE. He had PTCA RCA 1995, 1996, 2004, 2009. [facility] 2015 showed stable CAD, patent RCA stents. He is doing well. No events over the past year. No chest pain except if he goes to cut grass right after eating. He is okay if doesn't eat prior. He complains of significant reflux. He stays active with cutting grass/ bushhog. He does not do any dedicated exercise. He has a workshop he tinkers in. Most recent blood work per PCP in June 2022 reviewed and stable. Had leg u/s as well in July due to swelling, pain, no DVT. Sleep is just fair, needs a new mattress. Uses CPAP every night. No orthopnea or PND. Graciela Moreno describes symptoms including fatigue, edema but denies chest pain, dyspnea, palpitations, orthopnea, PND, early saiety, syncope.      NYHA:   II  ACC/ AHA Stage:    C    Past Medical History:   has a past medical history of Arthritis, CAD (coronary artery disease), Cancer ([facility] [address] [facility] GERD (gastroesophageal reflux disease), Hyperlipidemia, Hypertension, Osteoarthritis, tablet Take 1 tablet by mouth daily  Patient taking differently: Take 81 mg by mouth daily Indications: patient didnt stop for surgery-OK per Dr. Stone./Dr. Debi Yang. 8/26/15  Yes Jesus Wong MD   metoprolol (LOPRESSOR) 50 MG tablet Take 50 mg by mouth 2 times daily   Yes Historical Provider, MD   multivitamin [facility]) per tablet Take 1 tablet by mouth daily. Takes 1/2 in am and 1/2 in pm   Yes Historical Provider, MD   lisinopril (PRINIVIL;ZESTRIL) 20 MG tablet Take 20 mg by mouth daily. Yes Historical Provider, MD   pantoprazole (PROTONIX) 40 MG tablet Take 40 mg by mouth daily. Yes Historical Provider, MD   atorvastatin (LIPITOR) 80 MG tablet Take 80 mg by mouth daily. Yes Historical Provider, MD   ezetimibe (ZETIA) 10 MG tablet Take 10 mg by mouth daily. Yes Historical Provider, MD   ketoconazole (NIZORAL) 2 % cream Apply topically daily Indications: rash on chest R/T electrode pads Apply topically daily. Patient not taking: Reported on 9/7/2022    Historical Provider, MD        Allergies:  Lasix [furosemide], Neosporin [neomycin-polymyx-gramicid], and Polysporin [bacitracin-polymyxin b]     Physical Examination:    Vitals:    09/07/22 1524   BP: 120/60   Pulse: 55   SpO2: 98%   Weight: (!) 308 lb 8 oz (139.9 kg)   Height: 5' 10\" (1.778 m)     Constitutional and General Appearance: Warm and dry, no apparent distress, normal coloration  HEENT:  Normocephalic, atraumatic  Respiratory:  Normal excursion and expansion without use of accessory muscles  Resp Auscultation: Clear to auscultation   Cardiovascular: The apical impulses not displaced  Heart tones are crisp and normal  JVP difficult to assess  Regular rate and rhythm, normal S1S2, no m/g/r  Peripheral pulses are symmetrical and full  There is no clubbing, cyanosis of the extremities.   1+ BLE edema, L > R  Pedal Pulses: 2+ and equal   Abdomen:  No masses or tenderness  Liver/Spleen: No Abnormalities Noted  Neurological/Psychiatric:  Alert and oriented in all spheres  Moves all extremities well  Exhibits normal gait balance and coordination  No abnormalities of mood, affect, memory, mentation, or behavior are noted    Lab Data:  Most recent lab results below reviewed in office    CBC:   Lab Results   Component Value Date/Time    WBC 7.0 08/04/2021 02:50 PM    WBC 5.5 11/30/2020 10:43 AM    WBC 9.4 09/19/2019 07:22 AM    RBC 3.90 08/04/2021 02:50 PM    RBC 4.04 11/30/2020 10:43 AM    RBC 3.36 09/19/2019 07:22 AM    HGB 12.4 08/04/2021 02:50 PM    HGB 12.8 11/30/2020 10:43 AM    HGB 10.8 09/19/2019 07:22 AM    HCT 35.6 08/04/2021 02:50 PM    HCT 37.6 11/30/2020 10:43 AM    HCT 31.6 09/19/2019 07:22 AM    MCV 91.3 08/04/2021 02:50 PM    MCV 93.1 11/30/2020 10:43 AM    MCV 94.1 09/19/2019 07:22 AM    RDW 14.5 08/04/2021 02:50 PM    RDW 13.9 11/30/2020 10:43 AM    RDW 13.7 09/19/2019 07:22 AM     08/04/2021 02:50 PM     11/30/2020 10:43 AM     09/19/2019 07:22 AM     BMP:  Lab Results   Component Value Date/Time     08/04/2021 02:50 PM     11/30/2020 10:43 AM     09/19/2019 07:22 AM    K 4.5 08/04/2021 02:50 PM    K 4.3 11/30/2020 10:43 AM    K 4.3 09/19/2019 07:22 AM    K 4.6 08/23/2019 01:14 PM     08/04/2021 02:50 PM     11/30/2020 10:43 AM     09/19/2019 07:22 AM    CO2 20 08/04/2021 02:50 PM    CO2 22 11/30/2020 10:43 AM    CO2 24 09/19/2019 07:22 AM    BUN 23 08/04/2021 02:50 PM    BUN 21 11/30/2020 10:43 AM    BUN 29 09/19/2019 07:22 AM    CREATININE 0.6 08/04/2021 02:50 PM    CREATININE 0.8 11/30/2020 10:43 AM    CREATININE 0.9 09/19/2019 07:22 AM     BNP:   Lab Results   Component Value Date/Time    PROBNP 36 08/04/2021 02:50 PM    PROBNP 18 07/27/2015 12:47 PM     LIPID:   Lab Results   Component Value Date/Time    TRIG 96 11/30/2020 10:43 AM    TRIG 128 01/26/2017 12:00 AM    HDL 45 11/30/2020 10:43 AM    HDL 39 01/26/2017 12:00 AM    LDLCALC 64 11/30/2020 10:43 AM    LDLCALC 77 01/26/2017 12:00 AM       Cardiac Imaging:  Stress test 12/2020:  Low normal LVEF 57%    Basal inferolateral hypokinesis    Mixed inferolateral defect consistent with mixed ischemia/scar        Overall, this would be considered an abnormal, intermediate risk, study      Echo 12/2020:   Normal left ventricle systolic function with an estimated ejection fraction   of 55%. No regional wall motion abnormalities are seen. Normal left ventricular diastolic filling pressure. MIld mitral regurgitation. Trace tricuspid regurgitation. Systolic pulmonary artery pressure (SPAP) is normal and estimated at 19 mmHg   (right atrial pressure 3 mmHg). Echo 2/2015:  Normal left ventricular systolic function, with estimated ejection fraction   of 55%. There is mild concentric left ventricular hypertrophy. No regional wall motion abnormalities noted. Diastolic filling parameters suggests grade II diastolic dysfunction. Systolic pulmonary artery pressure (SPAP) is normal and estimated at 28 mmHg   (RA pressure 3 mmHg). Coronary angiogram 8/26/2015:  INDICATIONS:  Angina equivalent, coronary artery disease with multiple PCIs. PROCEDURES PERFORMED:  1.  Bilateral coronary angiography. 2.  Left heart catheterization. 3.  Left ventriculogram.  4.  Fractional flow reserve of the right coronary artery. 3.  Right iliofemoral angiography. DESCRIPTION:  The right groin was anesthetized, and a short 5-Setswana sheath  was inserted in the right common femoral artery. JL4 and 3DRC catheters were  used to perform the left and right coronary angiographies. A pigtail  catheter was then used to cross the aortic valve in the standard fashion. Pressures were recorded. LV-gram was then done in VILLALTA projection. The  catheter was then pulled back, and gradients were recorded.   The patient was  noted to have an in-stent restenosis of the right coronary artery stent, and  decision was made to stents with 40% discrete  in-stent restenosis of the mid-right coronary artery, stent, fractional flow  reserve across the lesion was 0.89, which is insignificant. 2.  Normal left ventricular systolic function with ejection fraction of 55%. 3.  Normal Left ventricular end-diastolic pressure, 11 mmHg. RECOMMENDATIONS:  The patient's stents are patent, and in-stent restenosis is  insignificant. He never had angina prior to his stent placement, but he is  having angina-equivalent symptoms and will start him on oral nitrates. In  the meantime, he will continue with aspirin, Lipitor, Zetia, as well as  Lopressor.          I appreciate the opportunity of cooperating in the care of this individual.    Aravind Restrepo, APRN - CNP, 9/7/2022, 3:35 PM

## 2023-10-02 ENCOUNTER — TELEPHONE (OUTPATIENT)
Dept: CARDIOLOGY CLINIC | Age: 78
End: 2023-10-02

## 2023-10-02 NOTE — TELEPHONE ENCOUNTER
Pt called stating upon exertion he is noticing more sob. He stated going about 75ft he will have to stop and catch his breathe. He is due for a yearly follow up as well. The pt would like to see NPDD. I informed pt that I will send this to npdd and call him back with response. LOPEZ can we use the 145pm for tomorrow 10/3?

## 2023-10-02 NOTE — TELEPHONE ENCOUNTER
Lets see if he can come in and see me tomorrow at the 1:45 opening.   Lets plan an EKG at that time as well  Thank you, Shikha Good

## 2023-10-03 ENCOUNTER — OFFICE VISIT (OUTPATIENT)
Dept: CARDIOLOGY CLINIC | Age: 78
End: 2023-10-03
Payer: MEDICARE

## 2023-10-03 VITALS
SYSTOLIC BLOOD PRESSURE: 140 MMHG | WEIGHT: 311 LBS | HEIGHT: 69 IN | HEART RATE: 61 BPM | OXYGEN SATURATION: 99 % | BODY MASS INDEX: 46.06 KG/M2 | DIASTOLIC BLOOD PRESSURE: 68 MMHG

## 2023-10-03 DIAGNOSIS — I50.32 CHRONIC DIASTOLIC CONGESTIVE HEART FAILURE (HCC): ICD-10-CM

## 2023-10-03 DIAGNOSIS — I10 ESSENTIAL HYPERTENSION: ICD-10-CM

## 2023-10-03 DIAGNOSIS — G47.33 OSA (OBSTRUCTIVE SLEEP APNEA): ICD-10-CM

## 2023-10-03 DIAGNOSIS — I25.10 CORONARY ARTERY DISEASE, UNSPECIFIED VESSEL OR LESION TYPE, UNSPECIFIED WHETHER ANGINA PRESENT, UNSPECIFIED WHETHER NATIVE OR TRANSPLANTED HEART: Primary | ICD-10-CM

## 2023-10-03 DIAGNOSIS — E78.2 MIXED HYPERLIPIDEMIA: ICD-10-CM

## 2023-10-03 PROCEDURE — 1036F TOBACCO NON-USER: CPT | Performed by: NURSE PRACTITIONER

## 2023-10-03 PROCEDURE — 99214 OFFICE O/P EST MOD 30 MIN: CPT | Performed by: NURSE PRACTITIONER

## 2023-10-03 PROCEDURE — G8427 DOCREV CUR MEDS BY ELIG CLIN: HCPCS | Performed by: NURSE PRACTITIONER

## 2023-10-03 PROCEDURE — 3078F DIAST BP <80 MM HG: CPT | Performed by: NURSE PRACTITIONER

## 2023-10-03 PROCEDURE — 1123F ACP DISCUSS/DSCN MKR DOCD: CPT | Performed by: NURSE PRACTITIONER

## 2023-10-03 PROCEDURE — G8484 FLU IMMUNIZE NO ADMIN: HCPCS | Performed by: NURSE PRACTITIONER

## 2023-10-03 PROCEDURE — 3077F SYST BP >= 140 MM HG: CPT | Performed by: NURSE PRACTITIONER

## 2023-10-03 PROCEDURE — G8417 CALC BMI ABV UP PARAM F/U: HCPCS | Performed by: NURSE PRACTITIONER

## 2023-10-03 PROCEDURE — 93000 ELECTROCARDIOGRAM COMPLETE: CPT | Performed by: NURSE PRACTITIONER

## 2023-10-03 RX ORDER — NITROGLYCERIN 0.4 MG/1
0.4 TABLET SUBLINGUAL EVERY 5 MIN PRN
Qty: 25 TABLET | Refills: 3 | Status: SHIPPED | OUTPATIENT
Start: 2023-10-03

## 2023-10-03 NOTE — PROGRESS NOTES
[facility]   Cardiac Evaluation    Primary Care Doctor:  Bridgett Duncan DO    Chief Complaint   Patient presents with    Follow-up    Hyperlipidemia    Hypertension    Congestive Heart Failure    Coronary Artery Disease        Assessment:    1. Coronary artery disease, unspecified vessel or lesion type, unspecified whether angina present, unspecified whether native or transplanted heart    2. Chronic diastolic congestive heart failure ([address])    3. Essential hypertension    4. Mixed hyperlipidemia    5. SALLIE (obstructive sleep apnea)        Plan:   Stress test - walking, call [address] [place] [address] ([phone]) to schedule  Hold the metoprolol for 24 hours prior to the stress test  Have fasting blood work at [facility]  Will call you with results and any new recommendations  Anticipate you will need left heart catheterization/ coronary angiogram - consider Dr. Pau Galindo, Dr. Onel Guzman. Everton Mathias or Dr. Arlin Maurice:    10/03/23 1420   BP: (!) 140/68   Pulse: 61   SpO2: 99%   Weight: (!) 311 lb (141.1 kg)   Height: 5' 9\" (1.753 m)       Primary Cardiologist: Dr. Azucena Arzola     History of Present Illness:   I had the pleasure of seeing Bryan Granados (64 y.o.) in follow up for CAD, CHF, HTN, HLD, SALLIE. He had PTCA RCA 1995, 1996, 2004, 2009. [address] [address] [address] 2015 showed stable CAD, patent RCA stents. He called yesterday with complaints of worsening shortness of breath. He had abnormal stress test in December 2020 but elected not to pursue angiogram due to stable symptoms. He is having shortness of breath and pain in back of shoulder blades, similar to prior angina. Resolves with rest but returns with lesser acrtivity. This started in past 2 days. He denies any chest pain/ angina or shortness of breath at rest nor wakens him at night. Has not tried any sl nitroglycerin. He is taking supplements for joint pain (glucosamine and chondroitin) with significant improvement.      Bryan Granados describes symptoms

## 2023-10-03 NOTE — PATIENT INSTRUCTIONS
Stress test - walking, call [address] [place] [address] ([phone]) to schedule  Hold the metoprolol for 24 hours prior to the stress test  Have fasting blood work at [facility]  Will call you with results and any new recommendations  Anticipate you will need left heart catheterization/ coronary angiogram - consider Dr. Diego Smith, Dr. Wilfredo Fernández.  Rojas Lopez or Dr. Garnetta Spatz   Follow up to be determined back on test results

## 2023-10-05 LAB
A/G RATIO: 1.6 (ref 1–2.1)
ALBUMIN SERPL-MCNC: 4.3 G/DL (ref 3.5–5)
ALP BLD-CCNC: 98 U/L (ref 33–140)
ALT SERPL-CCNC: 23 U/L (ref 0–60)
ANION GAP SERPL CALCULATED.3IONS-SCNC: 10 MMOL/L (ref 5–13)
AST SERPL-CCNC: 22 U/L (ref 0–40)
B-TYPE NATRIURETIC PEPTIDE: <36 PG/ML (ref 0–299)
BILIRUB SERPL-MCNC: 0.6 MG/DL (ref 0.2–1.2)
BUN / CREAT RATIO: 20
BUN BLDV-MCNC: 18 MG/DL (ref 7–25)
CALCIUM SERPL-MCNC: 9.1 MG/DL (ref 8.5–10.5)
CHLORIDE BLD-SCNC: 107 MMOL/L (ref 98–110)
CHOLESTEROL, TOTAL: 134 MG/DL (ref 125–199)
CO2: 23 MMOL/L (ref 22–29)
CREAT SERPL-MCNC: 0.9 MG/DL (ref 0.5–1.3)
EGFR (CKD-EPI): 88 SEE NOTE
GLOBULIN: 2.7 G/DL (ref 2–3.7)
GLUCOSE TOLERANCE TEST FASTING: 100 MG/DL (ref 71–99)
HCT VFR BLD CALC: 38.7 % (ref 40–51)
HDLC SERPL-MCNC: 41 MG/DL (ref 40–180)
HEMOGLOBIN: 12.5 G/DL (ref 13.2–17.1)
LDL CHOLESTEROL CALCULATED: 64 MG/DL (ref 0–100)
MCH RBC QN AUTO: 31.2 PG (ref 27–33)
MCHC RBC AUTO-ENTMCNC: 32.3 G/DL (ref 30–36)
MCV RBC AUTO: 96.5 FL (ref 80–100)
NONHDLC SERPL-MCNC: 93 MG/DL (ref 0–129)
PDW BLD-RTO: 13.8 % (ref 11–15)
PLATELET # BLD: 249 10*3/UL (ref 140–400)
PMV BLD AUTO: 9.9 FL (ref 9–13)
POTASSIUM SERPL-SCNC: 4.6 MMOL/L (ref 3.5–5.1)
RBC # BLD: 4.01 10*6/UL (ref 4.2–5.8)
SODIUM BLD-SCNC: 140 MMOL/L (ref 135–146)
TOTAL PROTEIN: 7 G/DL (ref 6–8)
TRIGL SERPL-MCNC: 146 MG/DL (ref 0–149)
WBC # BLD: 6.94 10*3/UL (ref 3.8–10.8)

## 2023-10-06 ENCOUNTER — TELEPHONE (OUTPATIENT)
Dept: CARDIOLOGY CLINIC | Age: 78
End: 2023-10-06

## 2023-10-06 NOTE — TELEPHONE ENCOUNTER
----- Message from PRATEEK Zaragoza - CNP sent at 10/6/2023  1:06 PM EDT -----  Labs reviewed. BMP and BNP okay. Lipids look good. LDL 64. BNP normal. Recommend continuing same medications. Please call. Thanks!

## 2023-10-11 ENCOUNTER — HOSPITAL ENCOUNTER (OUTPATIENT)
Dept: CARDIOLOGY | Age: 78
Discharge: HOME | End: 2023-10-11
Payer: MEDICARE

## 2023-10-11 DIAGNOSIS — I25.10 CORONARY ARTERY DISEASE, UNSPECIFIED VESSEL OR LESION TYPE, UNSPECIFIED WHETHER ANGINA PRESENT, UNSPECIFIED WHETHER NATIVE OR TRANSPLANTED HEART: ICD-10-CM

## 2023-10-11 DIAGNOSIS — I50.32 CHRONIC DIASTOLIC CONGESTIVE HEART FAILURE (HCC): ICD-10-CM

## 2023-10-11 DIAGNOSIS — E78.2 MIXED HYPERLIPIDEMIA: ICD-10-CM

## 2023-10-11 DIAGNOSIS — I10 ESSENTIAL HYPERTENSION: ICD-10-CM

## 2023-10-11 PROCEDURE — 93017 CV STRESS TEST TRACING ONLY: CPT

## 2023-10-11 PROCEDURE — A9502 TC99M TETROFOSMIN: HCPCS | Performed by: NURSE PRACTITIONER

## 2023-10-11 PROCEDURE — 3430000000 HC RX DIAGNOSTIC RADIOPHARMACEUTICAL: Performed by: NURSE PRACTITIONER

## 2023-10-11 PROCEDURE — 78452 HT MUSCLE IMAGE SPECT MULT: CPT

## 2023-10-11 RX ADMIN — TETROFOSMIN 31.4 MILLICURIE: 1.38 INJECTION, POWDER, LYOPHILIZED, FOR SOLUTION INTRAVENOUS at 09:24

## 2023-10-13 ENCOUNTER — HOSPITAL ENCOUNTER (OUTPATIENT)
Dept: CARDIOLOGY | Age: 78
Discharge: HOME | End: 2023-10-13
Payer: MEDICARE

## 2023-10-13 PROCEDURE — 3430000000 HC RX DIAGNOSTIC RADIOPHARMACEUTICAL: Performed by: NURSE PRACTITIONER

## 2023-10-13 PROCEDURE — A9502 TC99M TETROFOSMIN: HCPCS | Performed by: NURSE PRACTITIONER

## 2023-10-13 PROCEDURE — 93017 CV STRESS TEST TRACING ONLY: CPT

## 2023-10-13 PROCEDURE — 6360000002 HC RX W HCPCS: Performed by: NURSE PRACTITIONER

## 2023-10-13 PROCEDURE — 78452 HT MUSCLE IMAGE SPECT MULT: CPT

## 2023-10-13 RX ORDER — REGADENOSON 0.08 MG/ML
0.4 INJECTION, SOLUTION INTRAVENOUS
Status: COMPLETED | OUTPATIENT
Start: 2023-10-13 | End: 2023-10-13

## 2023-10-13 RX ADMIN — REGADENOSON 0.4 MG: 0.08 INJECTION, SOLUTION INTRAVENOUS at 12:48

## 2023-10-13 RX ADMIN — TETROFOSMIN 34 MILLICURIE: 1.38 INJECTION, POWDER, LYOPHILIZED, FOR SOLUTION INTRAVENOUS at 12:48

## 2023-10-13 NOTE — PROGRESS NOTES
Pt not physically able or safe to attempt a TM GXT stress test. A Lexiscan Myoview stress test was completed on this Pt. The Pt tolerated the test well.

## 2023-10-16 ENCOUNTER — TELEPHONE (OUTPATIENT)
Dept: CARDIOLOGY CLINIC | Age: 78
End: 2023-10-16

## 2023-10-20 NOTE — TELEPHONE ENCOUNTER
Mr. Freada Boon called and stated someone told him if he did not hear form us by 12 today, he should call us for results. Informed pt stress test had not been resulted yet. Please advise.

## 2023-10-23 NOTE — TELEPHONE ENCOUNTER
Previous JJP patient. OV booked for 10/24/23 spoke with patient wife Bakari silva per HIPAA she VU to time,date, and location of appt.

## 2023-10-23 NOTE — TELEPHONE ENCOUNTER
I had reviewed results of stress test as well as history and symptoms with Dr. Manjit Hernández. He recommends left heart catheterization for further evaluation of symptoms and prior stents. Discussed with patient. He was a prior patient of Dr. Simon Mueller. His wife requested Dr. John Quinones. Shikha to do procedure and to follow in future. Mishel Frausto, is it okay to schedule coronary angiogram with you? Please see my recent urgent office visit note on 10/3/23.    Emely Ferrera

## 2023-10-23 NOTE — TELEPHONE ENCOUNTER
It is best to see me in the office prior to undergoing coronary angiogram.    Lorene Torres lets get him in.

## 2023-10-24 ENCOUNTER — TELEPHONE (OUTPATIENT)
Dept: CARDIOLOGY CLINIC | Age: 78
End: 2023-10-24

## 2023-10-24 ENCOUNTER — OFFICE VISIT (OUTPATIENT)
Dept: CARDIOLOGY CLINIC | Age: 78
End: 2023-10-24
Payer: MEDICARE

## 2023-10-24 VITALS
SYSTOLIC BLOOD PRESSURE: 120 MMHG | WEIGHT: 310 LBS | BODY MASS INDEX: 45.91 KG/M2 | HEART RATE: 65 BPM | OXYGEN SATURATION: 98 % | HEIGHT: 69 IN | DIASTOLIC BLOOD PRESSURE: 76 MMHG

## 2023-10-24 DIAGNOSIS — R94.39 ABNORMAL STRESS TEST: ICD-10-CM

## 2023-10-24 DIAGNOSIS — I20.0 UNSTABLE ANGINA (HCC): ICD-10-CM

## 2023-10-24 DIAGNOSIS — E78.2 MIXED HYPERLIPIDEMIA: ICD-10-CM

## 2023-10-24 DIAGNOSIS — R06.02 SHORTNESS OF BREATH: Primary | ICD-10-CM

## 2023-10-24 DIAGNOSIS — I10 ESSENTIAL HYPERTENSION: ICD-10-CM

## 2023-10-24 DIAGNOSIS — R07.9 CHEST PAIN, UNSPECIFIED TYPE: ICD-10-CM

## 2023-10-24 PROCEDURE — 3074F SYST BP LT 130 MM HG: CPT | Performed by: INTERNAL MEDICINE

## 2023-10-24 PROCEDURE — 99215 OFFICE O/P EST HI 40 MIN: CPT | Performed by: INTERNAL MEDICINE

## 2023-10-24 PROCEDURE — G8427 DOCREV CUR MEDS BY ELIG CLIN: HCPCS | Performed by: INTERNAL MEDICINE

## 2023-10-24 PROCEDURE — 3078F DIAST BP <80 MM HG: CPT | Performed by: INTERNAL MEDICINE

## 2023-10-24 PROCEDURE — 1036F TOBACCO NON-USER: CPT | Performed by: INTERNAL MEDICINE

## 2023-10-24 PROCEDURE — G8484 FLU IMMUNIZE NO ADMIN: HCPCS | Performed by: INTERNAL MEDICINE

## 2023-10-24 PROCEDURE — G8417 CALC BMI ABV UP PARAM F/U: HCPCS | Performed by: INTERNAL MEDICINE

## 2023-10-24 PROCEDURE — 1123F ACP DISCUSS/DSCN MKR DOCD: CPT | Performed by: INTERNAL MEDICINE

## 2023-10-24 NOTE — TELEPHONE ENCOUNTER
Plan:  1. Order left heart catheterization ~ abnormal stress test   ~ NPO (nothing to eat or drink) after midnight  ~ Hold advanced joint relief supplement, ammonium lactate lotion, multivitamin morning of procedure.   ~ Take all other prescribed medications with sip of water this includes aspirin and Plavix.  ~ No lotion or cream to skin day of procedure.   ~ Janis will call to schedule procedure  ~ Have transportation arrangements   ~ Pack over night bag   2. Follow up after procedure.

## 2023-10-24 NOTE — PROGRESS NOTES
\"CL\", \"CO2\" in the last 72 hours. No results for input(s): \"INR\", \"PROTIME\" in the last 72 hours. No results for input(s): \"PROBNP\" in the last 72 hours. No results for input(s): \"CHOL\", \"LDL\", \"HDL\" in the last 72 hours. Invalid input(s): \"TG\"  No results found for: \"TROPHS\"       Imaging:  I have reviewed the below testing personally and my interpretation is below. EKG:        CXR:      Assessment:  69 y.o. patient with:  Active Problems:    * No active hospital problems. *  Resolved Problems:    * No resolved hospital problems. *      Problem List Items Addressed This Visit       Mixed hyperlipidemia    Essential hypertension    Shortness of breath - Primary    Chest pain    Abnormal stress test    Relevant Orders    LEFT HEART CATH    Unstable angina ([address])       Plan:  1. Order left heart catheterization ~ abnormal stress test   ~ NPO (nothing to eat or drink) after midnight  ~ Hold advanced joint relief supplement, ammonium lactate lotion, multivitamin morning of procedure.   ~ Take all other prescribed medications with sip of water this includes aspirin and Plavix.  ~ No lotion or cream to skin day of procedure.   ~ Janis will call to schedule procedure  ~ Have transportation arrangements   ~ Pack over night bag   2. Follow up after procedure. Scribe's attestation: This note was scribed in the presence of Taya Keating by William Eisenmenger RN     I, Dr. Destiny Morejon, personally performed the services described in this documentation, as scribed by the above signed scribe in my presence. It is both accurate and complete to my knowledge. I agree with the details independently gathered by the clinical support staff, while the remaining scribed note accurately describes my personal service to the patient. Medical Decision Making:   The following items were considered in medical decision making:  Independent review of images  Review / order clinical lab tests  Review / order radiology

## 2023-10-26 NOTE — TELEPHONE ENCOUNTER
Procedure:  [facility]  Doctor:  Dr. Subhash Serna  Date:  11/1/23  Time:  10am  Arrival:  8:30am  Reps:  n/a  Anesthesia:  n/a      Spoke with patient.

## 2023-11-01 ENCOUNTER — HOSPITAL ENCOUNTER (OUTPATIENT)
Dept: CARDIAC CATH/INVASIVE PROCEDURES | Age: 78
Discharge: HOME | End: 2023-11-01
Attending: INTERNAL MEDICINE | Admitting: INTERNAL MEDICINE
Payer: MEDICARE

## 2023-11-01 ENCOUNTER — TELEPHONE (OUTPATIENT)
Dept: CARDIOLOGY | Age: 78
End: 2023-11-01

## 2023-11-01 VITALS
RESPIRATION RATE: 17 BRPM | OXYGEN SATURATION: 98 % | WEIGHT: 311 LBS | HEART RATE: 51 BPM | DIASTOLIC BLOOD PRESSURE: 70 MMHG | SYSTOLIC BLOOD PRESSURE: 114 MMHG | HEIGHT: 69 IN | BODY MASS INDEX: 46.06 KG/M2

## 2023-11-01 DIAGNOSIS — I25.10 ATHEROSCLEROSIS OF NATIVE CORONARY ARTERY WITHOUT ANGINA PECTORIS, UNSPECIFIED WHETHER NATIVE OR TRANSPLANTED HEART: Primary | ICD-10-CM

## 2023-11-01 LAB
ANION GAP SERPL CALCULATED.3IONS-SCNC: 11 MMOL/L (ref 3–16)
BUN SERPL-MCNC: 20 MG/DL (ref 7–20)
CALCIUM SERPL-MCNC: 8.7 MG/DL (ref 8.3–10.6)
CHLORIDE SERPL-SCNC: 105 MMOL/L (ref 99–110)
CHOLEST SERPL-MCNC: 113 MG/DL (ref 0–199)
CO2 SERPL-SCNC: 22 MMOL/L (ref 21–32)
CREAT SERPL-MCNC: 0.8 MG/DL (ref 0.8–1.3)
DEPRECATED RDW RBC AUTO: 14.5 % (ref 12.4–15.4)
GFR SERPLBLD CREATININE-BSD FMLA CKD-EPI: >60 ML/MIN/{1.73_M2}
GLUCOSE SERPL-MCNC: 113 MG/DL (ref 70–99)
HCT VFR BLD AUTO: 39.8 % (ref 40.5–52.5)
HDLC SERPL-MCNC: 36 MG/DL (ref 40–60)
HGB BLD-MCNC: 13.3 G/DL (ref 13.5–17.5)
LDLC SERPL CALC-MCNC: 59 MG/DL
MCH RBC QN AUTO: 31.6 PG (ref 26–34)
MCHC RBC AUTO-ENTMCNC: 33.4 G/DL (ref 31–36)
MCV RBC AUTO: 94.5 FL (ref 80–100)
PLATELET # BLD AUTO: 154 K/UL (ref 135–450)
PLATELET BLD QL SMEAR: ADEQUATE
PMV BLD AUTO: 7.8 FL (ref 5–10.5)
POTASSIUM SERPL-SCNC: 4.1 MMOL/L (ref 3.5–5.1)
RBC # BLD AUTO: 4.21 M/UL (ref 4.2–5.9)
SLIDE REVIEW: ABNORMAL
SODIUM SERPL-SCNC: 138 MMOL/L (ref 136–145)
TRIGL SERPL-MCNC: 91 MG/DL (ref 0–150)
VLDLC SERPL CALC-MCNC: 18 MG/DL
WBC # BLD AUTO: 8.1 K/UL (ref 4–11)

## 2023-11-01 PROCEDURE — 85027 COMPLETE CBC AUTOMATED: CPT

## 2023-11-01 PROCEDURE — 2500000003 HC RX 250 WO HCPCS

## 2023-11-01 PROCEDURE — 99152 MOD SED SAME PHYS/QHP 5/>YRS: CPT | Performed by: INTERNAL MEDICINE

## 2023-11-01 PROCEDURE — C1894 INTRO/SHEATH, NON-LASER: HCPCS | Performed by: INTERNAL MEDICINE

## 2023-11-01 PROCEDURE — 80061 LIPID PANEL: CPT

## 2023-11-01 PROCEDURE — C1769 GUIDE WIRE: HCPCS | Performed by: INTERNAL MEDICINE

## 2023-11-01 PROCEDURE — 2709999900 HC NON-CHARGEABLE SUPPLY: Performed by: INTERNAL MEDICINE

## 2023-11-01 PROCEDURE — 6360000002 HC RX W HCPCS

## 2023-11-01 PROCEDURE — 6370000000 HC RX 637 (ALT 250 FOR IP)

## 2023-11-01 PROCEDURE — 80048 BASIC METABOLIC PNL TOTAL CA: CPT

## 2023-11-01 PROCEDURE — 93458 L HRT ARTERY/VENTRICLE ANGIO: CPT

## 2023-11-01 PROCEDURE — 93458 L HRT ARTERY/VENTRICLE ANGIO: CPT | Performed by: INTERNAL MEDICINE

## 2023-11-01 PROCEDURE — 93005 ELECTROCARDIOGRAM TRACING: CPT | Performed by: INTERNAL MEDICINE

## 2023-11-01 RX ORDER — SODIUM CHLORIDE 0.9 % (FLUSH) 0.9 %
5-40 SYRINGE (ML) INJECTION PRN
Status: DISCONTINUED | OUTPATIENT
Start: 2023-11-01 | End: 2023-11-01 | Stop reason: HOSPADM

## 2023-11-01 RX ORDER — ONDANSETRON 2 MG/ML
4 INJECTION INTRAMUSCULAR; INTRAVENOUS EVERY 6 HOURS PRN
Status: DISCONTINUED | OUTPATIENT
Start: 2023-11-01 | End: 2023-11-01 | Stop reason: HOSPADM

## 2023-11-01 RX ORDER — LORAZEPAM 0.5 MG/1
0.5 TABLET ORAL
Status: DISCONTINUED | OUTPATIENT
Start: 2023-11-01 | End: 2023-11-01 | Stop reason: HOSPADM

## 2023-11-01 RX ORDER — ASPIRIN 81 MG/1
81 TABLET, CHEWABLE ORAL ONCE
Status: COMPLETED | OUTPATIENT
Start: 2023-11-01 | End: 2023-11-01

## 2023-11-01 RX ORDER — SODIUM CHLORIDE 0.9 % (FLUSH) 0.9 %
5-40 SYRINGE (ML) INJECTION EVERY 12 HOURS SCHEDULED
Status: DISCONTINUED | OUTPATIENT
Start: 2023-11-01 | End: 2023-11-01 | Stop reason: HOSPADM

## 2023-11-01 RX ORDER — SODIUM CHLORIDE 9 MG/ML
INJECTION, SOLUTION INTRAVENOUS CONTINUOUS
Status: DISCONTINUED | OUTPATIENT
Start: 2023-11-01 | End: 2023-11-01 | Stop reason: HOSPADM

## 2023-11-01 RX ORDER — ASPIRIN 325 MG
325 TABLET ORAL ONCE
Status: DISCONTINUED | OUTPATIENT
Start: 2023-11-01 | End: 2023-11-01

## 2023-11-01 RX ORDER — SODIUM CHLORIDE 9 MG/ML
INJECTION, SOLUTION INTRAVENOUS PRN
Status: DISCONTINUED | OUTPATIENT
Start: 2023-11-01 | End: 2023-11-01 | Stop reason: HOSPADM

## 2023-11-01 RX ORDER — MIDAZOLAM HYDROCHLORIDE 1 MG/ML
INJECTION INTRAMUSCULAR; INTRAVENOUS
Status: COMPLETED | OUTPATIENT
Start: 2023-11-01 | End: 2023-11-01

## 2023-11-01 RX ORDER — HYDRALAZINE HYDROCHLORIDE 20 MG/ML
10 INJECTION INTRAMUSCULAR; INTRAVENOUS EVERY 10 MIN PRN
Status: DISCONTINUED | OUTPATIENT
Start: 2023-11-01 | End: 2023-11-01 | Stop reason: HOSPADM

## 2023-11-01 RX ORDER — FENTANYL CITRATE 50 UG/ML
INJECTION, SOLUTION INTRAMUSCULAR; INTRAVENOUS
Status: COMPLETED | OUTPATIENT
Start: 2023-11-01 | End: 2023-11-01

## 2023-11-01 RX ADMIN — MIDAZOLAM HYDROCHLORIDE 1 MG: 1 INJECTION INTRAMUSCULAR; INTRAVENOUS at 10:16

## 2023-11-01 RX ADMIN — MIDAZOLAM HYDROCHLORIDE 1 MG: 1 INJECTION INTRAMUSCULAR; INTRAVENOUS at 10:24

## 2023-11-01 RX ADMIN — FENTANYL CITRATE 50 MCG: 50 INJECTION, SOLUTION INTRAMUSCULAR; INTRAVENOUS at 10:16

## 2023-11-01 RX ADMIN — ASPIRIN 81 MG: 81 TABLET, CHEWABLE ORAL at 09:06

## 2023-11-01 NOTE — POST SEDATION
Patient:  Jayden May   :   1945    A pre-sedation re-evaluation was performed immediately at the end of the procedure. Procedure:  Cardiac cath  Medications: Procedural sedation with minimal conscious sedation  Complications: None  Estimated Blood Loss: none  Specimens: Were not obtained        Brittney Medication and Procedural Reconciliation:  I agree that the documented medications and procedures performed are true. The medications were given under my order. The procedures were performed under my direct supervision.

## 2023-11-01 NOTE — TELEPHONE ENCOUNTER
Pt had cath procedure today; needs follow up with NPDD in 2-4 weeks, can we provide an overbook please?

## 2023-11-01 NOTE — PROCEDURES
[facility]       Cardiac Catheterization Lab Report    PATIENT: Stacia Cruz  DATE: 2023  MRN: [id number]  CSN: [id number]  : 1945      Performing Physician: Kassandra Dominique MD, [address] [place] [address]  Primary Care Physician: Chaya Hodge DO  Admitting/Referring provider: Beata Womack MD     Procedures Performed:   1. Left heart catheterization  2. Selective left and right coronary angiogram  3. Left ventriculography     Procedure Findings:  1. Severe multi vessel coronary artery diease   ~70% LM   ~70 and 90% RCA  2. Normal left ventricular function with EF estimated at 55-60%  3. Normal left heart hemodynamics    Indications:   Patient Active Problem List   Diagnosis    Mixed hyperlipidemia    Essential hypertension    Coronary atherosclerosis    Shortness of breath    SALLIE (obstructive sleep apnea)    Chronic diastolic congestive heart failure (HCC)    CAD (coronary artery disease)    Primary osteoarthritis of right knee    Primary localized osteoarthritis of right knee    Status post total right knee replacement    Morbid obesity ([address])    Chest pain    Abnormal stress test    Unstable angina [facility])       Details:   Stacia Cruz was brought to the cardiac catheterization lab in a fasting state after informed consent was obtained. If the patient was able to provide written consent, it was obtained. The patient's vitals were monitored through out the procedure. The patient was sedated using the appropriate levels of sedation and ASA guidelines. The appropriate access site area was prepped and drapped in a sterile fashion. The area was anesthetized with 2% lidocaine. Using the modified Seldinger technique, an arterial sheath was introduced into the arterial access site using a single anterior wall puncture. The sheath was flushed and prepped in usual fashion. Catheters used during the procedure included 5 English TIG 4.0 catheter.  The catheters were advanced and removed

## 2023-11-01 NOTE — PROGRESS NOTES
Patient/family given discharge instructions. Patient/family verbalize understanding of discharge instructions, all questions addressed, copy given to patient/family. Pt transferred to vehicle via wheelchair to be discharged home with family. Pt continues to elevate RUE. Site remains unremarkable, arm board in place. No further questions. Elidel Pregnancy And Lactation Text: This medication is Pregnancy Category C. It is unknown if this medication is excreted in breast milk.

## 2023-11-01 NOTE — DISCHARGE INSTRUCTIONS
Cath Labs at  [facility]   Discharge Instructions        11/1/2023  Param Sheehan   Date of Birth 1945       Activity:  No driving for 24 hours. In 24 hours you may remove dressing and shower, wash site gently with soap and water and leave open to air  Avoid submerging your arm in sitting water for 5 days. Do not use your right hand for 24 hours, then  No lifting more than 5 pounds for 5 days. No lotions, powders, or ointments near site for 5 days. No work/school for 5 days unless instructed otherwise by your cardiologist.    Diet:   Resume previous diet, if a cardiac diet is specified you will receive a handout with  general guidelines. Drink extra non-alcoholic/decaffienated fluids for first 24 hours after your procedure. Arm Management:  If bleeding occurs from the site or a hematoma (lump) begins to increase in size, apply pressure directly over the site, call 911 to return to the hospital.    Special Instructions:  Report any coolness or numbness in the arm  Report any chills, fever, itching, red bumps or rash   Report any of the following to the MD: drainage from the site, redness and/or swelling at the site, increased tenderness at the site   If you are currently taking Metformin or Metformin combination medications for Diabetes, hold your dose for 48 hours after your procedure. Consult your Cardiologist before taking any NSAIDS, vitamin supplements, estrogen, or estrogen plus progestin. Do not stop taking Plavix, Brilinta or Effient, without first consulting your cardiologist.    Sedation Discharge Instructions: For the next 24 hours do not drive a car, operate machinery, power tools or kitchen appliances. Do not drink alcohol; including beer or wine. Do not make any important decisions or sign any important papers. For the next 24 hours you can expect drowsiness, light-headed or dizziness, nausea/ vomiting, inability to concentrate, fatigue and desire to sleep.   We strongly

## 2023-11-01 NOTE — H&P
[address]   Cardiovascular Evaluation    PATIENT: Olga Traore  DATE: 10/24/2023  MRN: [id number]  CSN: [id number]  : 1945    Primary Care Doctor/Referring provider: Mario Corea DO, No admitting provider for patient encounter. Reason for evaluation/Chief complaint:   Follow-up, Results (Discuss stress test), Hypertension, Hyperlipidemia, Shortness of Breath, Congestive Heart Failure (With activity. ), Fatigue (More tired than normal), and Back Pain (With exertion)      Subjective:    History of present illness on initial date of evaluation:   Olga Traore is a 69 y.o. male patient who presents for cardiology follow up. He previously followed with George Chaves. He has a medical history significant of CAD, CHF, HTN, HLD, SALLIE. He had PTCA RCA , , , . [facility]  showed stable CAD, patent RCA stents. Echo 2020 EF 55%, mild MR, trace TR. He had recently seen Duncan Vasquez CNP for an acute appointment in relation to shortness of breath. He had a [place] 10/13/23 that showed no definite evidence of ischemia or scar however a small mild inferolateral defect. Today he reports several weeks ago while he was push mowing he had to stop and catch his breath he had shooting pain across his shoulder blades. He reports this symptom complex is concerning as it prompted his heart catheterization in the past. Patient currently denies any weight gain, edema, palpitations, dizziness, and syncope. He is taking medications as prescribed, tolerating well.      Patient Active Problem List   Diagnosis    Mixed hyperlipidemia    Essential hypertension    Coronary atherosclerosis    Shortness of breath    SALLIE (obstructive sleep apnea)    Chronic diastolic congestive heart failure (HCC)    CAD (coronary artery disease)    Primary osteoarthritis of right knee    Primary localized osteoarthritis of right knee    Status post total right knee replacement    Morbid

## 2023-11-02 ENCOUNTER — TELEPHONE (OUTPATIENT)
Dept: CARDIOLOGY CLINIC | Age: 78
End: 2023-11-02

## 2023-11-02 DIAGNOSIS — Z71.9 ENCOUNTER FOR CONSULTATION: Primary | ICD-10-CM

## 2023-11-02 LAB
EKG ATRIAL RATE: 56 BPM
EKG DIAGNOSIS: NORMAL
EKG P AXIS: 63 DEGREES
EKG P-R INTERVAL: 192 MS
EKG Q-T INTERVAL: 414 MS
EKG QRS DURATION: 74 MS
EKG QTC CALCULATION (BAZETT): 399 MS
EKG R AXIS: -4 DEGREES
EKG T AXIS: 19 DEGREES
EKG VENTRICULAR RATE: 56 BPM

## 2023-11-02 NOTE — TELEPHONE ENCOUNTER
Pts wife called and stated vsp wanted to know where and who they want to move forward with for the by-pass surgery. Pts wife stated pt wants to move forward w Dr. Glen Carlos at [facility]. Pts ph#[phone]

## 2023-11-10 ENCOUNTER — TELEPHONE (OUTPATIENT)
Dept: CARDIOLOGY CLINIC | Age: 78
End: 2023-11-10

## 2023-11-10 NOTE — TELEPHONE ENCOUNTER
Pt stated that he lost his stent card. He asked how he could get a new one. Per medical staff they will reach out to cath lab and they will be in touch with pt. He v/u.

## 2023-11-15 NOTE — PROGRESS NOTES
Miller Hudson    Age 69 y.o.    male    1945    MRN [id number]    12/5/2023  Arrival Time_____________  OR Time____________330 Min     Procedure(s):  OPEN CORONARY ARTERY BYPASS GRAFTING TIMES THREE TO FOUR WITH STERNAL PLATING WITH LEFT ATRIAL APPENDAGE CLIP PLACEMENT NOTE: BILATERAL PECTORALIS BLOCKS                      General   Surgeon(s):  Tamiko Helton, MD      DAY ADMIT ___  SDS/OP ___  OUTPT IN BED ___        Phone [phone] (home)     PCP _____________________ Phone_________________ Epic  ) Epic CE ( ) Appt ________    ADDITIONAL INFO __________________________________ Cardio/Consult _____________    NOTES _____________________________________________________________________    ____________________________________________________________________________    PAT APPT DATE:________ TIME: ________  FAXED QAD: _______  (__) H&P w/ Hospitalist  __________________________________________________________________________  Preop Nurse phone screen complete: _____________  (__) CBC     (__) W/ DIFF ___________     (__) Hgb A1C    ___________  (__) CHEST X RAY   __________  (__) LIPID PROFILE  ___________  (__) EKG   __________  (__) PT-INR / APTT  ___________  (__) PFT's   __________  (__) BMP   ___________  (__) CAROTIDS  __________  (__) CMP   ___________  (__) VEIN MAPPING  __________  (__) U/A   ___________  (__) HISTORY & PHYSICAL __________  (__) URINE C & S  ___________  (__) CARDIAC CLEARANCE __________  (__) U/A W/ FLEX  ___________  (__) PULM.  CLEARANCE __________  (__) SERUM PREGNANCY ___________  (__) Check Epic DOS orders __________  (__) TYPE & SCREEN __________repeat ( ) (__)  __________________ __________  (__) Albumin / Prealbumin ___________  (__)  __________________ __________  (__) TRANSFERRIN  ___________  (__)  __________________ __________  (__) LIVER PROFILE  ___________  (__)  __________________ __________  (__) MRSA NASAL SWAB ___________  (__) URINE PREG DOS __________  (__) SED

## 2023-11-17 ENCOUNTER — HOSPITAL ENCOUNTER (OUTPATIENT)
Dept: PREADMISSION TESTING | Age: 78
End: 2023-11-17
Payer: MEDICARE

## 2023-11-17 ENCOUNTER — HOSPITAL ENCOUNTER (OUTPATIENT)
Dept: PULMONOLOGY | Age: 78
Discharge: HOME | End: 2023-11-17
Attending: THORACIC SURGERY (CARDIOTHORACIC VASCULAR SURGERY)
Payer: MEDICARE

## 2023-11-17 ENCOUNTER — HOSPITAL ENCOUNTER (OUTPATIENT)
Dept: GENERAL RADIOLOGY | Age: 78
Discharge: HOME | End: 2023-11-17
Payer: MEDICARE

## 2023-11-17 ENCOUNTER — HOSPITAL ENCOUNTER (OUTPATIENT)
Dept: VASCULAR LAB | Age: 78
Discharge: HOME | End: 2023-11-17
Payer: MEDICARE

## 2023-11-17 VITALS — OXYGEN SATURATION: 95 %

## 2023-11-17 DIAGNOSIS — R09.89 OTHER SPECIFIED SYMPTOMS AND SIGNS INVOLVING THE CIRCULATORY AND RESPIRATORY SYSTEMS: ICD-10-CM

## 2023-11-17 DIAGNOSIS — I25.10 CORONARY ARTERY DISEASE, UNSPECIFIED VESSEL OR LESION TYPE, UNSPECIFIED WHETHER ANGINA PRESENT, UNSPECIFIED WHETHER NATIVE OR TRANSPLANTED HEART: ICD-10-CM

## 2023-11-17 DIAGNOSIS — Z01.811 PRE-OP CHEST EXAM: ICD-10-CM

## 2023-11-17 LAB
ABO + RH BLD: NORMAL
ALBUMIN SERPL-MCNC: 4.3 G/DL (ref 3.4–5)
ALP SERPL-CCNC: 98 U/L (ref 40–129)
ALT SERPL-CCNC: 31 U/L (ref 10–40)
ANION GAP SERPL CALCULATED.3IONS-SCNC: 13 MMOL/L (ref 3–16)
APTT BLD: 33.1 SEC (ref 22.7–35.9)
AST SERPL-CCNC: 28 U/L (ref 15–37)
BASE EXCESS BLDA CALC-SCNC: -3 MMOL/L (ref -3–3)
BILIRUB DIRECT SERPL-MCNC: <0.2 MG/DL (ref 0–0.3)
BILIRUB INDIRECT SERPL-MCNC: NORMAL MG/DL (ref 0–1)
BILIRUB SERPL-MCNC: 0.4 MG/DL (ref 0–1)
BILIRUB UR QL STRIP.AUTO: NEGATIVE
BLD GP AB SCN SERPL QL: NORMAL
BUN SERPL-MCNC: 32 MG/DL (ref 7–20)
CALCIUM SERPL-MCNC: 9.7 MG/DL (ref 8.3–10.6)
CHLORIDE SERPL-SCNC: 108 MMOL/L (ref 99–110)
CLARITY UR: CLEAR
CO2 BLDA-SCNC: 21.7 MMOL/L
CO2 SERPL-SCNC: 21 MMOL/L (ref 21–32)
COHGB MFR BLDA: 0.3 % (ref 0–1.5)
COLOR UR: YELLOW
CREAT SERPL-MCNC: 1.1 MG/DL (ref 0.8–1.3)
DLCO %PRED: 91 %
DLCO PRED: NORMAL
DLCO/VA %PRED: NORMAL
DLCO/VA PRED: NORMAL
DLCO/VA: NORMAL
DLCO: NORMAL
EPI CELLS #/AREA URNS HPF: NORMAL /HPF (ref 0–5)
EXPIRATORY TIME-POST: NORMAL
EXPIRATORY TIME: NORMAL
FEF 25-75% %CHNG: NORMAL
FEF 25-75% %PRED-POST: NORMAL
FEF 25-75% %PRED-PRE: NORMAL
FEF 25-75% PRED: NORMAL
FEF 25-75%-POST: NORMAL
FEF 25-75%-PRE: NORMAL
FEV1 %PRED-POST: 100 %
FEV1 %PRED-PRE: 98 %
FEV1 PRED: NORMAL
FEV1-POST: NORMAL
FEV1-PRE: NORMAL
FEV1/FVC %PRED-POST: 104 %
FEV1/FVC %PRED-PRE: 98 %
FEV1/FVC PRED: NORMAL
FEV1/FVC-POST: NORMAL
FEV1/FVC-PRE: NORMAL
FVC %PRED-POST: 95 L
FVC %PRED-PRE: 99 %
FVC PRED: NORMAL
FVC-POST: NORMAL
FVC-PRE: NORMAL
GAW %PRED: NORMAL
GAW PRED: NORMAL
GAW: NORMAL
GFR SERPLBLD CREATININE-BSD FMLA CKD-EPI: >60 ML/MIN/{1.73_M2}
GLUCOSE SERPL-MCNC: 97 MG/DL (ref 70–99)
GLUCOSE UR STRIP.AUTO-MCNC: NEGATIVE MG/DL
HCO3 BLDA-SCNC: 20.7 MMOL/L (ref 21–29)
HGB BLDA-MCNC: 13.1 G/DL (ref 13.5–17.5)
HGB UR QL STRIP.AUTO: NEGATIVE
IC %PRED: NORMAL
IC PRED: NORMAL
IC: NORMAL
INR PPP: 1.1 (ref 0.84–1.16)
KETONES UR STRIP.AUTO-MCNC: NEGATIVE MG/DL
LEUKOCYTE ESTERASE UR QL STRIP.AUTO: ABNORMAL
MEP: NORMAL
METHGB MFR BLDA: 0.5 %
MIP: NORMAL
MVV %PRED-PRE: NORMAL
MVV PRED: NORMAL
MVV-PRE: NORMAL
NITRITE UR QL STRIP.AUTO: NEGATIVE
O2 THERAPY: ABNORMAL
PCO2 BLDA: 33 MMHG (ref 35–45)
PEF %PRED-POST: NORMAL
PEF %PRED-PRE: NORMAL
PEF PRED: NORMAL
PEF%CHNG: NORMAL
PEF-POST: NORMAL
PEF-PRE: NORMAL
PH BLDA: 7.42 [PH] (ref 7.35–7.45)
PH UR STRIP.AUTO: 6 [PH] (ref 5–8)
PO2 BLDA: 98.3 MMHG (ref 75–108)
POTASSIUM SERPL-SCNC: 4.7 MMOL/L (ref 3.5–5.1)
PROT SERPL-MCNC: 7 G/DL (ref 6.4–8.2)
PROT UR STRIP.AUTO-MCNC: NEGATIVE MG/DL
PROTHROMBIN TIME: 14.2 SEC (ref 11.5–14.8)
RAW %PRED: NORMAL
RAW PRED: NORMAL
RAW: NORMAL
RBC #/AREA URNS HPF: NORMAL /HPF (ref 0–4)
RV %PRED: NORMAL
RV PRED: NORMAL
RV: NORMAL
SAO2 % BLDA: 97.1 %
SODIUM SERPL-SCNC: 142 MMOL/L (ref 136–145)
SP GR UR STRIP.AUTO: 1.02 (ref 1–1.03)
SVC %PRED: NORMAL
SVC PRED: NORMAL
SVC: NORMAL
TLC %PRED: 71 %
TLC PRED: NORMAL
TLC: NORMAL
UA COMPLETE W REFLEX CULTURE PNL UR: ABNORMAL
UA DIPSTICK W REFLEX MICRO PNL UR: YES
URN SPEC COLLECT METH UR: ABNORMAL
UROBILINOGEN UR STRIP-ACNC: 0.2 E.U./DL
VA %PRED: NORMAL
VA PRED: NORMAL
VA: NORMAL
VTG %PRED: NORMAL
VTG PRED: NORMAL
VTG: NORMAL
WBC #/AREA URNS HPF: NORMAL /HPF (ref 0–5)

## 2023-11-17 PROCEDURE — 6370000000 HC RX 637 (ALT 250 FOR IP): Performed by: THORACIC SURGERY (CARDIOTHORACIC VASCULAR SURGERY)

## 2023-11-17 PROCEDURE — 36600 WITHDRAWAL OF ARTERIAL BLOOD: CPT

## 2023-11-17 PROCEDURE — 94729 DIFFUSING CAPACITY: CPT

## 2023-11-17 PROCEDURE — 93971 EXTREMITY STUDY: CPT

## 2023-11-17 PROCEDURE — 94060 EVALUATION OF WHEEZING: CPT

## 2023-11-17 PROCEDURE — 85610 PROTHROMBIN TIME: CPT

## 2023-11-17 PROCEDURE — 99211 OFF/OP EST MAY X REQ PHY/QHP: CPT | Performed by: NURSE PRACTITIONER

## 2023-11-17 PROCEDURE — 86850 RBC ANTIBODY SCREEN: CPT

## 2023-11-17 PROCEDURE — 86900 BLOOD TYPING SEROLOGIC ABO: CPT

## 2023-11-17 PROCEDURE — 71046 X-RAY EXAM CHEST 2 VIEWS: CPT

## 2023-11-17 PROCEDURE — 94726 PLETHYSMOGRAPHY LUNG VOLUMES: CPT

## 2023-11-17 PROCEDURE — 80076 HEPATIC FUNCTION PANEL: CPT

## 2023-11-17 PROCEDURE — 94760 N-INVAS EAR/PLS OXIMETRY 1: CPT

## 2023-11-17 PROCEDURE — 81001 URINALYSIS AUTO W/SCOPE: CPT

## 2023-11-17 PROCEDURE — 86901 BLOOD TYPING SEROLOGIC RH(D): CPT

## 2023-11-17 PROCEDURE — 80048 BASIC METABOLIC PNL TOTAL CA: CPT

## 2023-11-17 PROCEDURE — 93880 EXTRACRANIAL BILAT STUDY: CPT

## 2023-11-17 PROCEDURE — 82803 BLOOD GASES ANY COMBINATION: CPT

## 2023-11-17 PROCEDURE — 36415 COLL VENOUS BLD VENIPUNCTURE: CPT

## 2023-11-17 PROCEDURE — 85730 THROMBOPLASTIN TIME PARTIAL: CPT

## 2023-11-17 RX ORDER — ALBUTEROL SULFATE 90 UG/1
4 AEROSOL, METERED RESPIRATORY (INHALATION) ONCE
Status: COMPLETED | OUTPATIENT
Start: 2023-11-17 | End: 2023-11-17

## 2023-11-17 RX ADMIN — Medication 4 PUFF: at 12:12

## 2023-11-17 ASSESSMENT — PULMONARY FUNCTION TESTS
FEV1_PERCENT_PREDICTED_POST: 100
FEV1/FVC_PERCENT_PREDICTED_POST: 104
FVC_PERCENT_PREDICTED_POST: 95
FEV1/FVC_PERCENT_PREDICTED_PRE: 98
FVC_PERCENT_PREDICTED_PRE: 99
FEV1_PERCENT_PREDICTED_PRE: 98

## 2023-11-17 NOTE — PROGRESS NOTES
CVTS PRE-OP OPEN HEART SURGERY TEACHING    Discussed the open heart teaching binder with the patient after PAT was completed in [facility]. Gave patient prescriptions for Peridex, Hibiclens, and Bactroban Nasal Ointment and instructions for administration pre-operatively. All patient, and spouse, questions were answered pertaining to upcoming surgery.      Hilda Viveros, CNP

## 2023-11-17 NOTE — PROGRESS NOTES
Surgery Date and Time: 12/5/23 @ 07:45 am        Arrival Time:  05:30 am    The instructions given when and if a patient needs to stop oral intake prior to surgery varies. Follow the instructions you were given by your    Surgeon or RN during the Pre-op call. __X__Nothing to eat or to drink after Midnight the night before the surgery. NO gum, mints, candy or ice chips day of surgery. Only take the following medications with a small sip of water the morning of surgery:  metoprolol, isosorbide                 Hold the following medications (per anesthesia or surgeon request):               Last Dose:  lisinopril last dose 12/2/23 am      Aspirin, Ibuprofen, Advil, Naproxen, Vitamin E and other Anti-inflammatory products and supplements should be stopped for 5 -7days before surgery      or as directed by your physician. Aspirin continue, last dose 12/4/23 am      Check with your Surgeon/PCP/Cardiologist regarding stopping Plavix, Coumadin, Eliquis, Lovenox, Effient, Pradaxa, Xarelto, Fragmin or other blood thinners     and follow their instructions. Medication:   plavix         Last dose: last dose 11/29/23 per patient from surgeon's office        - Do not smoke or vape, and do not drink any alcoholic beverages 24 hours prior to surgery, this includes NA Beer. Refrain from using any recreational drugs,     including non-prescribed prescription drugs.       -You may brush your teeth and gargle the morning of surgery. DO NOT SWALLOW WATER.      -You MUST plan for a responsible adult to stay on site while you are here and take you home after your surgery. You will not be allowed to leave alone or drive               yourself home. It is requested someone stay with you the first 24 hrs.  Your surgery will be cancelled if you do not have a ride home with a responsible adult.      -Please wear simple, loose-fitting clothing to the hospital. Do not bring valuables (money, credit cards, checkbooks,

## 2023-11-28 ENCOUNTER — HOSPITAL ENCOUNTER (OUTPATIENT)
Dept: CT IMAGING | Age: 78
Discharge: HOME | End: 2023-11-28
Attending: THORACIC SURGERY (CARDIOTHORACIC VASCULAR SURGERY)
Payer: MEDICARE

## 2023-11-28 DIAGNOSIS — I25.10 CORONARY ARTERY DISEASE, UNSPECIFIED VESSEL OR LESION TYPE, UNSPECIFIED WHETHER ANGINA PRESENT, UNSPECIFIED WHETHER NATIVE OR TRANSPLANTED HEART: ICD-10-CM

## 2023-11-28 PROCEDURE — 71250 CT THORAX DX C-: CPT

## 2023-12-01 ENCOUNTER — PROCEDURE VISIT (OUTPATIENT)
Dept: CARDIOLOGY CLINIC | Age: 78
End: 2023-12-01

## 2023-12-01 DIAGNOSIS — I25.10 CORONARY ARTERY DISEASE, UNSPECIFIED VESSEL OR LESION TYPE, UNSPECIFIED WHETHER ANGINA PRESENT, UNSPECIFIED WHETHER NATIVE OR TRANSPLANTED HEART: ICD-10-CM

## 2023-12-04 ENCOUNTER — ANESTHESIA EVENT (OUTPATIENT)
Dept: OPERATING ROOM | Age: 78
End: 2023-12-04
Payer: MEDICARE

## 2023-12-04 ASSESSMENT — ENCOUNTER SYMPTOMS: SHORTNESS OF BREATH: 1

## 2023-12-04 NOTE — ANESTHESIA PRE PROCEDURE
Department of Anesthesiology  Preprocedure Note       Name:  Tati Johnston   Age:  69 y.o.  :  1945                                          MRN:  [id number]         Date:  2023      Surgeon: Jazmine Faust):  Trinity Mccall MD    Procedure: Procedure(s):  OPEN CORONARY ARTERY BYPASS GRAFTING TIMES THREE TO FOUR WITH STERNAL PLATING WITH LEFT ATRIAL APPENDAGE CLIP PLACEMENT NOTE: BILATERAL PECTORALIS BLOCKS    Medications prior to admission:   Prior to Admission medications    Medication Sig Start Date End Date Taking? Authorizing Provider   Misc Natural Products (ADVANCED JOINT RELIEF) CAPS Take by mouth    Moon Clemons MD   nitroGLYCERIN (NITROSTAT) 0.4 MG SL tablet Place 1 tablet under the tongue every 5 minutes as needed for Chest pain 10/3/23   PRATEEK Maguire CNP   isosorbide dinitrate (ISORDIL) 10 MG tablet Take 1 tablet by mouth in the morning and 1 tablet in the evening.  22   PRATEEK Maguire CNP   aspirin EC 81 MG EC tablet Take 1 tablet by mouth daily 22   PRATEEK Maguire CNP   Multiple Vitamins-Minerals (EYE VITAMINS) CAPS Take 2 capsules by mouth 2 times daily    Moon Clemons MD   clopidogrel (PLAVIX) 75 MG tablet Take by mouth    Moon Clemons MD   acetaminophen (TYLENOL) 325 MG tablet Take 2 tablets by mouth every 6 hours 19   Idania MCCANN PA   metoprolol (LOPRESSOR) 50 MG tablet Take 1 tablet by mouth 2 times daily    Moon Clemons MD   multivitamin [facility]) per tablet Take 1 tablet by mouth daily Takes 1/2 in am and 1/2 in pm    Moon Clemons MD   lisinopril (PRINIVIL;ZESTRIL) 20 MG tablet Take 1 tablet by mouth daily    Moon Clemons MD   pantoprazole (PROTONIX) 40 MG tablet Take 1 tablet by mouth daily    Moon Clemons MD   atorvastatin (LIPITOR) 80 MG tablet Take 1 tablet by mouth at bedtime    Moon Clemons MD   ezetimibe (ZETIA) 10 MG tablet Take 1 tablet by mouth at Posterior Auricular Interpolation Flap Division And Inset Text: Division and inset of the posterior auricular interpolation flap was performed to achieve optimal aesthetic result, restore normal anatomic appearance and avoid distortion of normal anatomy, expedite and facilitate wound healing, achieve optimal functional result and because linear closure either not possible or would produce suboptimal result. The patient was prepped and draped in the usual manner. The pedicle was infiltrated with local anesthesia. The pedicle was sectioned with a #15 blade. The pedicle was de-bulked and trimmed to match the shape of the defect. Hemostasis was achieved. The flap donor site and free margin of the flap were secured with deep buried sutures and the wound edges were re-approximated.

## 2023-12-05 ENCOUNTER — ANESTHESIA (OUTPATIENT)
Dept: OPERATING ROOM | Age: 78
End: 2023-12-05
Payer: MEDICARE

## 2023-12-05 ENCOUNTER — APPOINTMENT (OUTPATIENT)
Dept: GENERAL RADIOLOGY | Age: 78
DRG: 235 | End: 2023-12-05
Attending: THORACIC SURGERY (CARDIOTHORACIC VASCULAR SURGERY)
Payer: MEDICARE

## 2023-12-05 ENCOUNTER — HOSPITAL ENCOUNTER (INPATIENT)
Age: 78
LOS: 14 days | Discharge: NURSING, REHAB OR LONG-TERM CARE | DRG: 235 | End: 2023-12-19
Attending: THORACIC SURGERY (CARDIOTHORACIC VASCULAR SURGERY) | Admitting: THORACIC SURGERY (CARDIOTHORACIC VASCULAR SURGERY)
Payer: MEDICARE

## 2023-12-05 DIAGNOSIS — G89.18 ACUTE POST-OPERATIVE PAIN: Primary | ICD-10-CM

## 2023-12-05 PROBLEM — I25.10 CORONARY ARTERY DISEASE DUE TO CALCIFIED CORONARY LESION: Status: ACTIVE | Noted: 2023-12-05

## 2023-12-05 PROBLEM — I25.10 CAD IN NATIVE ARTERY: Status: ACTIVE | Noted: 2023-12-05

## 2023-12-05 PROBLEM — I25.84 CORONARY ARTERY DISEASE DUE TO CALCIFIED CORONARY LESION: Status: ACTIVE | Noted: 2023-12-05

## 2023-12-05 LAB
ABO + RH BLD: NORMAL
ACTIVATED CLOTTING TIME: 100 SEC (ref 99–130)
ACTIVATED CLOTTING TIME: 120 SEC (ref 99–130)
ACTIVATED CLOTTING TIME: 508 SEC (ref 99–130)
ACTIVATED CLOTTING TIME: 517 SEC (ref 99–130)
ACTIVATED CLOTTING TIME: 775 SEC (ref 99–130)
ACTIVATED CLOTTING TIME: 931 SEC (ref 99–130)
ACTIVATED CLOTTING TIME: 94 SEC (ref 99–130)
ANION GAP SERPL CALCULATED.3IONS-SCNC: 11 MMOL/L (ref 3–16)
APTT BLD: 31.5 SEC (ref 22.7–35.9)
BASE EXCESS BLDA CALC-SCNC: -1 MMOL/L (ref -3–3)
BASE EXCESS BLDA CALC-SCNC: -1 MMOL/L (ref -3–3)
BASE EXCESS BLDA CALC-SCNC: -2 MMOL/L (ref -3–3)
BASE EXCESS BLDA CALC-SCNC: -3 MMOL/L (ref -3–3)
BASE EXCESS BLDA CALC-SCNC: -3 MMOL/L (ref -3–3)
BASE EXCESS BLDA CALC-SCNC: -4 MMOL/L (ref -3–3)
BASE EXCESS BLDA CALC-SCNC: -5 MMOL/L (ref -3–3)
BASE EXCESS BLDA CALC-SCNC: 0 MMOL/L (ref -3–3)
BLD GP AB SCN SERPL QL: NORMAL
BLOOD BANK DISPENSE STATUS: NORMAL
BLOOD BANK DISPENSE STATUS: NORMAL
BLOOD BANK PRODUCT CODE: NORMAL
BLOOD BANK PRODUCT CODE: NORMAL
BPU ID: NORMAL
BPU ID: NORMAL
BUN SERPL-MCNC: 25 MG/DL (ref 7–20)
CA-I BLD-SCNC: 1.06 MMOL/L (ref 1.12–1.32)
CA-I BLD-SCNC: 1.08 MMOL/L (ref 1.12–1.32)
CA-I BLD-SCNC: 1.09 MMOL/L (ref 1.12–1.32)
CA-I BLD-SCNC: 1.16 MMOL/L (ref 1.12–1.32)
CA-I BLD-SCNC: 1.26 MMOL/L (ref 1.12–1.32)
CA-I BLD-SCNC: 1.28 MMOL/L (ref 1.12–1.32)
CA-I BLD-SCNC: 1.3 MMOL/L (ref 1.12–1.32)
CA-I BLD-SCNC: 1.31 MMOL/L (ref 1.12–1.32)
CA-I BLD-SCNC: 1.36 MMOL/L (ref 1.12–1.32)
CALCIUM SERPL-MCNC: 8.5 MG/DL (ref 8.3–10.6)
CHLORIDE SERPL-SCNC: 106 MMOL/L (ref 99–110)
CO2 BLDA-SCNC: 21 MMOL/L
CO2 BLDA-SCNC: 22 MMOL/L
CO2 BLDA-SCNC: 23 MMOL/L
CO2 BLDA-SCNC: 24 MMOL/L
CO2 BLDA-SCNC: 25 MMOL/L
CO2 BLDA-SCNC: 26 MMOL/L
CO2 SERPL-SCNC: 22 MMOL/L (ref 21–32)
CREAT SERPL-MCNC: 0.9 MG/DL (ref 0.8–1.3)
DEPRECATED RDW RBC AUTO: 14.2 % (ref 12.4–15.4)
DESCRIPTION BLOOD BANK: NORMAL
DESCRIPTION BLOOD BANK: NORMAL
GFR SERPLBLD CREATININE-BSD FMLA CKD-EPI: >60 ML/MIN/{1.73_M2}
GLUCOSE BLD-MCNC: 107 MG/DL (ref 70–99)
GLUCOSE BLD-MCNC: 108 MG/DL (ref 70–99)
GLUCOSE BLD-MCNC: 109 MG/DL (ref 70–99)
GLUCOSE BLD-MCNC: 125 MG/DL (ref 70–99)
GLUCOSE BLD-MCNC: 126 MG/DL (ref 70–99)
GLUCOSE BLD-MCNC: 133 MG/DL (ref 70–99)
GLUCOSE BLD-MCNC: 139 MG/DL (ref 70–99)
GLUCOSE BLD-MCNC: 146 MG/DL (ref 70–99)
GLUCOSE BLD-MCNC: 148 MG/DL (ref 70–99)
GLUCOSE BLD-MCNC: 151 MG/DL (ref 70–99)
GLUCOSE BLD-MCNC: 164 MG/DL (ref 70–99)
GLUCOSE BLD-MCNC: 165 MG/DL (ref 70–99)
GLUCOSE BLD-MCNC: 176 MG/DL (ref 70–99)
GLUCOSE BLD-MCNC: 256 MG/DL (ref 70–99)
GLUCOSE BLD-MCNC: 36 MG/DL (ref 70–99)
GLUCOSE BLD-MCNC: 56 MG/DL (ref 70–99)
GLUCOSE BLD-MCNC: 73 MG/DL (ref 70–99)
GLUCOSE BLD-MCNC: 84 MG/DL (ref 70–99)
GLUCOSE BLD-MCNC: 99 MG/DL (ref 70–99)
GLUCOSE SERPL-MCNC: 97 MG/DL (ref 70–99)
HCO3 BLDA-SCNC: 19.9 MMOL/L (ref 21–29)
HCO3 BLDA-SCNC: 21.2 MMOL/L (ref 21–29)
HCO3 BLDA-SCNC: 22 MMOL/L (ref 21–29)
HCO3 BLDA-SCNC: 22.8 MMOL/L (ref 21–29)
HCO3 BLDA-SCNC: 23 MMOL/L (ref 21–29)
HCO3 BLDA-SCNC: 23.1 MMOL/L (ref 21–29)
HCO3 BLDA-SCNC: 23.4 MMOL/L (ref 21–29)
HCO3 BLDA-SCNC: 23.5 MMOL/L (ref 21–29)
HCO3 BLDA-SCNC: 23.6 MMOL/L (ref 21–29)
HCO3 BLDA-SCNC: 24.6 MMOL/L (ref 21–29)
HCT VFR BLD AUTO: 24 % (ref 40.5–52.5)
HCT VFR BLD AUTO: 25 % (ref 40.5–52.5)
HCT VFR BLD AUTO: 26 % (ref 40.5–52.5)
HCT VFR BLD AUTO: 27 % (ref 40.5–52.5)
HCT VFR BLD AUTO: 28 % (ref 40.5–52.5)
HCT VFR BLD AUTO: 29 % (ref 40.5–52.5)
HCT VFR BLD AUTO: 30.4 % (ref 40.5–52.5)
HCT VFR BLD AUTO: 31 % (ref 40.5–52.5)
HCT VFR BLD AUTO: 33 % (ref 40.5–52.5)
HGB BLD CALC-MCNC: 10 GM/DL (ref 13.5–17.5)
HGB BLD CALC-MCNC: 10.6 GM/DL (ref 13.5–17.5)
HGB BLD CALC-MCNC: 11.3 GM/DL (ref 13.5–17.5)
HGB BLD CALC-MCNC: 8.3 GM/DL (ref 13.5–17.5)
HGB BLD CALC-MCNC: 8.6 GM/DL (ref 13.5–17.5)
HGB BLD CALC-MCNC: 8.7 GM/DL (ref 13.5–17.5)
HGB BLD CALC-MCNC: 8.9 GM/DL (ref 13.5–17.5)
HGB BLD CALC-MCNC: 9 GM/DL (ref 13.5–17.5)
HGB BLD CALC-MCNC: 9.3 GM/DL (ref 13.5–17.5)
HGB BLD CALC-MCNC: 9.4 GM/DL (ref 13.5–17.5)
HGB BLD-MCNC: 10.3 G/DL (ref 13.5–17.5)
INR PPP: 1.6 (ref 0.84–1.16)
LACTATE BLD-SCNC: 0.72 MMOL/L (ref 0.4–2)
LACTATE BLD-SCNC: 0.94 MMOL/L (ref 0.4–2)
LACTATE BLD-SCNC: 0.95 MMOL/L (ref 0.4–2)
LACTATE BLD-SCNC: 1.06 MMOL/L (ref 0.4–2)
LACTATE BLD-SCNC: 1.69 MMOL/L (ref 0.4–2)
LACTATE BLD-SCNC: 1.89 MMOL/L (ref 0.4–2)
MAGNESIUM SERPL-MCNC: 2.8 MG/DL (ref 1.8–2.4)
MCH RBC QN AUTO: 31.5 PG (ref 26–34)
MCHC RBC AUTO-ENTMCNC: 33.9 G/DL (ref 31–36)
MCV RBC AUTO: 93.1 FL (ref 80–100)
PCO2 BLDA: 32.3 MM HG (ref 35–45)
PCO2 BLDA: 33.2 MM HG (ref 35–45)
PCO2 BLDA: 34.5 MM HG (ref 35–45)
PCO2 BLDA: 35.5 MM HG (ref 35–45)
PCO2 BLDA: 36.1 MM HG (ref 35–45)
PCO2 BLDA: 38.6 MM HG (ref 35–45)
PCO2 BLDA: 39.1 MM HG (ref 35–45)
PCO2 BLDA: 40.6 MM HG (ref 35–45)
PCO2 BLDA: 41.6 MM HG (ref 35–45)
PCO2 BLDA: 43.9 MM HG (ref 35–45)
PERFORMED ON: ABNORMAL
PERFORMED ON: NORMAL
PH BLDA: 7.33 [PH] (ref 7.35–7.45)
PH BLDA: 7.36 [PH] (ref 7.35–7.45)
PH BLDA: 7.36 [PH] (ref 7.35–7.45)
PH BLDA: 7.39 [PH] (ref 7.35–7.45)
PH BLDA: 7.4 [PH] (ref 7.35–7.45)
PH BLDA: 7.41 [PH] (ref 7.35–7.45)
PH BLDA: 7.44 [PH] (ref 7.35–7.45)
PH BLDA: 7.45 [PH] (ref 7.35–7.45)
PLATELET # BLD AUTO: 160 K/UL (ref 135–450)
PMV BLD AUTO: 7.3 FL (ref 5–10.5)
PO2 BLDA: 116.1 MM HG (ref 75–108)
PO2 BLDA: 143.5 MM HG (ref 75–108)
PO2 BLDA: 205.8 MM HG (ref 75–108)
PO2 BLDA: 281.6 MM HG (ref 75–108)
PO2 BLDA: 295.7 MM HG (ref 75–108)
PO2 BLDA: 303 MM HG (ref 75–108)
PO2 BLDA: 361.5 MM HG (ref 75–108)
PO2 BLDA: 465.4 MM HG (ref 75–108)
PO2 BLDA: 89.1 MM HG (ref 75–108)
PO2 BLDA: 89.2 MM HG (ref 75–108)
POC SAMPLE TYPE: ABNORMAL
POC SAMPLE TYPE: NORMAL
POTASSIUM BLD-SCNC: 4 MMOL/L (ref 3.5–5.1)
POTASSIUM BLD-SCNC: 4 MMOL/L (ref 3.5–5.1)
POTASSIUM BLD-SCNC: 4.1 MMOL/L (ref 3.5–5.1)
POTASSIUM BLD-SCNC: 4.2 MMOL/L (ref 3.5–5.1)
POTASSIUM BLD-SCNC: 4.5 MMOL/L (ref 3.5–5.1)
POTASSIUM BLD-SCNC: 4.6 MMOL/L (ref 3.5–5.1)
POTASSIUM BLD-SCNC: 4.7 MMOL/L (ref 3.5–5.1)
POTASSIUM BLD-SCNC: 4.7 MMOL/L (ref 3.5–5.1)
POTASSIUM BLD-SCNC: 4.8 MMOL/L (ref 3.5–5.1)
POTASSIUM SERPL-SCNC: 4.1 MMOL/L (ref 3.5–5.1)
POTASSIUM SERPL-SCNC: 4.6 MMOL/L (ref 3.5–5.1)
PROTHROMBIN TIME: 19 SEC (ref 11.5–14.8)
RBC # BLD AUTO: 3.26 M/UL (ref 4.2–5.9)
SAO2 % BLDA: 100 % (ref 93–100)
SAO2 % BLDA: 97 % (ref 93–100)
SAO2 % BLDA: 97 % (ref 93–100)
SAO2 % BLDA: 99 % (ref 93–100)
SAO2 % BLDA: 99 % (ref 93–100)
SODIUM BLD-SCNC: 137 MMOL/L (ref 136–145)
SODIUM BLD-SCNC: 139 MMOL/L (ref 136–145)
SODIUM BLD-SCNC: 140 MMOL/L (ref 136–145)
SODIUM BLD-SCNC: 141 MMOL/L (ref 136–145)
SODIUM BLD-SCNC: 142 MMOL/L (ref 136–145)
SODIUM BLD-SCNC: 142 MMOL/L (ref 136–145)
SODIUM BLD-SCNC: 143 MMOL/L (ref 136–145)
SODIUM SERPL-SCNC: 139 MMOL/L (ref 136–145)
WBC # BLD AUTO: 11 K/UL (ref 4–11)

## 2023-12-05 PROCEDURE — 2580000003 HC RX 258: Performed by: ANESTHESIOLOGY

## 2023-12-05 PROCEDURE — 85027 COMPLETE CBC AUTOMATED: CPT

## 2023-12-05 PROCEDURE — 6360000002 HC RX W HCPCS

## 2023-12-05 PROCEDURE — 6370000000 HC RX 637 (ALT 250 FOR IP): Performed by: THORACIC SURGERY (CARDIOTHORACIC VASCULAR SURGERY)

## 2023-12-05 PROCEDURE — 85014 HEMATOCRIT: CPT

## 2023-12-05 PROCEDURE — 84295 ASSAY OF SERUM SODIUM: CPT

## 2023-12-05 PROCEDURE — 6370000000 HC RX 637 (ALT 250 FOR IP)

## 2023-12-05 PROCEDURE — P9016 RBC LEUKOCYTES REDUCED: HCPCS

## 2023-12-05 PROCEDURE — 30233N1 TRANSFUSION OF NONAUTOLOGOUS RED BLOOD CELLS INTO PERIPHERAL VEIN, PERCUTANEOUS APPROACH: ICD-10-PCS | Performed by: THORACIC SURGERY (CARDIOTHORACIC VASCULAR SURGERY)

## 2023-12-05 PROCEDURE — 94640 AIRWAY INHALATION TREATMENT: CPT

## 2023-12-05 PROCEDURE — 2720000010 HC SURG SUPPLY STERILE: Performed by: THORACIC SURGERY (CARDIOTHORACIC VASCULAR SURGERY)

## 2023-12-05 PROCEDURE — 84132 ASSAY OF SERUM POTASSIUM: CPT

## 2023-12-05 PROCEDURE — C1729 CATH, DRAINAGE: HCPCS | Performed by: THORACIC SURGERY (CARDIOTHORACIC VASCULAR SURGERY)

## 2023-12-05 PROCEDURE — 7100000010 HC PHASE II RECOVERY - FIRST 15 MIN

## 2023-12-05 PROCEDURE — 2700000000 HC OXYGEN THERAPY PER DAY

## 2023-12-05 PROCEDURE — 2580000003 HC RX 258

## 2023-12-05 PROCEDURE — A4217 STERILE WATER/SALINE, 500 ML: HCPCS | Performed by: THORACIC SURGERY (CARDIOTHORACIC VASCULAR SURGERY)

## 2023-12-05 PROCEDURE — 3600000018 HC SURGERY OHS ADDTL 15MIN: Performed by: THORACIC SURGERY (CARDIOTHORACIC VASCULAR SURGERY)

## 2023-12-05 PROCEDURE — 2709999900 HC NON-CHARGEABLE SUPPLY: Performed by: THORACIC SURGERY (CARDIOTHORACIC VASCULAR SURGERY)

## 2023-12-05 PROCEDURE — 02100Z9 BYPASS CORONARY ARTERY, ONE ARTERY FROM LEFT INTERNAL MAMMARY, OPEN APPROACH: ICD-10-PCS | Performed by: THORACIC SURGERY (CARDIOTHORACIC VASCULAR SURGERY)

## 2023-12-05 PROCEDURE — 5A1221Z PERFORMANCE OF CARDIAC OUTPUT, CONTINUOUS: ICD-10-PCS | Performed by: THORACIC SURGERY (CARDIOTHORACIC VASCULAR SURGERY)

## 2023-12-05 PROCEDURE — 33533 CABG ARTERIAL SINGLE: CPT | Performed by: THORACIC SURGERY (CARDIOTHORACIC VASCULAR SURGERY)

## 2023-12-05 PROCEDURE — 6360000002 HC RX W HCPCS: Performed by: THORACIC SURGERY (CARDIOTHORACIC VASCULAR SURGERY)

## 2023-12-05 PROCEDURE — C1889 IMPLANT/INSERT DEVICE, NOC: HCPCS | Performed by: THORACIC SURGERY (CARDIOTHORACIC VASCULAR SURGERY)

## 2023-12-05 PROCEDURE — 2580000003 HC RX 258: Performed by: THORACIC SURGERY (CARDIOTHORACIC VASCULAR SURGERY)

## 2023-12-05 PROCEDURE — 06BP4ZZ EXCISION OF RIGHT SAPHENOUS VEIN, PERCUTANEOUS ENDOSCOPIC APPROACH: ICD-10-PCS | Performed by: THORACIC SURGERY (CARDIOTHORACIC VASCULAR SURGERY)

## 2023-12-05 PROCEDURE — 71045 X-RAY EXAM CHEST 1 VIEW: CPT

## 2023-12-05 PROCEDURE — 83735 ASSAY OF MAGNESIUM: CPT

## 2023-12-05 PROCEDURE — 021209W BYPASS CORONARY ARTERY, THREE ARTERIES FROM AORTA WITH AUTOLOGOUS VENOUS TISSUE, OPEN APPROACH: ICD-10-PCS | Performed by: THORACIC SURGERY (CARDIOTHORACIC VASCULAR SURGERY)

## 2023-12-05 PROCEDURE — 6370000000 HC RX 637 (ALT 250 FOR IP): Performed by: ANESTHESIOLOGY

## 2023-12-05 PROCEDURE — 3700000000 HC ANESTHESIA ATTENDED CARE: Performed by: THORACIC SURGERY (CARDIOTHORACIC VASCULAR SURGERY)

## 2023-12-05 PROCEDURE — 6360000002 HC RX W HCPCS: Performed by: ANESTHESIOLOGY

## 2023-12-05 PROCEDURE — 82803 BLOOD GASES ANY COMBINATION: CPT

## 2023-12-05 PROCEDURE — 33268 EXCL LAA OPN OTH PX ANY METH: CPT | Performed by: THORACIC SURGERY (CARDIOTHORACIC VASCULAR SURGERY)

## 2023-12-05 PROCEDURE — 94660 CPAP INITIATION&MGMT: CPT

## 2023-12-05 PROCEDURE — 86901 BLOOD TYPING SEROLOGIC RH(D): CPT

## 2023-12-05 PROCEDURE — 86900 BLOOD TYPING SEROLOGIC ABO: CPT

## 2023-12-05 PROCEDURE — 2500000003 HC RX 250 WO HCPCS

## 2023-12-05 PROCEDURE — 85730 THROMBOPLASTIN TIME PARTIAL: CPT

## 2023-12-05 PROCEDURE — 2100000000 HC CCU R&B

## 2023-12-05 PROCEDURE — 80048 BASIC METABOLIC PNL TOTAL CA: CPT

## 2023-12-05 PROCEDURE — 2500000003 HC RX 250 WO HCPCS: Performed by: ANESTHESIOLOGY

## 2023-12-05 PROCEDURE — 83605 ASSAY OF LACTIC ACID: CPT

## 2023-12-05 PROCEDURE — 02L70CK OCCLUSION OF LEFT ATRIAL APPENDAGE WITH EXTRALUMINAL DEVICE, OPEN APPROACH: ICD-10-PCS | Performed by: THORACIC SURGERY (CARDIOTHORACIC VASCULAR SURGERY)

## 2023-12-05 PROCEDURE — 94002 VENT MGMT INPAT INIT DAY: CPT

## 2023-12-05 PROCEDURE — 7100000011 HC PHASE II RECOVERY - ADDTL 15 MIN

## 2023-12-05 PROCEDURE — 33508 ENDOSCOPIC VEIN HARVEST: CPT | Performed by: THORACIC SURGERY (CARDIOTHORACIC VASCULAR SURGERY)

## 2023-12-05 PROCEDURE — 3700000001 HC ADD 15 MINUTES (ANESTHESIA): Performed by: THORACIC SURGERY (CARDIOTHORACIC VASCULAR SURGERY)

## 2023-12-05 PROCEDURE — 94761 N-INVAS EAR/PLS OXIMETRY MLT: CPT

## 2023-12-05 PROCEDURE — 85610 PROTHROMBIN TIME: CPT

## 2023-12-05 PROCEDURE — 33519 CABG ARTERY-VEIN THREE: CPT | Performed by: THORACIC SURGERY (CARDIOTHORACIC VASCULAR SURGERY)

## 2023-12-05 PROCEDURE — 86923 COMPATIBILITY TEST ELECTRIC: CPT

## 2023-12-05 PROCEDURE — 92953 TEMPORARY EXTERNAL PACING: CPT

## 2023-12-05 PROCEDURE — 5A1D80Z PERFORMANCE OF URINARY FILTRATION, PROLONGED INTERMITTENT, 6-18 HOURS PER DAY: ICD-10-PCS | Performed by: THORACIC SURGERY (CARDIOTHORACIC VASCULAR SURGERY)

## 2023-12-05 PROCEDURE — 85347 COAGULATION TIME ACTIVATED: CPT

## 2023-12-05 PROCEDURE — 82330 ASSAY OF CALCIUM: CPT

## 2023-12-05 PROCEDURE — C1713 ANCHOR/SCREW BN/BN,TIS/BN: HCPCS | Performed by: THORACIC SURGERY (CARDIOTHORACIC VASCULAR SURGERY)

## 2023-12-05 PROCEDURE — C9290 INJ, BUPIVACAINE LIPOSOME: HCPCS | Performed by: THORACIC SURGERY (CARDIOTHORACIC VASCULAR SURGERY)

## 2023-12-05 PROCEDURE — 3600000008 HC SURGERY OHS BASE: Performed by: THORACIC SURGERY (CARDIOTHORACIC VASCULAR SURGERY)

## 2023-12-05 PROCEDURE — C1786 PMKR, SINGLE, RATE-RESP: HCPCS | Performed by: THORACIC SURGERY (CARDIOTHORACIC VASCULAR SURGERY)

## 2023-12-05 PROCEDURE — 86850 RBC ANTIBODY SCREEN: CPT

## 2023-12-05 PROCEDURE — 82947 ASSAY GLUCOSE BLOOD QUANT: CPT

## 2023-12-05 DEVICE — SCREW BNE L15MM DIA2.3MM THOR STRNL TI LOK DRL FREE LEV 1: Type: IMPLANTABLE DEVICE | Site: CHEST  WALL | Status: FUNCTIONAL

## 2023-12-05 DEVICE — DEVICE OCCL W/ 35MM PRELD VCLIP FR LAA EXCLUSION SYS: Type: IMPLANTABLE DEVICE | Site: HEART | Status: FUNCTIONAL

## 2023-12-05 DEVICE — SCREW BNE L17MM DIA2.3MM THOR STRNL TI LOK DRL FREE LEV 1: Type: IMPLANTABLE DEVICE | Site: CHEST  WALL | Status: FUNCTIONAL

## 2023-12-05 DEVICE — SCREW BNE L13MM DIA2.3MM THOR STRNL TI LOK DRL FREE LEV 1: Type: IMPLANTABLE DEVICE | Site: CHEST  WALL | Status: FUNCTIONAL

## 2023-12-05 DEVICE — PLATE BNE L208MM THK2.1/2.5MM 14 H THOR STRNL PEEK LOK LSS: Type: IMPLANTABLE DEVICE | Site: STERNUM | Status: FUNCTIONAL

## 2023-12-05 RX ORDER — MIDAZOLAM HYDROCHLORIDE 1 MG/ML
INJECTION INTRAMUSCULAR; INTRAVENOUS PRN
Status: DISCONTINUED | OUTPATIENT
Start: 2023-12-05 | End: 2023-12-05 | Stop reason: SDUPTHER

## 2023-12-05 RX ORDER — LANOLIN ALCOHOL/MO/W.PET/CERES
400 CREAM (GRAM) TOPICAL 2 TIMES DAILY
Status: DISCONTINUED | OUTPATIENT
Start: 2023-12-06 | End: 2023-12-05 | Stop reason: SDUPTHER

## 2023-12-05 RX ORDER — MEPERIDINE HYDROCHLORIDE 50 MG/ML
25 INJECTION INTRAMUSCULAR; INTRAVENOUS; SUBCUTANEOUS
Status: DISCONTINUED | OUTPATIENT
Start: 2023-12-05 | End: 2023-12-05 | Stop reason: SDUPTHER

## 2023-12-05 RX ORDER — FENTANYL CITRATE 0.05 MG/ML
INJECTION, SOLUTION INTRAMUSCULAR; INTRAVENOUS PRN
Status: DISCONTINUED | OUTPATIENT
Start: 2023-12-05 | End: 2023-12-05 | Stop reason: SDUPTHER

## 2023-12-05 RX ORDER — ACETAMINOPHEN 325 MG/1
650 TABLET ORAL EVERY 6 HOURS
Status: DISCONTINUED | OUTPATIENT
Start: 2023-12-06 | End: 2023-12-05 | Stop reason: SDUPTHER

## 2023-12-05 RX ORDER — ONDANSETRON 4 MG/1
4 TABLET, ORALLY DISINTEGRATING ORAL EVERY 8 HOURS PRN
Status: DISCONTINUED | OUTPATIENT
Start: 2023-12-05 | End: 2023-12-05 | Stop reason: SDUPTHER

## 2023-12-05 RX ORDER — DEXTROSE AND SODIUM CHLORIDE 5; .45 G/100ML; G/100ML
INJECTION, SOLUTION INTRAVENOUS CONTINUOUS
Status: DISCONTINUED | OUTPATIENT
Start: 2023-12-05 | End: 2023-12-05 | Stop reason: SDUPTHER

## 2023-12-05 RX ORDER — DEXTROSE MONOHYDRATE 25 G/50ML
INJECTION, SOLUTION INTRAVENOUS PRN
Status: DISCONTINUED | OUTPATIENT
Start: 2023-12-05 | End: 2023-12-05 | Stop reason: SDUPTHER

## 2023-12-05 RX ORDER — FAMOTIDINE 10 MG/ML
20 INJECTION, SOLUTION INTRAVENOUS 2 TIMES DAILY
Status: DISCONTINUED | OUTPATIENT
Start: 2023-12-05 | End: 2023-12-05 | Stop reason: SDUPTHER

## 2023-12-05 RX ORDER — DEXTROSE MONOHYDRATE 100 MG/ML
INJECTION, SOLUTION INTRAVENOUS CONTINUOUS PRN
Status: DISCONTINUED | OUTPATIENT
Start: 2023-12-05 | End: 2023-12-05 | Stop reason: SDUPTHER

## 2023-12-05 RX ORDER — BISACODYL 5 MG/1
5 TABLET, DELAYED RELEASE ORAL DAILY
Status: DISCONTINUED | OUTPATIENT
Start: 2023-12-06 | End: 2023-12-05 | Stop reason: SDUPTHER

## 2023-12-05 RX ORDER — KETAMINE HYDROCHLORIDE 100 MG/ML
INJECTION INTRAMUSCULAR; INTRAVENOUS PRN
Status: DISCONTINUED | OUTPATIENT
Start: 2023-12-05 | End: 2023-12-05 | Stop reason: SDUPTHER

## 2023-12-05 RX ORDER — SODIUM CHLORIDE 9 MG/ML
INJECTION, SOLUTION INTRAVENOUS CONTINUOUS PRN
Status: DISCONTINUED | OUTPATIENT
Start: 2023-12-05 | End: 2023-12-05 | Stop reason: SDUPTHER

## 2023-12-05 RX ORDER — FUROSEMIDE 10 MG/ML
20 INJECTION INTRAMUSCULAR; INTRAVENOUS 4 TIMES DAILY
Status: DISCONTINUED | OUTPATIENT
Start: 2023-12-06 | End: 2023-12-07

## 2023-12-05 RX ORDER — MAGNESIUM SULFATE IN WATER 40 MG/ML
2000 INJECTION, SOLUTION INTRAVENOUS PRN
Status: DISCONTINUED | OUTPATIENT
Start: 2023-12-05 | End: 2023-12-05 | Stop reason: SDUPTHER

## 2023-12-05 RX ORDER — FAMOTIDINE 20 MG/1
20 TABLET, FILM COATED ORAL 2 TIMES DAILY
Status: DISCONTINUED | OUTPATIENT
Start: 2023-12-05 | End: 2023-12-07

## 2023-12-05 RX ORDER — FONDAPARINUX SODIUM 2.5 MG/.5ML
2.5 INJECTION SUBCUTANEOUS DAILY
Status: DISCONTINUED | OUTPATIENT
Start: 2023-12-06 | End: 2023-12-07

## 2023-12-05 RX ORDER — MORPHINE SULFATE 4 MG/ML
4 INJECTION, SOLUTION INTRAMUSCULAR; INTRAVENOUS
Status: DISCONTINUED | OUTPATIENT
Start: 2023-12-05 | End: 2023-12-05 | Stop reason: SDUPTHER

## 2023-12-05 RX ORDER — DEXTROSE MONOHYDRATE 100 MG/ML
INJECTION, SOLUTION INTRAVENOUS CONTINUOUS PRN
Status: DISCONTINUED | OUTPATIENT
Start: 2023-12-05 | End: 2023-12-19 | Stop reason: HOSPADM

## 2023-12-05 RX ORDER — POTASSIUM CHLORIDE 29.8 MG/ML
20 INJECTION INTRAVENOUS PRN
Status: DISCONTINUED | OUTPATIENT
Start: 2023-12-05 | End: 2023-12-13

## 2023-12-05 RX ORDER — HYDRALAZINE HYDROCHLORIDE 20 MG/ML
5 INJECTION INTRAMUSCULAR; INTRAVENOUS EVERY 5 MIN PRN
Status: DISCONTINUED | OUTPATIENT
Start: 2023-12-05 | End: 2023-12-05 | Stop reason: SDUPTHER

## 2023-12-05 RX ORDER — ALBUTEROL SULFATE 2.5 MG/3ML
2.5 SOLUTION RESPIRATORY (INHALATION)
Status: DISCONTINUED | OUTPATIENT
Start: 2023-12-05 | End: 2023-12-05 | Stop reason: SDUPTHER

## 2023-12-05 RX ORDER — SODIUM CHLORIDE, SODIUM LACTATE, POTASSIUM CHLORIDE, CALCIUM CHLORIDE 600; 310; 30; 20 MG/100ML; MG/100ML; MG/100ML; MG/100ML
INJECTION, SOLUTION INTRAVENOUS CONTINUOUS
Status: DISCONTINUED | OUTPATIENT
Start: 2023-12-05 | End: 2023-12-05

## 2023-12-05 RX ORDER — ASPIRIN 81 MG/1
81 TABLET ORAL DAILY
Status: DISCONTINUED | OUTPATIENT
Start: 2023-12-06 | End: 2023-12-19 | Stop reason: HOSPADM

## 2023-12-05 RX ORDER — SODIUM CHLORIDE 0.9 % (FLUSH) 0.9 %
5-40 SYRINGE (ML) INJECTION EVERY 12 HOURS SCHEDULED
Status: DISCONTINUED | OUTPATIENT
Start: 2023-12-05 | End: 2023-12-05 | Stop reason: SDUPTHER

## 2023-12-05 RX ORDER — MIDAZOLAM HYDROCHLORIDE 1 MG/ML
1 INJECTION INTRAMUSCULAR; INTRAVENOUS
Status: DISCONTINUED | OUTPATIENT
Start: 2023-12-05 | End: 2023-12-06

## 2023-12-05 RX ORDER — ATORVASTATIN CALCIUM 40 MG/1
40 TABLET, FILM COATED ORAL NIGHTLY
Status: DISCONTINUED | OUTPATIENT
Start: 2023-12-06 | End: 2023-12-19 | Stop reason: HOSPADM

## 2023-12-05 RX ORDER — METOPROLOL TARTRATE 1 MG/ML
2.5 INJECTION, SOLUTION INTRAVENOUS EVERY 10 MIN PRN
Status: DISCONTINUED | OUTPATIENT
Start: 2023-12-05 | End: 2023-12-05 | Stop reason: SDUPTHER

## 2023-12-05 RX ORDER — ENOXAPARIN SODIUM 100 MG/ML
30 INJECTION SUBCUTANEOUS 2 TIMES DAILY
Status: DISCONTINUED | OUTPATIENT
Start: 2023-12-06 | End: 2023-12-05 | Stop reason: SDUPTHER

## 2023-12-05 RX ORDER — SODIUM CHLORIDE, SODIUM LACTATE, POTASSIUM CHLORIDE, CALCIUM CHLORIDE 600; 310; 30; 20 MG/100ML; MG/100ML; MG/100ML; MG/100ML
INJECTION, SOLUTION INTRAVENOUS CONTINUOUS PRN
Status: DISCONTINUED | OUTPATIENT
Start: 2023-12-05 | End: 2023-12-05 | Stop reason: SDUPTHER

## 2023-12-05 RX ORDER — OXYCODONE HYDROCHLORIDE 5 MG/1
5 TABLET ORAL EVERY 4 HOURS PRN
Status: DISCONTINUED | OUTPATIENT
Start: 2023-12-05 | End: 2023-12-05 | Stop reason: SDUPTHER

## 2023-12-05 RX ORDER — POLYETHYLENE GLYCOL 3350 17 G/17G
17 POWDER, FOR SOLUTION ORAL DAILY
Status: DISCONTINUED | OUTPATIENT
Start: 2023-12-06 | End: 2023-12-19 | Stop reason: HOSPADM

## 2023-12-05 RX ORDER — FUROSEMIDE 40 MG/1
40 TABLET ORAL 2 TIMES DAILY
Status: DISCONTINUED | OUTPATIENT
Start: 2023-12-08 | End: 2023-12-05 | Stop reason: SDUPTHER

## 2023-12-05 RX ORDER — PROTAMINE SULFATE 10 MG/ML
50 INJECTION, SOLUTION INTRAVENOUS
Status: ACTIVE | OUTPATIENT
Start: 2023-12-05 | End: 2023-12-06

## 2023-12-05 RX ORDER — CISATRACURIUM BESYLATE 2 MG/ML
INJECTION, SOLUTION INTRAVENOUS PRN
Status: DISCONTINUED | OUTPATIENT
Start: 2023-12-05 | End: 2023-12-05 | Stop reason: SDUPTHER

## 2023-12-05 RX ORDER — MEPERIDINE HYDROCHLORIDE 50 MG/ML
25 INJECTION INTRAMUSCULAR; INTRAVENOUS; SUBCUTANEOUS
Status: DISCONTINUED | OUTPATIENT
Start: 2023-12-05 | End: 2023-12-06

## 2023-12-05 RX ORDER — BISACODYL 5 MG/1
5 TABLET, DELAYED RELEASE ORAL DAILY
Status: DISCONTINUED | OUTPATIENT
Start: 2023-12-06 | End: 2023-12-19 | Stop reason: HOSPADM

## 2023-12-05 RX ORDER — DEXMEDETOMIDINE HYDROCHLORIDE 4 UG/ML
0.2 INJECTION, SOLUTION INTRAVENOUS CONTINUOUS
Status: DISCONTINUED | OUTPATIENT
Start: 2023-12-05 | End: 2023-12-06

## 2023-12-05 RX ORDER — CLOPIDOGREL BISULFATE 75 MG/1
75 TABLET ORAL DAILY
Status: DISCONTINUED | OUTPATIENT
Start: 2023-12-06 | End: 2023-12-05 | Stop reason: SDUPTHER

## 2023-12-05 RX ORDER — EPINEPHRINE 1 MG/ML(1)
AMPUL (ML) INJECTION PRN
Status: DISCONTINUED | OUTPATIENT
Start: 2023-12-05 | End: 2023-12-05 | Stop reason: SDUPTHER

## 2023-12-05 RX ORDER — CHLORHEXIDINE GLUCONATE ORAL RINSE 1.2 MG/ML
15 SOLUTION DENTAL 2 TIMES DAILY
Status: DISCONTINUED | OUTPATIENT
Start: 2023-12-05 | End: 2023-12-19 | Stop reason: HOSPADM

## 2023-12-05 RX ORDER — ONDANSETRON 2 MG/ML
INJECTION INTRAMUSCULAR; INTRAVENOUS PRN
Status: DISCONTINUED | OUTPATIENT
Start: 2023-12-05 | End: 2023-12-05 | Stop reason: SDUPTHER

## 2023-12-05 RX ORDER — ACETAMINOPHEN 500 MG
1000 TABLET ORAL ONCE
Status: COMPLETED | OUTPATIENT
Start: 2023-12-05 | End: 2023-12-05

## 2023-12-05 RX ORDER — PROTAMINE SULFATE 10 MG/ML
INJECTION, SOLUTION INTRAVENOUS PRN
Status: DISCONTINUED | OUTPATIENT
Start: 2023-12-05 | End: 2023-12-05 | Stop reason: SDUPTHER

## 2023-12-05 RX ORDER — HEPARIN SODIUM 1000 [USP'U]/ML
INJECTION, SOLUTION INTRAVENOUS; SUBCUTANEOUS PRN
Status: DISCONTINUED | OUTPATIENT
Start: 2023-12-05 | End: 2023-12-05 | Stop reason: SDUPTHER

## 2023-12-05 RX ORDER — ACETAMINOPHEN 325 MG/1
650 TABLET ORAL EVERY 6 HOURS
Status: DISCONTINUED | OUTPATIENT
Start: 2023-12-05 | End: 2023-12-05 | Stop reason: SDUPTHER

## 2023-12-05 RX ORDER — SODIUM CHLORIDE 9 MG/ML
INJECTION, SOLUTION INTRAVENOUS PRN
Status: DISCONTINUED | OUTPATIENT
Start: 2023-12-05 | End: 2023-12-12

## 2023-12-05 RX ORDER — INSULIN LISPRO 100 [IU]/ML
0-12 INJECTION, SOLUTION INTRAVENOUS; SUBCUTANEOUS
Status: DISCONTINUED | OUTPATIENT
Start: 2023-12-08 | End: 2023-12-07

## 2023-12-05 RX ORDER — INSULIN GLARGINE 100 [IU]/ML
0.15 INJECTION, SOLUTION SUBCUTANEOUS NIGHTLY
Status: DISCONTINUED | OUTPATIENT
Start: 2023-12-07 | End: 2023-12-05 | Stop reason: SDUPTHER

## 2023-12-05 RX ORDER — METHOCARBAMOL 750 MG/1
750 TABLET, FILM COATED ORAL 3 TIMES DAILY
Status: DISCONTINUED | OUTPATIENT
Start: 2023-12-05 | End: 2023-12-07

## 2023-12-05 RX ORDER — HYDRALAZINE HYDROCHLORIDE 20 MG/ML
5 INJECTION INTRAMUSCULAR; INTRAVENOUS EVERY 5 MIN PRN
Status: DISCONTINUED | OUTPATIENT
Start: 2023-12-05 | End: 2023-12-19 | Stop reason: HOSPADM

## 2023-12-05 RX ORDER — ACETAMINOPHEN 325 MG/1
650 TABLET ORAL EVERY 6 HOURS
Status: DISCONTINUED | OUTPATIENT
Start: 2023-12-05 | End: 2023-12-16

## 2023-12-05 RX ORDER — ASPIRIN 325 MG
325 TABLET, DELAYED RELEASE (ENTERIC COATED) ORAL ONCE
Status: COMPLETED | OUTPATIENT
Start: 2023-12-05 | End: 2023-12-05

## 2023-12-05 RX ORDER — MORPHINE SULFATE 4 MG/ML
4 INJECTION, SOLUTION INTRAMUSCULAR; INTRAVENOUS
Status: DISCONTINUED | OUTPATIENT
Start: 2023-12-05 | End: 2023-12-19 | Stop reason: HOSPADM

## 2023-12-05 RX ORDER — INSULIN LISPRO 100 [IU]/ML
0-6 INJECTION, SOLUTION INTRAVENOUS; SUBCUTANEOUS NIGHTLY
Status: DISCONTINUED | OUTPATIENT
Start: 2023-12-08 | End: 2023-12-07

## 2023-12-05 RX ORDER — POLYETHYLENE GLYCOL 3350 17 G/17G
17 POWDER, FOR SOLUTION ORAL DAILY
Status: DISCONTINUED | OUTPATIENT
Start: 2023-12-06 | End: 2023-12-05 | Stop reason: SDUPTHER

## 2023-12-05 RX ORDER — SODIUM CHLORIDE 9 MG/ML
INJECTION, SOLUTION INTRAVENOUS PRN
Status: DISCONTINUED | OUTPATIENT
Start: 2023-12-05 | End: 2023-12-05 | Stop reason: SDUPTHER

## 2023-12-05 RX ORDER — GABAPENTIN 100 MG/1
100 CAPSULE ORAL 3 TIMES DAILY
Status: DISCONTINUED | OUTPATIENT
Start: 2023-12-05 | End: 2023-12-07

## 2023-12-05 RX ORDER — ONDANSETRON 2 MG/ML
4 INJECTION INTRAMUSCULAR; INTRAVENOUS EVERY 6 HOURS PRN
Status: DISCONTINUED | OUTPATIENT
Start: 2023-12-05 | End: 2023-12-05 | Stop reason: SDUPTHER

## 2023-12-05 RX ORDER — FAMOTIDINE 10 MG/ML
20 INJECTION, SOLUTION INTRAVENOUS 2 TIMES DAILY
Status: DISCONTINUED | OUTPATIENT
Start: 2023-12-05 | End: 2023-12-07

## 2023-12-05 RX ORDER — CLOPIDOGREL BISULFATE 75 MG/1
75 TABLET ORAL DAILY
Status: DISCONTINUED | OUTPATIENT
Start: 2023-12-06 | End: 2023-12-19 | Stop reason: HOSPADM

## 2023-12-05 RX ORDER — DEXAMETHASONE SODIUM PHOSPHATE 4 MG/ML
INJECTION, SOLUTION INTRA-ARTICULAR; INTRALESIONAL; INTRAMUSCULAR; INTRAVENOUS; SOFT TISSUE PRN
Status: DISCONTINUED | OUTPATIENT
Start: 2023-12-05 | End: 2023-12-05 | Stop reason: SDUPTHER

## 2023-12-05 RX ORDER — SODIUM CHLORIDE 0.9 % (FLUSH) 0.9 %
5-40 SYRINGE (ML) INJECTION PRN
Status: DISCONTINUED | OUTPATIENT
Start: 2023-12-05 | End: 2023-12-19 | Stop reason: HOSPADM

## 2023-12-05 RX ORDER — INSULIN GLARGINE 100 [IU]/ML
0.15 INJECTION, SOLUTION SUBCUTANEOUS NIGHTLY
Status: DISCONTINUED | OUTPATIENT
Start: 2023-12-07 | End: 2023-12-18

## 2023-12-05 RX ORDER — CHLORHEXIDINE GLUCONATE ORAL RINSE 1.2 MG/ML
15 SOLUTION DENTAL 2 TIMES DAILY
Status: DISCONTINUED | OUTPATIENT
Start: 2023-12-05 | End: 2023-12-05 | Stop reason: SDUPTHER

## 2023-12-05 RX ORDER — MIDAZOLAM HYDROCHLORIDE 1 MG/ML
1 INJECTION INTRAMUSCULAR; INTRAVENOUS
Status: DISCONTINUED | OUTPATIENT
Start: 2023-12-05 | End: 2023-12-05 | Stop reason: SDUPTHER

## 2023-12-05 RX ORDER — METOPROLOL TARTRATE 1 MG/ML
2.5 INJECTION, SOLUTION INTRAVENOUS EVERY 10 MIN PRN
Status: DISCONTINUED | OUTPATIENT
Start: 2023-12-05 | End: 2023-12-19 | Stop reason: HOSPADM

## 2023-12-05 RX ORDER — ALBUMIN, HUMAN INJ 5% 5 %
25 SOLUTION INTRAVENOUS PRN
Status: DISCONTINUED | OUTPATIENT
Start: 2023-12-05 | End: 2023-12-05 | Stop reason: SDUPTHER

## 2023-12-05 RX ORDER — MORPHINE SULFATE 2 MG/ML
2 INJECTION, SOLUTION INTRAMUSCULAR; INTRAVENOUS
Status: DISCONTINUED | OUTPATIENT
Start: 2023-12-05 | End: 2023-12-19 | Stop reason: HOSPADM

## 2023-12-05 RX ORDER — ALBUTEROL SULFATE 2.5 MG/3ML
2.5 SOLUTION RESPIRATORY (INHALATION)
Status: DISCONTINUED | OUTPATIENT
Start: 2023-12-05 | End: 2023-12-19 | Stop reason: HOSPADM

## 2023-12-05 RX ORDER — ALBUMIN, HUMAN INJ 5% 5 %
25 SOLUTION INTRAVENOUS PRN
Status: COMPLETED | OUTPATIENT
Start: 2023-12-05 | End: 2023-12-07

## 2023-12-05 RX ORDER — PROTAMINE SULFATE 10 MG/ML
50 INJECTION, SOLUTION INTRAVENOUS
Status: DISCONTINUED | OUTPATIENT
Start: 2023-12-05 | End: 2023-12-05 | Stop reason: SDUPTHER

## 2023-12-05 RX ORDER — KETOROLAC TROMETHAMINE 30 MG/ML
INJECTION, SOLUTION INTRAMUSCULAR; INTRAVENOUS PRN
Status: DISCONTINUED | OUTPATIENT
Start: 2023-12-05 | End: 2023-12-05 | Stop reason: SDUPTHER

## 2023-12-05 RX ORDER — OXYCODONE HYDROCHLORIDE 5 MG/1
5 TABLET ORAL EVERY 4 HOURS PRN
Status: DISCONTINUED | OUTPATIENT
Start: 2023-12-05 | End: 2023-12-19 | Stop reason: HOSPADM

## 2023-12-05 RX ORDER — FAMOTIDINE 20 MG/1
20 TABLET, FILM COATED ORAL 2 TIMES DAILY
Status: DISCONTINUED | OUTPATIENT
Start: 2023-12-05 | End: 2023-12-05 | Stop reason: SDUPTHER

## 2023-12-05 RX ORDER — ASPIRIN 325 MG
325 TABLET, DELAYED RELEASE (ENTERIC COATED) ORAL ONCE
Status: DISCONTINUED | OUTPATIENT
Start: 2023-12-05 | End: 2023-12-05 | Stop reason: SDUPTHER

## 2023-12-05 RX ORDER — MORPHINE SULFATE 2 MG/ML
2 INJECTION, SOLUTION INTRAMUSCULAR; INTRAVENOUS
Status: DISCONTINUED | OUTPATIENT
Start: 2023-12-05 | End: 2023-12-05 | Stop reason: SDUPTHER

## 2023-12-05 RX ORDER — ATORVASTATIN CALCIUM 40 MG/1
40 TABLET, FILM COATED ORAL NIGHTLY
Status: DISCONTINUED | OUTPATIENT
Start: 2023-12-06 | End: 2023-12-05 | Stop reason: SDUPTHER

## 2023-12-05 RX ORDER — ASPIRIN 81 MG/1
81 TABLET ORAL DAILY
Status: DISCONTINUED | OUTPATIENT
Start: 2023-12-06 | End: 2023-12-05 | Stop reason: SDUPTHER

## 2023-12-05 RX ORDER — ONDANSETRON 2 MG/ML
4 INJECTION INTRAMUSCULAR; INTRAVENOUS EVERY 6 HOURS PRN
Status: DISCONTINUED | OUTPATIENT
Start: 2023-12-05 | End: 2023-12-19 | Stop reason: HOSPADM

## 2023-12-05 RX ORDER — ONDANSETRON 4 MG/1
4 TABLET, ORALLY DISINTEGRATING ORAL EVERY 8 HOURS PRN
Status: DISCONTINUED | OUTPATIENT
Start: 2023-12-05 | End: 2023-12-19 | Stop reason: HOSPADM

## 2023-12-05 RX ORDER — FUROSEMIDE 10 MG/ML
20 INJECTION INTRAMUSCULAR; INTRAVENOUS 4 TIMES DAILY
Status: DISCONTINUED | OUTPATIENT
Start: 2023-12-06 | End: 2023-12-05 | Stop reason: SDUPTHER

## 2023-12-05 RX ORDER — POTASSIUM CHLORIDE 29.8 MG/ML
20 INJECTION INTRAVENOUS PRN
Status: DISCONTINUED | OUTPATIENT
Start: 2023-12-05 | End: 2023-12-05 | Stop reason: SDUPTHER

## 2023-12-05 RX ORDER — SODIUM CHLORIDE 0.9 % (FLUSH) 0.9 %
5-40 SYRINGE (ML) INJECTION PRN
Status: DISCONTINUED | OUTPATIENT
Start: 2023-12-05 | End: 2023-12-05 | Stop reason: SDUPTHER

## 2023-12-05 RX ORDER — SODIUM CHLORIDE 9 MG/ML
INJECTION, SOLUTION INTRAVENOUS PRN
Status: DISCONTINUED | OUTPATIENT
Start: 2023-12-05 | End: 2023-12-05 | Stop reason: HOSPADM

## 2023-12-05 RX ORDER — SODIUM CHLORIDE 0.9 % (FLUSH) 0.9 %
5-40 SYRINGE (ML) INJECTION EVERY 12 HOURS SCHEDULED
Status: DISCONTINUED | OUTPATIENT
Start: 2023-12-05 | End: 2023-12-19 | Stop reason: HOSPADM

## 2023-12-05 RX ORDER — LANOLIN ALCOHOL/MO/W.PET/CERES
400 CREAM (GRAM) TOPICAL 2 TIMES DAILY
Status: DISCONTINUED | OUTPATIENT
Start: 2023-12-06 | End: 2023-12-12

## 2023-12-05 RX ORDER — CALCIUM CHLORIDE 100 MG/ML
INJECTION INTRAVENOUS; INTRAVENTRICULAR PRN
Status: DISCONTINUED | OUTPATIENT
Start: 2023-12-05 | End: 2023-12-05 | Stop reason: SDUPTHER

## 2023-12-05 RX ORDER — FUROSEMIDE 40 MG/1
40 TABLET ORAL 2 TIMES DAILY
Status: DISCONTINUED | OUTPATIENT
Start: 2023-12-08 | End: 2023-12-16

## 2023-12-05 RX ORDER — DOBUTAMINE HYDROCHLORIDE 200 MG/100ML
2.5-1 INJECTION INTRAVENOUS CONTINUOUS
Status: DISCONTINUED | OUTPATIENT
Start: 2023-12-05 | End: 2023-12-06

## 2023-12-05 RX ORDER — DEXTROSE AND SODIUM CHLORIDE 5; .45 G/100ML; G/100ML
INJECTION, SOLUTION INTRAVENOUS CONTINUOUS
Status: DISCONTINUED | OUTPATIENT
Start: 2023-12-05 | End: 2023-12-06

## 2023-12-05 RX ADMIN — KETAMINE HYDROCHLORIDE 25 MG: 100 INJECTION INTRAMUSCULAR; INTRAVENOUS at 13:30

## 2023-12-05 RX ADMIN — SODIUM CHLORIDE 2 MCG/MIN: 9 INJECTION, SOLUTION INTRAVENOUS at 10:10

## 2023-12-05 RX ADMIN — SODIUM CHLORIDE, SODIUM LACTATE, POTASSIUM CHLORIDE, AND CALCIUM CHLORIDE: .6; .31; .03; .02 INJECTION, SOLUTION INTRAVENOUS at 07:45

## 2023-12-05 RX ADMIN — SODIUM CHLORIDE 3 UNITS/HR: 9 INJECTION, SOLUTION INTRAVENOUS at 14:29

## 2023-12-05 RX ADMIN — METHOCARBAMOL TABLETS 750 MG: 750 TABLET, COATED ORAL at 20:23

## 2023-12-05 RX ADMIN — CISATRACURIUM BESYLATE 10 MG: 2 INJECTION, SOLUTION INTRAVENOUS at 09:30

## 2023-12-05 RX ADMIN — SODIUM CHLORIDE 2.5 MG/HR: 9 INJECTION, SOLUTION INTRAVENOUS at 13:35

## 2023-12-05 RX ADMIN — SODIUM CHLORIDE 10 MCG/MIN: 0.9 INJECTION, SOLUTION INTRAVENOUS at 16:30

## 2023-12-05 RX ADMIN — CALCIUM CHLORIDE 0.5 G: 100 INJECTION, SOLUTION INTRAVENOUS at 13:20

## 2023-12-05 RX ADMIN — ASPIRIN 325 MG: 325 TABLET, COATED ORAL at 20:23

## 2023-12-05 RX ADMIN — POTASSIUM CHLORIDE 20 MEQ: 29.8 INJECTION, SOLUTION INTRAVENOUS at 15:45

## 2023-12-05 RX ADMIN — PROTAMINE SULFATE 50 MG: 10 INJECTION, SOLUTION INTRAVENOUS at 13:40

## 2023-12-05 RX ADMIN — DEXAMETHASONE SODIUM PHOSPHATE 12 MG: 4 INJECTION, SOLUTION INTRAMUSCULAR; INTRAVENOUS at 13:30

## 2023-12-05 RX ADMIN — VANCOMYCIN HYDROCHLORIDE 2000 MG: 1 INJECTION, POWDER, LYOPHILIZED, FOR SOLUTION INTRAVENOUS at 08:15

## 2023-12-05 RX ADMIN — EPINEPHRINE 10 MCG: 0.1 INJECTION, SOLUTION ENDOTRACHEAL; INTRACARDIAC; INTRAVENOUS at 13:17

## 2023-12-05 RX ADMIN — DEXTROSE MONOHYDRATE 25 G: 25 INJECTION, SOLUTION INTRAVENOUS at 09:51

## 2023-12-05 RX ADMIN — FAMOTIDINE 20 MG: 20 TABLET ORAL at 20:23

## 2023-12-05 RX ADMIN — DEXTROSE MONOHYDRATE: 100 INJECTION, SOLUTION INTRAVENOUS at 11:45

## 2023-12-05 RX ADMIN — DEXMEDETOMIDINE 0.7 MCG/KG/HR: 100 INJECTION, SOLUTION INTRAVENOUS at 08:15

## 2023-12-05 RX ADMIN — PROTAMINE SULFATE 600 MG: 10 INJECTION, SOLUTION INTRAVENOUS at 13:13

## 2023-12-05 RX ADMIN — DEXMEDETOMIDINE 0.7 MCG/KG/HR: 100 INJECTION, SOLUTION INTRAVENOUS at 12:10

## 2023-12-05 RX ADMIN — AMINOCAPROIC ACID 5 G/HR: 250 INJECTION, SOLUTION INTRAVENOUS at 11:20

## 2023-12-05 RX ADMIN — DEXTROSE MONOHYDRATE 12.5 G: 25 INJECTION, SOLUTION INTRAVENOUS at 10:30

## 2023-12-05 RX ADMIN — FENTANYL CITRATE 250 MCG: 50 INJECTION, SOLUTION INTRAMUSCULAR; INTRAVENOUS at 07:51

## 2023-12-05 RX ADMIN — MORPHINE SULFATE 2 MG: 2 INJECTION, SOLUTION INTRAMUSCULAR; INTRAVENOUS at 18:02

## 2023-12-05 RX ADMIN — CALCIUM CHLORIDE 0.25 G: 100 INJECTION, SOLUTION INTRAVENOUS at 14:25

## 2023-12-05 RX ADMIN — SODIUM CHLORIDE: 9 INJECTION, SOLUTION INTRAVENOUS at 15:25

## 2023-12-05 RX ADMIN — SODIUM CHLORIDE, SODIUM LACTATE, POTASSIUM CHLORIDE, AND CALCIUM CHLORIDE: .6; .31; .03; .02 INJECTION, SOLUTION INTRAVENOUS at 14:11

## 2023-12-05 RX ADMIN — SODIUM CHLORIDE 2.32 UNITS/HR: 9 INJECTION, SOLUTION INTRAVENOUS at 19:27

## 2023-12-05 RX ADMIN — DOBUTAMINE HYDROCHLORIDE 2 MCG/KG/MIN: 200 INJECTION INTRAVENOUS at 15:10

## 2023-12-05 RX ADMIN — KETOROLAC TROMETHAMINE 60 MG: 30 INJECTION, SOLUTION INTRAMUSCULAR; INTRAVENOUS at 13:30

## 2023-12-05 RX ADMIN — Medication 3000 MG: at 20:30

## 2023-12-05 RX ADMIN — KETAMINE HYDROCHLORIDE 75 MG: 100 INJECTION INTRAMUSCULAR; INTRAVENOUS at 07:51

## 2023-12-05 RX ADMIN — SODIUM BICARBONATE 100 MEQ: 84 INJECTION INTRAVENOUS at 16:50

## 2023-12-05 RX ADMIN — ONDANSETRON 8 MG: 2 INJECTION INTRAMUSCULAR; INTRAVENOUS at 13:30

## 2023-12-05 RX ADMIN — SODIUM CHLORIDE: 9 INJECTION, SOLUTION INTRAVENOUS at 08:15

## 2023-12-05 RX ADMIN — CISATRACURIUM BESYLATE 10 MG: 2 INJECTION, SOLUTION INTRAVENOUS at 11:30

## 2023-12-05 RX ADMIN — DEXTROSE MONOHYDRATE 12.5 G: 25 INJECTION, SOLUTION INTRAVENOUS at 11:23

## 2023-12-05 RX ADMIN — PHENYLEPHRINE HYDROCHLORIDE 100 MCG: 10 INJECTION INTRAVENOUS at 13:15

## 2023-12-05 RX ADMIN — FENTANYL CITRATE 250 MCG: 50 INJECTION, SOLUTION INTRAMUSCULAR; INTRAVENOUS at 08:15

## 2023-12-05 RX ADMIN — GABAPENTIN 100 MG: 100 CAPSULE ORAL at 20:23

## 2023-12-05 RX ADMIN — FENTANYL CITRATE 1500 MCG: 50 INJECTION, SOLUTION INTRAMUSCULAR; INTRAVENOUS at 09:59

## 2023-12-05 RX ADMIN — ACETAMINOPHEN 1000 MG: 500 TABLET ORAL at 07:01

## 2023-12-05 RX ADMIN — SODIUM CHLORIDE, SODIUM LACTATE, POTASSIUM CHLORIDE, AND CALCIUM CHLORIDE: .6; .31; .03; .02 INJECTION, SOLUTION INTRAVENOUS at 11:45

## 2023-12-05 RX ADMIN — CISATRACURIUM BESYLATE 40 MG: 2 INJECTION, SOLUTION INTRAVENOUS at 07:52

## 2023-12-05 RX ADMIN — VANCOMYCIN HYDROCHLORIDE 2000 MG: 10 INJECTION, POWDER, LYOPHILIZED, FOR SOLUTION INTRAVENOUS at 21:03

## 2023-12-05 RX ADMIN — ACETAMINOPHEN 650 MG: 325 TABLET ORAL at 20:23

## 2023-12-05 RX ADMIN — HEPARIN SODIUM 54000 UNITS: 1000 INJECTION, SOLUTION INTRAVENOUS; SUBCUTANEOUS at 11:18

## 2023-12-05 RX ADMIN — Medication 2000 MG: at 12:15

## 2023-12-05 RX ADMIN — DEXTROSE MONOHYDRATE 25 G: 25 INJECTION, SOLUTION INTRAVENOUS at 08:25

## 2023-12-05 RX ADMIN — SODIUM CHLORIDE, SODIUM LACTATE, POTASSIUM CHLORIDE, AND CALCIUM CHLORIDE: .6; .31; .03; .02 INJECTION, SOLUTION INTRAVENOUS at 13:41

## 2023-12-05 RX ADMIN — PHENYLEPHRINE HYDROCHLORIDE 100 MCG: 10 INJECTION INTRAVENOUS at 08:25

## 2023-12-05 RX ADMIN — DEXTROSE AND SODIUM CHLORIDE: 5; 450 INJECTION, SOLUTION INTRAVENOUS at 16:48

## 2023-12-05 RX ADMIN — MIDAZOLAM 4 MG: 1 INJECTION INTRAMUSCULAR; INTRAVENOUS at 07:51

## 2023-12-05 RX ADMIN — ALBUTEROL SULFATE 2.5 MG: 2.5 SOLUTION RESPIRATORY (INHALATION) at 19:27

## 2023-12-05 RX ADMIN — CISATRACURIUM BESYLATE 10 MG: 2 INJECTION, SOLUTION INTRAVENOUS at 10:30

## 2023-12-05 RX ADMIN — PHENYLEPHRINE HYDROCHLORIDE 100 MCG: 10 INJECTION INTRAVENOUS at 13:17

## 2023-12-05 RX ADMIN — SODIUM CHLORIDE, SODIUM LACTATE, POTASSIUM CHLORIDE, AND CALCIUM CHLORIDE: .6; .31; .03; .02 INJECTION, SOLUTION INTRAVENOUS at 13:58

## 2023-12-05 RX ADMIN — EPINEPHRINE 10 MCG: 0.1 INJECTION, SOLUTION ENDOTRACHEAL; INTRACARDIAC; INTRAVENOUS at 08:25

## 2023-12-05 RX ADMIN — Medication 3000 MG: at 08:15

## 2023-12-05 ASSESSMENT — PULMONARY FUNCTION TESTS
PIF_VALUE: 36
PIF_VALUE: 23
PIF_VALUE: 23
PIF_VALUE: 27
PIF_VALUE: 16
PIF_VALUE: 16
PIF_VALUE: 15
PIF_VALUE: 15
PIF_VALUE: 22
PIF_VALUE: 34
PIF_VALUE: 16
PIF_VALUE: 31

## 2023-12-05 ASSESSMENT — PAIN - FUNCTIONAL ASSESSMENT: PAIN_FUNCTIONAL_ASSESSMENT: 0-10

## 2023-12-05 ASSESSMENT — PAIN DESCRIPTION - LOCATION: LOCATION: INCISION

## 2023-12-05 ASSESSMENT — PAIN SCALES - GENERAL: PAINLEVEL_OUTOF10: 10

## 2023-12-05 ASSESSMENT — PAIN DESCRIPTION - DESCRIPTORS: DESCRIPTORS: ACHING;SORE

## 2023-12-05 NOTE — PROGRESS NOTES
12/05/23 1538   Patient Observation   Pulse 63   Respirations 16   Vent Information   Vent Mode AC/VC   $Ventilation $Initial Day   Ventilator Settings   FiO2  60 %   Vt (Set, mL) 750 mL   Resp Rate (Set) 12 bpm   PEEP/CPAP (cmH2O) 7   Vent Patient Data (Readings)   Vt (Measured) 800 mL   Peak Inspiratory Pressure (cmH2O) 31 cmH2O   Rate Measured 12 br/min   Minute Volume (L/min) 10 Liters   Mean Airway Pressure (cmH2O) 16 cmH20   Plateau Pressure (cm H2O) 0 cm H2O   Driving Pressure -7   I:E Ratio 1:1.70   Vent Alarm Settings   High Pressure (cmH2O) 40 cmH2O   Low Minute Volume (lpm) 2 L/min   RR High (bpm) 40 br/min   Additional Respiratoray Assessments   Humidification Source HME   Ambu Bag With Mask At Bedside Yes   ETT    Placement Date/Time: 12/05/23 0754   Placement Verified By: Capnometry;Direct visualization  Preoxygenation: Yes  Mask Ventilation: Ventilated by mask with oral airway (2)  Technique: Direct laryngoscopy;Stylet  Airway Type: Cuffed  Airway Tube Size: . ..    Secured At 24 cm   Measured From Lips   ETT Placement Right   Secured By Commercial tube arroyo   Site Assessment Cool;Dry   Cuff Pressure 30 cm H2O

## 2023-12-05 NOTE — H&P
Consultation H&P    Date of Admission:  No admission date for patient encounter. Date of Consultation:  11/15/2023    PCP:  Michele Ho DO      Chief Complaint:  CAD     History of Present Illness: We are asked to see this patient in consultation by Dr. Lois Osullivan regarding CAD. Berta Winslow is a 69 y.o. male with PMH of CAD, GERD, hyperlipidemia, and sleep apnea, who presents to us for consultation regarding CAD. Pt has a prior history of balloon angioplasty and PCI with stent placement x5. Pt reports in September he developed chest tightness and shortness of breath while mowing his grass. This prompted him to follow up with his cardiologist.      Pt is currently taking Plavix and ASA. Pt reports daily alcohol consumption of 2 or more servings of wine/beer/vodka alternating. Past Medical History:  Past Medical History:   Diagnosis Date    Arthritis     CAD (coronary artery disease)     Cancer ([address])     nose    GERD (gastroesophageal reflux disease)     Hyperlipidemia     Hypertension     Osteoarthritis     Sleep apnea        Past Surgical History:  Past Surgical History:   Procedure Laterality Date    CATARACT EXTRACTION, BILATERAL Bilateral     CATARACT REMOVAL Bilateral     COLONOSCOPY      CORONARY ANGIOPLASTY  08/26/2015    CORONARY ANGIOPLASTY WITH STENT PLACEMENT      ENDOSCOPY, COLON, DIAGNOSTIC      JOINT REPLACEMENT      KNEE SURGERY      SKIN BIOPSY      nose    TOTAL KNEE ARTHROPLASTY Right 09/18/2019    RIGHT TOTALKNEE MAKOPLASTY WITH ADDUCTOR CANAL BLOCK FOR PAIN CONTROL             HARRIET BOSS  CPT CODE - 82761 performed by Linh Maurice MD at [address] [place] [address] Medications:   Prior to Admission medications    Medication Sig Start Date End Date Taking?  Authorizing Provider   Misc Natural Products (ADVANCED JOINT RELIEF) CAPS Take by mouth   Yes Provider, MD Moon   nitroGLYCERIN (NITROSTAT) 0.4 MG SL tablet Place 1 tablet under the tongue every 5 minutes as

## 2023-12-05 NOTE — ANESTHESIA POSTPROCEDURE EVALUATION
Department of Anesthesiology  Postprocedure Note    Patient: Stacia Cruz  MRN: [id number]  YOB: 1945  Date of evaluation: 12/5/2023      Procedure Summary       Date: 12/05/23 Room / Location: [facility] 09 / [address] [place] [address]    Anesthesia Start: 9247 Anesthesia Stop:     Procedure: OPEN CORONARY ARTERY BYPASS GRAFTING TIMES FOUR USING INTERNAL MAMMARY ARTERY, SAPHENOUS VEIN GRAFT, ON PUMP, LILY, TEMPORARY PACING WIRES, LEFT ATRIAL APPENDAGE CLIP PLACEMENT WITH STERNAL PLATING AND BILATERAL PECTORALIS BLOCKS (Chest) Diagnosis:       Coronary artery disease, unspecified vessel or lesion type, unspecified whether angina present, unspecified whether native or transplanted heart      (Coronary artery disease, unspecified vessel or lesion type, unspecified whether angina present, unspecified whether native or transplanted heart [I25.10])    Surgeons: Adore Lopez MD Responsible Provider: Manolo Braxton MD    Anesthesia Type: general ASA Status: 4            Anesthesia Type: No value filed. Jenna Phase I: Jenna Score: 10    Jenna Phase II:        Anesthesia Post Evaluation    Patient location during evaluation: ICU  Patient participation: waiting for patient participation  Level of consciousness: sedated and ventilated  Pain scale: N/A. Airway patency: patent  Nausea: N/A.   Complications: no  Cardiovascular status: hemodynamically stable  Respiratory status: acceptable, ventilator and intubated  Hydration status: stable  Pain control: N/A.

## 2023-12-05 NOTE — PROGRESS NOTES
Pt awake alert follows commands. Explained SBT and pt nodded that he understood instructions. Placed on CPAP 5 PS 10 Fio2 40%. Updated RTD with status.  Thea Kwok RN

## 2023-12-05 NOTE — PROGRESS NOTES
Pt admitted to [facility] room 8 to prep for surgery. Consents reviewed. Labs sent as ordered. OR staff clipped pt. Spouse at bedside.

## 2023-12-05 NOTE — PROGRESS NOTES
Patient admitted to CVU from [address] [place] [address]. ICU monitoring commenced. See flowsheets. Report received from Dr. Eloina Field and Dr. Sandie Pereira. Chest x-ray ordered, completed and read. All tubes and lines in good place. Labs drawn and sent. Assessment complete. Continue monitoring hemodynamics.     RECOVERY PERIOD BEGINS 1300 Vahe Mcmanus, RN

## 2023-12-05 NOTE — PROGRESS NOTES
Pt ABG meets requirements for extubation s/p SBT. NIF = -50 RR-12-14 TV 500s. Extubated to 4 L NC O2 BIPAP ordered.  Dora Horan RN

## 2023-12-05 NOTE — BRIEF OP NOTE
Brief Postoperative Note      Patient: Yonas Macedo  YOB: 1945  MRN: [id number]    Date of Procedure: 12/5/2023    Pre-Op Diagnosis Codes:     * Coronary artery disease, unspecified vessel or lesion type, unspecified whether angina present, unspecified whether native or transplanted heart [I25.10]    Post-Op Diagnosis: Same       Procedure(s):  OPEN CORONARY ARTERY BYPASS GRAFTING TIMES FOUR USING INTERNAL MAMMARY ARTERY, SAPHENOUS VEIN GRAFT, ON PUMP, LILY, TEMPORARY PACING WIRES, LEFT ATRIAL APPENDAGE CLIP PLACEMENT WITH STERNAL PLATING AND BILATERAL PECTORALIS BLOCKS    Surgeon(s):  Fredy Jung MD    Assistant:  Surgical Assistant: Kristal Lorenz Assistant: Laurence Grimm RN    Anesthesia: General    Estimated Blood Loss (mL): Minimal    Complications: None    Specimens:   * No specimens in log *    Implants:  Implant Name Type Inv. Item Serial No.  Lot No. LRB No. Used Action   PLATE BNE [id number] OON2.8/2.1BG 14 H THOR STRNL PEEK JADEN S - [id number]  PLATE BNE [id number] EJT0.2/9.2XH 14 H THOR STRNL PEEK JADEN LSS  KLS ALEXANDRE[employer] -WD [id number] N/A 1 Implanted   DEVICE OCCL W/ 35MM PRELD VCLIP FR ROBBI EXCLUSION SYS - [id number] Cardiovascular occluders DEVICE OCCL W/ 35MM PRELD VCLIP FR ROBBI EXCLUSION SYS  [employer]WD [id number] N/A 1 Implanted   SCREW BNE L13MM DIA2. 3MM THOR STRNL TI JADEN DRL FREE LEV 1 - [id number]  SCREW BNE L13MM DIA2. 3MM THOR STRNL TI JADEN DRL FREE LEV 1  [facility]  N/A 10 Implanted   SCREW BNE L15MM DIA2. 3MM THOR STRNL TI JADEN DRL FREE LEV 1 - [id number]  SCREW BNE L15MM DIA2. 3MM THOR STRNL TI JADEN DRL FREE LEV 1  KLS [employer]LEXANDRE[employer] -WD  N/A 8 Implanted   SCREW BNE L17MM DIA2. 3MM THOR STRNL TI JADEN DRL FREE LEV 1 - [id number]  SCREW BNE L17MM DIA2. 3MM THOR STRNL TI JADEN DRL FREE LEV 1  KLS ALEXANDREA -WD  N/A 2 Implanted       Findings: LIMA to LAD , RSV to D1, RSV to PDA and PL  EF 60      Electronically signed by Fredy Jung MD on 12/5/2023 at 2:05 PM

## 2023-12-06 ENCOUNTER — APPOINTMENT (OUTPATIENT)
Dept: GENERAL RADIOLOGY | Age: 78
DRG: 235 | End: 2023-12-06
Attending: THORACIC SURGERY (CARDIOTHORACIC VASCULAR SURGERY)
Payer: MEDICARE

## 2023-12-06 LAB
ANION GAP SERPL CALCULATED.3IONS-SCNC: 9 MMOL/L (ref 3–16)
BASE EXCESS BLDA CALC-SCNC: -4 MMOL/L (ref -3–3)
BASE EXCESS BLDA CALC-SCNC: -4 MMOL/L (ref -3–3)
BASE EXCESS BLDA CALC-SCNC: -5 MMOL/L (ref -3–3)
BUN SERPL-MCNC: 28 MG/DL (ref 7–20)
CA-I BLD-SCNC: 1.09 MMOL/L (ref 1.12–1.32)
CA-I BLD-SCNC: 1.24 MMOL/L (ref 1.12–1.32)
CA-I BLD-SCNC: 1.33 MMOL/L (ref 1.12–1.32)
CALCIUM SERPL-MCNC: 8.5 MG/DL (ref 8.3–10.6)
CHLORIDE SERPL-SCNC: 107 MMOL/L (ref 99–110)
CO2 BLDA-SCNC: 21 MMOL/L
CO2 BLDA-SCNC: 23 MMOL/L
CO2 BLDA-SCNC: 23 MMOL/L
CO2 SERPL-SCNC: 22 MMOL/L (ref 21–32)
CREAT SERPL-MCNC: 1 MG/DL (ref 0.8–1.3)
DEPRECATED RDW RBC AUTO: 14.1 % (ref 12.4–15.4)
DEPRECATED RDW RBC AUTO: 18.4 % (ref 12.4–15.4)
GFR SERPLBLD CREATININE-BSD FMLA CKD-EPI: >60 ML/MIN/{1.73_M2}
GLUCOSE BLD-MCNC: 108 MG/DL (ref 70–99)
GLUCOSE BLD-MCNC: 109 MG/DL (ref 70–99)
GLUCOSE BLD-MCNC: 110 MG/DL (ref 70–99)
GLUCOSE BLD-MCNC: 118 MG/DL (ref 70–99)
GLUCOSE BLD-MCNC: 120 MG/DL (ref 70–99)
GLUCOSE BLD-MCNC: 124 MG/DL (ref 70–99)
GLUCOSE BLD-MCNC: 125 MG/DL (ref 70–99)
GLUCOSE BLD-MCNC: 126 MG/DL (ref 70–99)
GLUCOSE BLD-MCNC: 127 MG/DL (ref 70–99)
GLUCOSE BLD-MCNC: 130 MG/DL (ref 70–99)
GLUCOSE BLD-MCNC: 134 MG/DL (ref 70–99)
GLUCOSE BLD-MCNC: 144 MG/DL (ref 70–99)
GLUCOSE BLD-MCNC: 164 MG/DL (ref 70–99)
GLUCOSE BLD-MCNC: 80 MG/DL (ref 70–99)
GLUCOSE BLD-MCNC: 98 MG/DL (ref 70–99)
GLUCOSE SERPL-MCNC: 117 MG/DL (ref 70–99)
HCO3 BLDA-SCNC: 19.8 MMOL/L (ref 21–29)
HCO3 BLDA-SCNC: 21.5 MMOL/L (ref 21–29)
HCO3 BLDA-SCNC: 21.5 MMOL/L (ref 21–29)
HCT VFR BLD AUTO: 20.6 % (ref 40.5–52.5)
HCT VFR BLD AUTO: 22.1 % (ref 40.5–52.5)
HCT VFR BLD AUTO: 23 % (ref 40.5–52.5)
HCT VFR BLD AUTO: 23.5 % (ref 40.5–52.5)
HCT VFR BLD AUTO: 25 % (ref 40.5–52.5)
HGB BLD CALC-MCNC: 7.8 GM/DL (ref 13.5–17.5)
HGB BLD CALC-MCNC: 8.4 GM/DL (ref 13.5–17.5)
HGB BLD-MCNC: 7 G/DL (ref 13.5–17.5)
HGB BLD-MCNC: 7.3 G/DL (ref 13.5–17.5)
HGB BLD-MCNC: 7.8 G/DL (ref 13.5–17.5)
INR PPP: 1.34 (ref 0.84–1.16)
LACTATE BLD-SCNC: 1.7 MMOL/L (ref 0.4–2)
MAGNESIUM SERPL-MCNC: 2.3 MG/DL (ref 1.8–2.4)
MCH RBC QN AUTO: 29.7 PG (ref 26–34)
MCH RBC QN AUTO: 31.7 PG (ref 26–34)
MCHC RBC AUTO-ENTMCNC: 33.1 G/DL (ref 31–36)
MCHC RBC AUTO-ENTMCNC: 33.9 G/DL (ref 31–36)
MCV RBC AUTO: 89.9 FL (ref 80–100)
MCV RBC AUTO: 93.5 FL (ref 80–100)
PCO2 BLDA: 31.4 MM HG (ref 35–45)
PCO2 BLDA: 35.7 MM HG (ref 35–45)
PCO2 BLDA: 35.7 MM HG (ref 35–45)
PERFORMED ON: ABNORMAL
PERFORMED ON: NORMAL
PERFORMED ON: NORMAL
PH BLDA: 7.39 [PH] (ref 7.35–7.45)
PH BLDA: 7.39 [PH] (ref 7.35–7.45)
PH BLDA: 7.41 [PH] (ref 7.35–7.45)
PH BLDV: 7.46 [PH] (ref 7.35–7.45)
PLATELET # BLD AUTO: 166 K/UL (ref 135–450)
PLATELET # BLD AUTO: 166 K/UL (ref 135–450)
PMV BLD AUTO: 7.5 FL (ref 5–10.5)
PMV BLD AUTO: 7.7 FL (ref 5–10.5)
PO2 BLDA: 58 MM HG (ref 75–108)
PO2 BLDA: 62.5 MM HG (ref 75–108)
PO2 BLDA: 75.8 MM HG (ref 75–108)
POC SAMPLE TYPE: ABNORMAL
POTASSIUM BLD-SCNC: 4.5 MMOL/L (ref 3.5–5.1)
POTASSIUM SERPL-SCNC: 4.6 MMOL/L (ref 3.5–5.1)
PROTHROMBIN TIME: 16.6 SEC (ref 11.5–14.8)
RBC # BLD AUTO: 2.2 M/UL (ref 4.2–5.9)
RBC # BLD AUTO: 2.46 M/UL (ref 4.2–5.9)
REASON FOR REJECTION: NORMAL
REJECTED TEST: NORMAL
SAO2 % BLDA: 90 % (ref 93–100)
SAO2 % BLDA: 92 % (ref 93–100)
SAO2 % BLDA: 95 % (ref 93–100)
SODIUM BLD-SCNC: 141 MMOL/L (ref 136–145)
SODIUM SERPL-SCNC: 138 MMOL/L (ref 136–145)
WBC # BLD AUTO: 10.9 K/UL (ref 4–11)
WBC # BLD AUTO: 14.3 K/UL (ref 4–11)

## 2023-12-06 PROCEDURE — 80048 BASIC METABOLIC PNL TOTAL CA: CPT

## 2023-12-06 PROCEDURE — 97530 THERAPEUTIC ACTIVITIES: CPT

## 2023-12-06 PROCEDURE — 2140000000 HC CCU INTERMEDIATE R&B

## 2023-12-06 PROCEDURE — 85018 HEMOGLOBIN: CPT

## 2023-12-06 PROCEDURE — 2580000003 HC RX 258

## 2023-12-06 PROCEDURE — 2700000000 HC OXYGEN THERAPY PER DAY

## 2023-12-06 PROCEDURE — 94660 CPAP INITIATION&MGMT: CPT

## 2023-12-06 PROCEDURE — 97110 THERAPEUTIC EXERCISES: CPT

## 2023-12-06 PROCEDURE — 6360000002 HC RX W HCPCS

## 2023-12-06 PROCEDURE — 82330 ASSAY OF CALCIUM: CPT

## 2023-12-06 PROCEDURE — 6360000002 HC RX W HCPCS: Performed by: THORACIC SURGERY (CARDIOTHORACIC VASCULAR SURGERY)

## 2023-12-06 PROCEDURE — 82947 ASSAY GLUCOSE BLOOD QUANT: CPT

## 2023-12-06 PROCEDURE — 6370000000 HC RX 637 (ALT 250 FOR IP)

## 2023-12-06 PROCEDURE — 94640 AIRWAY INHALATION TREATMENT: CPT

## 2023-12-06 PROCEDURE — 85610 PROTHROMBIN TIME: CPT

## 2023-12-06 PROCEDURE — P9045 ALBUMIN (HUMAN), 5%, 250 ML: HCPCS

## 2023-12-06 PROCEDURE — 2580000003 HC RX 258: Performed by: THORACIC SURGERY (CARDIOTHORACIC VASCULAR SURGERY)

## 2023-12-06 PROCEDURE — 94761 N-INVAS EAR/PLS OXIMETRY MLT: CPT

## 2023-12-06 PROCEDURE — 82803 BLOOD GASES ANY COMBINATION: CPT

## 2023-12-06 PROCEDURE — 85014 HEMATOCRIT: CPT

## 2023-12-06 PROCEDURE — 83605 ASSAY OF LACTIC ACID: CPT

## 2023-12-06 PROCEDURE — 2500000003 HC RX 250 WO HCPCS

## 2023-12-06 PROCEDURE — 36430 TRANSFUSION BLD/BLD COMPNT: CPT

## 2023-12-06 PROCEDURE — 85027 COMPLETE CBC AUTOMATED: CPT

## 2023-12-06 PROCEDURE — 84132 ASSAY OF SERUM POTASSIUM: CPT

## 2023-12-06 PROCEDURE — 71045 X-RAY EXAM CHEST 1 VIEW: CPT

## 2023-12-06 PROCEDURE — 84295 ASSAY OF SERUM SODIUM: CPT

## 2023-12-06 PROCEDURE — 6370000000 HC RX 637 (ALT 250 FOR IP): Performed by: THORACIC SURGERY (CARDIOTHORACIC VASCULAR SURGERY)

## 2023-12-06 PROCEDURE — 94669 MECHANICAL CHEST WALL OSCILL: CPT

## 2023-12-06 PROCEDURE — 97163 PT EVAL HIGH COMPLEX 45 MIN: CPT

## 2023-12-06 PROCEDURE — 97166 OT EVAL MOD COMPLEX 45 MIN: CPT

## 2023-12-06 PROCEDURE — 99024 POSTOP FOLLOW-UP VISIT: CPT

## 2023-12-06 PROCEDURE — 83735 ASSAY OF MAGNESIUM: CPT

## 2023-12-06 RX ORDER — SODIUM CHLORIDE 9 MG/ML
INJECTION, SOLUTION INTRAVENOUS PRN
Status: DISCONTINUED | OUTPATIENT
Start: 2023-12-06 | End: 2023-12-07

## 2023-12-06 RX ORDER — SODIUM CHLORIDE 9 MG/ML
INJECTION, SOLUTION INTRAVENOUS PRN
Status: DISCONTINUED | OUTPATIENT
Start: 2023-12-06 | End: 2023-12-11

## 2023-12-06 RX ADMIN — GABAPENTIN 100 MG: 100 CAPSULE ORAL at 08:43

## 2023-12-06 RX ADMIN — CHLORHEXIDINE GLUCONATE 15 ML: 1.2 RINSE ORAL at 20:42

## 2023-12-06 RX ADMIN — CALCIUM CHLORIDE 1000 MG: 100 INJECTION, SOLUTION INTRAVENOUS at 06:47

## 2023-12-06 RX ADMIN — MORPHINE SULFATE 2 MG: 2 INJECTION, SOLUTION INTRAMUSCULAR; INTRAVENOUS at 07:51

## 2023-12-06 RX ADMIN — FUROSEMIDE 20 MG: 10 INJECTION, SOLUTION INTRAMUSCULAR; INTRAVENOUS at 08:39

## 2023-12-06 RX ADMIN — Medication 3000 MG: at 12:38

## 2023-12-06 RX ADMIN — VANCOMYCIN HYDROCHLORIDE 2000 MG: 10 INJECTION, POWDER, LYOPHILIZED, FOR SOLUTION INTRAVENOUS at 20:29

## 2023-12-06 RX ADMIN — ACETAMINOPHEN 650 MG: 325 TABLET ORAL at 16:06

## 2023-12-06 RX ADMIN — MUPIROCIN: 20 OINTMENT TOPICAL at 20:41

## 2023-12-06 RX ADMIN — METOPROLOL TARTRATE 12.5 MG: 25 TABLET, FILM COATED ORAL at 08:43

## 2023-12-06 RX ADMIN — FAMOTIDINE 20 MG: 10 INJECTION, SOLUTION INTRAVENOUS at 08:39

## 2023-12-06 RX ADMIN — VANCOMYCIN HYDROCHLORIDE 2000 MG: 10 INJECTION, POWDER, LYOPHILIZED, FOR SOLUTION INTRAVENOUS at 08:51

## 2023-12-06 RX ADMIN — OXYCODONE 5 MG: 5 TABLET ORAL at 11:53

## 2023-12-06 RX ADMIN — FUROSEMIDE 20 MG: 10 INJECTION, SOLUTION INTRAMUSCULAR; INTRAVENOUS at 13:42

## 2023-12-06 RX ADMIN — CHLORHEXIDINE GLUCONATE 15 ML: 1.2 RINSE ORAL at 08:44

## 2023-12-06 RX ADMIN — OXYCODONE 5 MG: 5 TABLET ORAL at 00:17

## 2023-12-06 RX ADMIN — FUROSEMIDE 20 MG: 10 INJECTION, SOLUTION INTRAMUSCULAR; INTRAVENOUS at 20:42

## 2023-12-06 RX ADMIN — ACETAMINOPHEN 650 MG: 325 TABLET ORAL at 02:48

## 2023-12-06 RX ADMIN — MORPHINE SULFATE 4 MG: 4 INJECTION, SOLUTION INTRAMUSCULAR; INTRAVENOUS at 22:34

## 2023-12-06 RX ADMIN — OXYCODONE 5 MG: 5 TABLET ORAL at 05:42

## 2023-12-06 RX ADMIN — ASPIRIN 81 MG: 81 TABLET, COATED ORAL at 08:43

## 2023-12-06 RX ADMIN — METHOCARBAMOL TABLETS 750 MG: 750 TABLET, COATED ORAL at 20:41

## 2023-12-06 RX ADMIN — BISACODYL 5 MG: 5 TABLET, COATED ORAL at 08:43

## 2023-12-06 RX ADMIN — METHOCARBAMOL TABLETS 750 MG: 750 TABLET, COATED ORAL at 13:44

## 2023-12-06 RX ADMIN — ALBUTEROL SULFATE 2.5 MG: 2.5 SOLUTION RESPIRATORY (INHALATION) at 07:43

## 2023-12-06 RX ADMIN — MORPHINE SULFATE 2 MG: 2 INJECTION, SOLUTION INTRAMUSCULAR; INTRAVENOUS at 04:42

## 2023-12-06 RX ADMIN — GABAPENTIN 100 MG: 100 CAPSULE ORAL at 20:42

## 2023-12-06 RX ADMIN — ALBUTEROL SULFATE 2.5 MG: 2.5 SOLUTION RESPIRATORY (INHALATION) at 11:28

## 2023-12-06 RX ADMIN — MORPHINE SULFATE 2 MG: 2 INJECTION, SOLUTION INTRAMUSCULAR; INTRAVENOUS at 16:06

## 2023-12-06 RX ADMIN — SODIUM CHLORIDE, PRESERVATIVE FREE 10 ML: 5 INJECTION INTRAVENOUS at 08:52

## 2023-12-06 RX ADMIN — ALBUTEROL SULFATE 2.5 MG: 2.5 SOLUTION RESPIRATORY (INHALATION) at 19:55

## 2023-12-06 RX ADMIN — CLOPIDOGREL BISULFATE 75 MG: 75 TABLET ORAL at 08:43

## 2023-12-06 RX ADMIN — ACETAMINOPHEN 650 MG: 325 TABLET ORAL at 20:42

## 2023-12-06 RX ADMIN — METOPROLOL TARTRATE 12.5 MG: 25 TABLET, FILM COATED ORAL at 20:42

## 2023-12-06 RX ADMIN — GABAPENTIN 100 MG: 100 CAPSULE ORAL at 13:44

## 2023-12-06 RX ADMIN — FAMOTIDINE 20 MG: 10 INJECTION, SOLUTION INTRAVENOUS at 20:41

## 2023-12-06 RX ADMIN — SODIUM CHLORIDE, PRESERVATIVE FREE 10 ML: 5 INJECTION INTRAVENOUS at 20:45

## 2023-12-06 RX ADMIN — FUROSEMIDE 20 MG: 10 INJECTION, SOLUTION INTRAMUSCULAR; INTRAVENOUS at 16:59

## 2023-12-06 RX ADMIN — Medication 3000 MG: at 04:28

## 2023-12-06 RX ADMIN — SODIUM CHLORIDE 3.84 UNITS/HR: 9 INJECTION, SOLUTION INTRAVENOUS at 12:14

## 2023-12-06 RX ADMIN — ALBUMIN (HUMAN) 12.5 G: 12.5 INJECTION, SOLUTION INTRAVENOUS at 00:03

## 2023-12-06 RX ADMIN — OXYCODONE 5 MG: 5 TABLET ORAL at 19:25

## 2023-12-06 RX ADMIN — FONDAPARINUX SODIUM 2.5 MG: 2.5 INJECTION, SOLUTION SUBCUTANEOUS at 08:43

## 2023-12-06 RX ADMIN — METHOCARBAMOL TABLETS 750 MG: 750 TABLET, COATED ORAL at 08:43

## 2023-12-06 RX ADMIN — Medication 400 MG: at 20:42

## 2023-12-06 RX ADMIN — ATORVASTATIN CALCIUM 40 MG: 40 TABLET, FILM COATED ORAL at 20:42

## 2023-12-06 RX ADMIN — Medication 3000 MG: at 20:01

## 2023-12-06 RX ADMIN — ACETAMINOPHEN 650 MG: 325 TABLET ORAL at 08:43

## 2023-12-06 ASSESSMENT — PAIN DESCRIPTION - DESCRIPTORS
DESCRIPTORS: ACHING;SORE

## 2023-12-06 ASSESSMENT — PAIN DESCRIPTION - LOCATION
LOCATION: CHEST;BACK
LOCATION: INCISION
LOCATION: BACK;CHEST
LOCATION: INCISION
LOCATION: INCISION
LOCATION: CHEST
LOCATION: INCISION

## 2023-12-06 ASSESSMENT — PAIN SCALES - GENERAL
PAINLEVEL_OUTOF10: 6
PAINLEVEL_OUTOF10: 9
PAINLEVEL_OUTOF10: 7
PAINLEVEL_OUTOF10: 4
PAINLEVEL_OUTOF10: 6
PAINLEVEL_OUTOF10: 2
PAINLEVEL_OUTOF10: 8
PAINLEVEL_OUTOF10: 5
PAINLEVEL_OUTOF10: 6
PAINLEVEL_OUTOF10: 5

## 2023-12-06 NOTE — PROGRESS NOTES
12/05/23 1931   NIV Type   NIV Started/Stopped On   Equipment Type v60   Mode Bilevel   Mask Type Full face mask   Mask Size Large   Assessment   Pulse 68   Respirations 20   Settings/Measurements   IPAP 10 cmH20   CPAP/EPAP 5 cmH2O   Vt (Measured) 683 mL   Rate Ordered 15   FiO2  35 %   Mask Leak (lpm) 0 lpm   Alarm Settings   Alarms On Y   Low Pressure (cmH2O) 6 cmH2O   High Pressure (cmH2O) 30 cmH2O   Patient Observation   Observations mepilex on nose

## 2023-12-06 NOTE — PROGRESS NOTES
Physical Therapy  Facility/Department: [facility] C2 CARD TELEMETRY  Physical Therapy Initial Assessment/Treatment    Name: Aishwarya Zavala  : 1945  MRN: [id number]  Date of Service: 2023    Discharge Recommendations:  IP Rehab   PT Equipment Recommendations  Equipment Needed: No    Therapy discharge recommendations take into account each patient's current medical complexities and are subject to input/oversight from the patient's healthcare team.     Barriers to Home Discharge:   [x] Steps to access home entry or bed/bath:   [x] Unable to transfer, ambulate, or propel wheelchair household distances without assist   [x] Limited available assist at home upon discharge    [] Patient or family requests d/c to post-acute facility    [x] Poor cognition/safety awareness for d/c to home alone    [] Unable to maintain ordered weight bearing status    [] Patient with salient signs of long-standing immobility   [] Decreased independence with ADLs   [x] Increased risk for falls   [] Other:    If pt is unable to be seen after this session, please let this note serve as discharge summary. Please see case management note for discharge disposition. Thank you. Patient Diagnosis(es): There were no encounter diagnoses. Past Medical History:  has a past medical history of Arthritis, CAD (coronary artery disease), Cancer ([address]), GERD (gastroesophageal reflux disease), Hearing aid worn, Hyperlipidemia, Hypertension, Osteoarthritis, Sleep apnea, and Wears glasses. Past Surgical History:  has a past surgical history that includes Coronary angioplasty with stent; Coronary angioplasty (2015); skin biopsy; Colonoscopy; Endoscopy, colon, diagnostic; Cataract removal (Bilateral); Total knee arthroplasty (Right, 2019); joint replacement; knee surgery; Cataract extraction, bilateral (Bilateral); and Coronary artery bypass graft (N/A, 2023).     Assessment   Body Structures, Functions, Activity Limitations Requiring

## 2023-12-06 NOTE — PROGRESS NOTES
Chest tube meet criteria to remove per MD Order. No  air leak. no crepitus. Pt instructed on procedure. Pt premedicated with 2mg Morphine. Site cleansed and prepped per protocol. M/S Chest tube removed without difficulty. Dry sterile dressing applied. Bilateral breath sounds audible. O2 Sats 95 on room air. New sterile dressing on Plural CT. Incision site within normal limits. Patient tolerated well.

## 2023-12-06 NOTE — PROGRESS NOTES
Criteria met per clinical pathway to remove epicardial pacing wires & MD order received. Procedure explained to patient. INR is less than 2.0. Pacing wire site within normal limits. Cleansed site with Chloroprep. Atrial and Ventricular pacing wires removed per policy without difficulty. Dry sterile dressing applied. Vital signs taken every 15 minutes X 2, every 30 minutes X 1, and every hour X 1 recorded in Doc Flowsheets. Patient tolerated procedure well, heart tones unchanged, oxygen saturation within normal limits. No signs/symtoms of cardiac tamponade.        Sung Singh RN

## 2023-12-06 NOTE — PROGRESS NOTES
CVTS Cardiothoracic Progress Note:                                CC:  Post op follow up     Surgery:12/5  OPEN CORONARY ARTERY BYPASS GRAFTING TIMES FOUR USING INTERNAL MAMMARY ARTERY, SAPHENOUS VEIN GRAFT, ON PUMP, LILY, TEMPORARY PACING WIRES, LEFT ATRIAL APPENDAGE CLIP PLACEMENT WITH STERNAL PLATING AND BILATERAL [facility] course:  12/5 DOS- Extubated @ [phone]. Pain difficult to control. High Levo requirement. 12/ POD 1- Hgb 7 this AM. 1 unit PRBC given. Able to wean off Levo. Scheduled Gabapentin and Robaxin ordered for pain. TPW removed.  Plan to remove MS chest tube      Past Medical History:   Diagnosis Date    Arthritis     CAD (coronary artery disease)     Cancer ([address])     nose    GERD (gastroesophageal reflux disease)     Hearing aid worn     denise    Hyperlipidemia     Hypertension     Osteoarthritis     Sleep apnea     CPAP    Wears glasses     reading        Past Surgical History:   Procedure Laterality Date    CATARACT EXTRACTION, BILATERAL Bilateral     CATARACT REMOVAL Bilateral     COLONOSCOPY      CORONARY ANGIOPLASTY  08/26/2015    CORONARY ANGIOPLASTY WITH STENT PLACEMENT      x5    CORONARY ARTERY BYPASS GRAFT N/A 12/5/2023    OPEN CORONARY ARTERY BYPASS GRAFTING TIMES FOUR USING INTERNAL MAMMARY ARTERY, SAPHENOUS VEIN GRAFT, ON PUMP, LILY, TEMPORARY PACING WIRES, LEFT ATRIAL APPENDAGE CLIP PLACEMENT WITH STERNAL PLATING AND BILATERAL PECTORALIS BLOCKS performed by Mikayla Abreu MD at [facility], [address] [facility], [address] BIOPSY      nose    TOTAL KNEE ARTHROPLASTY Right 09/18/2019    RIGHT [place] WITH ADDUCTOR CANAL BLOCK FOR PAIN CONTROL             HARRIETAMANDA FULLERO  CPT CODE - 39516 performed by Lindsey Decker MD at [address] [facility] as of 11/15/2023 - Fully Reviewed 11/15/2023   Allergen Reaction Noted    Lasix [furosemide]  07/27/2015    Neosporin [neomycin-polymyx-gramicid]  03/25/2013    Polysporin

## 2023-12-06 NOTE — PROGRESS NOTES
12/06/23 0344   NIV Type   NIV Started/Stopped On   Equipment Type V60   Mode Bilevel   Mask Type Full face mask   Mask Size Large   Assessment   Pulse 65   Respirations 19   SpO2 99 %   Comfort Level Good   Using Accessory Muscles No   Mask Compliance Good   Skin Assessment Clean, dry, & intact   Skin Protection for O2 Device Yes   Location Nose   Intervention(s) Skin Barrier   Breath Sounds   Right Upper Lobe Clear   Right Middle Lobe Clear   Right Lower Lobe Diminished   Left Upper Lobe Clear   Left Lower Lobe Diminished   Settings/Measurements   IPAP 10 cmH20   CPAP/EPAP 5 cmH2O   Vt (Measured) 416 mL   Rate Ordered 15   FiO2  35 %   Minute Volume (L/min) 7.9 Liters   Mask Leak (lpm) 17 lpm   Patient's Home Machine No   Alarm Settings   Alarms On Y

## 2023-12-06 NOTE — CARE COORDINATION
Case Management Assessment  Initial Evaluation    Date/Time of Evaluation: 12/6/2023 9:49 AM  Assessment Completed by: Wilman Malcolm RN    If patient is discharged prior to next notation, then this note serves as note for discharge by case management. Patient Name: Luca Olea                   YOB: 1945  Diagnosis: Coronary artery disease, unspecified vessel or lesion type, unspecified whether angina present, unspecified whether native or transplanted heart [I25.10]  Coronary artery disease due to calcified coronary lesion [I25.10, I25.84]  CAD in native artery [I25.10]                   Date / Time: 12/5/2023  6:33 AM    Patient Admission Status: Inpatient   Readmission Risk (Low < 19, Mod (19-27), High > 27): Readmission Risk Score: 11.3    Current PCP: Marciano Pettit, DO  PCP verified by CM? Yes    Chart Reviewed: Yes      History Provided by: Patient  Patient Orientation: Alert and Oriented    Patient Cognition: Alert    Hospitalization in the last 30 days (Readmission):  No    If yes, Readmission Assessment in  Navigator will be completed. Advance Directives:      Code Status: Full Code   Patient's Primary Decision Maker is: Named in [facility] E Nedra     Primary Decision MakerMarlucas Calhoun  Spouse - [phone]    Secondary Decision Maker: Onel Abad Child - [phone]    Discharge Planning:    Patient lives with: Spouse/Significant Other Type of Home: House  Primary Care Giver: Self  Patient Support Systems include: Spouse/Significant Other, Family Members, Children   Current Financial resources: Medicare  Current community resources: None  Current services prior to admission: None            Current DME:              Type of Home Care services:  OT, PT, Skilled Therapy (Will likely need  IPR vs home with [facility])    ADLS  Prior functional level:  Independent in ADLs/IADLs  Current functional level: Assistance with the following:, Mobility, Bathing, Dressing, Other (see

## 2023-12-06 NOTE — PROGRESS NOTES
Occupational Therapy  Facility/Department: [facility] C2 CARD TELEMETRY  Occupational Therapy Initial Assessment & Treatment Note    Name: Yonas Macedo  : 1945  MRN: [id number]  Date of Service: 2023    Discharge Recommendations:  IP Rehab (ARU)     Therapy discharge recommendations take into account each patient's current medical complexities and are subject to input/oversight from the patient's healthcare team.     Barriers to Home Discharge:   [x] Steps to access home entry or bed/bath:   [x] Unable to transfer, ambulate, or propel wheelchair household distances without assist   [] Limited available assist at home upon discharge    [] Patient or family requests d/c to post-acute facility    [x] Poor cognition/safety awareness for d/c to home alone    [] Unable to maintain ordered weight bearing status    [] Patient with salient signs of long-standing immobility   [x] Decreased independence with ADLs   [x] Increased risk for falls   [] Other:    If pt is unable to be seen after this session, please let this note serve as discharge summary. Please see case management note for discharge disposition. Thank you. Patient Diagnosis(es): There were no encounter diagnoses. Past Medical History:  has a past medical history of Arthritis, CAD (coronary artery disease), Cancer ([address]), GERD (gastroesophageal reflux disease), Hearing aid worn, Hyperlipidemia, Hypertension, Osteoarthritis, Sleep apnea, and Wears glasses. Past Surgical History:  has a past surgical history that includes Coronary angioplasty with stent; Coronary angioplasty (2015); skin biopsy; Colonoscopy; Endoscopy, colon, diagnostic; Cataract removal (Bilateral); Total knee arthroplasty (Right, 2019); joint replacement; knee surgery; Cataract extraction, bilateral (Bilateral); and Coronary artery bypass graft (N/A, 2023). Assessment   Performance deficits / Impairments: Decreased functional mobility ; Decreased ADL

## 2023-12-06 NOTE — CONSULTS
Comprehensive Nutrition Assessment    Type and Reason for Visit:  Initial, Consult, Patient Education    Nutrition Recommendations/Plan:   Continue regular diet and encourage PO intake   RD to add ensure BID   Monitor nutrition adequacy, pertinent labs, bowel habits, wt changes, and clinical progress     Malnutrition Assessment:  Malnutrition Status: At risk for malnutrition (Comment) (12/06/23 1402)    Context:  Acute Illness     Findings of the 6 clinical characteristics of malnutrition:  Energy Intake:  Mild decrease in energy intake (Comment)    Nutrition Assessment:    Consult for diet education: 69 y.o. male with PMH of CAD, GERD, hyperlipidemia, balloon angioplasty, PCI with stent placement x5 and sleep apnea admitted d/t CAD. S/p CABG yesterday. On regular diet. Pt reports good appetite and stable weight PTA. Reports appetite decreased today, ate 50% of chicken breast for lunch. No wt loss in EMR. Willing to trial ensure to help w/ recovery after surgery. Pt not appropriate for heart healthy diet education d/t poor appetite and fatique, provided handout to family member. Continue to encourage PO intake, will continue to monitor. Nutrition Education:  Consult received for heart healthy diet education. Provided pt's family w/ written and verbal instruction on heart healthy nutrition therapy. Discussed low sodium, whole grains, fruits and vegetables, lean proteins, healthier fat options, and meal planning tips. Educated on heart healthy diet education   Learners: Family  Readiness: Acceptance  Method: Explanation and Handout  Response: Verbalizes Understanding  Contact name and number provided. Nutrition Related Findings:    Generalized + 1 edema. Labs reviewed. BG WNL. On insulin drip. No BM Wound Type: Surgical Incision       Current Nutrition Intake & Therapies:    Average Meal Intake: 26-50%  Average Supplements Intake: None Ordered  ADULT DIET;  Regular    Anthropometric Measures:  Height: 172.7

## 2023-12-07 ENCOUNTER — APPOINTMENT (OUTPATIENT)
Dept: GENERAL RADIOLOGY | Age: 78
DRG: 235 | End: 2023-12-07
Attending: THORACIC SURGERY (CARDIOTHORACIC VASCULAR SURGERY)
Payer: MEDICARE

## 2023-12-07 PROBLEM — N17.9 AKI (ACUTE KIDNEY INJURY) (HCC): Status: ACTIVE | Noted: 2023-12-07

## 2023-12-07 PROBLEM — R91.8 PULMONARY INFILTRATE: Status: ACTIVE | Noted: 2023-12-07

## 2023-12-07 PROBLEM — F10.90 ALCOHOL USE: Status: ACTIVE | Noted: 2023-12-07

## 2023-12-07 PROBLEM — Z95.1 S/P CABG X 4: Status: ACTIVE | Noted: 2023-12-07

## 2023-12-07 PROBLEM — J98.4 RESTRICTIVE LUNG DISEASE: Status: ACTIVE | Noted: 2023-12-07

## 2023-12-07 PROBLEM — J90 PLEURAL EFFUSION ON LEFT: Status: ACTIVE | Noted: 2023-12-07

## 2023-12-07 PROBLEM — D64.9 POSTOPERATIVE ANEMIA: Status: ACTIVE | Noted: 2023-12-07

## 2023-12-07 PROBLEM — Z78.9 ALCOHOL USE: Status: ACTIVE | Noted: 2023-12-07

## 2023-12-07 PROBLEM — D72.829 LEUKOCYTOSIS: Status: ACTIVE | Noted: 2023-12-07

## 2023-12-07 PROBLEM — I51.89 GRADE I DIASTOLIC DYSFUNCTION: Status: ACTIVE | Noted: 2023-12-07

## 2023-12-07 LAB
ANION GAP SERPL CALCULATED.3IONS-SCNC: 14 MMOL/L (ref 3–16)
BASE EXCESS BLDA CALC-SCNC: -4 MMOL/L (ref -3–3)
BUN SERPL-MCNC: 39 MG/DL (ref 7–20)
CA-I BLD-SCNC: 1.26 MMOL/L (ref 1.12–1.32)
CALCIUM SERPL-MCNC: 8.8 MG/DL (ref 8.3–10.6)
CHLORIDE SERPL-SCNC: 106 MMOL/L (ref 99–110)
CO2 BLDA-SCNC: 22 MMOL/L
CO2 SERPL-SCNC: 21 MMOL/L (ref 21–32)
CREAT SERPL-MCNC: 1.7 MG/DL (ref 0.8–1.3)
DEPRECATED RDW RBC AUTO: 18.4 % (ref 12.4–15.4)
DEPRECATED RDW RBC AUTO: 18.6 % (ref 12.4–15.4)
GFR SERPLBLD CREATININE-BSD FMLA CKD-EPI: 41 ML/MIN/{1.73_M2}
GLUCOSE BLD-MCNC: 101 MG/DL (ref 70–99)
GLUCOSE BLD-MCNC: 105 MG/DL (ref 70–99)
GLUCOSE BLD-MCNC: 114 MG/DL (ref 70–99)
GLUCOSE BLD-MCNC: 115 MG/DL (ref 70–99)
GLUCOSE BLD-MCNC: 117 MG/DL (ref 70–99)
GLUCOSE BLD-MCNC: 120 MG/DL (ref 70–99)
GLUCOSE BLD-MCNC: 144 MG/DL (ref 70–99)
GLUCOSE BLD-MCNC: 146 MG/DL (ref 70–99)
GLUCOSE BLD-MCNC: 149 MG/DL (ref 70–99)
GLUCOSE BLD-MCNC: 158 MG/DL (ref 70–99)
GLUCOSE BLD-MCNC: 87 MG/DL (ref 70–99)
GLUCOSE SERPL-MCNC: 118 MG/DL (ref 70–99)
HCO3 BLDA-SCNC: 21.1 MMOL/L (ref 21–29)
HCT VFR BLD AUTO: 23.9 % (ref 40.5–52.5)
HCT VFR BLD AUTO: 24.9 % (ref 40.5–52.5)
HCT VFR BLD AUTO: 26 % (ref 40.5–52.5)
HGB BLD CALC-MCNC: 8.8 GM/DL (ref 13.5–17.5)
HGB BLD-MCNC: 7.9 G/DL (ref 13.5–17.5)
HGB BLD-MCNC: 8.2 G/DL (ref 13.5–17.5)
INR PPP: 1.22 (ref 0.84–1.16)
MAGNESIUM SERPL-MCNC: 2.3 MG/DL (ref 1.8–2.4)
MCH RBC QN AUTO: 29.3 PG (ref 26–34)
MCH RBC QN AUTO: 29.4 PG (ref 26–34)
MCHC RBC AUTO-ENTMCNC: 33 G/DL (ref 31–36)
MCHC RBC AUTO-ENTMCNC: 33 G/DL (ref 31–36)
MCV RBC AUTO: 88.9 FL (ref 80–100)
MCV RBC AUTO: 89.3 FL (ref 80–100)
PCO2 BLDA: 37.2 MM HG (ref 35–45)
PERFORMED ON: ABNORMAL
PERFORMED ON: NORMAL
PH BLDA: 7.36 [PH] (ref 7.35–7.45)
PLATELET # BLD AUTO: 155 K/UL (ref 135–450)
PLATELET # BLD AUTO: 177 K/UL (ref 135–450)
PMV BLD AUTO: 7.4 FL (ref 5–10.5)
PMV BLD AUTO: 7.8 FL (ref 5–10.5)
PO2 BLDA: 71.6 MM HG (ref 75–108)
POC SAMPLE TYPE: ABNORMAL
POTASSIUM SERPL-SCNC: 4.5 MMOL/L (ref 3.5–5.1)
PROTHROMBIN TIME: 15.4 SEC (ref 11.5–14.8)
RBC # BLD AUTO: 2.69 M/UL (ref 4.2–5.9)
RBC # BLD AUTO: 2.79 M/UL (ref 4.2–5.9)
SAO2 % BLDA: 94 % (ref 93–100)
SODIUM SERPL-SCNC: 141 MMOL/L (ref 136–145)
WBC # BLD AUTO: 12.5 K/UL (ref 4–11)
WBC # BLD AUTO: 14.8 K/UL (ref 4–11)

## 2023-12-07 PROCEDURE — 2500000003 HC RX 250 WO HCPCS

## 2023-12-07 PROCEDURE — 6370000000 HC RX 637 (ALT 250 FOR IP)

## 2023-12-07 PROCEDURE — 6370000000 HC RX 637 (ALT 250 FOR IP): Performed by: THORACIC SURGERY (CARDIOTHORACIC VASCULAR SURGERY)

## 2023-12-07 PROCEDURE — 97110 THERAPEUTIC EXERCISES: CPT

## 2023-12-07 PROCEDURE — 71045 X-RAY EXAM CHEST 1 VIEW: CPT

## 2023-12-07 PROCEDURE — 97530 THERAPEUTIC ACTIVITIES: CPT

## 2023-12-07 PROCEDURE — 83735 ASSAY OF MAGNESIUM: CPT

## 2023-12-07 PROCEDURE — 94660 CPAP INITIATION&MGMT: CPT

## 2023-12-07 PROCEDURE — 2140000000 HC CCU INTERMEDIATE R&B

## 2023-12-07 PROCEDURE — 2700000000 HC OXYGEN THERAPY PER DAY

## 2023-12-07 PROCEDURE — 6360000002 HC RX W HCPCS

## 2023-12-07 PROCEDURE — 2580000003 HC RX 258

## 2023-12-07 PROCEDURE — 36430 TRANSFUSION BLD/BLD COMPNT: CPT

## 2023-12-07 PROCEDURE — P9045 ALBUMIN (HUMAN), 5%, 250 ML: HCPCS

## 2023-12-07 PROCEDURE — 80048 BASIC METABOLIC PNL TOTAL CA: CPT

## 2023-12-07 PROCEDURE — 82330 ASSAY OF CALCIUM: CPT

## 2023-12-07 PROCEDURE — 85014 HEMATOCRIT: CPT

## 2023-12-07 PROCEDURE — 99223 1ST HOSP IP/OBS HIGH 75: CPT | Performed by: INTERNAL MEDICINE

## 2023-12-07 PROCEDURE — 82803 BLOOD GASES ANY COMBINATION: CPT

## 2023-12-07 PROCEDURE — 94761 N-INVAS EAR/PLS OXIMETRY MLT: CPT

## 2023-12-07 PROCEDURE — 99024 POSTOP FOLLOW-UP VISIT: CPT

## 2023-12-07 PROCEDURE — 94640 AIRWAY INHALATION TREATMENT: CPT

## 2023-12-07 PROCEDURE — 82947 ASSAY GLUCOSE BLOOD QUANT: CPT

## 2023-12-07 PROCEDURE — 85610 PROTHROMBIN TIME: CPT

## 2023-12-07 PROCEDURE — 85027 COMPLETE CBC AUTOMATED: CPT

## 2023-12-07 PROCEDURE — 94669 MECHANICAL CHEST WALL OSCILL: CPT

## 2023-12-07 RX ORDER — HEPARIN SODIUM 5000 [USP'U]/ML
5000 INJECTION, SOLUTION INTRAVENOUS; SUBCUTANEOUS EVERY 8 HOURS SCHEDULED
Status: DISCONTINUED | OUTPATIENT
Start: 2023-12-07 | End: 2023-12-19

## 2023-12-07 RX ORDER — INSULIN LISPRO 100 [IU]/ML
0-12 INJECTION, SOLUTION INTRAVENOUS; SUBCUTANEOUS
Status: DISCONTINUED | OUTPATIENT
Start: 2023-12-07 | End: 2023-12-18

## 2023-12-07 RX ORDER — METHOCARBAMOL 500 MG/1
1000 TABLET, FILM COATED ORAL 3 TIMES DAILY
Status: DISCONTINUED | OUTPATIENT
Start: 2023-12-07 | End: 2023-12-19 | Stop reason: HOSPADM

## 2023-12-07 RX ORDER — FAMOTIDINE 20 MG/1
20 TABLET, FILM COATED ORAL DAILY
Status: DISCONTINUED | OUTPATIENT
Start: 2023-12-07 | End: 2023-12-19 | Stop reason: HOSPADM

## 2023-12-07 RX ORDER — FAMOTIDINE 10 MG/ML
20 INJECTION, SOLUTION INTRAVENOUS DAILY
Status: DISCONTINUED | OUTPATIENT
Start: 2023-12-07 | End: 2023-12-11

## 2023-12-07 RX ORDER — INSULIN LISPRO 100 [IU]/ML
0-6 INJECTION, SOLUTION INTRAVENOUS; SUBCUTANEOUS NIGHTLY
Status: DISCONTINUED | OUTPATIENT
Start: 2023-12-07 | End: 2023-12-18

## 2023-12-07 RX ADMIN — BISACODYL 5 MG: 5 TABLET, COATED ORAL at 08:37

## 2023-12-07 RX ADMIN — ATORVASTATIN CALCIUM 40 MG: 40 TABLET, FILM COATED ORAL at 20:03

## 2023-12-07 RX ADMIN — ALBUTEROL SULFATE 2.5 MG: 2.5 SOLUTION RESPIRATORY (INHALATION) at 08:24

## 2023-12-07 RX ADMIN — ALBUMIN (HUMAN) 25 G: 12.5 INJECTION, SOLUTION INTRAVENOUS at 06:38

## 2023-12-07 RX ADMIN — METOPROLOL TARTRATE 12.5 MG: 25 TABLET, FILM COATED ORAL at 20:03

## 2023-12-07 RX ADMIN — METHOCARBAMOL 1000 MG: 500 TABLET ORAL at 13:57

## 2023-12-07 RX ADMIN — ACETAMINOPHEN 650 MG: 325 TABLET ORAL at 20:03

## 2023-12-07 RX ADMIN — CHLORHEXIDINE GLUCONATE 15 ML: 1.2 RINSE ORAL at 20:11

## 2023-12-07 RX ADMIN — SODIUM BICARBONATE 50 MEQ: 84 INJECTION INTRAVENOUS at 08:31

## 2023-12-07 RX ADMIN — ASPIRIN 81 MG: 81 TABLET, COATED ORAL at 08:37

## 2023-12-07 RX ADMIN — INSULIN LISPRO 2 UNITS: 100 INJECTION, SOLUTION INTRAVENOUS; SUBCUTANEOUS at 11:57

## 2023-12-07 RX ADMIN — OXYCODONE 5 MG: 5 TABLET ORAL at 17:02

## 2023-12-07 RX ADMIN — Medication 400 MG: at 20:03

## 2023-12-07 RX ADMIN — ACETAMINOPHEN 650 MG: 325 TABLET ORAL at 13:57

## 2023-12-07 RX ADMIN — MORPHINE SULFATE 2 MG: 2 INJECTION, SOLUTION INTRAMUSCULAR; INTRAVENOUS at 20:03

## 2023-12-07 RX ADMIN — HEPARIN SODIUM 5000 UNITS: 5000 INJECTION INTRAVENOUS; SUBCUTANEOUS at 13:58

## 2023-12-07 RX ADMIN — ALBUTEROL SULFATE 2.5 MG: 2.5 SOLUTION RESPIRATORY (INHALATION) at 19:54

## 2023-12-07 RX ADMIN — HEPARIN SODIUM 5000 UNITS: 5000 INJECTION INTRAVENOUS; SUBCUTANEOUS at 20:03

## 2023-12-07 RX ADMIN — HEPARIN SODIUM 5000 UNITS: 5000 INJECTION INTRAVENOUS; SUBCUTANEOUS at 08:37

## 2023-12-07 RX ADMIN — OXYCODONE 5 MG: 5 TABLET ORAL at 12:27

## 2023-12-07 RX ADMIN — MORPHINE SULFATE 2 MG: 2 INJECTION, SOLUTION INTRAMUSCULAR; INTRAVENOUS at 14:07

## 2023-12-07 RX ADMIN — ALBUTEROL SULFATE 2.5 MG: 2.5 SOLUTION RESPIRATORY (INHALATION) at 11:38

## 2023-12-07 RX ADMIN — POLYETHYLENE GLYCOL 3350 17 G: 17 POWDER, FOR SOLUTION ORAL at 08:37

## 2023-12-07 RX ADMIN — ALBUTEROL SULFATE 2.5 MG: 2.5 SOLUTION RESPIRATORY (INHALATION) at 15:06

## 2023-12-07 RX ADMIN — CLOPIDOGREL BISULFATE 75 MG: 75 TABLET ORAL at 08:37

## 2023-12-07 RX ADMIN — SODIUM CHLORIDE, PRESERVATIVE FREE 10 ML: 5 INJECTION INTRAVENOUS at 20:14

## 2023-12-07 RX ADMIN — INSULIN GLARGINE 20 UNITS: 100 INJECTION, SOLUTION SUBCUTANEOUS at 20:00

## 2023-12-07 RX ADMIN — METHOCARBAMOL 1000 MG: 500 TABLET ORAL at 20:13

## 2023-12-07 RX ADMIN — SODIUM CHLORIDE, PRESERVATIVE FREE 10 ML: 5 INJECTION INTRAVENOUS at 09:46

## 2023-12-07 RX ADMIN — ACETAMINOPHEN 650 MG: 325 TABLET ORAL at 08:37

## 2023-12-07 RX ADMIN — MORPHINE SULFATE 4 MG: 4 INJECTION, SOLUTION INTRAMUSCULAR; INTRAVENOUS at 05:50

## 2023-12-07 RX ADMIN — METOPROLOL TARTRATE 12.5 MG: 25 TABLET, FILM COATED ORAL at 08:37

## 2023-12-07 RX ADMIN — FAMOTIDINE 20 MG: 20 TABLET ORAL at 08:37

## 2023-12-07 RX ADMIN — CHLORHEXIDINE GLUCONATE 15 ML: 1.2 RINSE ORAL at 20:08

## 2023-12-07 RX ADMIN — FUROSEMIDE 5 MG/HR: 10 INJECTION, SOLUTION INTRAMUSCULAR; INTRAVENOUS at 09:51

## 2023-12-07 RX ADMIN — FUROSEMIDE 5 MG/HR: 10 INJECTION, SOLUTION INTRAMUSCULAR; INTRAVENOUS at 22:28

## 2023-12-07 RX ADMIN — MUPIROCIN: 20 OINTMENT TOPICAL at 20:11

## 2023-12-07 RX ADMIN — Medication 3000 MG: at 04:23

## 2023-12-07 ASSESSMENT — PAIN SCALES - GENERAL
PAINLEVEL_OUTOF10: 6
PAINLEVEL_OUTOF10: 5
PAINLEVEL_OUTOF10: 8
PAINLEVEL_OUTOF10: 4
PAINLEVEL_OUTOF10: 8
PAINLEVEL_OUTOF10: 3
PAINLEVEL_OUTOF10: 5

## 2023-12-07 ASSESSMENT — PAIN DESCRIPTION - ORIENTATION: ORIENTATION: MID;UPPER

## 2023-12-07 ASSESSMENT — PAIN DESCRIPTION - LOCATION
LOCATION: BACK;CHEST;INCISION
LOCATION: STERNUM
LOCATION: INCISION;CHEST
LOCATION: STERNUM

## 2023-12-07 ASSESSMENT — PAIN DESCRIPTION - DESCRIPTORS
DESCRIPTORS: HEAVINESS;SORE
DESCRIPTORS: ACHING
DESCRIPTORS: ACHING

## 2023-12-07 NOTE — PROGRESS NOTES
CVTS Cardiothoracic Progress Note:                                CC:  Post op follow up     Surgery:12/5  OPEN CORONARY ARTERY BYPASS GRAFTING TIMES FOUR USING INTERNAL MAMMARY ARTERY, SAPHENOUS VEIN GRAFT, ON PUMP, LILY, TEMPORARY PACING WIRES, LEFT ATRIAL APPENDAGE CLIP PLACEMENT WITH STERNAL PLATING AND BILATERAL [facility] course:  12/5 DOS- Extubated @ [phone]. Pain difficult to control. High Levo requirement. 12/6 POD 1- Hgb 7 this AM. 1 unit PRBC given. Able to wean off Levo. Scheduled Gabapentin and Robaxin ordered for pain. TPW removed. Plan to remove MS chest tube    12/7 POD2- OOB to chair this AM. Cr up to 1.7. D/C Gabapentin. Switched Arixtra to Heparin. Hypotensive on standing this AM. Albumin given. Started Lasix gtt.        Past Medical History:   Diagnosis Date    Arthritis     CAD (coronary artery disease)     Cancer ([address] [facility])     nose    GERD (gastroesophageal reflux disease)     Hearing aid worn     denise    Hyperlipidemia     Hypertension     Osteoarthritis     Sleep apnea     CPAP    Wears glasses     reading        Past Surgical History:   Procedure Laterality Date    CATARACT EXTRACTION, BILATERAL Bilateral     CATARACT REMOVAL Bilateral     COLONOSCOPY      CORONARY ANGIOPLASTY  08/26/2015    CORONARY ANGIOPLASTY WITH STENT PLACEMENT      x5    CORONARY ARTERY BYPASS GRAFT N/A 12/5/2023    OPEN CORONARY ARTERY BYPASS GRAFTING TIMES FOUR USING INTERNAL MAMMARY ARTERY, SAPHENOUS VEIN GRAFT, ON PUMP, LILY, TEMPORARY PACING WIRES, LEFT ATRIAL APPENDAGE CLIP PLACEMENT WITH STERNAL PLATING AND BILATERAL PECTORALIS BLOCKS performed by Hunter Grimes MD at [address] [place] [address], [address] BIOPSY      nose    TOTAL KNEE ARTHROPLASTY Right 09/18/2019    RIGHT [place] Debora WITH ADDUCTOR CANAL BLOCK FOR PAIN CONTROL             HARRIET BOSS  CPT CODE - 49834 performed by Kaylee Combs MD at [address] [facility] as of

## 2023-12-07 NOTE — PLAN OF CARE
Problem: Chronic Conditions and Co-morbidities  Goal: Patient's chronic conditions and co-morbidity symptoms are monitored and maintained or improved  Outcome: Progressing     Problem: Discharge Planning  Goal: Discharge to home or other facility with appropriate resources  Outcome: Progressing     Problem: Pain  Goal: Verbalizes/displays adequate comfort level or baseline comfort level  Outcome: Progressing     Problem: ABCDS Injury Assessment  Goal: Absence of physical injury  Outcome: Progressing     Problem: Safety - Adult  Goal: Free from fall injury  Outcome: Progressing     Problem: Neurosensory - Adult  Goal: Achieves stable or improved neurological status  Outcome: Progressing

## 2023-12-07 NOTE — CONSULTS
PULM/CCM     Please see the consult done earlier    [facility] OF RADHA Carlson MD

## 2023-12-07 NOTE — CARE COORDINATION
Update 12/12/23: Pt not yet medically ready for ARU. Continue to follow. Electronically signed by Mark Kong RN on 12/12/2023 at 4:55 PM   [address] [address] Unit   After review, this patient is felt to be:       []  Appropriate for Acute Inpatient Rehab    []  Appropriate for Acute Inpatient Rehab Pending Insurance Authorization    []  Not appropriate for Acute Inpatient Rehab    [x]  Referral received and ARU reviewing patient; Evaluation ongoing. Will follow for medical and therapy progress. Final determination per Dr. Elise Dias. Will notify DCP with further updates. Thank you for the referral.  Kaelyn Lewis RN    0649  Met with patient and provided him and wife information on ARU and they are in agreement. Accepted pending bed availability.  Discussed with JACK Campos RN

## 2023-12-07 NOTE — PROGRESS NOTES
Occupational Therapy  Facility/Department: [facility] C2 CARD TELEMETRY  Daily Treatment Note  NAME: Jael Nieves  : 1945  MRN: [id number]    Date of Service: 2023    Discharge Recommendations:  IP Rehab     Therapy discharge recommendations take into account each patient's current medical complexities and are subject to input/oversight from the patient's healthcare team.      Barriers to Home Discharge:   [x] Steps to access home entry or bed/bath:   [x] Unable to transfer, ambulate, or propel wheelchair household distances without assist   [] Limited available assist at home upon discharge    [] Patient or family requests d/c to post-acute facility    [x] Poor cognition/safety awareness for d/c to home alone    [] Unable to maintain ordered weight bearing status    [] Patient with salient signs of long-standing immobility   [x] Decreased independence with ADLs   [x] Increased risk for falls   [] Other:     If pt is unable to be seen after this session, please let this note serve as discharge summary. Please see case management note for discharge disposition. Thank you. Patient Diagnosis(es): There were no encounter diagnoses. Assessment    Assessment: Co-tx collaboration this date to safely meet goals and will have better occupational performance outcomes with in a co-treatment than 1:1 session. Pt seen for cotx follow up this date to progress functional transfer training and functional independence. Pt tolerated session fair -- pt remains significantly limited d/t 10/10 pain local to chest tube site; RN present and reported issuing pain meds prior to session. Pt completed seated BUE therex (wrist/ elbow only) w/ cues for adherence to sternal precautions t/o. Pt completed x1 STS c min-mod x2 and completed marching in place, however, pt requesting to sit after ~60 seconds and declining additional STS trials d/t inc in pain. OT reommending IPR d/c, cont acute OT.   Activity Tolerance: Patient help for eating meals?: A Little  AM-PAC Inpatient Daily Activity Raw Score: 10  AM-PAC Inpatient ADL T-Scale Score : 27.31  ADL Inpatient CMS 0-100% Score: 74.7  ADL Inpatient CMS G-Code Modifier : CL    Therapy Time   Individual Concurrent Group Co-treatment   Time In       1410   Time Out       1441   Minutes       31   Timed Code Treatment Minutes: 31 Minutes       Carlos Cole OT

## 2023-12-07 NOTE — PROGRESS NOTES
When the pt start desating at 11 pm, an EPOC sample done and came back with HB=7.8 and HCT of 23, a confirmatory sample sent to lab and came back 7.3. CTS paged and Dr. Mccormack Pretty updated with the pt situation, an order to start 1 PRBc is obtained and carried out.      Electronically signed by Uday Engel RN on 12/6/23 at 11:46 PM EST

## 2023-12-07 NOTE — CONSULTS
Dr [facility],     Pt seeking to be off for disability start date 4/6/23-5/1 pt was hospitalized 4/8-4/11 do you support?        Thanks,     Lex Diaz Patient: Kamron Akers  [id number]  Date: 12/7/2023      Chief Complaint: s/p CABG    History of Present Illness/Hospital Course:  Gisela Fuller is a 69year old male with a past medical history significant for CAD, GERD, HLD, HTN, sleep apnea, and morbid obesity who presented to [facility] on 12/5/23 for planned open CABG x4 with left atrial appendage clip placement with sternal plating. Post operative course has been notable for hypotension, MADONNA, and acute blood loss anemia. He continues to have functional deficits below his baseline. Today Corin Irving is seen with his wife and nursing present. He reports pain. He is motivated to work with therapies and interested in ARU. has a past medical history of Arthritis, CAD (coronary artery disease), Cancer ([address]), GERD (gastroesophageal reflux disease), Hearing aid worn, Hyperlipidemia, Hypertension, Osteoarthritis, Sleep apnea, and Wears glasses. has a past surgical history that includes Coronary angioplasty with stent; Coronary angioplasty (08/26/2015); skin biopsy; Colonoscopy; Endoscopy, colon, diagnostic; Cataract removal (Bilateral); Total knee arthroplasty (Right, 09/18/2019); joint replacement; knee surgery; Cataract extraction, bilateral (Bilateral); and Coronary artery bypass graft (N/A, 12/5/2023). reports that he quit smoking about 45 years ago. His smoking use included cigarettes. He has a 45.00 pack-year smoking history. He has never used smokeless tobacco. He reports current alcohol use of about 2.0 standard drinks of alcohol per week. He reports that he does not use drugs. family history includes Coronary Art Dis in his father and mother; Heart Disease in his father and mother. REVIEW OF SYSTEMS:   CONSTITUTIONAL: negative for fevers, chills, diaphoresis, activity change, appetite change, fatigue, night sweats and unexpected weight change.    EYES: negative for blurred vision, eye discharge, visual disturbance and icterus  HEENT: negative for hearing loss, tinnitus, ear drainage, sinus pressure, nasal congestion, epistaxis and snoring  RESPIRATORY: Negative for hemoptysis, cough, sputum production  CARDIOVASCULAR: negative for chest pain, palpitations, exertional chest pressure/discomfort, edema, syncope  GASTROINTESTINAL: negative for nausea, vomiting, diarrhea, constipation, blood in stool and abdominal pain  GENITOURINARY: negative for frequency, dysuria, urinary incontinence, decreased urine volume, and hematuria  HEMATOLOGIC/LYMPHATIC: negative for easy bruising, bleeding and lymphadenopathy  ALLERGIC/IMMUNOLOGIC: negative for recurrent infections, angioedema, anaphylaxis and drug reactions  ENDOCRINE: negative for weight changes and diabetic symptoms including polyuria, polydipsia and polyphagia  MUSCULOSKELETAL: positive for pain; denies joint swelling, decreased range of motion and muscle weakness  NEUROLOGICAL: negative for headaches, slurred speech, unilateral weakness  PSYCHIATRIC/BEHAVIORAL: negative for hallucinations, behavioral problems, confusion and agitation.     Physical Examination:  Vitals: Patient Vitals for the past 24 hrs:   BP Temp Temp src Pulse Resp SpO2 Weight   12/07/23 1500 -- -- -- 91 -- -- --   12/07/23 1415 (!) 115/56 -- -- 100 -- 94 % --   12/07/23 1400 -- -- -- 100 -- -- --   12/07/23 1300 -- -- -- 88 -- -- --   12/07/23 1257 -- -- -- -- 16 -- --   12/07/23 1200 -- -- -- 92 -- -- --   12/07/23 1143 -- -- -- 89 -- -- --   12/07/23 1142 -- -- -- -- -- 99 % --   12/07/23 1100 -- -- -- 87 -- -- --   12/07/23 1000 -- 99.8 °F (37.7 °C) Bladder 97 18 -- --   12/07/23 0900 -- -- -- (!) 101 -- -- --   12/07/23 0825 -- -- -- -- -- 99 % --   12/07/23 0800 -- 100 °F (37.8 °C) -- 90 -- -- --   12/07/23 0630 -- 100.2 °F (37.9 °C) Bladder -- -- -- --   12/07/23 0620 -- -- -- -- 16 -- --   12/07/23 0600 -- 99.1 °F (37.3 °C) Bladder 94 (!) 0 90 % (!) 144.4 kg (318 lb 5.5 oz)   12/07/23 0550 -- 99.3 °F (37.4 °C) Bladder -- 18 -- --

## 2023-12-07 NOTE — PROGRESS NOTES
12/06/23 2468   NIV Type   NIV Started/Stopped On   Equipment Type v60   Mode Bilevel   Mask Type Full face mask   Assessment   Pulse 89   Respirations 17   SpO2 98 %   Comfort Level Good   Using Accessory Muscles No   Mask Compliance Good   Skin Assessment Clean, dry, & intact   Skin Protection for O2 Device N/A   Settings/Measurements   PIP Observed 13 cm H20   IPAP 13 cmH20   CPAP/EPAP 5 cmH2O   Vt (Measured) 708 mL   Rate Ordered 15   Insp Rise Time (%) 2 %   FiO2  75 %  (decreased to 50% at this time)   I Time/ I Time % 1.1 s   Minute Volume (L/min) 12 Liters   Mask Leak (lpm) 0 lpm   Patient's Home Machine No   Alarm Settings   Alarms On Y   Low Pressure (cmH2O) 6 cmH2O   High Pressure (cmH2O) 30 cmH2O   Delay Alarm 20 sec(s)   RR Low (bpm) 6   RR High (bpm) 40 br/min

## 2023-12-07 NOTE — PROGRESS NOTES
Physical Therapy  Facility/Department: [facility] C2 CARD TELEMETRY  Daily Treatment Note  NAME: Braydon Aranda  : 1945  MRN: [id number]    Date of Service: 2023    Discharge Recommendations:  IP Rehab   PT Equipment Recommendations  Equipment Needed: No    Therapy discharge recommendations take into account each patient's current medical complexities and are subject to input/oversight from the patient's healthcare team.      Barriers to Home Discharge:   [x] Steps to access home entry or bed/bath:   [x] Unable to transfer, ambulate, or propel wheelchair household distances without assist   [x] Limited available assist at home upon discharge    [] Patient or family requests d/c to post-acute facility    [x] Poor cognition/safety awareness for d/c to home alone    [] Unable to maintain ordered weight bearing status    [] Patient with salient signs of long-standing immobility   [] Decreased independence with ADLs   [x] Increased risk for falls   [] Other:     If pt is unable to be seen after this session, please let this note serve as discharge summary. Please see case management note for discharge disposition. Thank you. Patient Diagnosis(es): There were no encounter diagnoses. Assessment   Assessment: Pt seen as cotx with OT to progress functional mobility safely and maximize outcomes compared to 1:1 session. Pt currently requires min+mod(A)x2 for transfers from chair. Pt able to take some steps in place with rollator . Pt overall limited by pain. Pt demo short, shallow breathing. VC for breathing techniques/PLB, with minimal carryover. Pt would benefit from continued skilled PT to address remaining deficits. Recommend IPR upon d/c    Activity Tolerance: Patient tolerated treatment well;Patient limited by pain; Patient limited by endurance  Equipment Needed: No     Plan    Physical Therapy Plan  General Plan: 5-7 times per week  Current Treatment Recommendations: Strengthening;Balance

## 2023-12-07 NOTE — PROGRESS NOTES
12/07/23 0335   NIV Type   NIV Started/Stopped On   Equipment Type V60   Mode Bilevel   Mask Type Full face mask   Mask Size Large   Assessment   Pulse 79   Respirations 17   SpO2 100 %   Comfort Level Good   Using Accessory Muscles No   Mask Compliance Good   Skin Assessment Clean, dry, & intact   Breath Sounds   Right Upper Lobe Clear   Right Middle Lobe Diminished   Right Lower Lobe Diminished   Left Upper Lobe Clear   Left Lower Lobe Diminished   Settings/Measurements   IPAP 13 cmH20   CPAP/EPAP 5 cmH2O   Vt (Measured) 650 mL   FiO2  45 %   Minute Volume (L/min) 10.9 Liters   Mask Leak (lpm) 1 lpm   Patient's Home Machine Yes (type/vendor)   Alarm Settings   Alarms On Y

## 2023-12-07 NOTE — PROGRESS NOTES
Home CPAP started at 9;30 pm, the pt was doing well and sating 92 above, until 11 pm. The pt start to have O2 Sat 0f low 80s. Pulse oximeter changed, ABGs drawn  and RT called to discuss the pt ventilation. The pt seem to keep his mouth open so the home CPAP is not actually working properly. Pt was switched to hospital BIPAP and O2 sat return to 95 and 100s.      Electronically signed by Srinivas Means RN on 12/6/23 at 11:25 PM EST

## 2023-12-08 ENCOUNTER — APPOINTMENT (OUTPATIENT)
Dept: GENERAL RADIOLOGY | Age: 78
DRG: 235 | End: 2023-12-08
Attending: THORACIC SURGERY (CARDIOTHORACIC VASCULAR SURGERY)
Payer: MEDICARE

## 2023-12-08 LAB
ALBUMIN SERPL-MCNC: 3.6 G/DL (ref 3.4–5)
ANION GAP SERPL CALCULATED.3IONS-SCNC: 12 MMOL/L (ref 3–16)
BACTERIA URNS QL MICRO: ABNORMAL /HPF
BILIRUB UR QL STRIP.AUTO: NEGATIVE
BLOOD BANK DISPENSE STATUS: NORMAL
BLOOD BANK PRODUCT CODE: NORMAL
BPU ID: NORMAL
BUN SERPL-MCNC: 48 MG/DL (ref 7–20)
CALCIUM SERPL-MCNC: 8.7 MG/DL (ref 8.3–10.6)
CHLORIDE SERPL-SCNC: 105 MMOL/L (ref 99–110)
CLARITY UR: CLEAR
CO2 SERPL-SCNC: 23 MMOL/L (ref 21–32)
COLOR UR: YELLOW
CREAT SERPL-MCNC: 1.9 MG/DL (ref 0.8–1.3)
CREAT UR-MCNC: 91 MG/DL (ref 39–259)
DEPRECATED RDW RBC AUTO: 19.1 % (ref 12.4–15.4)
DESCRIPTION BLOOD BANK: NORMAL
EPI CELLS #/AREA URNS HPF: ABNORMAL /HPF (ref 0–5)
GFR SERPLBLD CREATININE-BSD FMLA CKD-EPI: 36 ML/MIN/{1.73_M2}
GLUCOSE BLD-MCNC: 111 MG/DL (ref 70–99)
GLUCOSE BLD-MCNC: 114 MG/DL (ref 70–99)
GLUCOSE BLD-MCNC: 120 MG/DL (ref 70–99)
GLUCOSE BLD-MCNC: 128 MG/DL (ref 70–99)
GLUCOSE BLD-MCNC: 160 MG/DL (ref 70–99)
GLUCOSE SERPL-MCNC: 126 MG/DL (ref 70–99)
GLUCOSE UR STRIP.AUTO-MCNC: NEGATIVE MG/DL
HCT VFR BLD AUTO: 22.5 % (ref 40.5–52.5)
HGB BLD-MCNC: 7.4 G/DL (ref 13.5–17.5)
HGB UR QL STRIP.AUTO: ABNORMAL
HYALINE CASTS #/AREA URNS LPF: ABNORMAL /LPF (ref 0–2)
KETONES UR STRIP.AUTO-MCNC: NEGATIVE MG/DL
LEUKOCYTE ESTERASE UR QL STRIP.AUTO: ABNORMAL
MAGNESIUM SERPL-MCNC: 2.4 MG/DL (ref 1.8–2.4)
MCH RBC QN AUTO: 29.5 PG (ref 26–34)
MCHC RBC AUTO-ENTMCNC: 33 G/DL (ref 31–36)
MCV RBC AUTO: 89.6 FL (ref 80–100)
MUCOUS THREADS #/AREA URNS LPF: ABNORMAL /LPF
NITRITE UR QL STRIP.AUTO: NEGATIVE
PERFORMED ON: ABNORMAL
PH UR STRIP.AUTO: 5.5 [PH] (ref 5–8)
PLATELET # BLD AUTO: 152 K/UL (ref 135–450)
PMV BLD AUTO: 7.2 FL (ref 5–10.5)
POTASSIUM SERPL-SCNC: 4.5 MMOL/L (ref 3.5–5.1)
PROT UR STRIP.AUTO-MCNC: NEGATIVE MG/DL
RBC # BLD AUTO: 2.52 M/UL (ref 4.2–5.9)
RBC #/AREA URNS HPF: ABNORMAL /HPF (ref 0–4)
SODIUM SERPL-SCNC: 140 MMOL/L (ref 136–145)
SODIUM UR-SCNC: 81 MMOL/L
SP GR UR STRIP.AUTO: 1.02 (ref 1–1.03)
UA DIPSTICK W REFLEX MICRO PNL UR: YES
URN SPEC COLLECT METH UR: ABNORMAL
UROBILINOGEN UR STRIP-ACNC: 0.2 E.U./DL
UUN UR-MCNC: 291 MG/DL (ref 800–1666)
WBC # BLD AUTO: 12.9 K/UL (ref 4–11)
WBC #/AREA URNS HPF: ABNORMAL /HPF (ref 0–5)

## 2023-12-08 PROCEDURE — 94669 MECHANICAL CHEST WALL OSCILL: CPT

## 2023-12-08 PROCEDURE — 94640 AIRWAY INHALATION TREATMENT: CPT

## 2023-12-08 PROCEDURE — 6360000002 HC RX W HCPCS

## 2023-12-08 PROCEDURE — 97535 SELF CARE MNGMENT TRAINING: CPT

## 2023-12-08 PROCEDURE — 81001 URINALYSIS AUTO W/SCOPE: CPT

## 2023-12-08 PROCEDURE — 85027 COMPLETE CBC AUTOMATED: CPT

## 2023-12-08 PROCEDURE — 99024 POSTOP FOLLOW-UP VISIT: CPT

## 2023-12-08 PROCEDURE — 71045 X-RAY EXAM CHEST 1 VIEW: CPT

## 2023-12-08 PROCEDURE — 84300 ASSAY OF URINE SODIUM: CPT

## 2023-12-08 PROCEDURE — 97530 THERAPEUTIC ACTIVITIES: CPT

## 2023-12-08 PROCEDURE — 6370000000 HC RX 637 (ALT 250 FOR IP)

## 2023-12-08 PROCEDURE — 82570 ASSAY OF URINE CREATININE: CPT

## 2023-12-08 PROCEDURE — 2140000000 HC CCU INTERMEDIATE R&B

## 2023-12-08 PROCEDURE — 84540 ASSAY OF URINE/UREA-N: CPT

## 2023-12-08 PROCEDURE — 83735 ASSAY OF MAGNESIUM: CPT

## 2023-12-08 PROCEDURE — 2700000000 HC OXYGEN THERAPY PER DAY

## 2023-12-08 PROCEDURE — 82040 ASSAY OF SERUM ALBUMIN: CPT

## 2023-12-08 PROCEDURE — 97116 GAIT TRAINING THERAPY: CPT

## 2023-12-08 PROCEDURE — 2580000003 HC RX 258

## 2023-12-08 PROCEDURE — 94761 N-INVAS EAR/PLS OXIMETRY MLT: CPT

## 2023-12-08 PROCEDURE — 97110 THERAPEUTIC EXERCISES: CPT

## 2023-12-08 PROCEDURE — 36430 TRANSFUSION BLD/BLD COMPNT: CPT

## 2023-12-08 PROCEDURE — 80048 BASIC METABOLIC PNL TOTAL CA: CPT

## 2023-12-08 PROCEDURE — 94667 MNPJ CHEST WALL 1ST: CPT

## 2023-12-08 PROCEDURE — 99233 SBSQ HOSP IP/OBS HIGH 50: CPT | Performed by: INTERNAL MEDICINE

## 2023-12-08 PROCEDURE — 2500000003 HC RX 250 WO HCPCS: Performed by: THORACIC SURGERY (CARDIOTHORACIC VASCULAR SURGERY)

## 2023-12-08 PROCEDURE — C8929 TTE W OR WO FOL WCON,DOPPLER: HCPCS

## 2023-12-08 RX ORDER — SODIUM CHLORIDE 9 MG/ML
INJECTION, SOLUTION INTRAVENOUS PRN
Status: DISCONTINUED | OUTPATIENT
Start: 2023-12-08 | End: 2023-12-11

## 2023-12-08 RX ORDER — AMIODARONE HYDROCHLORIDE 200 MG/1
400 TABLET ORAL 2 TIMES DAILY
Status: COMPLETED | OUTPATIENT
Start: 2023-12-08 | End: 2023-12-12

## 2023-12-08 RX ORDER — SALIVA STIMULANT COMB. NO.3
SPRAY, NON-AEROSOL (ML) MUCOUS MEMBRANE PRN
Status: DISCONTINUED | OUTPATIENT
Start: 2023-12-08 | End: 2023-12-19 | Stop reason: HOSPADM

## 2023-12-08 RX ADMIN — ALBUTEROL SULFATE 2.5 MG: 2.5 SOLUTION RESPIRATORY (INHALATION) at 12:04

## 2023-12-08 RX ADMIN — ALBUTEROL SULFATE 2.5 MG: 2.5 SOLUTION RESPIRATORY (INHALATION) at 07:50

## 2023-12-08 RX ADMIN — ATORVASTATIN CALCIUM 40 MG: 40 TABLET, FILM COATED ORAL at 21:08

## 2023-12-08 RX ADMIN — AMIODARONE HYDROCHLORIDE 400 MG: 200 TABLET ORAL at 08:59

## 2023-12-08 RX ADMIN — POLYETHYLENE GLYCOL 3350 17 G: 17 POWDER, FOR SOLUTION ORAL at 08:56

## 2023-12-08 RX ADMIN — HEPARIN SODIUM 5000 UNITS: 5000 INJECTION INTRAVENOUS; SUBCUTANEOUS at 05:48

## 2023-12-08 RX ADMIN — CLOPIDOGREL BISULFATE 75 MG: 75 TABLET ORAL at 08:57

## 2023-12-08 RX ADMIN — ASPIRIN 81 MG: 81 TABLET, COATED ORAL at 08:57

## 2023-12-08 RX ADMIN — MORPHINE SULFATE 2 MG: 2 INJECTION, SOLUTION INTRAMUSCULAR; INTRAVENOUS at 02:24

## 2023-12-08 RX ADMIN — MORPHINE SULFATE 4 MG: 4 INJECTION, SOLUTION INTRAMUSCULAR; INTRAVENOUS at 05:46

## 2023-12-08 RX ADMIN — SODIUM CHLORIDE, PRESERVATIVE FREE 10 ML: 5 INJECTION INTRAVENOUS at 08:55

## 2023-12-08 RX ADMIN — ACETAMINOPHEN 650 MG: 325 TABLET ORAL at 02:24

## 2023-12-08 RX ADMIN — MUPIROCIN: 20 OINTMENT TOPICAL at 21:09

## 2023-12-08 RX ADMIN — ACETAMINOPHEN 650 MG: 325 TABLET ORAL at 21:09

## 2023-12-08 RX ADMIN — FAMOTIDINE 20 MG: 10 INJECTION, SOLUTION INTRAVENOUS at 08:57

## 2023-12-08 RX ADMIN — METHOCARBAMOL 1000 MG: 500 TABLET ORAL at 21:07

## 2023-12-08 RX ADMIN — METOPROLOL TARTRATE 12.5 MG: 25 TABLET, FILM COATED ORAL at 21:08

## 2023-12-08 RX ADMIN — AMIODARONE HYDROCHLORIDE 400 MG: 200 TABLET ORAL at 21:16

## 2023-12-08 RX ADMIN — CHLORHEXIDINE GLUCONATE 15 ML: 1.2 RINSE ORAL at 08:55

## 2023-12-08 RX ADMIN — HEPARIN SODIUM 5000 UNITS: 5000 INJECTION INTRAVENOUS; SUBCUTANEOUS at 21:09

## 2023-12-08 RX ADMIN — ALBUTEROL SULFATE 2.5 MG: 2.5 SOLUTION RESPIRATORY (INHALATION) at 15:36

## 2023-12-08 RX ADMIN — ONDANSETRON 4 MG: 4 TABLET, ORALLY DISINTEGRATING ORAL at 21:07

## 2023-12-08 RX ADMIN — ALBUTEROL SULFATE 2.5 MG: 2.5 SOLUTION RESPIRATORY (INHALATION) at 19:43

## 2023-12-08 RX ADMIN — MUPIROCIN: 20 OINTMENT TOPICAL at 08:55

## 2023-12-08 RX ADMIN — FUROSEMIDE 10 MG/HR: 10 INJECTION, SOLUTION INTRAMUSCULAR; INTRAVENOUS at 13:37

## 2023-12-08 RX ADMIN — METOPROLOL TARTRATE 12.5 MG: 25 TABLET, FILM COATED ORAL at 08:56

## 2023-12-08 RX ADMIN — ACETAMINOPHEN 650 MG: 325 TABLET ORAL at 08:57

## 2023-12-08 RX ADMIN — BISACODYL 5 MG: 5 TABLET, COATED ORAL at 08:57

## 2023-12-08 RX ADMIN — ACETAMINOPHEN 650 MG: 325 TABLET ORAL at 14:47

## 2023-12-08 RX ADMIN — OXYCODONE 5 MG: 5 TABLET ORAL at 21:09

## 2023-12-08 RX ADMIN — INSULIN GLARGINE 20 UNITS: 100 INJECTION, SOLUTION SUBCUTANEOUS at 21:05

## 2023-12-08 RX ADMIN — MUPIROCIN: 20 OINTMENT TOPICAL at 08:56

## 2023-12-08 RX ADMIN — OXYCODONE 5 MG: 5 TABLET ORAL at 17:35

## 2023-12-08 RX ADMIN — SODIUM CHLORIDE, PRESERVATIVE FREE 10 ML: 5 INJECTION INTRAVENOUS at 21:10

## 2023-12-08 RX ADMIN — METHOCARBAMOL 1000 MG: 500 TABLET ORAL at 08:57

## 2023-12-08 RX ADMIN — METHOCARBAMOL 1000 MG: 500 TABLET ORAL at 14:47

## 2023-12-08 RX ADMIN — HEPARIN SODIUM 5000 UNITS: 5000 INJECTION INTRAVENOUS; SUBCUTANEOUS at 14:47

## 2023-12-08 RX ADMIN — FUROSEMIDE 5 MG/HR: 10 INJECTION, SOLUTION INTRAMUSCULAR; INTRAVENOUS at 13:04

## 2023-12-08 RX ADMIN — CHLORHEXIDINE GLUCONATE 15 ML: 1.2 RINSE ORAL at 21:09

## 2023-12-08 RX ADMIN — OXYCODONE 5 MG: 5 TABLET ORAL at 02:25

## 2023-12-08 RX ADMIN — MORPHINE SULFATE 4 MG: 4 INJECTION, SOLUTION INTRAMUSCULAR; INTRAVENOUS at 00:04

## 2023-12-08 RX ADMIN — OXYCODONE 5 MG: 5 TABLET ORAL at 06:25

## 2023-12-08 RX ADMIN — Medication 400 MG: at 21:09

## 2023-12-08 ASSESSMENT — PAIN SCALES - GENERAL
PAINLEVEL_OUTOF10: 6
PAINLEVEL_OUTOF10: 8
PAINLEVEL_OUTOF10: 3
PAINLEVEL_OUTOF10: 0
PAINLEVEL_OUTOF10: 8
PAINLEVEL_OUTOF10: 9
PAINLEVEL_OUTOF10: 4
PAINLEVEL_OUTOF10: 7
PAINLEVEL_OUTOF10: 8

## 2023-12-08 ASSESSMENT — PAIN - FUNCTIONAL ASSESSMENT
PAIN_FUNCTIONAL_ASSESSMENT: PREVENTS OR INTERFERES SOME ACTIVE ACTIVITIES AND ADLS

## 2023-12-08 ASSESSMENT — PAIN DESCRIPTION - LOCATION
LOCATION: STERNUM
LOCATION: CHEST;STERNUM
LOCATION: STERNUM
LOCATION: STERNUM

## 2023-12-08 ASSESSMENT — PAIN DESCRIPTION - ORIENTATION
ORIENTATION: ANTERIOR;MID
ORIENTATION: ANTERIOR
ORIENTATION: ANTERIOR
ORIENTATION: MID

## 2023-12-08 ASSESSMENT — PAIN DESCRIPTION - DESCRIPTORS
DESCRIPTORS: PENETRATING;SORE;THROBBING
DESCRIPTORS: SORE
DESCRIPTORS: STABBING;THROBBING;SORE
DESCRIPTORS: SORE;DISCOMFORT;NAGGING
DESCRIPTORS: HEAVINESS;PRESSURE;SORE

## 2023-12-08 ASSESSMENT — PAIN DESCRIPTION - FREQUENCY: FREQUENCY: CONTINUOUS

## 2023-12-08 ASSESSMENT — PAIN DESCRIPTION - PAIN TYPE: TYPE: SURGICAL PAIN

## 2023-12-08 NOTE — PROGRESS NOTES
Patient: Fernando Roca  [id number]  Date: 12/8/2023      Chief Complaint: s/p CABG    History of Present Illness/Hospital Course:  Wright Rubinstein is a 69year old male with a past medical history significant for CAD, GERD, HLD, HTN, sleep apnea, and morbid obesity who presented to [facility] on 12/5/23 for planned open CABG x4 with left atrial appendage clip placement with sternal plating. Post operative course has been notable for hypotension, MADONNA, and acute blood loss anemia. He continues to have functional deficits below his baseline. Interval History:  Cr is trending up and Nephrology is consulted. Today Amee Arora is seen with his wife present. He reports feeling tired. Discussed that he has been accepted to ARU. He and his wife are happy about this. has a past medical history of Arthritis, CAD (coronary artery disease), Cancer ([address]), GERD (gastroesophageal reflux disease), Hearing aid worn, Hyperlipidemia, Hypertension, Osteoarthritis, Sleep apnea, and Wears glasses. has a past surgical history that includes Coronary angioplasty with stent; Coronary angioplasty (08/26/2015); skin biopsy; Colonoscopy; Endoscopy, colon, diagnostic; Cataract removal (Bilateral); Total knee arthroplasty (Right, 09/18/2019); joint replacement; knee surgery; Cataract extraction, bilateral (Bilateral); and Coronary artery bypass graft (N/A, 12/5/2023). reports that he quit smoking about 45 years ago. His smoking use included cigarettes. He has a 45.00 pack-year smoking history. He has never used smokeless tobacco. He reports current alcohol use of about 2.0 standard drinks of alcohol per week. He reports that he does not use drugs. family history includes Coronary Art Dis in his father and mother; Heart Disease in his father and mother.       REVIEW OF SYSTEMS:   Denies f/c, n/v, cp, sob    Physical Examination:  Vitals: Patient Vitals for the past 24 hrs:   BP Temp Temp src Pulse Resp SpO2 Weight   12/08/23 1536 --

## 2023-12-08 NOTE — PROGRESS NOTES
12/07/23 2350   NIV Type   NIV Started/Stopped On   Equipment Type v60   Mode Bilevel   Mask Type Full face mask   Mask Size Large   Assessment   Pulse 95   Respirations 24   SpO2 94 %   Comfort Level Good   Using Accessory Muscles No   Mask Compliance Good   Skin Assessment Clean, dry, & intact   Skin Protection for O2 Device Yes   Location Nose   Intervention(s) Skin Barrier   Breath Sounds   Right Upper Lobe Clear   Right Middle Lobe Diminished   Right Lower Lobe Diminished   Left Upper Lobe Clear   Left Lower Lobe Diminished   Settings/Measurements   PIP Observed 13 cm H20   IPAP 13 cmH20   CPAP/EPAP 5 cmH2O   Vt (Measured) 405 mL   FiO2  45 %   Minute Volume (L/min) 12.4 Liters   Mask Leak (lpm) 9 lpm   Patient's Home Machine No   Alarm Settings   Alarms On Y

## 2023-12-08 NOTE — ACP (ADVANCE CARE PLANNING)
Advance Care Planning     General Advance Care Planning (ACP) Conversation    Date of Conversation: 11/15/2023  Conducted with: Patient with Decision Making Capacity    Healthcare Decision Maker:    Primary Decision Maker: Shante Kamara - Spouse - [phone]    Secondary Decision Maker: Casey Gerber - Child - [phone]  Click here to complete Healthcare Decision Makers including selection of the Healthcare Decision Maker Relationship (ie \"Primary\"). Today we documented Decision Maker(s) consistent with Legal Next of Kin hierarchy. Content/Action Overview:   Has ACP document(s) on file - reflects the patient's care preferences  Reviewed DNR/DNI and patient elects Full Code (Attempt Resuscitation)        Length of Voluntary ACP Conversation in minutes:  <16 minutes (Non-Billable)    Dioni Spring RN

## 2023-12-08 NOTE — PROGRESS NOTES
CVTS Cardiothoracic Progress Note:                                CC:  Post op follow up     Surgery:12/5  OPEN CORONARY ARTERY BYPASS GRAFTING TIMES FOUR USING INTERNAL MAMMARY ARTERY, SAPHENOUS VEIN GRAFT, ON PUMP, LILY, TEMPORARY PACING WIRES, LEFT ATRIAL APPENDAGE CLIP PLACEMENT WITH STERNAL PLATING AND BILATERAL [facility] course:  12/5 DOS- Extubated @ [phone]. Pain difficult to control. High Levo requirement. 12/6 POD 1- Hgb 7 this AM. 1 unit PRBC given. Able to wean off Levo. Scheduled Gabapentin and Robaxin ordered for pain. TPW removed. Plan to remove MS chest tube    12/7 POD2- OOB to chair this AM. Cr up to 1.7. D/C Gabapentin. Switched Arixtra to Heparin. Hypotensive on standing this AM. Albumin given. Started Lasix gtt. 12.8 POD3- Still with difficult pain control. On typical post op pain control. Unable to give Gabapentin and Toradol 2/2 Cr. Hgb low this morning. 1 unit PRBC ordered by oncall. Cr 1.9 this AM. Consult Nephro.        Past Medical History:   Diagnosis Date    Arthritis     CAD (coronary artery disease)     Cancer (HCC)     nose    GERD (gastroesophageal reflux disease)     Hearing aid worn     denise    Hyperlipidemia     Hypertension     Osteoarthritis     Sleep apnea     CPAP    Wears glasses     reading        Past Surgical History:   Procedure Laterality Date    CATARACT EXTRACTION, BILATERAL Bilateral     CATARACT REMOVAL Bilateral     COLONOSCOPY      CORONARY ANGIOPLASTY  08/26/2015    CORONARY ANGIOPLASTY WITH STENT PLACEMENT      x5    CORONARY ARTERY BYPASS GRAFT N/A 12/5/2023    OPEN CORONARY ARTERY BYPASS GRAFTING TIMES FOUR USING INTERNAL MAMMARY ARTERY, SAPHENOUS VEIN GRAFT, ON PUMP, LILY, TEMPORARY PACING WIRES, LEFT ATRIAL APPENDAGE CLIP PLACEMENT WITH STERNAL PLATING AND BILATERAL PECTORALIS BLOCKS performed by Rc Jimenez MD at [address] [facility] [address]      SKIN BIOPSY      nose    TOTAL

## 2023-12-08 NOTE — CARE COORDINATION
LOS 3- POD 3 CABG-  still having pain, COnsult to nephro- received uPRBC;'s- On Lasix and Heparin gtts-  Started pre cert for ARU today

## 2023-12-08 NOTE — PROGRESS NOTES
No clubbing. No obvious joint deformity. Lymphadenopathy: No cervical or supraclavicular adenopathy. Skin: Skin is warm and dry. No rash or nodules on the exposed extremities. Neurologic Alert and communicative when seen    Results:  CBC:   Recent Labs     12/07/23  0400 12/07/23  0600 12/07/23  0737 12/08/23  0510   WBC 12.5* 14.8*  --  12.9*   HGB 7.9* 8.2* 8.8* 7.4*   HCT 23.9* 24.9*  --  22.5*   MCV 88.9 89.3  --  89.6    177  --  152     BMP:   Recent Labs     12/06/23  0430 12/07/23  0600 12/08/23  0510    141 140   K 4.6 4.5 4.5    106 105   CO2 22 21 23   BUN 28* 39* 48*   CREATININE 1.0 1.7* 1.9*       PT/INR:   Recent Labs     12/05/23  1505 12/06/23  0430 12/07/23  0600   PROTIME 19.0* 16.6* 15.4*   INR 1.60* 1.34* 1.22*     APTT:   Recent Labs     12/05/23  1505   APTT 31.5       Imaging:  I have reviewed radiology images personally. XR CHEST PORTABLE   Final Result   Worsening asymmetric airspace changes in the right lower lung zone with   associated pleural effusion. XR CHEST PORTABLE   Final Result   Improved aeration of the lungs      Stable tiny left apical pneumothorax         XR CHEST PORTABLE   Final Result   Increased pleural-parenchymal disease on the left. XR CHEST PORTABLE   Final Result   1. Mild pulmonary vascular congestion. XR CHEST PORTABLE    (Results Pending)           ONE XRAY VIEW OF THE CHEST     12/8/2023 5:10 am     COMPARISON:  12/07/2023 radiograph     HISTORY:  ORDERING SYSTEM PROVIDED HISTORY: Post op open heart surgery  TECHNOLOGIST PROVIDED HISTORY:  Daily for 3 days at 6 AM  Reason for exam:->Post op open heart surgery  Reason for Exam: post open heart eval     FINDINGS:  The heart is enlarged and there is prior surgical change of sternotomy. A  vascular sheath remains on the right. Moderate perihilar opacities have  increased centrally. There is increasing pleural fluid and opacification in  the right lower lung zone. Previously described left apical pneumothorax is  not well visualized.  Mild left basilar airspace changes.  No skeletal  finding.     IMPRESSION:  Worsening asymmetric airspace changes in the right lower lung zone with  associated pleural effusion.      Assessment:  Principal Problem:    Coronary artery disease due to calcified coronary lesion  Active Problems:    SALLIE (obstructive sleep apnea)    Morbid obesity with BMI of 45.0-49.9, adult ([facility])    CAD in native artery    Postoperative anemia    MADONNA (acute kidney injury) ([facility])    Leukocytosis    Grade I diastolic dysfunction    Restrictive lung disease    S/P CABG x 4    Alcohol use    Pulmonary infiltrate    Pleural effusion on left  Resolved Problems:    * No resolved hospital problems. *          Plan:   Oxygen supplementation to keep saturation in 90-94% only  Please titrate the oxygen as per the above parameters  Pulmonary toilet  BiPAP on intermittent basis-at nighttime and then patient is taking a nap in the daytime along with as needed basis-orders changed  Patient to be given hospital BiPAP unit and did not use his home CPAP unit for now  Case discussed with CT surgeon nurse practitioner and the information for [employer] given-to find out about the CPAP machine and its compliance  Today's balance is maintained on the current BiPAP settings  Patient can be given bronchodilators on the level to see basis  Patient's PFT did not show any obstructive or reactive disease but had mild restrictive lung disease  Patient has MADONNA and picture and patient having significant edema of the extremities-patient also was having some orthostatic hypotension this morning as procedure note-patient has been given albumin and has been started on Lasix drip as per CT surgery which has been increased to 10 mg/h by CT surgery and nephrology  Patient's x-ray chest shows worsening pulm infiltrates/edema  Patient did get Ancef and vancomycin for surgical prophylaxis  Fluid

## 2023-12-08 NOTE — PLAN OF CARE
Problem: Chronic Conditions and Co-morbidities  Goal: Patient's chronic conditions and co-morbidity symptoms are monitored and maintained or improved  Outcome: Progressing  Flowsheets (Taken 12/7/2023 2000)  Care Plan - Patient's Chronic Conditions and Co-Morbidity Symptoms are Monitored and Maintained or Improved:   Monitor and assess patient's chronic conditions and comorbid symptoms for stability, deterioration, or improvement   Collaborate with multidisciplinary team to address chronic and comorbid conditions and prevent exacerbation or deterioration   Update acute care plan with appropriate goals if chronic or comorbid symptoms are exacerbated and prevent overall improvement and discharge     Problem: Discharge Planning  Goal: Discharge to home or other facility with appropriate resources  Outcome: Progressing  Flowsheets (Taken 12/7/2023 2000)  Discharge to home or other facility with appropriate resources:   Identify barriers to discharge with patient and caregiver   Arrange for needed discharge resources and transportation as appropriate   Identify discharge learning needs (meds, wound care, etc)   Arrange for interpreters to assist at discharge as needed   Refer to discharge planning if patient needs post-hospital services based on physician order or complex needs related to functional status, cognitive ability or social support system     Problem: Pain  Goal: Verbalizes/displays adequate comfort level or baseline comfort level  Outcome: Progressing  Flowsheets (Taken 12/7/2023 2000)  Verbalizes/displays adequate comfort level or baseline comfort level:   Encourage patient to monitor pain and request assistance   Assess pain using appropriate pain scale   Administer analgesics based on type and severity of pain and evaluate response   Implement non-pharmacological measures as appropriate and evaluate response   Consider cultural and social influences on pain and pain management   Notify Licensed

## 2023-12-08 NOTE — PROGRESS NOTES
Physical Therapy  Facility/Department: [facility] C2 CARD TELEMETRY  Daily Treatment Note  NAME: Tita Flores  : 1945  MRN: [id number]    Date of Service: 2023    Discharge Recommendations:  IP Rehab   PT Equipment Recommendations  Equipment Needed: No    Patient Diagnosis(es): There were no encounter diagnoses. Assessment   Assessment: Pt seen as cotx with OT to progress functional mobility safely and maximize outcomes compared to 1:1 session. Pt currently requires mod(A)X2 for bed mobility, min(A)X for transfers, and min(A)x2 for ambulation. Pt overall limited by endurance, pt on 3 L O2 throughout session. Pt would benefit from continued skilled PT to address remaining deficits. Recommend IPR upon d/c    Activity Tolerance: Patient tolerated treatment well;Patient limited by pain; Patient limited by endurance  Equipment Needed: No     Plan    Physical Therapy Plan  General Plan: 5-7 times per week  Current Treatment Recommendations: Strengthening;Balance training;Functional mobility training;Transfer training; Endurance training;Pain management; Neuromuscular re-education;Gait training;Stair training; Safety education & training;Patient/Caregiver education & training;Home exercise program;Therapeutic activities     Restrictions  Restrictions/Precautions  Restrictions/Precautions: Fall Risk, Cardiac  Position Activity Restriction  Sternal Precautions: No Pushing, No Pulling, 5# Lifting Restrictions  Other position/activity restrictions: ICU monitoring, R IJ, O2, R a line, x1 chest tubes, rosa     Subjective    Subjective  Subjective: Pt sitting up in chair upon arrival, agreeable to PT tx. Wife at bedside. Pain: 8/10 pain  Orientation  Overall Orientation Status: Within Normal Limits  Orientation Level: Oriented X4  Cognition  Overall Cognitive Status: WNL     Objective   Vitals  /61,  bpm, SpO2 100% on 3 L    Bed Mobility Training  Bed Mobility Training: Yes  Sit to Supine:  Moderate

## 2023-12-08 NOTE — CONSULTS
Nephrology Consult Note  [email or web address]        Reason for Consult: MADONNA  Consulting Physician: PRATEEK Paiz CNP     HISTORY OF PRESENT ILLNESS:      The patient is a 69 y.o. male with significant past medical history of CAD, arthritis, GERD, HTN, HLD and SALLIE who was admitted on 12/5/23 and underwent CABG x 4, ROBBI clip. Had post-op hypotension needing IV vasopressor. Labs on admission showed a serum creatinine of 0.9mg/dL, trended up to 1.7mg/dL yesterday -> 1.9mg/dL today hence this consult. Weight was 299lbs pre-op and up to 319lbs today. Started on Lasix drip on 12/7/23. Past Medical History:        Diagnosis Date    Arthritis     CAD (coronary artery disease)     Cancer ([address])     nose    GERD (gastroesophageal reflux disease)     Hearing aid worn     denise    Hyperlipidemia     Hypertension     Osteoarthritis     Sleep apnea     CPAP    Wears glasses     reading       Past Surgical History:        Procedure Laterality Date    CATARACT EXTRACTION, BILATERAL Bilateral     CATARACT REMOVAL Bilateral     COLONOSCOPY      CORONARY ANGIOPLASTY  08/26/2015    CORONARY ANGIOPLASTY WITH STENT PLACEMENT      x5    CORONARY ARTERY BYPASS GRAFT N/A 12/5/2023    OPEN CORONARY ARTERY BYPASS GRAFTING TIMES FOUR USING INTERNAL MAMMARY ARTERY, SAPHENOUS VEIN GRAFT, ON PUMP, LILY, TEMPORARY PACING WIRES, LEFT ATRIAL APPENDAGE CLIP PLACEMENT WITH STERNAL PLATING AND BILATERAL PECTORALIS BLOCKS performed by Oneyda Chandra MD at [facility], [facility], [address] BIOPSY      nose    TOTAL KNEE ARTHROPLASTY Right 09/18/2019    RIGHT Christian Deniseovanni WITH ADDUCTOR CANAL BLOCK FOR PAIN CONTROL             HARRIET BOSS  CPT CODE - 91282 performed by Sherin Clayton MD at [address] [facility]       Current Medications:    No current facility-administered medications on file prior to encounter.      Current Outpatient Medications on File Prior to Encounter   Medication Sig Dispense No    Sexual activity: Yes     Partners: Female   Other Topics Concern    Not on file   Social History Narrative    Not on file     Social Determinants of Health     Financial Resource Strain: Not on file   Food Insecurity: Not on file   Transportation Needs: Not on file   Physical Activity: Not on file   Stress: Not on file   Social Connections: Not on file   Intimate Partner Violence: Not on file   Housing Stability: Not on file       Family History:       Problem Relation Age of Onset    Coronary Art Dis Mother         CABG x3    Heart Disease Mother     Heart Disease Father     Coronary Art Dis Father          REVIEW OF SYSTEMS:    Review of Systems   Constitutional: Negative.    HENT: Negative.     Respiratory: Negative.     Cardiovascular:  Positive for leg swelling.   Gastrointestinal: Negative.    Genitourinary: Negative.    Musculoskeletal: Negative.    Neurological: Negative.    Psychiatric/Behavioral: Negative.           PHYSICAL EXAM:    Vitals:    /66   Pulse 95   Temp 98.6 °F (37 °C) (Oral)   Resp 22   Ht 1.727 m (5' 8\")   Wt (!) 145 kg (319 lb 10.7 oz)   SpO2 97%   BMI 48.61 kg/m²   I/O last 3 completed shifts:  In: 3023.2 [P.O.:840; I.V.:241.2; Blood:450; IV Piggyback:1492.1]  Out: 2740 [Urine:2210; Chest Tube:530]  I/O this shift:  In: 617.1 [P.O.:417; I.V.:0.1; Blood:200]  Out: 275 [Urine:275]    Physical Exam:  Physical Exam  Vitals reviewed.   Constitutional:       General: He is not in acute distress.     Appearance: Normal appearance.   HENT:      Head: Normocephalic and atraumatic.   Eyes:      General: No scleral icterus.     Conjunctiva/sclera: Conjunctivae normal.   Cardiovascular:      Rate and Rhythm: Normal rate.      Heart sounds:      No friction rub.   Pulmonary:      Effort: Pulmonary effort is normal. No respiratory distress.      Breath sounds: No wheezing.   Abdominal:      General: Bowel sounds are normal. There is no distension.      Tenderness: There is no

## 2023-12-08 NOTE — PROGRESS NOTES
Occupational Therapy  Facility/Department: [facility] C2 CARD TELEMETRY  Daily Treatment Note  NAME: Kristopher Watkins  : 1945  MRN: [id number]    Date of Service: 2023    Discharge Recommendations:  IP Rehab   Barriers to Home Discharge:   [x] Steps to access home entry or bed/bath:   [x] Unable to transfer, ambulate, or propel wheelchair household distances without assist   [] Limited available assist at home upon discharge    [] Patient or family requests d/c to post-acute facility    [x] Poor cognition/safety awareness for d/c to home alone    [] Unable to maintain ordered weight bearing status    [] Patient with salient signs of long-standing immobility   [x] Decreased independence with ADLs   [x] Increased risk for falls   [] Other:   If pt is unable to be seen after this session, please let this note serve as discharge summary.  Please see case management note for discharge disposition.  Thank you.     Patient Diagnosis(es): There were no encounter diagnoses.     Assessment    Assessment: Pt seen for OT tx for ADLs, education re: sternal precautions and breathing exercises and mobility. Pt tends to groan and take shallow breaths, affecting his activity tolerance and O2 sats. Pt instructed on PLB and use of Acapella and spirometer. Pt demo'd use of these devices and needs additional education.  Pt was min x2 for standing balance and transfers with 4WW. He was able to take steps in room today with assist x2.  He has limited endurance to participate in ADL tasks including feeding.  Recommend IPR for skilled OT to improve ADL skills and mobility. Cont OT in acute care.  Activity Tolerance: Patient limited by endurance  Discharge Recommendations: IP Rehab    Plan   Occupational Therapy Plan  Times Per Week: 4-6x / week  Current Treatment Recommendations: Strengthening;ROM;Balance training;Functional mobility training;Endurance training;Pain management;Safety education & training;Patient/Caregiver education &  risk precautions in place;Call light within reach;Nurse notified; Heels elevated for pressure relief;Left in bed     Patient Education  Education Given To: Patient; Family  Education Provided: Role of Therapy;Transfer Training;Equipment;Plan of Care;Energy Conservation; Fall Prevention Strategies;Precautions; ADL Adaptive Strategies  Education Method: Verbal;Demonstration  Barriers to Learning: None  Education Outcome: Verbalized understanding;Continued education needed  Disease Specific Education: Pt educated on sternal precautions, safe mobility, energy conservation, and task modification.  Pt verbalized understanding  Goals  Short Term Goals  Time Frame for Short Term Goals: 12/13/23 -- unless otherwise stated -- ongoing 12/07  Short Term Goal 1: Pt will complete functional transfers/ toilet transfer w/ CGA and LRAD  Short Term Goal 2: Pt will complete 2-3 grooming tasks in unsupportive sitting w/ SUPV and good adherence to sternal precautions  Short Term Goal 3: Pt will complete x15 reps BUE therex within adherence to sternal precautions w/o fatigue -- by 12/11/23  Short Term Goal 4: Pt will complete UB dressing w/ min A  Patient Goals   Patient goals : \"to go home\"    AM-PAC - ADL  AM-PAC Daily Activity - Inpatient   How much help is needed for putting on and taking off regular lower body clothing?: Total  How much help is needed for bathing (which includes washing, rinsing, drying)?: Total  How much help is needed for toileting (which includes using toilet, bedpan, or urinal)?: Total  How much help is needed for putting on and taking off regular upper body clothing?: A Lot  How much help is needed for taking care of personal grooming?: A Lot  How much help for eating meals?: A Little  AM-PAC Inpatient Daily Activity Raw Score: 10  AM-PAC Inpatient ADL T-Scale Score : 27.31  ADL Inpatient CMS 0-100% Score: 74.7  ADL Inpatient CMS G-Code Modifier : CL    Therapy Time   Individual Concurrent Group Co-treatment   Time

## 2023-12-09 ENCOUNTER — APPOINTMENT (OUTPATIENT)
Dept: GENERAL RADIOLOGY | Age: 78
DRG: 235 | End: 2023-12-09
Attending: THORACIC SURGERY (CARDIOTHORACIC VASCULAR SURGERY)
Payer: MEDICARE

## 2023-12-09 LAB
ALBUMIN SERPL-MCNC: 3.5 G/DL (ref 3.4–5)
ANION GAP SERPL CALCULATED.3IONS-SCNC: 11 MMOL/L (ref 3–16)
BUN SERPL-MCNC: 56 MG/DL (ref 7–20)
CALCIUM SERPL-MCNC: 8.7 MG/DL (ref 8.3–10.6)
CHLORIDE SERPL-SCNC: 104 MMOL/L (ref 99–110)
CO2 SERPL-SCNC: 26 MMOL/L (ref 21–32)
CREAT SERPL-MCNC: 1.9 MG/DL (ref 0.8–1.3)
DEPRECATED RDW RBC AUTO: 17.7 % (ref 12.4–15.4)
GFR SERPLBLD CREATININE-BSD FMLA CKD-EPI: 36 ML/MIN/{1.73_M2}
GLUCOSE BLD-MCNC: 119 MG/DL (ref 70–99)
GLUCOSE BLD-MCNC: 134 MG/DL (ref 70–99)
GLUCOSE BLD-MCNC: 143 MG/DL (ref 70–99)
GLUCOSE BLD-MCNC: 185 MG/DL (ref 70–99)
GLUCOSE SERPL-MCNC: 123 MG/DL (ref 70–99)
HCT VFR BLD AUTO: 24 % (ref 40.5–52.5)
HGB BLD-MCNC: 8.1 G/DL (ref 13.5–17.5)
MAGNESIUM SERPL-MCNC: 2.6 MG/DL (ref 1.8–2.4)
MCH RBC QN AUTO: 30.3 PG (ref 26–34)
MCHC RBC AUTO-ENTMCNC: 33.6 G/DL (ref 31–36)
MCV RBC AUTO: 90.3 FL (ref 80–100)
PERFORMED ON: ABNORMAL
PHOSPHATE SERPL-MCNC: 4.4 MG/DL (ref 2.5–4.9)
PLATELET # BLD AUTO: 140 K/UL (ref 135–450)
PMV BLD AUTO: 7.5 FL (ref 5–10.5)
POTASSIUM SERPL-SCNC: 4.3 MMOL/L (ref 3.5–5.1)
RBC # BLD AUTO: 2.66 M/UL (ref 4.2–5.9)
SODIUM SERPL-SCNC: 141 MMOL/L (ref 136–145)
WBC # BLD AUTO: 11 K/UL (ref 4–11)

## 2023-12-09 PROCEDURE — 6370000000 HC RX 637 (ALT 250 FOR IP): Performed by: THORACIC SURGERY (CARDIOTHORACIC VASCULAR SURGERY)

## 2023-12-09 PROCEDURE — 94761 N-INVAS EAR/PLS OXIMETRY MLT: CPT

## 2023-12-09 PROCEDURE — 6370000000 HC RX 637 (ALT 250 FOR IP)

## 2023-12-09 PROCEDURE — 6360000002 HC RX W HCPCS

## 2023-12-09 PROCEDURE — 94669 MECHANICAL CHEST WALL OSCILL: CPT

## 2023-12-09 PROCEDURE — 80069 RENAL FUNCTION PANEL: CPT

## 2023-12-09 PROCEDURE — 85027 COMPLETE CBC AUTOMATED: CPT

## 2023-12-09 PROCEDURE — 2700000000 HC OXYGEN THERAPY PER DAY

## 2023-12-09 PROCEDURE — 83735 ASSAY OF MAGNESIUM: CPT

## 2023-12-09 PROCEDURE — 6370000000 HC RX 637 (ALT 250 FOR IP): Performed by: INTERNAL MEDICINE

## 2023-12-09 PROCEDURE — 2580000003 HC RX 258

## 2023-12-09 PROCEDURE — 99233 SBSQ HOSP IP/OBS HIGH 50: CPT | Performed by: INTERNAL MEDICINE

## 2023-12-09 PROCEDURE — 94640 AIRWAY INHALATION TREATMENT: CPT

## 2023-12-09 PROCEDURE — 94660 CPAP INITIATION&MGMT: CPT

## 2023-12-09 PROCEDURE — 71045 X-RAY EXAM CHEST 1 VIEW: CPT

## 2023-12-09 PROCEDURE — 2140000000 HC CCU INTERMEDIATE R&B

## 2023-12-09 RX ORDER — METOLAZONE 2.5 MG/1
2.5 TABLET ORAL ONCE
Status: COMPLETED | OUTPATIENT
Start: 2023-12-09 | End: 2023-12-09

## 2023-12-09 RX ADMIN — AMIODARONE HYDROCHLORIDE 400 MG: 200 TABLET ORAL at 20:13

## 2023-12-09 RX ADMIN — OXYCODONE 5 MG: 5 TABLET ORAL at 12:19

## 2023-12-09 RX ADMIN — FUROSEMIDE 10 MG/HR: 10 INJECTION, SOLUTION INTRAMUSCULAR; INTRAVENOUS at 01:21

## 2023-12-09 RX ADMIN — INSULIN LISPRO 1 UNITS: 100 INJECTION, SOLUTION INTRAVENOUS; SUBCUTANEOUS at 20:11

## 2023-12-09 RX ADMIN — FAMOTIDINE 20 MG: 20 TABLET ORAL at 08:52

## 2023-12-09 RX ADMIN — INSULIN LISPRO 2 UNITS: 100 INJECTION, SOLUTION INTRAVENOUS; SUBCUTANEOUS at 12:29

## 2023-12-09 RX ADMIN — BISACODYL 5 MG: 5 TABLET, COATED ORAL at 08:52

## 2023-12-09 RX ADMIN — POTASSIUM CHLORIDE 20 MEQ: 29.8 INJECTION, SOLUTION INTRAVENOUS at 08:50

## 2023-12-09 RX ADMIN — METHOCARBAMOL 1000 MG: 500 TABLET ORAL at 15:54

## 2023-12-09 RX ADMIN — ASPIRIN 81 MG: 81 TABLET, COATED ORAL at 08:51

## 2023-12-09 RX ADMIN — ACETAMINOPHEN 650 MG: 325 TABLET ORAL at 21:55

## 2023-12-09 RX ADMIN — CHLORHEXIDINE GLUCONATE 15 ML: 1.2 RINSE ORAL at 20:13

## 2023-12-09 RX ADMIN — ACETAMINOPHEN 650 MG: 325 TABLET ORAL at 03:43

## 2023-12-09 RX ADMIN — ALBUTEROL SULFATE 2.5 MG: 2.5 SOLUTION RESPIRATORY (INHALATION) at 15:15

## 2023-12-09 RX ADMIN — SODIUM CHLORIDE, PRESERVATIVE FREE 10 ML: 5 INJECTION INTRAVENOUS at 08:53

## 2023-12-09 RX ADMIN — ALBUTEROL SULFATE 2.5 MG: 2.5 SOLUTION RESPIRATORY (INHALATION) at 21:18

## 2023-12-09 RX ADMIN — METOPROLOL TARTRATE 12.5 MG: 25 TABLET, FILM COATED ORAL at 08:52

## 2023-12-09 RX ADMIN — CHLORHEXIDINE GLUCONATE 15 ML: 1.2 RINSE ORAL at 08:52

## 2023-12-09 RX ADMIN — ALBUTEROL SULFATE 2.5 MG: 2.5 SOLUTION RESPIRATORY (INHALATION) at 11:07

## 2023-12-09 RX ADMIN — MUPIROCIN: 20 OINTMENT TOPICAL at 20:14

## 2023-12-09 RX ADMIN — METHOCARBAMOL 1000 MG: 500 TABLET ORAL at 08:52

## 2023-12-09 RX ADMIN — OXYCODONE 5 MG: 5 TABLET ORAL at 20:13

## 2023-12-09 RX ADMIN — ACETAMINOPHEN 650 MG: 325 TABLET ORAL at 08:52

## 2023-12-09 RX ADMIN — Medication 400 MG: at 20:13

## 2023-12-09 RX ADMIN — METOPROLOL TARTRATE 25 MG: 25 TABLET, FILM COATED ORAL at 20:13

## 2023-12-09 RX ADMIN — AMIODARONE HYDROCHLORIDE 400 MG: 200 TABLET ORAL at 08:52

## 2023-12-09 RX ADMIN — HEPARIN SODIUM 5000 UNITS: 5000 INJECTION INTRAVENOUS; SUBCUTANEOUS at 20:14

## 2023-12-09 RX ADMIN — ATORVASTATIN CALCIUM 40 MG: 40 TABLET, FILM COATED ORAL at 20:13

## 2023-12-09 RX ADMIN — HEPARIN SODIUM 5000 UNITS: 5000 INJECTION INTRAVENOUS; SUBCUTANEOUS at 15:54

## 2023-12-09 RX ADMIN — MUPIROCIN: 20 OINTMENT TOPICAL at 08:52

## 2023-12-09 RX ADMIN — METOLAZONE 2.5 MG: 2.5 TABLET ORAL at 12:19

## 2023-12-09 RX ADMIN — SODIUM CHLORIDE, PRESERVATIVE FREE 10 ML: 5 INJECTION INTRAVENOUS at 20:14

## 2023-12-09 RX ADMIN — METHOCARBAMOL 1000 MG: 500 TABLET ORAL at 20:13

## 2023-12-09 RX ADMIN — HEPARIN SODIUM 5000 UNITS: 5000 INJECTION INTRAVENOUS; SUBCUTANEOUS at 06:15

## 2023-12-09 RX ADMIN — ONDANSETRON 4 MG: 4 TABLET, ORALLY DISINTEGRATING ORAL at 20:13

## 2023-12-09 RX ADMIN — INSULIN GLARGINE 20 UNITS: 100 INJECTION, SOLUTION SUBCUTANEOUS at 20:12

## 2023-12-09 RX ADMIN — CLOPIDOGREL BISULFATE 75 MG: 75 TABLET ORAL at 08:52

## 2023-12-09 RX ADMIN — ACETAMINOPHEN 650 MG: 325 TABLET ORAL at 15:54

## 2023-12-09 RX ADMIN — POLYETHYLENE GLYCOL 3350 17 G: 17 POWDER, FOR SOLUTION ORAL at 08:52

## 2023-12-09 RX ADMIN — FUROSEMIDE 10 MG/HR: 10 INJECTION, SOLUTION INTRAMUSCULAR; INTRAVENOUS at 12:32

## 2023-12-09 RX ADMIN — OXYCODONE 5 MG: 5 TABLET ORAL at 06:19

## 2023-12-09 ASSESSMENT — PAIN SCALES - GENERAL
PAINLEVEL_OUTOF10: 4
PAINLEVEL_OUTOF10: 5
PAINLEVEL_OUTOF10: 6
PAINLEVEL_OUTOF10: 8
PAINLEVEL_OUTOF10: 7

## 2023-12-09 ASSESSMENT — PAIN DESCRIPTION - DESCRIPTORS: DESCRIPTORS: STABBING

## 2023-12-09 NOTE — PROGRESS NOTES
Pt had somewhat improved pain control overnight. Brachial a line removed sans issues. Pt pain med demand decreased   Pt refused bipap and ripped it off around 3am.      Continues to moan and grunt, coached to not do that. Full bath, incisional care. Prevention for cauti , clabsi, and HAPI in place. Lasix gtt diursed around 2Litre for the shift. Pt  138kg this am, reduction in weight. Still remains edematous, still requires heavy coaching on IS/cough and deep breathe. Audible exp wheeze heard b/l when pt is grunting/moaning. Redirected multiple times during shift re cough and deep breathe and use IS.      LM OR tomorrow

## 2023-12-09 NOTE — PROGRESS NOTES
Department of Cardiovascular & Thoracic Surgery  Progress Note    Department of Cardiovascular & Thoracic Surgery  Progress Note     Postop day #4 CABG, LAAE  SUBJECTIVE:    Wife at bedside and encouraging patient with incentive spirometry use  Otherwise without complaints  Continues on Lasix drip      OBJECTIVE:    Vitals:  Afebrile  -665 systolic  Normal sinus rhythm nontachycardic  O2 down to 1 L nasal cannula      Patient Vitals for the past 96 hrs (Last 3 readings):   Weight   12/09/23 0600 (!) 138.5 kg (305 lb 5.4 oz)   12/08/23 0557 (!) 145 kg (319 lb 10.7 oz)   12/08/23 0530 (!) 142.6 kg (314 lb 6 oz)   12/5/2023 135.8 actual preop standing scale  Overall +3 kg       24 HR INTAKE/OUTPUT:      Gross per 24 hour   Intake 1360   Output 2020   Net -600   UO: 2000    James: 20 mL on waterseal    Data  Recent Labs     Units 12/09/23  0323 12/08/23  0510 12/07/23  0600   WBC K/uL 11.0 12.9* 14.8*   HCT % 24.0* 22.5* 24.9*   PLT K/uL 140 152 177        Acute blood loss anemia  Recent Labs     Units 12/09/23  0323 12/08/23  0510 12/07/23  0600   NA mmol/L 141 140 141   K mmol/L 4.3 4.5 4.5   CREATININE mg/dL 1.9* 1.9* 1.7*   BUN mg/dL 56* 48* 39*   Creatinine 1.0 on 12/5  Chest x-ray reviewed and shows haziness and blunting of right costophrenic angle      Current Cardiac Medications:  Amiodarone  Aspirin  Lipitor  Plavix  Lasix drip/Zaroxolyn  Metoprolol 12.5 mg twice daily  Current NonCardiac Medications:  Tylenol  Proventil  Dulcolax/MiraLAX  Chlorhexidine  Pepcid  Mag oxide  Insulin  Robaxin  ASSESSMENT AND PLAN:    Postop day 4 CABG  Hyperlipidemia  Sleep apnea  Hypertension  Acute kidney injury  Acute blood loss anemia    Uptitrate beta-blocker  Appreciate nephrology's help-continue Lasix drip and Zaroxolyn added  Received 1 unit of packed blood cells yesterday-will reassess tomorrow  Needs aggressive pulmonary toilet

## 2023-12-09 NOTE — PROGRESS NOTES
12/08/23 2343   NIV Type   NIV Started/Stopped On   Equipment Type v60   Mode Bilevel   Mask Type Full face mask   Mask Size Large   Assessment   Pulse 79   Respirations 19   SpO2 100 %   Comfort Level Good   Using Accessory Muscles No   Mask Compliance Good   Skin Assessment Clean, dry, & intact   Skin Protection for O2 Device Yes   Orientation Middle   Location Nose   Settings/Measurements   IPAP 13 cmH20   CPAP/EPAP 5 cmH2O   Vt (Measured) 542 mL   Rate Ordered 15   FiO2  40 %   Minute Volume (L/min) 15.8 Liters   Mask Leak (lpm) 19 lpm   Patient's Home Machine No   Alarm Settings   Alarms On Y   Low Pressure (cmH2O) 6 cmH2O   High Pressure (cmH2O) 30 cmH2O   Delay Alarm 20 sec(s)   RR Low (bpm) 6   RR High (bpm) 40 br/min

## 2023-12-09 NOTE — PLAN OF CARE
Problem: Chronic Conditions and Co-morbidities  Goal: Patient's chronic conditions and co-morbidity symptoms are monitored and maintained or improved  12/9/2023 0941 by Karen Humphreys RN  Outcome: Progressing  Flowsheets (Taken 12/9/2023 0800)  Care Plan - Patient's Chronic Conditions and Co-Morbidity Symptoms are Monitored and Maintained or Improved: Monitor and assess patient's chronic conditions and comorbid symptoms for stability, deterioration, or improvement     Problem: Discharge Planning  Goal: Discharge to home or other facility with appropriate resources  Outcome: Progressing  Flowsheets  Taken 12/9/2023 0800 by Karen Humphreys RN  Discharge to home or other facility with appropriate resources: Identify barriers to discharge with patient and caregiver  Problem: Safety - Adult  Goal: Free from fall injury  12/9/2023 0941 by Karen Humphreys RN  Outcome: Progressing  12/9/2023 0503 by Jasmin Paul RN  Outcome: Progressing  Flowsheets (Taken 12/8/2023 2142)  Free From Fall Injury:   Instruct family/caregiver on patient safety   Based on caregiver fall risk screen, instruct family/caregiver to ask for assistance with transferring infant if caregiver noted to have fall risk factors     Problem: Neurosensory - Adult  Goal: Achieves stable or improved neurological status  Outcome: Progressing  Flowsheets (Taken 12/8/2023 2000 by Jasmin Paul RN)  Achieves stable or improved neurological status:   Maintain blood pressure and fluid volume within ordered parameters to optimize cerebral perfusion and minimize risk of hemorrhage   Monitor temperature, glucose, and sodium.  Initiate appropriate interventions as ordered   Initiate measures to prevent increased intracranial pressure     Problem: Respiratory - Adult  Goal: Achieves optimal ventilation and oxygenation  Outcome: Progressing     Problem: Cardiovascular - Adult  Goal: Maintains optimal cardiac output and hemodynamic stability  Outcome:

## 2023-12-09 NOTE — PLAN OF CARE
Problem: Chronic Conditions and Co-morbidities  Goal: Patient's chronic conditions and co-morbidity symptoms are monitored and maintained or improved  Outcome: Progressing  Flowsheets (Taken 12/8/2023 2000)  Care Plan - Patient's Chronic Conditions and Co-Morbidity Symptoms are Monitored and Maintained or Improved:   Monitor and assess patient's chronic conditions and comorbid symptoms for stability, deterioration, or improvement   Update acute care plan with appropriate goals if chronic or comorbid symptoms are exacerbated and prevent overall improvement and discharge     Problem: Pain  Goal: Verbalizes/displays adequate comfort level or baseline comfort level  Outcome: Progressing  Flowsheets (Taken 12/8/2023 2200)  Verbalizes/displays adequate comfort level or baseline comfort level:   Encourage patient to monitor pain and request assistance   Assess pain using appropriate pain scale   Administer analgesics based on type and severity of pain and evaluate response   Implement non-pharmacological measures as appropriate and evaluate response   Consider cultural and social influences on pain and pain management     Problem: ABCDS Injury Assessment  Goal: Absence of physical injury  Outcome: Progressing     Problem: Safety - Adult  Goal: Free from fall injury  Outcome: Progressing  Flowsheets (Taken 12/8/2023 2142)  Free From Fall Injury:   Instruct family/caregiver on patient safety   Based on caregiver fall risk screen, instruct family/caregiver to ask for assistance with transferring infant if caregiver noted to have fall risk factors

## 2023-12-10 ENCOUNTER — APPOINTMENT (OUTPATIENT)
Dept: GENERAL RADIOLOGY | Age: 78
DRG: 235 | End: 2023-12-10
Attending: THORACIC SURGERY (CARDIOTHORACIC VASCULAR SURGERY)
Payer: MEDICARE

## 2023-12-10 LAB
ALBUMIN SERPL-MCNC: 3.4 G/DL (ref 3.4–5)
ANION GAP SERPL CALCULATED.3IONS-SCNC: 12 MMOL/L (ref 3–16)
BUN SERPL-MCNC: 59 MG/DL (ref 7–20)
CALCIUM SERPL-MCNC: 9.3 MG/DL (ref 8.3–10.6)
CHLORIDE SERPL-SCNC: 101 MMOL/L (ref 99–110)
CO2 SERPL-SCNC: 28 MMOL/L (ref 21–32)
CREAT SERPL-MCNC: 1.5 MG/DL (ref 0.8–1.3)
DEPRECATED RDW RBC AUTO: 17.5 % (ref 12.4–15.4)
GFR SERPLBLD CREATININE-BSD FMLA CKD-EPI: 47 ML/MIN/{1.73_M2}
GLUCOSE BLD-MCNC: 108 MG/DL (ref 70–99)
GLUCOSE BLD-MCNC: 116 MG/DL (ref 70–99)
GLUCOSE BLD-MCNC: 146 MG/DL (ref 70–99)
GLUCOSE SERPL-MCNC: 119 MG/DL (ref 70–99)
HCT VFR BLD AUTO: 25.7 % (ref 40.5–52.5)
HGB BLD-MCNC: 8.5 G/DL (ref 13.5–17.5)
MAGNESIUM SERPL-MCNC: 2.7 MG/DL (ref 1.8–2.4)
MCH RBC QN AUTO: 29.5 PG (ref 26–34)
MCHC RBC AUTO-ENTMCNC: 32.9 G/DL (ref 31–36)
MCV RBC AUTO: 89.6 FL (ref 80–100)
PERFORMED ON: ABNORMAL
PHOSPHATE SERPL-MCNC: 4.2 MG/DL (ref 2.5–4.9)
PLATELET # BLD AUTO: 195 K/UL (ref 135–450)
PMV BLD AUTO: 7.5 FL (ref 5–10.5)
POTASSIUM SERPL-SCNC: 4 MMOL/L (ref 3.5–5.1)
RBC # BLD AUTO: 2.87 M/UL (ref 4.2–5.9)
SODIUM SERPL-SCNC: 141 MMOL/L (ref 136–145)
WBC # BLD AUTO: 11.5 K/UL (ref 4–11)

## 2023-12-10 PROCEDURE — 6370000000 HC RX 637 (ALT 250 FOR IP): Performed by: THORACIC SURGERY (CARDIOTHORACIC VASCULAR SURGERY)

## 2023-12-10 PROCEDURE — 6370000000 HC RX 637 (ALT 250 FOR IP): Performed by: INTERNAL MEDICINE

## 2023-12-10 PROCEDURE — 99024 POSTOP FOLLOW-UP VISIT: CPT | Performed by: THORACIC SURGERY (CARDIOTHORACIC VASCULAR SURGERY)

## 2023-12-10 PROCEDURE — 80069 RENAL FUNCTION PANEL: CPT

## 2023-12-10 PROCEDURE — 94640 AIRWAY INHALATION TREATMENT: CPT

## 2023-12-10 PROCEDURE — 2580000003 HC RX 258

## 2023-12-10 PROCEDURE — 6370000000 HC RX 637 (ALT 250 FOR IP)

## 2023-12-10 PROCEDURE — 83735 ASSAY OF MAGNESIUM: CPT

## 2023-12-10 PROCEDURE — 6360000002 HC RX W HCPCS

## 2023-12-10 PROCEDURE — 85027 COMPLETE CBC AUTOMATED: CPT

## 2023-12-10 PROCEDURE — 2140000000 HC CCU INTERMEDIATE R&B

## 2023-12-10 PROCEDURE — 71045 X-RAY EXAM CHEST 1 VIEW: CPT

## 2023-12-10 PROCEDURE — 94669 MECHANICAL CHEST WALL OSCILL: CPT

## 2023-12-10 RX ORDER — METOLAZONE 2.5 MG/1
2.5 TABLET ORAL ONCE
Status: COMPLETED | OUTPATIENT
Start: 2023-12-10 | End: 2023-12-10

## 2023-12-10 RX ADMIN — AMIODARONE HYDROCHLORIDE 400 MG: 200 TABLET ORAL at 08:43

## 2023-12-10 RX ADMIN — POLYETHYLENE GLYCOL 3350 17 G: 17 POWDER, FOR SOLUTION ORAL at 08:43

## 2023-12-10 RX ADMIN — METHOCARBAMOL 1000 MG: 500 TABLET ORAL at 15:14

## 2023-12-10 RX ADMIN — ACETAMINOPHEN 650 MG: 325 TABLET ORAL at 19:58

## 2023-12-10 RX ADMIN — ALBUTEROL SULFATE 2.5 MG: 2.5 SOLUTION RESPIRATORY (INHALATION) at 12:19

## 2023-12-10 RX ADMIN — INSULIN LISPRO 1 UNITS: 100 INJECTION, SOLUTION INTRAVENOUS; SUBCUTANEOUS at 19:59

## 2023-12-10 RX ADMIN — SODIUM CHLORIDE, PRESERVATIVE FREE 10 ML: 5 INJECTION INTRAVENOUS at 08:44

## 2023-12-10 RX ADMIN — ALBUTEROL SULFATE 2.5 MG: 2.5 SOLUTION RESPIRATORY (INHALATION) at 07:55

## 2023-12-10 RX ADMIN — AMIODARONE HYDROCHLORIDE 400 MG: 200 TABLET ORAL at 19:58

## 2023-12-10 RX ADMIN — ATORVASTATIN CALCIUM 40 MG: 40 TABLET, FILM COATED ORAL at 19:58

## 2023-12-10 RX ADMIN — ACETAMINOPHEN 650 MG: 325 TABLET ORAL at 15:14

## 2023-12-10 RX ADMIN — ONDANSETRON 4 MG: 4 TABLET, ORALLY DISINTEGRATING ORAL at 19:59

## 2023-12-10 RX ADMIN — HEPARIN SODIUM 5000 UNITS: 5000 INJECTION INTRAVENOUS; SUBCUTANEOUS at 20:00

## 2023-12-10 RX ADMIN — FUROSEMIDE 10 MG/HR: 10 INJECTION, SOLUTION INTRAMUSCULAR; INTRAVENOUS at 23:54

## 2023-12-10 RX ADMIN — METOPROLOL TARTRATE 25 MG: 25 TABLET, FILM COATED ORAL at 19:58

## 2023-12-10 RX ADMIN — METOLAZONE 2.5 MG: 2.5 TABLET ORAL at 15:14

## 2023-12-10 RX ADMIN — FUROSEMIDE 10 MG/HR: 10 INJECTION, SOLUTION INTRAMUSCULAR; INTRAVENOUS at 02:05

## 2023-12-10 RX ADMIN — FAMOTIDINE 20 MG: 20 TABLET ORAL at 08:43

## 2023-12-10 RX ADMIN — SODIUM CHLORIDE, PRESERVATIVE FREE 10 ML: 5 INJECTION INTRAVENOUS at 20:01

## 2023-12-10 RX ADMIN — ALBUTEROL SULFATE 2.5 MG: 2.5 SOLUTION RESPIRATORY (INHALATION) at 16:39

## 2023-12-10 RX ADMIN — METHOCARBAMOL 1000 MG: 500 TABLET ORAL at 19:58

## 2023-12-10 RX ADMIN — ACETAMINOPHEN 650 MG: 325 TABLET ORAL at 03:00

## 2023-12-10 RX ADMIN — FUROSEMIDE 10 MG/HR: 10 INJECTION, SOLUTION INTRAMUSCULAR; INTRAVENOUS at 13:32

## 2023-12-10 RX ADMIN — OXYCODONE 5 MG: 5 TABLET ORAL at 19:59

## 2023-12-10 RX ADMIN — CHLORHEXIDINE GLUCONATE 15 ML: 1.2 RINSE ORAL at 20:01

## 2023-12-10 RX ADMIN — METOPROLOL TARTRATE 25 MG: 25 TABLET, FILM COATED ORAL at 08:43

## 2023-12-10 RX ADMIN — OXYCODONE 5 MG: 5 TABLET ORAL at 05:41

## 2023-12-10 RX ADMIN — POTASSIUM CHLORIDE 20 MEQ: 29.8 INJECTION, SOLUTION INTRAVENOUS at 04:21

## 2023-12-10 RX ADMIN — CLOPIDOGREL BISULFATE 75 MG: 75 TABLET ORAL at 08:43

## 2023-12-10 RX ADMIN — MORPHINE SULFATE 2 MG: 2 INJECTION, SOLUTION INTRAMUSCULAR; INTRAVENOUS at 19:57

## 2023-12-10 RX ADMIN — HEPARIN SODIUM 5000 UNITS: 5000 INJECTION INTRAVENOUS; SUBCUTANEOUS at 05:41

## 2023-12-10 RX ADMIN — HEPARIN SODIUM 5000 UNITS: 5000 INJECTION INTRAVENOUS; SUBCUTANEOUS at 15:14

## 2023-12-10 RX ADMIN — Medication 400 MG: at 19:58

## 2023-12-10 RX ADMIN — BISACODYL 5 MG: 5 TABLET, COATED ORAL at 08:43

## 2023-12-10 RX ADMIN — ACETAMINOPHEN 650 MG: 325 TABLET ORAL at 08:43

## 2023-12-10 RX ADMIN — INSULIN GLARGINE 20 UNITS: 100 INJECTION, SOLUTION SUBCUTANEOUS at 19:59

## 2023-12-10 RX ADMIN — CHLORHEXIDINE GLUCONATE 15 ML: 1.2 RINSE ORAL at 08:43

## 2023-12-10 RX ADMIN — METHOCARBAMOL 1000 MG: 500 TABLET ORAL at 08:46

## 2023-12-10 RX ADMIN — MUPIROCIN: 20 OINTMENT TOPICAL at 08:45

## 2023-12-10 RX ADMIN — ASPIRIN 81 MG: 81 TABLET, COATED ORAL at 08:43

## 2023-12-10 ASSESSMENT — PAIN SCALES - GENERAL
PAINLEVEL_OUTOF10: 3
PAINLEVEL_OUTOF10: 3
PAINLEVEL_OUTOF10: 10
PAINLEVEL_OUTOF10: 7

## 2023-12-10 ASSESSMENT — PAIN DESCRIPTION - DESCRIPTORS
DESCRIPTORS: SORE
DESCRIPTORS: STABBING;SHARP;TEARING

## 2023-12-10 ASSESSMENT — PAIN DESCRIPTION - LOCATION
LOCATION: BACK;STERNUM
LOCATION: BACK

## 2023-12-10 NOTE — PROGRESS NOTES
12/09/23 2132   NIV Type   NIV Started/Stopped On   Equipment Type v60   Mode Bilevel   Mask Type Full face mask   Mask Size Large   Assessment   Pulse 95   Respirations 24   SpO2 97 %   Comfort Level Poor   Using Accessory Muscles Yes   Mask Compliance Fair   Skin Assessment Clean, dry, & intact   Skin Protection for O2 Device Yes   Orientation Middle   Location Nose   Settings/Measurements   IPAP 13 cmH20   CPAP/EPAP 5 cmH2O   Vt (Measured) 601 mL   Rate Ordered 15   FiO2  30 %   Minute Volume (L/min) 14.4 Liters   Mask Leak (lpm) 20 lpm   Patient's Home Machine No   Alarm Settings   Alarms On Y   Low Pressure (cmH2O) 6 cmH2O   High Pressure (cmH2O) 30 cmH2O   Delay Alarm 20 sec(s)   RR Low (bpm) 6   RR High (bpm) 40 br/min

## 2023-12-10 NOTE — PROGRESS NOTES
Department of Cardiovascular & Thoracic Surgery  Progress Note    Department of Cardiovascular & Thoracic Surgery  Progress Note    Postop day 5 CABG  SUBJECTIVE:      No complaints    OBJECTIVE:    Vitals:  Afebrile  BP 97-1 23  Normal sinus rhythm  Room air sats greater than 94%    Patient Vitals for the past 96 hrs (Last 3 readings):   Weight   12/10/23 0600 (!) 137.6 kg (303 lb 5.7 oz)   12/09/23 0600 (!) 138.5 kg (305 lb 5.4 oz)   12/08/23 0557 (!) 145 kg (319 lb 10.7 oz)   Preop weight 135.8  Overall +1.8 kg       24 HR INTAKE/OUTPUT:      Gross per 24 hour   Intake 687   Output 3980   Net    UO: 3850  James: 130    Data  Recent Labs     Units 12/10/23  0340 12/09/23  0323 12/08/23  0510   WBC K/uL 11.5* 11.0 12.9*   HCT % 25.7* 24.0* 22.5*   PLT K/uL 195 140 152         Recent Labs     Units 12/10/23  0340 12/09/23  0323 12/08/23  0510   NA mmol/L 141 141 140   K mmol/L 4.0 4.3 4.5   CREATININE mg/dL 1.5* 1.9* 1.9*   BUN mg/dL 59* 56* 48*      Creatinine down to 1.5  Hematocrit of 25.7 stable and increasing slightly  Chest x-ray reviewed and is improved although still with probable mild to moderate right pleural effusion      Current Cardiac Medications:  Amiodarone  DAPT  Lipitor  Lopressor 25 twice daily  Lasix drip  Zaroxolyn 2.5 mg x 2 days  Current NonCardiac Medications:        ASSESSMENT AND PLAN:    Postop day 5 CABG  MADONNA  Acute blood loss anemia    On appropriate cardiac medications  Responding well to Lasix drip and Zaroxolyn with good urine output and declining creatinine  Hemoglobin has been stable since transfusion on 12/8  Chest tube to come out  Continue diuresis  Discussed the importance of aggressive pulmonary toilet with the patient

## 2023-12-10 NOTE — PLAN OF CARE
Problem: Chronic Conditions and Co-morbidities  Goal: Patient's chronic conditions and co-morbidity symptoms are monitored and maintained or improved  Outcome: Progressing     Problem: Discharge Planning  Goal: Discharge to home or other facility with appropriate resources  Outcome: Progressing     Problem: Pain  Goal: Verbalizes/displays adequate comfort level or baseline comfort level  Outcome: Progressing  Flowsheets  Taken 12/9/2023 2300  Verbalizes/displays adequate comfort level or baseline comfort level:   Encourage patient to monitor pain and request assistance   Assess pain using appropriate pain scale   Administer analgesics based on type and severity of pain and evaluate response   Consider cultural and social influences on pain and pain management   Implement non-pharmacological measures as appropriate and evaluate response  Taken 12/9/2023 2000  Verbalizes/displays adequate comfort level or baseline comfort level:   Encourage patient to monitor pain and request assistance   Assess pain using appropriate pain scale   Administer analgesics based on type and severity of pain and evaluate response   Implement non-pharmacological measures as appropriate and evaluate response   Consider cultural and social influences on pain and pain management   Notify Licensed Independent Practitioner if interventions unsuccessful or patient reports new pain  Taken 12/9/2023 1900  Verbalizes/displays adequate comfort level or baseline comfort level:   Encourage patient to monitor pain and request assistance   Assess pain using appropriate pain scale   Administer analgesics based on type and severity of pain and evaluate response   Implement non-pharmacological measures as appropriate and evaluate response   Consider cultural and social influences on pain and pain management     Problem: ABCDS Injury Assessment  Goal: Absence of physical injury  Outcome: Progressing

## 2023-12-10 NOTE — PROGRESS NOTES
Pt has had better rest tonight. Pain med needs dramtatically decreased, 10mg po Estella total     PT hesitant to get OOB this am, will attempt after the most recent oxycodone has time to take affect. Diuresed approx 3litres tonight. K+ replaced this am total 20meQ     Pt awake, alert oriented. Central line remains, rosa remains, on lasix gtt and continue to have edema and exp wheezes. Creatinine is improved this am.      Hemodynamics solid, fsbg WDL overnight with Lantus and one SSI coverage at HS. Pleural chest tube WDL. Had around 90cc serosang drainage out overnight. Continues to grunt/moan frequently and ineffective breathing pattern at times, needs to work harder on IS. Pt is coughing and expectorating.

## 2023-12-11 ENCOUNTER — APPOINTMENT (OUTPATIENT)
Dept: GENERAL RADIOLOGY | Age: 78
DRG: 235 | End: 2023-12-11
Attending: THORACIC SURGERY (CARDIOTHORACIC VASCULAR SURGERY)
Payer: MEDICARE

## 2023-12-11 LAB
ALBUMIN SERPL-MCNC: 3.4 G/DL (ref 3.4–5)
ANION GAP SERPL CALCULATED.3IONS-SCNC: 12 MMOL/L (ref 3–16)
BUN SERPL-MCNC: 70 MG/DL (ref 7–20)
CALCIUM SERPL-MCNC: 9.3 MG/DL (ref 8.3–10.6)
CHLORIDE SERPL-SCNC: 94 MMOL/L (ref 99–110)
CO2 SERPL-SCNC: 31 MMOL/L (ref 21–32)
CREAT SERPL-MCNC: 1.6 MG/DL (ref 0.8–1.3)
DEPRECATED RDW RBC AUTO: 17.3 % (ref 12.4–15.4)
GFR SERPLBLD CREATININE-BSD FMLA CKD-EPI: 44 ML/MIN/{1.73_M2}
GLUCOSE BLD-MCNC: 100 MG/DL (ref 70–99)
GLUCOSE BLD-MCNC: 109 MG/DL (ref 70–99)
GLUCOSE BLD-MCNC: 122 MG/DL (ref 70–99)
GLUCOSE BLD-MCNC: 142 MG/DL (ref 70–99)
GLUCOSE SERPL-MCNC: 117 MG/DL (ref 70–99)
HCT VFR BLD AUTO: 25.9 % (ref 40.5–52.5)
HGB BLD-MCNC: 8.6 G/DL (ref 13.5–17.5)
MAGNESIUM SERPL-MCNC: 2.6 MG/DL (ref 1.8–2.4)
MCH RBC QN AUTO: 29.5 PG (ref 26–34)
MCHC RBC AUTO-ENTMCNC: 33.4 G/DL (ref 31–36)
MCV RBC AUTO: 88.5 FL (ref 80–100)
PERFORMED ON: ABNORMAL
PHOSPHATE SERPL-MCNC: 5 MG/DL (ref 2.5–4.9)
PLATELET # BLD AUTO: 234 K/UL (ref 135–450)
PMV BLD AUTO: 7.6 FL (ref 5–10.5)
POTASSIUM SERPL-SCNC: 3.5 MMOL/L (ref 3.5–5.1)
RBC # BLD AUTO: 2.93 M/UL (ref 4.2–5.9)
SODIUM SERPL-SCNC: 137 MMOL/L (ref 136–145)
WBC # BLD AUTO: 11.9 K/UL (ref 4–11)

## 2023-12-11 PROCEDURE — 2580000003 HC RX 258: Performed by: INTERNAL MEDICINE

## 2023-12-11 PROCEDURE — 6370000000 HC RX 637 (ALT 250 FOR IP): Performed by: THORACIC SURGERY (CARDIOTHORACIC VASCULAR SURGERY)

## 2023-12-11 PROCEDURE — 85027 COMPLETE CBC AUTOMATED: CPT

## 2023-12-11 PROCEDURE — 2580000003 HC RX 258

## 2023-12-11 PROCEDURE — 71045 X-RAY EXAM CHEST 1 VIEW: CPT

## 2023-12-11 PROCEDURE — 97535 SELF CARE MNGMENT TRAINING: CPT

## 2023-12-11 PROCEDURE — 6360000002 HC RX W HCPCS

## 2023-12-11 PROCEDURE — 80069 RENAL FUNCTION PANEL: CPT

## 2023-12-11 PROCEDURE — 99024 POSTOP FOLLOW-UP VISIT: CPT

## 2023-12-11 PROCEDURE — 6370000000 HC RX 637 (ALT 250 FOR IP)

## 2023-12-11 PROCEDURE — 83735 ASSAY OF MAGNESIUM: CPT

## 2023-12-11 PROCEDURE — 94640 AIRWAY INHALATION TREATMENT: CPT

## 2023-12-11 PROCEDURE — 97530 THERAPEUTIC ACTIVITIES: CPT

## 2023-12-11 PROCEDURE — 97116 GAIT TRAINING THERAPY: CPT

## 2023-12-11 PROCEDURE — 94669 MECHANICAL CHEST WALL OSCILL: CPT

## 2023-12-11 PROCEDURE — 2140000000 HC CCU INTERMEDIATE R&B

## 2023-12-11 PROCEDURE — 6360000002 HC RX W HCPCS: Performed by: INTERNAL MEDICINE

## 2023-12-11 RX ORDER — AMOXICILLIN AND CLAVULANATE POTASSIUM 875; 125 MG/1; MG/1
1 TABLET, FILM COATED ORAL EVERY 12 HOURS SCHEDULED
Status: DISCONTINUED | OUTPATIENT
Start: 2023-12-11 | End: 2023-12-13

## 2023-12-11 RX ADMIN — CHLORHEXIDINE GLUCONATE 15 ML: 1.2 RINSE ORAL at 20:53

## 2023-12-11 RX ADMIN — ALBUTEROL SULFATE 2.5 MG: 2.5 SOLUTION RESPIRATORY (INHALATION) at 07:16

## 2023-12-11 RX ADMIN — ACETAMINOPHEN 650 MG: 325 TABLET ORAL at 13:54

## 2023-12-11 RX ADMIN — POTASSIUM CHLORIDE 20 MEQ: 29.8 INJECTION, SOLUTION INTRAVENOUS at 04:46

## 2023-12-11 RX ADMIN — METOPROLOL TARTRATE 25 MG: 25 TABLET, FILM COATED ORAL at 20:53

## 2023-12-11 RX ADMIN — SODIUM CHLORIDE, PRESERVATIVE FREE 10 ML: 5 INJECTION INTRAVENOUS at 20:53

## 2023-12-11 RX ADMIN — Medication 400 MG: at 20:53

## 2023-12-11 RX ADMIN — OXYCODONE 5 MG: 5 TABLET ORAL at 05:05

## 2023-12-11 RX ADMIN — INSULIN LISPRO 2 UNITS: 100 INJECTION, SOLUTION INTRAVENOUS; SUBCUTANEOUS at 17:03

## 2023-12-11 RX ADMIN — ALBUTEROL SULFATE 2.5 MG: 2.5 SOLUTION RESPIRATORY (INHALATION) at 11:31

## 2023-12-11 RX ADMIN — METHOCARBAMOL 1000 MG: 500 TABLET ORAL at 13:54

## 2023-12-11 RX ADMIN — AMOXICILLIN AND CLAVULANATE POTASSIUM 1 TABLET: 875; 125 TABLET, FILM COATED ORAL at 08:29

## 2023-12-11 RX ADMIN — ACETAMINOPHEN 650 MG: 325 TABLET ORAL at 08:25

## 2023-12-11 RX ADMIN — ACETAMINOPHEN 650 MG: 325 TABLET ORAL at 03:08

## 2023-12-11 RX ADMIN — HEPARIN SODIUM 5000 UNITS: 5000 INJECTION INTRAVENOUS; SUBCUTANEOUS at 06:39

## 2023-12-11 RX ADMIN — BISACODYL 5 MG: 5 TABLET, COATED ORAL at 08:25

## 2023-12-11 RX ADMIN — HEPARIN SODIUM 5000 UNITS: 5000 INJECTION INTRAVENOUS; SUBCUTANEOUS at 13:54

## 2023-12-11 RX ADMIN — AMIODARONE HYDROCHLORIDE 400 MG: 200 TABLET ORAL at 08:25

## 2023-12-11 RX ADMIN — FAMOTIDINE 20 MG: 20 TABLET ORAL at 08:25

## 2023-12-11 RX ADMIN — AMIODARONE HYDROCHLORIDE 400 MG: 200 TABLET ORAL at 20:52

## 2023-12-11 RX ADMIN — METOPROLOL TARTRATE 25 MG: 25 TABLET, FILM COATED ORAL at 08:25

## 2023-12-11 RX ADMIN — ATORVASTATIN CALCIUM 40 MG: 40 TABLET, FILM COATED ORAL at 20:53

## 2023-12-11 RX ADMIN — METHOCARBAMOL 1000 MG: 500 TABLET ORAL at 20:52

## 2023-12-11 RX ADMIN — INSULIN GLARGINE 20 UNITS: 100 INJECTION, SOLUTION SUBCUTANEOUS at 20:54

## 2023-12-11 RX ADMIN — ACETAMINOPHEN 650 MG: 325 TABLET ORAL at 20:52

## 2023-12-11 RX ADMIN — ASPIRIN 81 MG: 81 TABLET, COATED ORAL at 08:25

## 2023-12-11 RX ADMIN — AMOXICILLIN AND CLAVULANATE POTASSIUM 1 TABLET: 875; 125 TABLET, FILM COATED ORAL at 20:55

## 2023-12-11 RX ADMIN — HEPARIN SODIUM 5000 UNITS: 5000 INJECTION INTRAVENOUS; SUBCUTANEOUS at 20:53

## 2023-12-11 RX ADMIN — CLOPIDOGREL BISULFATE 75 MG: 75 TABLET ORAL at 08:25

## 2023-12-11 RX ADMIN — ALBUTEROL SULFATE 2.5 MG: 2.5 SOLUTION RESPIRATORY (INHALATION) at 15:49

## 2023-12-11 RX ADMIN — FUROSEMIDE 10 MG/HR: 10 INJECTION, SOLUTION INTRAMUSCULAR; INTRAVENOUS at 13:06

## 2023-12-11 RX ADMIN — OXYCODONE 5 MG: 5 TABLET ORAL at 15:19

## 2023-12-11 RX ADMIN — SODIUM CHLORIDE, PRESERVATIVE FREE 10 ML: 5 INJECTION INTRAVENOUS at 08:27

## 2023-12-11 RX ADMIN — METHOCARBAMOL 1000 MG: 500 TABLET ORAL at 08:25

## 2023-12-11 RX ADMIN — ALBUTEROL SULFATE 2.5 MG: 2.5 SOLUTION RESPIRATORY (INHALATION) at 20:35

## 2023-12-11 RX ADMIN — POTASSIUM CHLORIDE 20 MEQ: 29.8 INJECTION, SOLUTION INTRAVENOUS at 06:39

## 2023-12-11 RX ADMIN — CHLORHEXIDINE GLUCONATE 15 ML: 1.2 RINSE ORAL at 08:25

## 2023-12-11 ASSESSMENT — PAIN DESCRIPTION - ORIENTATION
ORIENTATION: MID
ORIENTATION: UPPER

## 2023-12-11 ASSESSMENT — PAIN SCALES - GENERAL
PAINLEVEL_OUTOF10: 5
PAINLEVEL_OUTOF10: 3

## 2023-12-11 ASSESSMENT — PAIN DESCRIPTION - LOCATION
LOCATION: BACK
LOCATION: BACK

## 2023-12-11 ASSESSMENT — PAIN DESCRIPTION - DESCRIPTORS
DESCRIPTORS: DISCOMFORT
DESCRIPTORS: SPASM

## 2023-12-11 NOTE — PROGRESS NOTES
Occupational Therapy  Facility/Department: [facility] C2 CARD TELEMETRY  Daily Treatment Note  NAME: Merlin Fore  : 1945  MRN: [id number]    Date of Service: 2023    Discharge Recommendations:  IP Rehab (pt can tolerate 3 hrs of therapy a day)  OT Equipment Recommendations  Equipment Needed: No (defer to next level of care)    Therapy discharge recommendations take into account each patient's current medical complexities and are subject to input/oversight from the patient's healthcare team.     Barriers to Home Discharge:   [] Steps to access home entry or bed/bath:   [x] Unable to transfer, ambulate, or propel wheelchair household distances without assist   [x] Limited available assist at home upon discharge    [x] Patient or family requests d/c to post-acute facility    [] Poor cognition/safety awareness for d/c to home alone    [] Unable to maintain ordered weight bearing status    [] Patient with salient signs of long-standing immobility   [x] Decreased independence with ADLs   [x] Increased risk for falls   [] Other:    If pt is unable to be seen after this session, please let this note serve as discharge summary. Please see case management note for discharge disposition. Thank you. Patient Diagnosis(es): There were no encounter diagnoses. Assessment    Assessment: Pt tolerated cotx session fair this date. Co-tx collaboration this date to safely meet goals and will have better occupational performance outcomes with in a co-treatment than 1:1 session. Pt performed bed mobility with modAx2, functional transfers with minAx1 progressing to minAx1+modAx1 with rollator, and functional mobility with minAx1 and rollator. He also required Jimy for toileting, CGA for grooming, and maxA for UB dressing. Pt is limited by decreased endurance and requires seated rest breaks between all activities. He continues to require verbal cues to adhere to sternal precautions.  Pt will benefit from continued

## 2023-12-11 NOTE — OP NOTE
Operative Note      Patient: Louie Branham  YOB: 1945  MRN: [id number]    Date of Procedure: 12/5/2023    Pre-Op Diagnosis Codes:     * Coronary artery disease, unspecified vessel or lesion type, unspecified whether angina present, unspecified whether native or transplanted heart [I25.10]    Post-Op Diagnosis: Same       Procedure(s):  OPEN CORONARY ARTERY BYPASS GRAFTING TIMES FOUR USING INTERNAL MAMMARY ARTERY, SAPHENOUS VEIN GRAFT, ON PUMP, LILY, TEMPORARY PACING WIRES, LEFT ATRIAL APPENDAGE CLIP PLACEMENT WITH STERNAL PLATING AND BILATERAL PECTORALIS BLOCKS    Surgeon(s):  Franky Estevez MD    Assistant:   Surgical Assistant: Thomas Lorenz Assistant: Albert Cook RN    Anesthesia: General    Estimated Blood Loss (mL): 610    Complications: None    Specimens:   * No specimens in log *    Implants:  Implant Name Type Inv. Item Serial No.  Lot No. LRB No. Used Action   PLATE BNE [id number] ZQT4.8/9.3NN 14 H THOR STRNL PEEK JADEN LSS - [id number]  PLATE BNE [id number] PLN1.7/4.2RT 14 H THOR STRNL PEEK JADEN LSS  S [facility]-WD [id number] N/A 1 Implanted   DEVICE OCCL W/ 35MM PRELD VCLIP FR ROBBI EXCLUSION SYS - [id number] Cardiovascular occluders DEVICE OCCL W/ 35MM PRELD VCLIP FR ROBBI EXCLUSION SYS  [employer]WD [id number] N/A 1 Implanted   SCREW BNE L13MM DIA2. 3MM THOR STRNL TI JADEN DRL FREE LEV 1 - [id number]  SCREW BNE L13MM DIA2. 3MM THOR STRNL TI JADEN DRL FREE LEV 1  [employer] -WD  N/A 10 Implanted   SCREW BNE L15MM DIA2. 3MM THOR STRNL TI JADEN DRL FREE LEV 1 - [id number]  SCREW BNE L15MM DIA2. 3MM THOR STRNL TI JADEN DRL FREE LEV 1  KLS [employer]LEXANDRE[employer] LP-WD  N/A 8 Implanted   SCREW BNE L17MM DIA2. 3MM THOR STRNL TI JADEN DRL FREE LEV 1 - [id number]  SCREW BNE L17MM DIA2. 3MM THOR STRNL TI JADEN DRL FREE LEV 1  [employer] LP-WD  N/A 2 Implanted         Drains:   Chest Tube Left Pleural 2 (Active)   Chest Tube Airleak No 12/10/23 0800   Status Continuous Suction 12/10/23 0800   Suction -20 cm H2O 12/10/23

## 2023-12-11 NOTE — PROGRESS NOTES
hr  Wound/Incisions:MSI oozing small amount serosanguineous, areas of old dried blood distally  ; RLE incisions with dressings CDI  Extremities: BLE pulses + 2 ; +2 swelling noted in BLE  Neurological: Alert, grossly non focal  Chest tubes/Drains: All out    Assessment:   Post-op: 6 days.   Condition: In stable condition.     Plan:   1. Cardiovascular:   - s/p CABG x 4 w/ ROBBI clip and sternal plating 12/5. On ASA, statin, BB, Plavix  - On Amio 400 PO BID x 5 days  - Hx HTN . On Lisinopril at home. Holding for now 2/2 hypotension.  Consider resuming pending BP trends.     2. Pulmonary:   - AM CXR reviewed.   - Continue expansion measures. IS, Acapella, OOB  - Hx SALLIE. Wears CPAP at home.   - Consulted pulmonary for SALLIE and CPAP management. Appreciate assistance.   - Add Metaneb    3. Neurology:   - Pain control difficult post op. On scheduled Tylenol and Robaxin . D/C gabapentin 2/2 elevated Cr .  - Continue  PRN Oxycodone and Morphine    4. Nephrology:   - post op MADONNA- Suspect ATN. Cr. 1.6 this AM. UOP 3.4 L/ 24 hr. Weight trending down. Weight 137.2 kg this AM.   - Continue diuresis as tolerated. Continue Lasix gtt. Decreased to 5 mg/hr . Titrate for UOP > 100ml/hr   - Nephrology following. Appreciate assistance  - Daily weight, I&O  - OK to remove rosa if OK per Nephrology    5. Endocrinology:   - No Hx DM. A1C 5.3 pre-op  - Continue SSI and Lantus     6. Hematology:   - Acute blood loss anemia. Hgb 8.6 this AM.   - Daily CBC  - Transfuse PRN for Hgb< 7    7. Microbiology:   - Leukocytosis. WBC 11.9 this AM. Afebrile. Monitor.  - Daily CBC  - MSI oozing- suspect fat necrosis. Added Augmentin PO x 5 days.    8. GI:  - Continue post op bowel regimen    9. Nutrition:   -ADAT    10. Labs:   - labs and imaging reviewed as above    11. Post-op Drains/Wires:  OK to remove rosa if OK per nephrology      12. D/C plan: Pending PT/OT recs and clinical course. ARU accepted when medically ready. Plan for ARU likely  Wednesday/Thursday. Discussed w/ CM    13.  Continue post-op care of patient in the ICU    GI prophylaxis: Pepcid  DVT prophylaxis: Arixtra  ________________________________________________________________________  Zeferino Penaloza, PRATEEK Abad CNP,   12/11/2023  11:08 AM

## 2023-12-11 NOTE — CARE COORDINATION
LOS 6- followed by CTS, Nephro-    POD 6 CABG-  Lasix gtt- weak, Pravena wound vac to MS incision. Started oral anbx for wound oozing.

## 2023-12-11 NOTE — PROGRESS NOTES
Physical Therapy  Facility/Department: [facility] C2 CARD TELEMETRY  Daily Treatment Note  NAME: Olga Traore  : 1945  MRN: [id number]    Date of Service: 2023    Discharge Recommendations:  IP Rehab, Patient able to tolerate 3hrs of therapy a day   PT Equipment Recommendations  Equipment Needed: No  Other: Defer    Therapy discharge recommendations take into account each patient's current medical complexities and are subject to input/oversight from the patient's healthcare team.     Barriers to Home Discharge:   [] Steps to access home entry or bed/bath:   [x] Unable to transfer, ambulate, or propel wheelchair household distances without assist   [x] Limited available assist at home upon discharge    [x] Patient or family requests d/c to post-acute facility    [] Poor cognition/safety awareness for d/c to home alone    [] Unable to maintain ordered weight bearing status    [] Patient with salient signs of long-standing immobility   [] Decreased independence with ADLs   [x] Increased risk for falls   [] Other:    If pt is unable to be seen after this session, please let this note serve as discharge summary. Please see case management note for discharge disposition. Thank you. Patient Diagnosis(es): There were no encounter diagnoses. Assessment   Assessment: Pt demos improved OOB mobility and endurance this date. Pt continues to require mod Ax2 for bed mobility. Able to complete sit<>stand from bed and from toilet with min A, requires increased assist to min + mod A for stand from chair. Pt able to increase ambulation into betancourt to ~50ft with rollator and min A. Pt requires increased time throughout session due to fatigue and SOB, pt does not desat below 90% throughout session. Pt will continue to benefit from skilled PT services to address current deficits. Continue to rec IPR at DC when deemed medically appropriate.   Activity Tolerance: Patient limited by endurance  Equipment Needed: No  Other:

## 2023-12-12 ENCOUNTER — APPOINTMENT (OUTPATIENT)
Dept: GENERAL RADIOLOGY | Age: 78
DRG: 235 | End: 2023-12-12
Attending: THORACIC SURGERY (CARDIOTHORACIC VASCULAR SURGERY)
Payer: MEDICARE

## 2023-12-12 LAB
ALBUMIN SERPL-MCNC: 3.4 G/DL (ref 3.4–5)
ALBUMIN SERPL-MCNC: 3.6 G/DL (ref 3.4–5)
ANION GAP SERPL CALCULATED.3IONS-SCNC: 16 MMOL/L (ref 3–16)
ANION GAP SERPL CALCULATED.3IONS-SCNC: 18 MMOL/L (ref 3–16)
BASE EXCESS BLDA CALC-SCNC: 10 MMOL/L (ref -3–3)
BASE EXCESS BLDA CALC-SCNC: 8 MMOL/L (ref -3–3)
BUN SERPL-MCNC: 76 MG/DL (ref 7–20)
BUN SERPL-MCNC: 77 MG/DL (ref 7–20)
CA-I BLD-SCNC: 1.04 MMOL/L (ref 1.12–1.32)
CALCIUM SERPL-MCNC: 9.5 MG/DL (ref 8.3–10.6)
CALCIUM SERPL-MCNC: 9.8 MG/DL (ref 8.3–10.6)
CHLORIDE SERPL-SCNC: 89 MMOL/L (ref 99–110)
CHLORIDE SERPL-SCNC: 92 MMOL/L (ref 99–110)
CO2 BLDA-SCNC: 31 MMOL/L
CO2 BLDA-SCNC: 32 MMOL/L
CO2 SERPL-SCNC: 29 MMOL/L (ref 21–32)
CO2 SERPL-SCNC: 30 MMOL/L (ref 21–32)
CREAT SERPL-MCNC: 1.7 MG/DL (ref 0.8–1.3)
CREAT SERPL-MCNC: 1.8 MG/DL (ref 0.8–1.3)
DEPRECATED RDW RBC AUTO: 17.2 % (ref 12.4–15.4)
GFR SERPLBLD CREATININE-BSD FMLA CKD-EPI: 38 ML/MIN/{1.73_M2}
GFR SERPLBLD CREATININE-BSD FMLA CKD-EPI: 41 ML/MIN/{1.73_M2}
GLUCOSE BLD-MCNC: 103 MG/DL (ref 70–99)
GLUCOSE BLD-MCNC: 105 MG/DL (ref 70–99)
GLUCOSE BLD-MCNC: 107 MG/DL (ref 70–99)
GLUCOSE BLD-MCNC: 117 MG/DL (ref 70–99)
GLUCOSE SERPL-MCNC: 114 MG/DL (ref 70–99)
GLUCOSE SERPL-MCNC: 116 MG/DL (ref 70–99)
HCO3 BLDA-SCNC: 29.8 MMOL/L (ref 21–29)
HCO3 BLDA-SCNC: 30.9 MMOL/L (ref 21–29)
HCT VFR BLD AUTO: 28 % (ref 40.5–52.5)
HCT VFR BLD AUTO: 30.3 % (ref 40.5–52.5)
HGB BLD CALC-MCNC: 9.7 GM/DL (ref 13.5–17.5)
HGB BLD-MCNC: 9.9 G/DL (ref 13.5–17.5)
LACTATE BLD-SCNC: 0.87 MMOL/L (ref 0.4–2)
LACTATE BLD-SCNC: 1 MMOL/L (ref 0.4–2)
MAGNESIUM SERPL-MCNC: 2.8 MG/DL (ref 1.8–2.4)
MAGNESIUM SERPL-MCNC: 2.9 MG/DL (ref 1.8–2.4)
MCH RBC QN AUTO: 29 PG (ref 26–34)
MCHC RBC AUTO-ENTMCNC: 32.5 G/DL (ref 31–36)
MCV RBC AUTO: 89.3 FL (ref 80–100)
PCO2 BLDA: 29.9 MM HG (ref 35–45)
PCO2 BLDA: 30.5 MM HG (ref 35–45)
PERFORMED ON: ABNORMAL
PH BLDA: 7.6 [PH] (ref 7.35–7.45)
PH BLDA: 7.62 [PH] (ref 7.35–7.45)
PHOSPHATE SERPL-MCNC: 5.5 MG/DL (ref 2.5–4.9)
PHOSPHATE SERPL-MCNC: 5.8 MG/DL (ref 2.5–4.9)
PLATELET # BLD AUTO: 332 K/UL (ref 135–450)
PMV BLD AUTO: 7.7 FL (ref 5–10.5)
PO2 BLDA: 144 MM HG (ref 75–108)
PO2 BLDA: 54.6 MM HG (ref 75–108)
POC SAMPLE TYPE: ABNORMAL
POC SAMPLE TYPE: ABNORMAL
POTASSIUM BLD-SCNC: 3.1 MMOL/L (ref 3.5–5.1)
POTASSIUM SERPL-SCNC: 3.5 MMOL/L (ref 3.5–5.1)
POTASSIUM SERPL-SCNC: 4 MMOL/L (ref 3.5–5.1)
RBC # BLD AUTO: 3.4 M/UL (ref 4.2–5.9)
SAO2 % BLDA: 100 % (ref 93–100)
SAO2 % BLDA: 93 % (ref 93–100)
SODIUM BLD-SCNC: 136 MMOL/L (ref 136–145)
SODIUM SERPL-SCNC: 137 MMOL/L (ref 136–145)
SODIUM SERPL-SCNC: 137 MMOL/L (ref 136–145)
WBC # BLD AUTO: 16.2 K/UL (ref 4–11)

## 2023-12-12 PROCEDURE — C1751 CATH, INF, PER/CENT/MIDLINE: HCPCS

## 2023-12-12 PROCEDURE — 82330 ASSAY OF CALCIUM: CPT

## 2023-12-12 PROCEDURE — 6360000002 HC RX W HCPCS: Performed by: INTERNAL MEDICINE

## 2023-12-12 PROCEDURE — 82803 BLOOD GASES ANY COMBINATION: CPT

## 2023-12-12 PROCEDURE — P9047 ALBUMIN (HUMAN), 25%, 50ML: HCPCS | Performed by: INTERNAL MEDICINE

## 2023-12-12 PROCEDURE — 87040 BLOOD CULTURE FOR BACTERIA: CPT

## 2023-12-12 PROCEDURE — 6370000000 HC RX 637 (ALT 250 FOR IP): Performed by: THORACIC SURGERY (CARDIOTHORACIC VASCULAR SURGERY)

## 2023-12-12 PROCEDURE — 71045 X-RAY EXAM CHEST 1 VIEW: CPT

## 2023-12-12 PROCEDURE — 6360000002 HC RX W HCPCS

## 2023-12-12 PROCEDURE — 87086 URINE CULTURE/COLONY COUNT: CPT

## 2023-12-12 PROCEDURE — 80069 RENAL FUNCTION PANEL: CPT

## 2023-12-12 PROCEDURE — 6370000000 HC RX 637 (ALT 250 FOR IP): Performed by: NURSE PRACTITIONER

## 2023-12-12 PROCEDURE — 84132 ASSAY OF SERUM POTASSIUM: CPT

## 2023-12-12 PROCEDURE — 85027 COMPLETE CBC AUTOMATED: CPT

## 2023-12-12 PROCEDURE — 82947 ASSAY GLUCOSE BLOOD QUANT: CPT

## 2023-12-12 PROCEDURE — 90935 HEMODIALYSIS ONE EVALUATION: CPT

## 2023-12-12 PROCEDURE — 85014 HEMATOCRIT: CPT

## 2023-12-12 PROCEDURE — 2140000000 HC CCU INTERMEDIATE R&B

## 2023-12-12 PROCEDURE — 94640 AIRWAY INHALATION TREATMENT: CPT

## 2023-12-12 PROCEDURE — 2500000003 HC RX 250 WO HCPCS: Performed by: THORACIC SURGERY (CARDIOTHORACIC VASCULAR SURGERY)

## 2023-12-12 PROCEDURE — 6360000002 HC RX W HCPCS: Performed by: THORACIC SURGERY (CARDIOTHORACIC VASCULAR SURGERY)

## 2023-12-12 PROCEDURE — 2580000003 HC RX 258: Performed by: INTERNAL MEDICINE

## 2023-12-12 PROCEDURE — 99024 POSTOP FOLLOW-UP VISIT: CPT | Performed by: NURSE PRACTITIONER

## 2023-12-12 PROCEDURE — 6370000000 HC RX 637 (ALT 250 FOR IP)

## 2023-12-12 PROCEDURE — 36415 COLL VENOUS BLD VENIPUNCTURE: CPT

## 2023-12-12 PROCEDURE — 87350 HEPATITIS BE AG IA: CPT

## 2023-12-12 PROCEDURE — 83605 ASSAY OF LACTIC ACID: CPT

## 2023-12-12 PROCEDURE — 94761 N-INVAS EAR/PLS OXIMETRY MLT: CPT

## 2023-12-12 PROCEDURE — 2700000000 HC OXYGEN THERAPY PER DAY

## 2023-12-12 PROCEDURE — 83735 ASSAY OF MAGNESIUM: CPT

## 2023-12-12 PROCEDURE — 94660 CPAP INITIATION&MGMT: CPT

## 2023-12-12 PROCEDURE — 94669 MECHANICAL CHEST WALL OSCILL: CPT

## 2023-12-12 PROCEDURE — 2580000003 HC RX 258: Performed by: THORACIC SURGERY (CARDIOTHORACIC VASCULAR SURGERY)

## 2023-12-12 PROCEDURE — 2580000003 HC RX 258

## 2023-12-12 PROCEDURE — 36556 INSERT NON-TUNNEL CV CATH: CPT

## 2023-12-12 PROCEDURE — 36569 INSJ PICC 5 YR+ W/O IMAGING: CPT

## 2023-12-12 PROCEDURE — 86706 HEP B SURFACE ANTIBODY: CPT

## 2023-12-12 PROCEDURE — 84295 ASSAY OF SERUM SODIUM: CPT

## 2023-12-12 RX ORDER — LIDOCAINE HYDROCHLORIDE 10 MG/ML
5 INJECTION, SOLUTION INFILTRATION; PERINEURAL ONCE
Status: DISCONTINUED | OUTPATIENT
Start: 2023-12-12 | End: 2023-12-14

## 2023-12-12 RX ORDER — SODIUM CHLORIDE 9 MG/ML
25 INJECTION, SOLUTION INTRAVENOUS PRN
Status: DISCONTINUED | OUTPATIENT
Start: 2023-12-12 | End: 2023-12-12

## 2023-12-12 RX ORDER — MIDAZOLAM HYDROCHLORIDE 1 MG/ML
2 INJECTION INTRAMUSCULAR; INTRAVENOUS ONCE
Status: COMPLETED | OUTPATIENT
Start: 2023-12-12 | End: 2023-12-12

## 2023-12-12 RX ORDER — SODIUM CHLORIDE 0.9 % (FLUSH) 0.9 %
5-40 SYRINGE (ML) INJECTION PRN
Status: DISCONTINUED | OUTPATIENT
Start: 2023-12-12 | End: 2023-12-12

## 2023-12-12 RX ORDER — SODIUM CHLORIDE 0.9 % (FLUSH) 0.9 %
5-40 SYRINGE (ML) INJECTION EVERY 12 HOURS SCHEDULED
Status: DISCONTINUED | OUTPATIENT
Start: 2023-12-12 | End: 2023-12-12

## 2023-12-12 RX ORDER — LIDOCAINE HYDROCHLORIDE 10 MG/ML
5 INJECTION, SOLUTION INFILTRATION; PERINEURAL ONCE
Status: DISCONTINUED | OUTPATIENT
Start: 2023-12-12 | End: 2023-12-12

## 2023-12-12 RX ORDER — SODIUM CHLORIDE 9 MG/ML
25 INJECTION, SOLUTION INTRAVENOUS PRN
Status: DISCONTINUED | OUTPATIENT
Start: 2023-12-12 | End: 2023-12-19 | Stop reason: HOSPADM

## 2023-12-12 RX ORDER — HEPARIN SODIUM 1000 [USP'U]/ML
1000 INJECTION, SOLUTION INTRAVENOUS; SUBCUTANEOUS PRN
Status: DISCONTINUED | OUTPATIENT
Start: 2023-12-12 | End: 2023-12-12

## 2023-12-12 RX ORDER — DEXMEDETOMIDINE HYDROCHLORIDE 4 UG/ML
.2-.4 INJECTION, SOLUTION INTRAVENOUS CONTINUOUS
Status: DISCONTINUED | OUTPATIENT
Start: 2023-12-12 | End: 2023-12-13

## 2023-12-12 RX ORDER — DOBUTAMINE HYDROCHLORIDE 200 MG/100ML
3 INJECTION INTRAVENOUS CONTINUOUS
Status: DISCONTINUED | OUTPATIENT
Start: 2023-12-12 | End: 2023-12-16

## 2023-12-12 RX ORDER — FENTANYL 25 UG/1
1 PATCH TRANSDERMAL
Status: DISCONTINUED | OUTPATIENT
Start: 2023-12-12 | End: 2023-12-13

## 2023-12-12 RX ORDER — HEPARIN SODIUM 1000 [USP'U]/ML
2800 INJECTION, SOLUTION INTRAVENOUS; SUBCUTANEOUS PRN
Status: DISCONTINUED | OUTPATIENT
Start: 2023-12-12 | End: 2023-12-19 | Stop reason: HOSPADM

## 2023-12-12 RX ORDER — SODIUM CHLORIDE 9 MG/ML
INJECTION, SOLUTION INTRAVENOUS PRN
Status: DISCONTINUED | OUTPATIENT
Start: 2023-12-12 | End: 2023-12-19 | Stop reason: HOSPADM

## 2023-12-12 RX ORDER — POTASSIUM CHLORIDE 7.45 MG/ML
10 INJECTION INTRAVENOUS PRN
Status: DISCONTINUED | OUTPATIENT
Start: 2023-12-12 | End: 2023-12-13

## 2023-12-12 RX ORDER — MIDAZOLAM HYDROCHLORIDE 1 MG/ML
INJECTION INTRAMUSCULAR; INTRAVENOUS
Status: COMPLETED
Start: 2023-12-12 | End: 2023-12-12

## 2023-12-12 RX ORDER — ALBUMIN (HUMAN) 12.5 G/50ML
25 SOLUTION INTRAVENOUS ONCE
Status: COMPLETED | OUTPATIENT
Start: 2023-12-12 | End: 2023-12-12

## 2023-12-12 RX ORDER — BISACODYL 10 MG
20 SUPPOSITORY, RECTAL RECTAL ONCE
Status: COMPLETED | OUTPATIENT
Start: 2023-12-12 | End: 2023-12-12

## 2023-12-12 RX ADMIN — BISACODYL 20 MG: 10 SUPPOSITORY RECTAL at 11:53

## 2023-12-12 RX ADMIN — ATORVASTATIN CALCIUM 40 MG: 40 TABLET, FILM COATED ORAL at 19:42

## 2023-12-12 RX ADMIN — MORPHINE SULFATE 2 MG: 2 INJECTION, SOLUTION INTRAMUSCULAR; INTRAVENOUS at 12:03

## 2023-12-12 RX ADMIN — HEPARIN SODIUM 5000 UNITS: 5000 INJECTION INTRAVENOUS; SUBCUTANEOUS at 06:47

## 2023-12-12 RX ADMIN — FUROSEMIDE 10 MG/HR: 10 INJECTION, SOLUTION INTRAMUSCULAR; INTRAVENOUS at 00:38

## 2023-12-12 RX ADMIN — CHLORHEXIDINE GLUCONATE 15 ML: 1.2 RINSE ORAL at 17:47

## 2023-12-12 RX ADMIN — CHLORHEXIDINE GLUCONATE 15 ML: 1.2 RINSE ORAL at 21:00

## 2023-12-12 RX ADMIN — POTASSIUM CHLORIDE 10 MEQ: 7.46 INJECTION, SOLUTION INTRAVENOUS at 14:19

## 2023-12-12 RX ADMIN — Medication 0.2 MCG/KG/HR: at 09:11

## 2023-12-12 RX ADMIN — METOPROLOL TARTRATE 25 MG: 25 TABLET, FILM COATED ORAL at 11:54

## 2023-12-12 RX ADMIN — HEPARIN SODIUM 2800 UNITS: 1000 INJECTION INTRAVENOUS; SUBCUTANEOUS at 22:22

## 2023-12-12 RX ADMIN — MORPHINE SULFATE 4 MG: 4 INJECTION, SOLUTION INTRAMUSCULAR; INTRAVENOUS at 23:57

## 2023-12-12 RX ADMIN — CALCIUM CHLORIDE INJECTION 1000 MG: 100 INJECTION, SOLUTION INTRAVENOUS at 11:36

## 2023-12-12 RX ADMIN — ALBUMIN (HUMAN) 25 G: 0.25 INJECTION, SOLUTION INTRAVENOUS at 21:22

## 2023-12-12 RX ADMIN — CLOPIDOGREL BISULFATE 75 MG: 75 TABLET ORAL at 11:55

## 2023-12-12 RX ADMIN — FUROSEMIDE 10 MG/HR: 10 INJECTION, SOLUTION INTRAMUSCULAR; INTRAVENOUS at 18:32

## 2023-12-12 RX ADMIN — ACETAMINOPHEN 650 MG: 325 TABLET ORAL at 02:37

## 2023-12-12 RX ADMIN — ASPIRIN 81 MG: 81 TABLET, COATED ORAL at 11:55

## 2023-12-12 RX ADMIN — FUROSEMIDE 10 MG/HR: 10 INJECTION, SOLUTION INTRAMUSCULAR; INTRAVENOUS at 11:38

## 2023-12-12 RX ADMIN — HEPARIN SODIUM 5000 UNITS: 5000 INJECTION INTRAVENOUS; SUBCUTANEOUS at 20:59

## 2023-12-12 RX ADMIN — ALBUTEROL SULFATE 2.5 MG: 2.5 SOLUTION RESPIRATORY (INHALATION) at 12:06

## 2023-12-12 RX ADMIN — MIDAZOLAM HYDROCHLORIDE 2 MG: 1 INJECTION INTRAMUSCULAR; INTRAVENOUS at 15:55

## 2023-12-12 RX ADMIN — DOBUTAMINE HYDROCHLORIDE 3 MCG/KG/MIN: 200 INJECTION INTRAVENOUS at 09:10

## 2023-12-12 RX ADMIN — ACETAMINOPHEN 650 MG: 325 TABLET ORAL at 17:46

## 2023-12-12 RX ADMIN — MIDAZOLAM 2 MG: 1 INJECTION INTRAMUSCULAR; INTRAVENOUS at 15:55

## 2023-12-12 RX ADMIN — POTASSIUM CHLORIDE 20 MEQ: 29.8 INJECTION, SOLUTION INTRAVENOUS at 14:18

## 2023-12-12 RX ADMIN — SODIUM CHLORIDE, PRESERVATIVE FREE 10 ML: 5 INJECTION INTRAVENOUS at 19:43

## 2023-12-12 RX ADMIN — DOBUTAMINE HYDROCHLORIDE 3 MCG/KG/MIN: 200 INJECTION INTRAVENOUS at 18:37

## 2023-12-12 RX ADMIN — AMOXICILLIN AND CLAVULANATE POTASSIUM 1 TABLET: 875; 125 TABLET, FILM COATED ORAL at 19:42

## 2023-12-12 RX ADMIN — OXYCODONE 5 MG: 5 TABLET ORAL at 06:47

## 2023-12-12 RX ADMIN — OXYCODONE 5 MG: 5 TABLET ORAL at 19:45

## 2023-12-12 RX ADMIN — ALBUTEROL SULFATE 2.5 MG: 2.5 SOLUTION RESPIRATORY (INHALATION) at 19:54

## 2023-12-12 RX ADMIN — AMIODARONE HYDROCHLORIDE 400 MG: 200 TABLET ORAL at 19:42

## 2023-12-12 RX ADMIN — METHOCARBAMOL 1000 MG: 500 TABLET ORAL at 17:45

## 2023-12-12 RX ADMIN — ALBUTEROL SULFATE 2.5 MG: 2.5 SOLUTION RESPIRATORY (INHALATION) at 08:37

## 2023-12-12 RX ADMIN — AMOXICILLIN AND CLAVULANATE POTASSIUM 1 TABLET: 875; 125 TABLET, FILM COATED ORAL at 11:55

## 2023-12-12 RX ADMIN — AMIODARONE HYDROCHLORIDE 400 MG: 200 TABLET ORAL at 17:46

## 2023-12-12 RX ADMIN — METHOCARBAMOL 1000 MG: 500 TABLET ORAL at 19:42

## 2023-12-12 RX ADMIN — FAMOTIDINE 20 MG: 20 TABLET ORAL at 11:54

## 2023-12-12 RX ADMIN — POTASSIUM CHLORIDE 10 MEQ: 7.46 INJECTION, SOLUTION INTRAVENOUS at 09:48

## 2023-12-12 RX ADMIN — SODIUM CHLORIDE, PRESERVATIVE FREE 10 ML: 5 INJECTION INTRAVENOUS at 14:21

## 2023-12-12 RX ADMIN — ACETAMINOPHEN 650 MG: 325 TABLET ORAL at 19:42

## 2023-12-12 ASSESSMENT — PAIN SCALES - GENERAL
PAINLEVEL_OUTOF10: 8
PAINLEVEL_OUTOF10: 5
PAINLEVEL_OUTOF10: 9
PAINLEVEL_OUTOF10: 6
PAINLEVEL_OUTOF10: 9

## 2023-12-12 ASSESSMENT — PAIN DESCRIPTION - LOCATION: LOCATION: GENERALIZED

## 2023-12-12 NOTE — PROGRESS NOTES
Pt sleepy but arousable. Pt Spo2 not picking up on monitor changed to ear monitoring. Spo2 83%. Fingernails on bilat. hands dark bluish/purple and hands cold. Pt was started on Precedex at 0911 at 0.2 mcg/kg/hr; gtt stopped, ABG drawn, placed pt on home CPAP machine and NC (in mouth) at 5L O2 called RTD and requested assistance, called CTS NP to bedside. Pt placed on BIPAP 15/5 Fio2 50%.  Jay Tse RN

## 2023-12-12 NOTE — PROGRESS NOTES
12/12/23 0941   NIV Type   $NIV $Daily Charge   Equipment Type v60   Mode Bilevel   Mask Type Full face mask   Mask Size Medium   Assessment   Pulse 86   Skin Assessment Clean, dry, & intact   Skin Protection for O2 Device Yes   Location Nose   Settings/Measurements   PIP Observed 16 cm H20   IPAP 15 cmH20   CPAP/EPAP 5 cmH2O   Vt (Measured) 683 mL   Rate Ordered 15   FiO2  50 %   I Time/ I Time % 1.1 s   Minute Volume (L/min) 16 Liters   Mask Leak (lpm) 7 lpm

## 2023-12-12 NOTE — PROGRESS NOTES
Rounded with CT surgery. Discussed pain regimen, Lasix gtt and UO. Pt is  not feeling rested and not feeling well today. MD order for Dobutamine gtt 3mcg/kg/min, Precedex gtt for 2 hours and then night time use for comfort and sleep, KYLAH hose to be placed and utilized per protocol orders, placement for PICC line access.  Tyrone White RN

## 2023-12-12 NOTE — PROGRESS NOTES
Physical/Occupational Therapy    Attempted to see patient this date. Per RN hold this date, pt is on bipap and is not appropriate at this time. Will attempt at a later time/date as pt is appropriate. Thank you.   Audrey Petty PT, DPT  Reid De Jesus OTR/L

## 2023-12-12 NOTE — PROGRESS NOTES
Comprehensive Nutrition Assessment    Type and Reason for Visit:  Reassess    Nutrition Recommendations/Plan:   NPO   Advance diet and add ensure BID when able   If unable to advance diet, consider NGT placement and TF initiation, consult RD for recommendations. Monitor nutrition adequacy, pertinent labs, bowel habits, wt changes, and clinical progress     Malnutrition Assessment:  Malnutrition Status: At risk for malnutrition (Comment) (12/12/23 1100)    Context:  Acute Illness     Findings of the 6 clinical characteristics of malnutrition:  Energy Intake:  Mild decrease in energy intake (Comment)    Nutrition Assessment:    Follow up: Pt made NPO today d/t Bipap. Previously on regular diet, PO intakes % and 0% of meals. Wt trending down, likely d/t fluid. Recommend advancing diet as soon as medically feasible. If unable to advance diet, consider alternative methods of nutrition, consult RD for recommendations. Will monitor. Nutrition Related Findings:    -3 L since admission. Mg 2.9, phos 5.5. No Bm documented. Wound Type: Surgical Incision       Current Nutrition Intake & Therapies:    Average Meal Intake: NPO  Average Supplements Intake: NPO  Diet NPO Exceptions are: Sips of Water with Meds  Current Tube Feeding (TF) Orders:  Feeding Route: Nasogastric  Formula: Standard with Fiber  Schedule: Continuous  Goal TF & Flush Orders Provides: jevity 1.5 x20 hours at goal rate of 70 ml/hr to provide 1400 ml TV, 2100 kcals, 90 g protein and 1064 ml free water. Free water flush 60 ml q 4 hours. Anthropometric Measures:  Height: 172.7 cm (5' 8\")  Ideal Body Weight (IBW): 154 lbs (70 kg)       Current Body Weight: 135.6 kg (299 lb), 204.5 % IBW.  Weight Source: Bed Scale  Current BMI (kg/m2): 45.5        Weight Adjustment For: No Adjustment                 BMI Categories: Obese Class 3 (BMI 40.0 or greater)    Estimated Daily Nutrient Needs:  Energy Requirements Based On: Kcal/kg (25-30 kcals/kg)  Weight Used for Energy Requirements: Ideal (70 kg)  Energy (kcal/day): 7366-5859  Weight Used for Protein Requirements: Ideal (1-1.2 g/kg)  Protein (g/day): 70-84 g  Method Used for Fluid Requirements: 1 ml/kcal  Fluid (ml/day):      Nutrition Diagnosis:   Inadequate oral intake related to impaired respiratory function as evidenced by NPO or clear liquid status due to medical condition, other (comment), weight loss (S/p CABG)    Nutrition Interventions:   Food and/or Nutrient Delivery: Start Oral Diet, Start Oral Nutrition Supplement  Nutrition Education/Counseling: Education not appropriate, Education initiated  Coordination of Nutrition Care: Continue to monitor while inpatient       Goals:  Previous Goal Met: No Progress toward Goal(s)  Goals: PO intake 50% or greater, prior to discharge       Nutrition Monitoring and Evaluation:   Behavioral-Environmental Outcomes: None Identified  Food/Nutrient Intake Outcomes: Food and Nutrient Intake, Supplement Intake  Physical Signs/Symptoms Outcomes: Weight, Nutrition Focused Physical Findings, Biochemical Data    Discharge Planning:    Continue current diet, Continue Oral Nutrition Supplement     Shaye Pascual, MS, RD, LD  Contact: Office: [phone]; Nick: [phone]

## 2023-12-12 NOTE — PROGRESS NOTES
Arrived to place PICC line with bedside RN Doretha Pang. Pre-procedure and timeout done with Alexx Orourke, discussed limitations of placement and allergies. Pt's right brachial are all easily collapsible with no indication for a clot. Vein selected is large enough for catheter. Pt tolerated sterile procedure well, with no difficulty accessing basilic vein, when accessed - blood was free flowing and non-pulsatile. Guidewire, introducer, and catheter went in smoothly. PICC line verified with 3CG technology with peaked P-waves (please see image below). Nurses:  OK to use PICC. Please replace all existing IV tubing with new IV tubing prior to using the PICC for current IV infusions. Please remove any PIVs from PICC arm. Please refrain from obtaining BPs in the arm with a PICC. All of the above may be sources of infection or damage to the PICC line/site. Post procedure - reorganized pt table, placed pt in lowest position, with call light and educated on line care. Reported off to bedside RN. Please call if you have any questions about the PICC or ML. The  will direct you to the PICC RN that is on call.      [phone]

## 2023-12-12 NOTE — CARE COORDINATION
LOS 7 - POD 7 CABG-  Lasix gtt - Prevena wound vac - Picc line inserted. On Bipap  15/5 O2 50%.   ARU following

## 2023-12-12 NOTE — PLAN OF CARE
Problem: Chronic Conditions and Co-morbidities  Goal: Patient's chronic conditions and co-morbidity symptoms are monitored and maintained or improved  Outcome: Progressing     Problem: Discharge Planning  Goal: Discharge to home or other facility with appropriate resources  Outcome: Progressing     Problem: Pain  Goal: Verbalizes/displays adequate comfort level or baseline comfort level  Outcome: Progressing   Precedex gtt use and fentanyl patch ordered this am. Unable to utilize at this time.  Problem: ABCDS Injury Assessment  Goal: Absence of physical injury  Outcome: Progressing     Problem: Safety - Adult  Goal: Free from fall injury  Outcome: Progressing     Problem: Neurosensory - Adult  Goal: Achieves stable or improved neurological status  Outcome: Progressing     Problem: Neurosensory - Adult  Goal: Absence of seizures  Outcome: Progressing     Problem: Neurosensory - Adult  Goal: Remains free of injury related to seizures activity  Outcome: Progressing     Problem: Respiratory - Adult  Goal: Achieves optimal ventilation and oxygenation  Outcome: Progressing   BIPAP   Problem: Cardiovascular - Adult  Goal: Maintains optimal cardiac output and hemodynamic stability  Outcome: Progressing   Dobutamine gtt started this am  Problem: Cardiovascular - Adult  Goal: Absence of cardiac dysrhythmias or at baseline  Outcome: Progressing     Problem: Skin/Tissue Integrity - Adult  Goal: Skin integrity remains intact  Outcome: Progressing     Problem: Skin/Tissue Integrity - Adult  Goal: Incisions, wounds, or drain sites healing without S/S of infection  Outcome: Progressing     Problem: Skin/Tissue Integrity - Adult  Goal: Oral mucous membranes remain intact  Outcome: Progressing     Problem: Musculoskeletal - Adult  Goal: Return mobility to safest level of function  Outcome: Progressing     Problem: Musculoskeletal - Adult  Goal: Maintain proper alignment of affected body part  Outcome: Progressing     Problem:  Musculoskeletal - Adult  Goal: Return ADL status to a safe level of function  Outcome: Progressing     Problem: Gastrointestinal - Adult  Goal: Minimal or absence of nausea and vomiting  Outcome: Progressing     Problem: Gastrointestinal - Adult  Goal: Maintains or returns to baseline bowel function  Outcome: Progressing   No BM since 12/5-orders for GI bowel regimen  Problem: Gastrointestinal - Adult  Goal: Maintains adequate nutritional intake  Outcome: Progressing     Problem: Genitourinary - Adult  Goal: Absence of urinary retention  Outcome: Progressing     Problem: Genitourinary - Adult  Goal: Urinary catheter remains patent  Outcome: Progressing     Problem: Infection - Adult  Goal: Absence of infection at discharge  Outcome: Progressing     Problem: Infection - Adult  Goal: Absence of infection during hospitalization  Outcome: Progressing     Problem: Metabolic/Fluid and Electrolytes - Adult  Goal: Electrolytes maintained within normal limits  Outcome: Progressing     Problem: Metabolic/Fluid and Electrolytes - Adult  Goal: Hemodynamic stability and optimal renal function maintained  Outcome: Progressing     Problem: Metabolic/Fluid and Electrolytes - Adult  Goal: Glucose maintained within prescribed range  Outcome: Progressing     Problem: Hematologic - Adult  Goal: Maintains hematologic stability  Outcome: Progressing     Problem: Nutrition Deficit:  Goal: Optimize nutritional status  Outcome: Progressing     Problem: Skin/Tissue Integrity  Goal: Absence of new skin breakdown  Description: 1. Monitor for areas of redness and/or skin breakdown  2. Assess vascular access sites hourly  3. Every 4-6 hours minimum:  Change oxygen saturation probe site  4. Every 4-6 hours:  If on nasal continuous positive airway pressure, respiratory therapy assess nares and determine need for appliance change or resting period.   Outcome: Progressing

## 2023-12-12 NOTE — PROGRESS NOTES
138.5 kg  Weight on 12/10 137.6 kg  Weight on 12/11 137.2 kg  12/12 135.9 kg +0.1 kg      Intake/Output:   Intake/Output Summary (Last 24 hours) at 12/12/2023 1445  Last data filed at 12/12/2023 1019  Gross per 24 hour   Intake 1570.92 ml   Output 2700 ml   Net -1129.08 ml      GTTS: Lasix    Extubation Time: 12/5 @ 1745    LABORATORY DATA:     CBC:   Recent Labs     12/10/23  0340 12/11/23  0330 12/12/23  0825 12/12/23  0935   WBC 11.5* 11.9* 16.2*  --    HGB 8.5* 8.6* 9.9* 9.7*   HCT 25.7* 25.9* 30.3*  --    MCV 89.6 88.5 89.3  --     234 332  --      BMP:   Recent Labs     12/10/23  0340 12/11/23  0330 12/12/23  0825    137 137   K 4.0 3.5 3.5    94* 89*   CO2 28 31 30   PHOS 4.2 5.0* 5.5*   BUN 59* 70* 77*   CREATININE 1.5* 1.6* 1.8*     MG:    Recent Labs     12/10/23  0340 12/11/23  0330 12/12/23  0825   MG 2.70* 2.60* 2.90*      CXR: 12/12/23  FINDINGS:  Stable cardiomegaly. Small bilateral pleural effusions with bibasilar  airspace disease left greater than right. No pneumothorax. Right-sided PICC  line catheter with the tip in the superior vena cava.      IMPRESSION:  Stable exam with small bilateral pleural effusions with bibasilar airspace  Disease   ________________________________________________________________________    Subjective:   Dietary Intake: ADAT    Nausea : denies  Pain Control: adequate  Complaints: denies  Bowels: no BM    Objective:   General appearance: Alert, OOB to chair, NAD  Lungs: diminished  Heart: S1S2 regular; SR on monitor  Chest: symmetrical expansion with inspiration and expirations; no rocking of sternum noted , MSI oozing small amount serosanguineous, areas of old dried blood distally  Abdomen: rounded, soft  Bowel sounds: normoactive  Kidneys: cr 1.8 this AM. UOP 3,550 ml/ 24 hr  Wound/Incisions:MSI oozing small amount serosanguineous, areas of old dried blood distally  ; RLE incisions with dressings CDI  Extremities: BLE pulses + 2 ; +2 swelling noted

## 2023-12-12 NOTE — PROGRESS NOTES
Spoke with Dr. Montserrat Medina and discussed ABG results, am chemistry, lasix gtt, UO. MD would like to avoid Diamox and use loop diuretics to continue diuresis.  Dora Horan RN

## 2023-12-12 NOTE — PROGRESS NOTES
Patient: Bryan Granados  [id number]  Date: 12/12/2023      Chief Complaint: s/p CABG    History of Present Illness/Hospital Course:  Roxy Mullen is a 69year old male with a past medical history significant for CAD, GERD, HLD, HTN, sleep apnea, and morbid obesity who presented to [facility] on 12/5/23 for planned open CABG x4 with left atrial appendage clip placement with sternal plating. Post operative course has been notable for hypotension, MADONNA, and acute blood loss anemia. Nephrology is following. He continues to have functional deficits below his baseline. Interval History:  Cr is trending up. He was also noted to be more lethargic today and requiring BiPAP. Today Shadi is seen with his wife present. He endorses feeling ill. Discussed with his wife that our team is following.      has a past medical history of Arthritis, CAD (coronary artery disease), Cancer ([address]), GERD (gastroesophageal reflux disease), Hearing aid worn, Hyperlipidemia, Hypertension, Osteoarthritis, Sleep apnea, and Wears glasses. has a past surgical history that includes Coronary angioplasty with stent; Coronary angioplasty (08/26/2015); skin biopsy; Colonoscopy; Endoscopy, colon, diagnostic; Cataract removal (Bilateral); Total knee arthroplasty (Right, 09/18/2019); joint replacement; knee surgery; Cataract extraction, bilateral (Bilateral); and Coronary artery bypass graft (N/A, 12/5/2023). reports that he quit smoking about 45 years ago. His smoking use included cigarettes. He has a 45.00 pack-year smoking history. He has never used smokeless tobacco. He reports current alcohol use of about 2.0 standard drinks of alcohol per week. He reports that he does not use drugs. family history includes Coronary Art Dis in his father and mother; Heart Disease in his father and mother.       REVIEW OF SYSTEMS:   Denies f/c, n/v, cp, sob    Physical Examination:  Vitals: Patient Vitals for the past 24 hrs:   BP Temp Temp src Pulse

## 2023-12-13 ENCOUNTER — APPOINTMENT (OUTPATIENT)
Dept: GENERAL RADIOLOGY | Age: 78
DRG: 235 | End: 2023-12-13
Attending: THORACIC SURGERY (CARDIOTHORACIC VASCULAR SURGERY)
Payer: MEDICARE

## 2023-12-13 LAB
ABO + RH BLD: NORMAL
ALBUMIN SERPL-MCNC: 3.4 G/DL (ref 3.4–5)
ALBUMIN SERPL-MCNC: 3.4 G/DL (ref 3.4–5)
ALBUMIN SERPL-MCNC: 3.5 G/DL (ref 3.4–5)
ALBUMIN/GLOB SERPL: 1.1 {RATIO} (ref 1.1–2.2)
ALP SERPL-CCNC: 154 U/L (ref 40–129)
ALT SERPL-CCNC: 29 U/L (ref 10–40)
ANION GAP SERPL CALCULATED.3IONS-SCNC: 12 MMOL/L (ref 3–16)
ANION GAP SERPL CALCULATED.3IONS-SCNC: 13 MMOL/L (ref 3–16)
ANION GAP SERPL CALCULATED.3IONS-SCNC: 13 MMOL/L (ref 3–16)
AST SERPL-CCNC: 32 U/L (ref 15–37)
BACTERIA UR CULT: NORMAL
BASE EXCESS BLDV CALC-SCNC: 3 MMOL/L (ref -3–3)
BILIRUB SERPL-MCNC: 1.2 MG/DL (ref 0–1)
BLD GP AB SCN SERPL QL: NORMAL
BLOOD BANK DISPENSE STATUS: NORMAL
BLOOD BANK PRODUCT CODE: NORMAL
BPU ID: NORMAL
BUN SERPL-MCNC: 39 MG/DL (ref 7–20)
BUN SERPL-MCNC: 63 MG/DL (ref 7–20)
BUN SERPL-MCNC: 70 MG/DL (ref 7–20)
CA-I BLD-SCNC: 0.98 MMOL/L (ref 1.12–1.32)
CA-I BLD-SCNC: 1 MMOL/L (ref 1.12–1.32)
CA-I BLD-SCNC: 1.09 MMOL/L (ref 1.12–1.32)
CALCIUM SERPL-MCNC: 8.5 MG/DL (ref 8.3–10.6)
CALCIUM SERPL-MCNC: 8.6 MG/DL (ref 8.3–10.6)
CALCIUM SERPL-MCNC: 8.9 MG/DL (ref 8.3–10.6)
CHLORIDE SERPL-SCNC: 101 MMOL/L (ref 99–110)
CHLORIDE SERPL-SCNC: 96 MMOL/L (ref 99–110)
CHLORIDE SERPL-SCNC: 97 MMOL/L (ref 99–110)
CO2 BLDV-SCNC: 28 MMOL/L
CO2 SERPL-SCNC: 25 MMOL/L (ref 21–32)
CO2 SERPL-SCNC: 26 MMOL/L (ref 21–32)
CO2 SERPL-SCNC: 26 MMOL/L (ref 21–32)
CREAT SERPL-MCNC: 1.4 MG/DL (ref 0.8–1.3)
CREAT SERPL-MCNC: 2.3 MG/DL (ref 0.8–1.3)
CREAT SERPL-MCNC: 2.8 MG/DL (ref 0.8–1.3)
DEPRECATED RDW RBC AUTO: 16.9 % (ref 12.4–15.4)
DESCRIPTION BLOOD BANK: NORMAL
GFR SERPLBLD CREATININE-BSD FMLA CKD-EPI: 22 ML/MIN/{1.73_M2}
GFR SERPLBLD CREATININE-BSD FMLA CKD-EPI: 28 ML/MIN/{1.73_M2}
GFR SERPLBLD CREATININE-BSD FMLA CKD-EPI: 52 ML/MIN/{1.73_M2}
GLUCOSE BLD-MCNC: 111 MG/DL (ref 70–99)
GLUCOSE BLD-MCNC: 86 MG/DL (ref 70–99)
GLUCOSE SERPL-MCNC: 102 MG/DL (ref 70–99)
GLUCOSE SERPL-MCNC: 117 MG/DL (ref 70–99)
GLUCOSE SERPL-MCNC: 120 MG/DL (ref 70–99)
HBV SURFACE AB SERPL IA-ACNC: <3.5 MIU/ML
HCO3 BLDV-SCNC: 27.1 MMOL/L (ref 23–29)
HCT VFR BLD AUTO: 23 % (ref 40.5–52.5)
HCT VFR BLD AUTO: 24.9 % (ref 40.5–52.5)
HCT VFR BLD AUTO: 25 % (ref 40.5–52.5)
HCT VFR BLD AUTO: 25.5 % (ref 40.5–52.5)
HGB BLD CALC-MCNC: 8.4 GM/DL (ref 13.5–17.5)
HGB BLD-MCNC: 7.5 G/DL (ref 13.5–17.5)
HGB BLD-MCNC: 8.1 G/DL (ref 13.5–17.5)
HGB BLD-MCNC: 8.2 G/DL (ref 13.5–17.5)
LACTATE BLD-SCNC: 1.07 MMOL/L (ref 0.4–2)
MAGNESIUM SERPL-MCNC: 2.6 MG/DL (ref 1.8–2.4)
MAGNESIUM SERPL-MCNC: 2.7 MG/DL (ref 1.8–2.4)
MAGNESIUM SERPL-MCNC: 2.7 MG/DL (ref 1.8–2.4)
MCH RBC QN AUTO: 29.1 PG (ref 26–34)
MCHC RBC AUTO-ENTMCNC: 32.5 G/DL (ref 31–36)
MCV RBC AUTO: 89.5 FL (ref 80–100)
PCO2 BLDV: 42.2 MM HG (ref 40–50)
PERFORMED ON: ABNORMAL
PERFORMED ON: NORMAL
PH BLDV: 7.42 [PH] (ref 7.35–7.45)
PH BLDV: 7.43 [PH] (ref 7.35–7.45)
PH BLDV: 7.44 [PH] (ref 7.35–7.45)
PHOSPHATE SERPL-MCNC: 2.9 MG/DL (ref 2.5–4.9)
PHOSPHATE SERPL-MCNC: 5.4 MG/DL (ref 2.5–4.9)
PHOSPHATE SERPL-MCNC: 6.1 MG/DL (ref 2.5–4.9)
PLATELET # BLD AUTO: 274 K/UL (ref 135–450)
PMV BLD AUTO: 7.1 FL (ref 5–10.5)
PO2 BLDV: 38 MM HG
POC SAMPLE TYPE: ABNORMAL
POTASSIUM BLD-SCNC: 4.1 MMOL/L (ref 3.5–5.1)
POTASSIUM SERPL-SCNC: 4 MMOL/L (ref 3.5–5.1)
PROT SERPL-MCNC: 6.6 G/DL (ref 6.4–8.2)
RBC # BLD AUTO: 2.78 M/UL (ref 4.2–5.9)
SAO2 % BLDV: 73 %
SODIUM SERPL-SCNC: 135 MMOL/L (ref 136–145)
SODIUM SERPL-SCNC: 136 MMOL/L (ref 136–145)
SODIUM SERPL-SCNC: 138 MMOL/L (ref 136–145)
WBC # BLD AUTO: 14.2 K/UL (ref 4–11)

## 2023-12-13 PROCEDURE — 86900 BLOOD TYPING SEROLOGIC ABO: CPT

## 2023-12-13 PROCEDURE — 94640 AIRWAY INHALATION TREATMENT: CPT

## 2023-12-13 PROCEDURE — 2580000003 HC RX 258: Performed by: INTERNAL MEDICINE

## 2023-12-13 PROCEDURE — 97535 SELF CARE MNGMENT TRAINING: CPT

## 2023-12-13 PROCEDURE — 82947 ASSAY GLUCOSE BLOOD QUANT: CPT

## 2023-12-13 PROCEDURE — 84100 ASSAY OF PHOSPHORUS: CPT

## 2023-12-13 PROCEDURE — 83735 ASSAY OF MAGNESIUM: CPT

## 2023-12-13 PROCEDURE — 83605 ASSAY OF LACTIC ACID: CPT

## 2023-12-13 PROCEDURE — 84132 ASSAY OF SERUM POTASSIUM: CPT

## 2023-12-13 PROCEDURE — 85027 COMPLETE CBC AUTOMATED: CPT

## 2023-12-13 PROCEDURE — 6370000000 HC RX 637 (ALT 250 FOR IP): Performed by: THORACIC SURGERY (CARDIOTHORACIC VASCULAR SURGERY)

## 2023-12-13 PROCEDURE — 93308 TTE F-UP OR LMTD: CPT

## 2023-12-13 PROCEDURE — 2140000000 HC CCU INTERMEDIATE R&B

## 2023-12-13 PROCEDURE — P9016 RBC LEUKOCYTES REDUCED: HCPCS

## 2023-12-13 PROCEDURE — 6360000002 HC RX W HCPCS

## 2023-12-13 PROCEDURE — 90945 DIALYSIS ONE EVALUATION: CPT

## 2023-12-13 PROCEDURE — 6360000002 HC RX W HCPCS: Performed by: INTERNAL MEDICINE

## 2023-12-13 PROCEDURE — 94669 MECHANICAL CHEST WALL OSCILL: CPT

## 2023-12-13 PROCEDURE — 6370000000 HC RX 637 (ALT 250 FOR IP)

## 2023-12-13 PROCEDURE — 2580000003 HC RX 258: Performed by: THORACIC SURGERY (CARDIOTHORACIC VASCULAR SURGERY)

## 2023-12-13 PROCEDURE — 6360000002 HC RX W HCPCS: Performed by: THORACIC SURGERY (CARDIOTHORACIC VASCULAR SURGERY)

## 2023-12-13 PROCEDURE — P9045 ALBUMIN (HUMAN), 5%, 250 ML: HCPCS

## 2023-12-13 PROCEDURE — 2580000003 HC RX 258

## 2023-12-13 PROCEDURE — 85014 HEMATOCRIT: CPT

## 2023-12-13 PROCEDURE — 2500000003 HC RX 250 WO HCPCS: Performed by: THORACIC SURGERY (CARDIOTHORACIC VASCULAR SURGERY)

## 2023-12-13 PROCEDURE — 36592 COLLECT BLOOD FROM PICC: CPT

## 2023-12-13 PROCEDURE — 80053 COMPREHEN METABOLIC PANEL: CPT

## 2023-12-13 PROCEDURE — 86850 RBC ANTIBODY SCREEN: CPT

## 2023-12-13 PROCEDURE — 94660 CPAP INITIATION&MGMT: CPT

## 2023-12-13 PROCEDURE — 71045 X-RAY EXAM CHEST 1 VIEW: CPT

## 2023-12-13 PROCEDURE — 86923 COMPATIBILITY TEST ELECTRIC: CPT

## 2023-12-13 PROCEDURE — 97530 THERAPEUTIC ACTIVITIES: CPT

## 2023-12-13 PROCEDURE — 2700000000 HC OXYGEN THERAPY PER DAY

## 2023-12-13 PROCEDURE — 94761 N-INVAS EAR/PLS OXIMETRY MLT: CPT

## 2023-12-13 PROCEDURE — 2500000003 HC RX 250 WO HCPCS

## 2023-12-13 PROCEDURE — 82330 ASSAY OF CALCIUM: CPT

## 2023-12-13 PROCEDURE — 86901 BLOOD TYPING SEROLOGIC RH(D): CPT

## 2023-12-13 PROCEDURE — 2500000003 HC RX 250 WO HCPCS: Performed by: INTERNAL MEDICINE

## 2023-12-13 PROCEDURE — 99024 POSTOP FOLLOW-UP VISIT: CPT | Performed by: NURSE PRACTITIONER

## 2023-12-13 PROCEDURE — 36430 TRANSFUSION BLD/BLD COMPNT: CPT

## 2023-12-13 PROCEDURE — 82803 BLOOD GASES ANY COMBINATION: CPT

## 2023-12-13 PROCEDURE — 85018 HEMOGLOBIN: CPT

## 2023-12-13 RX ORDER — ALBUMIN, HUMAN INJ 5% 5 %
25 SOLUTION INTRAVENOUS ONCE
Status: COMPLETED | OUTPATIENT
Start: 2023-12-13 | End: 2023-12-13

## 2023-12-13 RX ORDER — SODIUM CHLORIDE 9 MG/ML
INJECTION, SOLUTION INTRAVENOUS PRN
Status: DISCONTINUED | OUTPATIENT
Start: 2023-12-13 | End: 2023-12-19 | Stop reason: HOSPADM

## 2023-12-13 RX ORDER — METOPROLOL TARTRATE 1 MG/ML
2.5 INJECTION, SOLUTION INTRAVENOUS ONCE
Status: COMPLETED | OUTPATIENT
Start: 2023-12-13 | End: 2023-12-13

## 2023-12-13 RX ORDER — MAGNESIUM SULFATE 1 G/100ML
1000 INJECTION INTRAVENOUS PRN
Status: DISCONTINUED | OUTPATIENT
Start: 2023-12-13 | End: 2023-12-14

## 2023-12-13 RX ORDER — CALCIUM GLUCONATE 20 MG/ML
1000 INJECTION, SOLUTION INTRAVENOUS PRN
Status: DISCONTINUED | OUTPATIENT
Start: 2023-12-13 | End: 2023-12-14

## 2023-12-13 RX ORDER — ALBUMIN, HUMAN INJ 5% 5 %
SOLUTION INTRAVENOUS
Status: COMPLETED
Start: 2023-12-13 | End: 2023-12-13

## 2023-12-13 RX ORDER — ACETAZOLAMIDE 250 MG/1
500 TABLET ORAL 2 TIMES DAILY
Status: DISCONTINUED | OUTPATIENT
Start: 2023-12-13 | End: 2023-12-14

## 2023-12-13 RX ORDER — ALBUMIN, HUMAN INJ 5% 5 %
SOLUTION INTRAVENOUS
Status: DISPENSED
Start: 2023-12-13 | End: 2023-12-13

## 2023-12-13 RX ORDER — CALCIUM GLUCONATE 20 MG/ML
2000 INJECTION, SOLUTION INTRAVENOUS PRN
Status: DISCONTINUED | OUTPATIENT
Start: 2023-12-13 | End: 2023-12-14

## 2023-12-13 RX ORDER — SODIUM CHLORIDE 9 MG/ML
INJECTION, SOLUTION INTRAVENOUS CONTINUOUS
Status: DISCONTINUED | OUTPATIENT
Start: 2023-12-13 | End: 2023-12-14

## 2023-12-13 RX ORDER — POTASSIUM CHLORIDE 29.8 MG/ML
20 INJECTION INTRAVENOUS PRN
Status: DISCONTINUED | OUTPATIENT
Start: 2023-12-13 | End: 2023-12-14

## 2023-12-13 RX ADMIN — MEROPENEM 1000 MG: 1 INJECTION, POWDER, FOR SOLUTION INTRAVENOUS at 09:39

## 2023-12-13 RX ADMIN — DOBUTAMINE HYDROCHLORIDE 3 MCG/KG/MIN: 200 INJECTION INTRAVENOUS at 15:10

## 2023-12-13 RX ADMIN — SODIUM CHLORIDE, PRESERVATIVE FREE 10 ML: 5 INJECTION INTRAVENOUS at 20:22

## 2023-12-13 RX ADMIN — ALBUTEROL SULFATE 2.5 MG: 2.5 SOLUTION RESPIRATORY (INHALATION) at 12:34

## 2023-12-13 RX ADMIN — METHOCARBAMOL 1000 MG: 500 TABLET ORAL at 09:12

## 2023-12-13 RX ADMIN — Medication: at 12:32

## 2023-12-13 RX ADMIN — MEROPENEM 1000 MG: 1 INJECTION, POWDER, FOR SOLUTION INTRAVENOUS at 17:12

## 2023-12-13 RX ADMIN — ASPIRIN 81 MG: 81 TABLET, COATED ORAL at 09:24

## 2023-12-13 RX ADMIN — ALBUTEROL SULFATE 2.5 MG: 2.5 SOLUTION RESPIRATORY (INHALATION) at 08:10

## 2023-12-13 RX ADMIN — ALBUMIN (HUMAN) 25 G: 12.5 INJECTION, SOLUTION INTRAVENOUS at 06:55

## 2023-12-13 RX ADMIN — METOPROLOL TARTRATE 25 MG: 25 TABLET, FILM COATED ORAL at 20:10

## 2023-12-13 RX ADMIN — ACETAMINOPHEN 650 MG: 325 TABLET ORAL at 16:31

## 2023-12-13 RX ADMIN — ACETAMINOPHEN 650 MG: 325 TABLET ORAL at 04:15

## 2023-12-13 RX ADMIN — HEPARIN SODIUM 5000 UNITS: 5000 INJECTION INTRAVENOUS; SUBCUTANEOUS at 05:14

## 2023-12-13 RX ADMIN — Medication: at 20:51

## 2023-12-13 RX ADMIN — ACETAZOLAMIDE 500 MG: 250 TABLET ORAL at 20:09

## 2023-12-13 RX ADMIN — CALCIUM GLUCONATE 1000 MG: 20 INJECTION, SOLUTION INTRAVENOUS at 12:39

## 2023-12-13 RX ADMIN — ATORVASTATIN CALCIUM 40 MG: 40 TABLET, FILM COATED ORAL at 20:10

## 2023-12-13 RX ADMIN — ALBUTEROL SULFATE 2.5 MG: 2.5 SOLUTION RESPIRATORY (INHALATION) at 16:21

## 2023-12-13 RX ADMIN — Medication: at 23:19

## 2023-12-13 RX ADMIN — SODIUM CHLORIDE: 9 INJECTION, SOLUTION INTRAVENOUS at 18:04

## 2023-12-13 RX ADMIN — SODIUM CHLORIDE: 9 INJECTION, SOLUTION INTRAVENOUS at 11:57

## 2023-12-13 RX ADMIN — CALCIUM GLUCONATE 1000 MG: 20 INJECTION, SOLUTION INTRAVENOUS at 20:47

## 2023-12-13 RX ADMIN — ALBUTEROL SULFATE 2.5 MG: 2.5 SOLUTION RESPIRATORY (INHALATION) at 19:44

## 2023-12-13 RX ADMIN — Medication: at 12:31

## 2023-12-13 RX ADMIN — METHOCARBAMOL 1000 MG: 500 TABLET ORAL at 16:39

## 2023-12-13 RX ADMIN — METHOCARBAMOL 1000 MG: 500 TABLET ORAL at 20:10

## 2023-12-13 RX ADMIN — ALBUMIN, HUMAN INJ 5% 25 G: 5 SOLUTION at 06:55

## 2023-12-13 RX ADMIN — FUROSEMIDE 10 MG/HR: 10 INJECTION, SOLUTION INTRAMUSCULAR; INTRAVENOUS at 05:19

## 2023-12-13 RX ADMIN — ACETAMINOPHEN 650 MG: 325 TABLET ORAL at 09:09

## 2023-12-13 RX ADMIN — Medication: at 17:14

## 2023-12-13 RX ADMIN — METOPROLOL TARTRATE 2.5 MG: 5 INJECTION INTRAVENOUS at 05:37

## 2023-12-13 RX ADMIN — ACETAMINOPHEN 650 MG: 325 TABLET ORAL at 20:10

## 2023-12-13 RX ADMIN — SODIUM CHLORIDE: 9 INJECTION, SOLUTION INTRAVENOUS at 20:46

## 2023-12-13 RX ADMIN — Medication: at 19:58

## 2023-12-13 RX ADMIN — METOPROLOL TARTRATE 2.5 MG: 5 INJECTION INTRAVENOUS at 07:50

## 2023-12-13 RX ADMIN — FAMOTIDINE 20 MG: 20 TABLET ORAL at 09:25

## 2023-12-13 RX ADMIN — MORPHINE SULFATE 4 MG: 4 INJECTION, SOLUTION INTRAMUSCULAR; INTRAVENOUS at 06:46

## 2023-12-13 RX ADMIN — CLOPIDOGREL BISULFATE 75 MG: 75 TABLET ORAL at 09:24

## 2023-12-13 RX ADMIN — METOPROLOL TARTRATE 25 MG: 25 TABLET, FILM COATED ORAL at 09:10

## 2023-12-13 RX ADMIN — OXYCODONE 5 MG: 5 TABLET ORAL at 16:31

## 2023-12-13 RX ADMIN — CHLORHEXIDINE GLUCONATE 15 ML: 1.2 RINSE ORAL at 20:22

## 2023-12-13 RX ADMIN — HEPARIN SODIUM 5000 UNITS: 5000 INJECTION INTRAVENOUS; SUBCUTANEOUS at 16:31

## 2023-12-13 RX ADMIN — HEPARIN SODIUM 5000 UNITS: 5000 INJECTION INTRAVENOUS; SUBCUTANEOUS at 21:19

## 2023-12-13 ASSESSMENT — PAIN SCALES - GENERAL
PAINLEVEL_OUTOF10: 10
PAINLEVEL_OUTOF10: 7
PAINLEVEL_OUTOF10: 7

## 2023-12-13 ASSESSMENT — PAIN DESCRIPTION - FREQUENCY: FREQUENCY: CONTINUOUS

## 2023-12-13 ASSESSMENT — PAIN DESCRIPTION - PAIN TYPE: TYPE: SURGICAL PAIN

## 2023-12-13 ASSESSMENT — PAIN - FUNCTIONAL ASSESSMENT: PAIN_FUNCTIONAL_ASSESSMENT: PREVENTS OR INTERFERES SOME ACTIVE ACTIVITIES AND ADLS

## 2023-12-13 ASSESSMENT — PAIN DESCRIPTION - LOCATION: LOCATION: GENERALIZED

## 2023-12-13 ASSESSMENT — PAIN DESCRIPTION - DESCRIPTORS: DESCRIPTORS: SORE

## 2023-12-13 NOTE — PROGRESS NOTES
Patient dialyzed per MD. Orders below: Patient is MADONNA    Question Answer   Blood flow rate (BFR) 300   K+ 4   Ca++ 3   Bicarb 28   Na+ 140   Dialyzer F160   Dialysate Temperature (C) 36   Dialysate flow rate (DFR) 500   Treatment Time 2   Dry weight (kg) 0     Hep labs drawn: 12/12/23    Medication given: albumin 25% for b/p support    Access:  Left neck IJ- new placement, confirmed with x-ray,      Consent for dialysis obtained. UF: 0    Patient tolerated well with . Albumin given for b/p support. Report given to [facility], patient remained stable throughout entire treatment and upon exiting patient's room.     Education:  Discussed dialysis procedure, and catheter care

## 2023-12-13 NOTE — PROGRESS NOTES
12/13/23 0351   NIV Type   NIV Started/Stopped On   Equipment Type v60   Mode Bilevel   Mask Type Full face mask   Assessment   Pulse 85   Respirations 19   SpO2 99 %   Comfort Level Good   Using Accessory Muscles No   Mask Compliance Good   Skin Assessment Clean, dry, & intact   Skin Protection for O2 Device Yes   Location Nose   Settings/Measurements   PIP Observed 16 cm H20   IPAP 15 cmH20   CPAP/EPAP 5 cmH2O   Vt (Measured) 727 mL   Rate Ordered 15   Insp Rise Time (%) 2 %   FiO2  50 %   I Time/ I Time % 1.1 s   Minute Volume (L/min) 13.7 Liters   Mask Leak (lpm) 11 lpm   Patient's Home Machine No   Alarm Settings   Alarms On Y   Low Pressure (cmH2O) 6 cmH2O   High Pressure (cmH2O) 30 cmH2O   Delay Alarm 20 sec(s)   RR Low (bpm) 6   RR High (bpm) 50 br/min

## 2023-12-13 NOTE — CARE COORDINATION
CM update note. PD #8  POD #8 CABG. Followed by CTS, Pulmonary, Cardiology and Nephrology. Remains on Lasix gtt. More lethargic today. Nephro has made decision to stop HD and start CRRT. ARU can accept when, medically ready. No pre cert needed. Will continue to follow.       Jose R Zazueta RN

## 2023-12-13 NOTE — CONSULTS
Pharmacy Note  Renal Dose Medications: CRRT    Meropenem 1000 mg IVPB q12h over 3 hours. Judy Haddad PharmD, BCPS   12/13/2023 11:21 AM

## 2023-12-13 NOTE — PROGRESS NOTES
CVTS Cardiothoracic Progress Note:                              CC:  Post op follow up      Surgery:12/5  OPEN CORONARY ARTERY BYPASS GRAFTING TIMES FOUR USING INTERNAL MAMMARY ARTERY, SAPHENOUS VEIN GRAFT, ON PUMP, LILY, TEMPORARY PACING WIRES, LEFT ATRIAL APPENDAGE CLIP PLACEMENT WITH STERNAL PLATING AND BILATERAL [facility] course:  12/5 DOS- Extubated @ [phone]. Pain difficult to control. High Levo requirement. 12/6 POD 1- Hgb 7 this AM. 1 unit PRBC given. Able to wean off Levo. Scheduled Gabapentin and Robaxin ordered for pain. TPW removed. Plan to remove MS chest tube     12/7 POD2- OOB to chair this AM. Cr up to 1.7. D/C Gabapentin. Switched Arixtra to Heparin. Hypotensive on standing this AM. Albumin given. Started Lasix gtt. 12.8 POD3- Still with difficult pain control. On typical post op pain control. Unable to give Gabapentin and Toradol 2/2 Cr. Hgb low this morning. 1 unit PRBC ordered by oncall. Cr 1.9 this AM. Consult Nephro. 12/11 POD 6 - Cr 1.6 this AM. Lasix gtt decreased to 5 mg/hr . MS incision oozing. Added Augmentin PO x 5 days. Prevena to MS incision. 12/12 Cr up to 1.8, lasix back up to 10 mg/hr per Nephro. More lethargic today,  requiring bipap. ABG alkalotic. Discussed HD with nephro. Dr. Eve Palacios will place the vas cath.      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

## 2023-12-13 NOTE — PROGRESS NOTES
Physical Therapy  Facility/Department: [facility] C2 CARD TELEMETRY  Daily Treatment Note  NAME: Kristopher Watkins  : 1945  MRN: [id number]    Date of Service: 2023    Discharge Recommendations:  IP Rehab, Patient able to tolerate 3hrs of therapy a day   PT Equipment Recommendations  Equipment Needed: No  Other: Defer    Patient Diagnosis(es): There were no encounter diagnoses.    Barriers to Home Discharge:   [] Steps to access home entry or bed/bath:   [x] Unable to transfer, ambulate, or propel wheelchair household distances without assist   [x] Limited available assist at home upon discharge    [x] Patient or family requests d/c to post-acute facility    [] Poor cognition/safety awareness for d/c to home alone    [] Unable to maintain ordered weight bearing status    [] Patient with salient signs of long-standing immobility   [] Decreased independence with ADLs   [x] Increased risk for falls   [] Other:    Assessment   Assessment: Pt continues to require mod Ax2 for bed mobility. Able to complete sit<>stand from bed with CGA and from BSC with min A. Requires min A for pivot transfer to and from BS. Limited this date by CRRT and unable to ambulate pt at this time.  Pt will continue to benefit from skilled PT services to address current deficits. Continue to rec IPR at DC when deemed medically appropriate. Co-tx collaboration this date to safely meet goals and will have better occupational performance outcomes with in a co-treatment than 1:1 session.    Activity Tolerance: Other (comment);Patient limited by endurance (Limited by CRRT machine)  Equipment Needed: No  Other: Defer     Plan    Physical Therapy Plan  General Plan: 5-7 times per week  Current Treatment Recommendations: Strengthening;Balance training;Functional mobility training;Transfer training;Endurance training;Pain management;Neuromuscular re-education;Gait training;Stair training;Safety education & training;Patient/Caregiver education &

## 2023-12-13 NOTE — PROGRESS NOTES
[facility] Progress Note           Subjective/   78 y.o. year old male who we are seeing in consultation for MADONNA, fluid overload.     HPI:  Underwent intermittent hemodialysis overnight for 2 hours with 0 ultrafiltration.  Had 2 L of urine output charted yesterday on Lasix drip at 10 mg/h.  Had 435 mL output charted since midnight.  Developed A-fib with RVR started on amiodarone.  More awake and alert today.    ROS:  +weak, intake adequate.    Objective/   GEN:  Chronically ill, BP (!) 95/55   Pulse 79   Temp 98 °F (36.7 °C) (Bladder)   Resp 24   Ht 1.727 m (5' 8\")   Wt (!) 138.2 kg (304 lb 10.8 oz)   SpO2 96%   BMI 46.33 kg/m²   HEENT: non-icteric, no JVD  CV: S1, S2 without m/r/g; ++ LE edema  RESP: CTA B without w/r/r; breathing wnl  ABD: +bs, soft, nt, no hsm  SKIN: warm, no rashes    Data/  Recent Labs     12/11/23  0330 12/12/23  0825 12/12/23  0935 12/13/23  0420   WBC 11.9* 16.2*  --  14.2*   HGB 8.6* 9.9* 9.7* 8.1*   HCT 25.9* 30.3*  --  24.9*   MCV 88.5 89.3  --  89.5    332  --  274     Recent Labs     12/12/23  0825 12/12/23  1820 12/13/23  0420    137 135*   K 3.5 4.0 4.0   CL 89* 92* 96*   CO2 30 29 26   GLUCOSE 116* 114* 120*   PHOS 5.5* 5.8* 5.4*   MG 2.90* 2.80* 2.70*   BUN 77* 76* 63*   CREATININE 1.8* 1.7* 2.3*   LABGLOM 38* 41* 28*       Assessment/Plan     Acute Kidney Injury.  - Likely hemodynamically mediated ATN.  - Baseline serum creatinine of 0.9-1.1mg/dL.  Started on intermittent hemodialysis on 12/12  -Continue Lasix back to 10 mg/h unless an uric  -Discussed with our colleagues in cardiothoracic will proceed with CVVHDF for clearance GOAL uf net even.  -Adjust all medications for CRRT     Hypertension.  - BP is acceptable.  - On Furosemide and Metoprolol.     Anemia.  - Blood loss anemia.  - PRN blood transfusion per primary service.     CAD.  - S/P CABG x 4, ROBBI clip on 12/5/23.  - CTS following.    Case d/w ICU Nursing and cardiothoracic  team    ____________________________________  Para MD Han  [facility]  [email or web address]  Office: [phone]

## 2023-12-13 NOTE — PROGRESS NOTES
Patient: Keisha Fragoso  [id number]  Date: 12/13/2023      Chief Complaint: s/p CABG    History of Present Illness/Hospital Course:  Jose Luis Mayes is a 69year old male with a past medical history significant for CAD, GERD, HLD, HTN, sleep apnea, and morbid obesity who presented to [facility] on 12/5/23 for planned open CABG x4 with left atrial appendage clip placement with sternal plating. Post operative course has been notable for hypotension, MADONNA, and acute blood loss anemia. Nephrology is following. He continues to have functional deficits below his baseline. Interval History:  Patient started on CRRT. Today Ijeoma Ordonez is seen with nursing and therapy present. Discussed that we are following for rehab.      has a past medical history of Arthritis, CAD (coronary artery disease), Cancer ([address]), GERD (gastroesophageal reflux disease), Hearing aid worn, Hyperlipidemia, Hypertension, Osteoarthritis, Sleep apnea, and Wears glasses. has a past surgical history that includes Coronary angioplasty with stent; Coronary angioplasty (08/26/2015); skin biopsy; Colonoscopy; Endoscopy, colon, diagnostic; Cataract removal (Bilateral); Total knee arthroplasty (Right, 09/18/2019); joint replacement; knee surgery; Cataract extraction, bilateral (Bilateral); and Coronary artery bypass graft (N/A, 12/5/2023). reports that he quit smoking about 45 years ago. His smoking use included cigarettes. He has a 45.00 pack-year smoking history. He has never used smokeless tobacco. He reports current alcohol use of about 2.0 standard drinks of alcohol per week. He reports that he does not use drugs. family history includes Coronary Art Dis in his father and mother; Heart Disease in his father and mother.       REVIEW OF SYSTEMS:   Denies f/c, n/v, cp, sob    Physical Examination:  Vitals: Patient Vitals for the past 24 hrs:   BP Temp Temp src Pulse Resp SpO2 Weight   12/13/23 1622 -- -- -- 90 -- -- --   12/13/23 1621 -- -- -- 84 20

## 2023-12-13 NOTE — PROGRESS NOTES
12/13/23 0320   NIV Type   NIV Started/Stopped On   Equipment Type v60   Mode Bilevel   Mask Type Full face mask   Assessment   Pulse 84   Respirations 29   SpO2 97 %   Comfort Level Good   Using Accessory Muscles No   Mask Compliance Good   Skin Assessment Clean, dry, & intact   Skin Protection for O2 Device Yes   Location Nose   Settings/Measurements   PIP Observed 12 cm H20  (ramp applied for comfort)   IPAP 15 cmH20   CPAP/EPAP 5 cmH2O   Vt (Measured) 285 mL   Rate Ordered 15   FiO2  50 %   I Time/ I Time % 1.1 s   Minute Volume (L/min) 8.4 Liters   Mask Leak (lpm) 6 lpm   Patient's Home Machine No   Alarm Settings   Alarms On Y   Low Pressure (cmH2O) 6 cmH2O   High Pressure (cmH2O) 30 cmH2O   Delay Alarm 20 sec(s)   RR Low (bpm) 6   RR High (bpm) 50 br/min

## 2023-12-13 NOTE — PROGRESS NOTES
Occupational Therapy  Facility/Department: [facility] C2 CARD TELEMETRY  Daily Treatment Note  NAME: Kristopher Watkins  : 1945  MRN: [id number]    Date of Service: 2023    Discharge Recommendations:  IP Rehab (pt can tolerate 3 hrs of therapy a day)  OT Equipment Recommendations  Equipment Needed: No (defer to next level of care)    Therapy discharge recommendations take into account each patient's current medical complexities and are subject to input/oversight from the patient's healthcare team.     Barriers to Home Discharge:   [] Steps to access home entry or bed/bath:   [x] Unable to transfer, ambulate, or propel wheelchair household distances without assist   [x] Limited available assist at home upon discharge    [] Patient or family requests d/c to post-acute facility    [] Poor cognition/safety awareness for d/c to home alone    [] Unable to maintain ordered weight bearing status    [] Patient with salient signs of long-standing immobility   [x] Decreased independence with ADLs   [x] Increased risk for falls   [] Other:    If pt is unable to be seen after this session, please let this note serve as discharge summary.  Please see case management note for discharge disposition.  Thank you.      Patient Diagnosis(es): There were no encounter diagnoses.     Assessment    Assessment: Pt tolerated cotx session well this date. Co-tx collaboration this date to safely meet goals and will have better occupational performance outcomes with in a co-treatment than 1:1 session. Pt performed bed mobility with modAx2, sit<>stands with CGA-minAx1, and stand-pivot transfer to/from [facility] with minAx2. He performs toileting and LB dressing with totalA. He continues to function below his baseline d/t decreased strength, balance, endurance, and IND with ADL tasks. IPR is recommended at d/c.   Activity Tolerance: Other (comment);Patient limited by endurance  Discharge Recommendations: IP Rehab (pt can tolerate 3 hrs of therapy a  drying)?: A Lot  How much help is needed for toileting (which includes using toilet, bedpan, or urinal)?: Total  How much help is needed for putting on and taking off regular upper body clothing?: A Lot  How much help is needed for taking care of personal grooming?: A Little  How much help for eating meals?: A Little  AM-[facility] Inpatient Daily Activity Raw Score: 12  AM-[facility] Inpatient ADL T-Scale Score : 30.6  ADL Inpatient CMS 0-100% Score: 66.57  ADL Inpatient CMS G-Code Modifier : CL    Therapy Time   Individual Concurrent Group Co-treatment   Time In       1502   Time Out       1547   Minutes       45   Timed Code Treatment Minutes: [address], OT

## 2023-12-13 NOTE — PROGRESS NOTES
Overnight:  Pt tolerated dialysis well, more alert after. 2L NC overnight, ~2 hours on biPAP (refused to wear it anymore)  1 BM  300ml UO  On lasix drip overnight, HR increased around 6am (rate 120s), per [facility] give 250cc albumin and return to bed.

## 2023-12-13 NOTE — PROGRESS NOTES
Pt NSR 80 after 2.5 mg metoprolol ivp. Informed Dr. Derik Alcazar, holding on amiodarone bolus and gtt until return message.  Shemar Del Toro RN

## 2023-12-14 ENCOUNTER — APPOINTMENT (OUTPATIENT)
Dept: GENERAL RADIOLOGY | Age: 78
DRG: 235 | End: 2023-12-14
Attending: THORACIC SURGERY (CARDIOTHORACIC VASCULAR SURGERY)
Payer: MEDICARE

## 2023-12-14 LAB
ALBUMIN SERPL-MCNC: 3.6 G/DL (ref 3.4–5)
ANION GAP SERPL CALCULATED.3IONS-SCNC: 7 MMOL/L (ref 3–16)
BUN SERPL-MCNC: 21 MG/DL (ref 7–20)
CA-I BLD-SCNC: 1.11 MMOL/L (ref 1.12–1.32)
CA-I BLD-SCNC: 1.12 MMOL/L (ref 1.12–1.32)
CA-I BLD-SCNC: 1.14 MMOL/L (ref 1.12–1.32)
CALCIUM SERPL-MCNC: 8.6 MG/DL (ref 8.3–10.6)
CHLORIDE SERPL-SCNC: 100 MMOL/L (ref 99–110)
CO2 SERPL-SCNC: 27 MMOL/L (ref 21–32)
CREAT SERPL-MCNC: 0.9 MG/DL (ref 0.8–1.3)
DEPRECATED RDW RBC AUTO: 17 % (ref 12.4–15.4)
GFR SERPLBLD CREATININE-BSD FMLA CKD-EPI: >60 ML/MIN/{1.73_M2}
GLUCOSE BLD-MCNC: 102 MG/DL (ref 70–99)
GLUCOSE BLD-MCNC: 103 MG/DL (ref 70–99)
GLUCOSE BLD-MCNC: 74 MG/DL (ref 70–99)
GLUCOSE BLD-MCNC: 96 MG/DL (ref 70–99)
GLUCOSE SERPL-MCNC: 110 MG/DL (ref 70–99)
HBV E AG SERPL QL IA: NEGATIVE
HCT VFR BLD AUTO: 24.4 % (ref 40.5–52.5)
HGB BLD-MCNC: 8 G/DL (ref 13.5–17.5)
MAGNESIUM SERPL-MCNC: 2.7 MG/DL (ref 1.8–2.4)
MAGNESIUM SERPL-MCNC: 2.7 MG/DL (ref 1.8–2.4)
MCH RBC QN AUTO: 29.6 PG (ref 26–34)
MCHC RBC AUTO-ENTMCNC: 32.6 G/DL (ref 31–36)
MCV RBC AUTO: 90.9 FL (ref 80–100)
PERFORMED ON: ABNORMAL
PERFORMED ON: ABNORMAL
PERFORMED ON: NORMAL
PERFORMED ON: NORMAL
PH BLDV: 7.41 [PH] (ref 7.35–7.45)
PH BLDV: 7.41 [PH] (ref 7.35–7.45)
PH BLDV: 7.42 [PH] (ref 7.35–7.45)
PHOSPHATE SERPL-MCNC: 2.3 MG/DL (ref 2.5–4.9)
PLATELET # BLD AUTO: 265 K/UL (ref 135–450)
PMV BLD AUTO: 7.2 FL (ref 5–10.5)
POTASSIUM SERPL-SCNC: 4.2 MMOL/L (ref 3.5–5.1)
RBC # BLD AUTO: 2.69 M/UL (ref 4.2–5.9)
SODIUM SERPL-SCNC: 134 MMOL/L (ref 136–145)
WBC # BLD AUTO: 14 K/UL (ref 4–11)

## 2023-12-14 PROCEDURE — 6360000002 HC RX W HCPCS

## 2023-12-14 PROCEDURE — 2580000003 HC RX 258: Performed by: INTERNAL MEDICINE

## 2023-12-14 PROCEDURE — 71045 X-RAY EXAM CHEST 1 VIEW: CPT

## 2023-12-14 PROCEDURE — 97530 THERAPEUTIC ACTIVITIES: CPT

## 2023-12-14 PROCEDURE — 97110 THERAPEUTIC EXERCISES: CPT

## 2023-12-14 PROCEDURE — 94669 MECHANICAL CHEST WALL OSCILL: CPT

## 2023-12-14 PROCEDURE — 6360000002 HC RX W HCPCS: Performed by: INTERNAL MEDICINE

## 2023-12-14 PROCEDURE — 6370000000 HC RX 637 (ALT 250 FOR IP)

## 2023-12-14 PROCEDURE — 85027 COMPLETE CBC AUTOMATED: CPT

## 2023-12-14 PROCEDURE — 36592 COLLECT BLOOD FROM PICC: CPT

## 2023-12-14 PROCEDURE — 2700000000 HC OXYGEN THERAPY PER DAY

## 2023-12-14 PROCEDURE — 82330 ASSAY OF CALCIUM: CPT

## 2023-12-14 PROCEDURE — 80069 RENAL FUNCTION PANEL: CPT

## 2023-12-14 PROCEDURE — 6370000000 HC RX 637 (ALT 250 FOR IP): Performed by: THORACIC SURGERY (CARDIOTHORACIC VASCULAR SURGERY)

## 2023-12-14 PROCEDURE — 2140000000 HC CCU INTERMEDIATE R&B

## 2023-12-14 PROCEDURE — 94761 N-INVAS EAR/PLS OXIMETRY MLT: CPT

## 2023-12-14 PROCEDURE — 2580000003 HC RX 258

## 2023-12-14 PROCEDURE — 6370000000 HC RX 637 (ALT 250 FOR IP): Performed by: NURSE PRACTITIONER

## 2023-12-14 PROCEDURE — 99024 POSTOP FOLLOW-UP VISIT: CPT | Performed by: NURSE PRACTITIONER

## 2023-12-14 PROCEDURE — 83735 ASSAY OF MAGNESIUM: CPT

## 2023-12-14 PROCEDURE — 89220 SPUTUM SPECIMEN COLLECTION: CPT

## 2023-12-14 PROCEDURE — 2500000003 HC RX 250 WO HCPCS: Performed by: INTERNAL MEDICINE

## 2023-12-14 PROCEDURE — 94640 AIRWAY INHALATION TREATMENT: CPT

## 2023-12-14 RX ORDER — AMIODARONE HYDROCHLORIDE 200 MG/1
200 TABLET ORAL DAILY
Status: DISCONTINUED | OUTPATIENT
Start: 2023-12-14 | End: 2023-12-16

## 2023-12-14 RX ADMIN — ALBUTEROL SULFATE 2.5 MG: 2.5 SOLUTION RESPIRATORY (INHALATION) at 15:42

## 2023-12-14 RX ADMIN — ATORVASTATIN CALCIUM 40 MG: 40 TABLET, FILM COATED ORAL at 20:50

## 2023-12-14 RX ADMIN — Medication: at 07:06

## 2023-12-14 RX ADMIN — SODIUM CHLORIDE, PRESERVATIVE FREE 10 ML: 5 INJECTION INTRAVENOUS at 07:55

## 2023-12-14 RX ADMIN — CHLORHEXIDINE GLUCONATE 15 ML: 1.2 RINSE ORAL at 07:55

## 2023-12-14 RX ADMIN — CALCIUM GLUCONATE 1000 MG: 20 INJECTION, SOLUTION INTRAVENOUS at 00:10

## 2023-12-14 RX ADMIN — HEPARIN SODIUM 5000 UNITS: 5000 INJECTION INTRAVENOUS; SUBCUTANEOUS at 14:39

## 2023-12-14 RX ADMIN — SODIUM CHLORIDE: 9 INJECTION, SOLUTION INTRAVENOUS at 04:11

## 2023-12-14 RX ADMIN — ACETAMINOPHEN 650 MG: 325 TABLET ORAL at 07:55

## 2023-12-14 RX ADMIN — AMIODARONE HYDROCHLORIDE 200 MG: 200 TABLET ORAL at 14:41

## 2023-12-14 RX ADMIN — SODIUM CHLORIDE, PRESERVATIVE FREE 10 ML: 5 INJECTION INTRAVENOUS at 20:51

## 2023-12-14 RX ADMIN — OXYCODONE 5 MG: 5 TABLET ORAL at 17:05

## 2023-12-14 RX ADMIN — ACETAZOLAMIDE 500 MG: 250 TABLET ORAL at 08:09

## 2023-12-14 RX ADMIN — HEPARIN SODIUM 5000 UNITS: 5000 INJECTION INTRAVENOUS; SUBCUTANEOUS at 05:56

## 2023-12-14 RX ADMIN — METHOCARBAMOL 1000 MG: 500 TABLET ORAL at 20:50

## 2023-12-14 RX ADMIN — ALBUTEROL SULFATE 2.5 MG: 2.5 SOLUTION RESPIRATORY (INHALATION) at 08:33

## 2023-12-14 RX ADMIN — METHOCARBAMOL 1000 MG: 500 TABLET ORAL at 07:55

## 2023-12-14 RX ADMIN — MEROPENEM 1000 MG: 1 INJECTION, POWDER, FOR SOLUTION INTRAVENOUS at 16:03

## 2023-12-14 RX ADMIN — HEPARIN SODIUM 5000 UNITS: 5000 INJECTION INTRAVENOUS; SUBCUTANEOUS at 21:50

## 2023-12-14 RX ADMIN — ASPIRIN 81 MG: 81 TABLET, COATED ORAL at 07:55

## 2023-12-14 RX ADMIN — METOPROLOL TARTRATE 25 MG: 25 TABLET, FILM COATED ORAL at 08:04

## 2023-12-14 RX ADMIN — SODIUM PHOSPHATE, MONOBASIC, MONOHYDRATE AND SODIUM PHOSPHATE, DIBASIC, ANHYDROUS 6 MMOL: 276; 142 INJECTION, SOLUTION INTRAVENOUS at 13:15

## 2023-12-14 RX ADMIN — METHOCARBAMOL 1000 MG: 500 TABLET ORAL at 14:39

## 2023-12-14 RX ADMIN — Medication: at 02:41

## 2023-12-14 RX ADMIN — OXYCODONE 5 MG: 5 TABLET ORAL at 07:24

## 2023-12-14 RX ADMIN — INSULIN GLARGINE 20 UNITS: 100 INJECTION, SOLUTION SUBCUTANEOUS at 20:50

## 2023-12-14 RX ADMIN — CALCIUM GLUCONATE 1000 MG: 20 INJECTION, SOLUTION INTRAVENOUS at 05:59

## 2023-12-14 RX ADMIN — CHLORHEXIDINE GLUCONATE 15 ML: 1.2 RINSE ORAL at 20:51

## 2023-12-14 RX ADMIN — Medication: at 00:10

## 2023-12-14 RX ADMIN — Medication: at 04:11

## 2023-12-14 RX ADMIN — Medication: at 06:07

## 2023-12-14 RX ADMIN — Medication: at 08:19

## 2023-12-14 RX ADMIN — POLYETHYLENE GLYCOL 3350 17 G: 17 POWDER, FOR SOLUTION ORAL at 07:55

## 2023-12-14 RX ADMIN — Medication: at 10:22

## 2023-12-14 RX ADMIN — Medication: at 09:28

## 2023-12-14 RX ADMIN — ACETAMINOPHEN 650 MG: 325 TABLET ORAL at 03:06

## 2023-12-14 RX ADMIN — MEROPENEM 1000 MG: 1 INJECTION, POWDER, FOR SOLUTION INTRAVENOUS at 04:06

## 2023-12-14 RX ADMIN — METOPROLOL TARTRATE 25 MG: 25 TABLET, FILM COATED ORAL at 20:50

## 2023-12-14 RX ADMIN — FAMOTIDINE 20 MG: 20 TABLET ORAL at 07:55

## 2023-12-14 RX ADMIN — ACETAMINOPHEN 650 MG: 325 TABLET ORAL at 20:50

## 2023-12-14 RX ADMIN — MORPHINE SULFATE 4 MG: 4 INJECTION, SOLUTION INTRAMUSCULAR; INTRAVENOUS at 08:11

## 2023-12-14 RX ADMIN — ACETAMINOPHEN 650 MG: 325 TABLET ORAL at 14:39

## 2023-12-14 RX ADMIN — Medication: at 12:43

## 2023-12-14 RX ADMIN — Medication: at 04:06

## 2023-12-14 RX ADMIN — BISACODYL 5 MG: 5 TABLET, COATED ORAL at 07:55

## 2023-12-14 RX ADMIN — ALBUTEROL SULFATE 2.5 MG: 2.5 SOLUTION RESPIRATORY (INHALATION) at 20:27

## 2023-12-14 RX ADMIN — CLOPIDOGREL BISULFATE 75 MG: 75 TABLET ORAL at 07:55

## 2023-12-14 ASSESSMENT — PAIN SCALES - GENERAL
PAINLEVEL_OUTOF10: 3
PAINLEVEL_OUTOF10: 9
PAINLEVEL_OUTOF10: 3
PAINLEVEL_OUTOF10: 3
PAINLEVEL_OUTOF10: 5
PAINLEVEL_OUTOF10: 9
PAINLEVEL_OUTOF10: 9

## 2023-12-14 ASSESSMENT — PAIN DESCRIPTION - LOCATION
LOCATION: CHEST

## 2023-12-14 ASSESSMENT — PAIN DESCRIPTION - DESCRIPTORS
DESCRIPTORS: ACHING;SORE
DESCRIPTORS: ACHING

## 2023-12-14 ASSESSMENT — PAIN DESCRIPTION - PAIN TYPE
TYPE: SURGICAL PAIN
TYPE: SURGICAL PAIN

## 2023-12-14 ASSESSMENT — PAIN DESCRIPTION - FREQUENCY
FREQUENCY: CONTINUOUS
FREQUENCY: CONTINUOUS

## 2023-12-14 ASSESSMENT — PAIN DESCRIPTION - ONSET
ONSET: ON-GOING
ONSET: PROGRESSIVE

## 2023-12-14 ASSESSMENT — PAIN DESCRIPTION - ORIENTATION
ORIENTATION: MID;UPPER
ORIENTATION: UPPER
ORIENTATION: MID
ORIENTATION: MID;UPPER

## 2023-12-14 ASSESSMENT — PAIN - FUNCTIONAL ASSESSMENT
PAIN_FUNCTIONAL_ASSESSMENT: PREVENTS OR INTERFERES WITH ALL ACTIVE AND SOME PASSIVE ACTIVITIES
PAIN_FUNCTIONAL_ASSESSMENT: PREVENTS OR INTERFERES WITH ALL ACTIVE AND SOME PASSIVE ACTIVITIES
PAIN_FUNCTIONAL_ASSESSMENT: PREVENTS OR INTERFERES SOME ACTIVE ACTIVITIES AND ADLS
PAIN_FUNCTIONAL_ASSESSMENT: ACTIVITIES ARE NOT PREVENTED

## 2023-12-14 NOTE — PROGRESS NOTES
Patient: Kristopher Watkins  [id number]  Date: 12/14/2023      Chief Complaint: s/p CABG    History of Present Illness/Hospital Course:  Kristopher Watkins is a 78 year old male with a past medical history significant for CAD, GERD, HLD, HTN, sleep apnea, and morbid obesity who presented to [facility] on 12/5/23 for planned open CABG x4 with left atrial appendage clip placement with sternal plating. Post operative course has been notable for hypotension, MADONNA, and acute blood loss anemia. Nephrology is following. He was started on CRRT on 12/13.    Interval History:  Primary teams are considering switch to intermittent dialysis. Today Kristopher is seen without family present. He reports that he is feeling improved.      has a past medical history of Arthritis, CAD (coronary artery disease), Cancer (H[facility]), GERD (gastroesophageal reflux disease), Hearing aid worn, Hyperlipidemia, Hypertension, Osteoarthritis, Sleep apnea, and Wears glasses.     has a past surgical history that includes Coronary angioplasty with stent; Coronary angioplasty (08/26/2015); skin biopsy; Colonoscopy; Endoscopy, colon, diagnostic; Cataract removal (Bilateral); Total knee arthroplasty (Right, 09/18/2019); joint replacement; knee surgery; Cataract extraction, bilateral (Bilateral); and Coronary artery bypass graft (N/A, 12/5/2023).     reports that he quit smoking about 45 years ago. His smoking use included cigarettes. He has a 45.00 pack-year smoking history. He has never used smokeless tobacco. He reports current alcohol use of about 2.0 standard drinks of alcohol per week. He reports that he does not use drugs.    family history includes Coronary Art Dis in his father and mother; Heart Disease in his father and mother.      REVIEW OF SYSTEMS:   Denies f/c, n/v, cp, sob    Physical Examination:  Vitals: Patient Vitals for the past 24 hrs:   BP Temp Temp src Pulse Resp SpO2 Weight   12/14/23 1441 101/69 -- -- 75 -- -- --   12/14/23 1400 107/76 -- -- 73  12/07/2023    INR 1.34 (H) 12/06/2023    INR 1.60 (H) 12/05/2023    PROTIME 15.4 (H) 12/07/2023    PROTIME 16.6 (H) 12/06/2023    PROTIME 19.0 (H) 12/05/2023     Lab Results   Component Value Date    CREATININE 0.9 12/14/2023    BUN 21 (H) 12/14/2023     (L) 12/14/2023    K 4.2 12/14/2023     12/14/2023    CO2 27 12/14/2023     Lab Results   Component Value Date    ALT 29 12/13/2023    AST 32 12/13/2023    ALKPHOS 154 (H) 12/13/2023    BILITOT 1.2 (H) 12/13/2023       Most recent echocardiogram revealed EF 55-60%. Most recent EKG revealed sinus bradycardia. IMAGING    XR Chest 12/7/23  Improved aeration of the lungs  Stable tiny left apical pneumothorax    Assessment:  1. CAD s/p CABG   2. MADONNA, on CRRT  3. Acute blood loss anemia   4. Morbid obesity   5. Pulmonary insufficiency    Recommendations: Following medical course. Patient has Medicare and does not need a precert to admit to ARU. Thank you for this consult. Please contact me with any questions or concerns. [address] [facility]  Olvin Garduno MD 12/14/2023, 2:48 PM

## 2023-12-14 NOTE — PROGRESS NOTES
Physician Progress Note      Nallely Covington  CSN #:                  [id number]  :                       1945  ADMIT DATE:       2023 6:33 AM  DISCH DATE:  RESPONDING  PROVIDER #:        Jose Abad CNP          QUERY TEXT:    Pt is s/p CABG. Pt noted to have need for supplemental oxygen and bipap post   op. If possible, please document in the progress notes and discharge summary   if you are evaluating and/or treating any of the following: The medical record reflects the following:  Risk Factors: SALLIE, s/p CABG, pulmonary edema, pleural effusions  Clinical Indicators: RR 16-29, SPO2 %, required bipap, now on 4-6 L NC  Treatment: supplemental oxygen, CXR, bipap, lasix, supportive care    Thank you,  Duong Olivier RN, CDS  [email or web address]  Options provided:  -- Acute respiratory failure with hypoxia  -- Other - I will add my own diagnosis  -- Disagree - Not applicable / Not valid  -- Disagree - Clinically unable to determine / Unknown  -- Refer to Clinical Documentation Reviewer    PROVIDER RESPONSE TEXT:    Acute on chronic respiratory failure. Patient wears cpap at home for SALLIE.    Refusing to wear while in the hospital.    Query created by: Duong Olivier on 2023 1:56 PM      Electronically signed by:  Jose Abad CNP 2023 1:17 PM

## 2023-12-14 NOTE — PROGRESS NOTES
CVTS Cardiothoracic Progress Note:                              CC:  Post op follow up      Surgery:12/5  OPEN CORONARY ARTERY BYPASS GRAFTING TIMES FOUR USING INTERNAL MAMMARY ARTERY, SAPHENOUS VEIN GRAFT, ON PUMP, LILY, TEMPORARY PACING WIRES, LEFT ATRIAL APPENDAGE CLIP PLACEMENT WITH STERNAL PLATING AND BILATERAL [facility] course:  12/5 DOS- Extubated @ [phone]. Pain difficult to control. High Levo requirement. 12/6 POD 1- Hgb 7 this AM. 1 unit PRBC given. Able to wean off Levo. Scheduled Gabapentin and Robaxin ordered for pain. TPW removed. Plan to remove MS chest tube     12/7 POD2- OOB to chair this AM. Cr up to 1.7. D/C Gabapentin. Switched Arixtra to Heparin. Hypotensive on standing this AM. Albumin given. Started Lasix gtt. 12.8 POD3- Still with difficult pain control. On typical post op pain control. Unable to give Gabapentin and Toradol 2/2 Cr. Hgb low this morning. 1 unit PRBC ordered by oncall. Cr 1.9 this AM. Consult Nephro. 12/11 POD 6 - Cr 1.6 this AM. Lasix gtt decreased to 5 mg/hr . MS incision oozing. Added Augmentin PO x 5 days. Prevena to MS incision. 12/12 Cr up to 1.8, lasix back up to 10 mg/hr per Nephro. More lethargic today,  requiring bipap. ABG alkalotic. Discussed HD with nephro. Dr. Daly Hutchinson will place the vas cath. 12/13 started pt on CRRT for assistance with clearance. 12/14 Discussed with Dr. Compa Guy, will transition pt to iHD now that his blood pressure is improving.      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

## 2023-12-14 NOTE — PROGRESS NOTES
Drip rates at handoff:    amiodarone      sodium chloride      DOBUTamine Stopped (12/14/23 1315)    sodium chloride 5 mL/hr at 12/14/23 1711    sodium chloride      dextrose         Hospital Course:  POD# 12/5/23 Days  -Out of CVOR 1445  -wean and extubate 1745  -20 meq KCL  -2 amps NaBicarb  -Pain- precedex 0.2mcg/kg/min  -UO: 480 ml  -CT MS: 320 LP: 170 ml    POD # 0 Nights   -Titrated levo down  -precedexn off  -1 Unit RBC given per order  - 250 ml albumin hypotension  -A/V wires removed per order  -Calcium chloride 1 Gm     POD#1 Days  - Levo Off  - FlowTrac removed  - M/S CT Removed  - Walked to chair, worked with PT/OT  - Pain control is biggest issue    POD#1 NIGHT   - BIPAP at night, (hospital BIPAP), pt kept de-sating on the home BIPAP  - Hemoglobin dropped to 7.3, CTS called and an order of 1 PRBC started   - NC rate flow increase to 6 after moving to the chair.  - pain controlled with Morphine and Oxy  - Albumin 500 started at 6;30 for correcting BP   - walked to in the room for the scale and chair. POD #8 Nights 12/13  -CRRT net even  - UOP: 305ml  -refusing bipap, 4L NC otherwise. decreased to 3L once up  in chair   -Ca+ replaced x3  -H&H stable     POD 9 Days:  - CRRT stopped  - given okay by CTS to use home cpap machine   - sodium phos replaced          Lab Data:  CBC:   Recent Labs     12/13/23  0420 12/13/23  1157 12/13/23  2309 12/14/23  0509   WBC 14.2*  --   --  14.0*   HGB 8.1*   < > 8.2* 8.0*   HCT 24.9*   < > 25.5* 24.4*   MCV 89.5  --   --  90.9     --   --  265    < > = values in this interval not displayed. BMP:    Recent Labs     12/13/23  2309 12/14/23  1122    134*   K 4.0 4.2   CO2 25 27   BUN 39* 21*   CREATININE 1.4* 0.9       LIVR:   Recent Labs     12/13/23  1155   AST 32   ALT 29       PT/INR:   Recent Labs     12/13/23  1155   PROT 6.6       APTT:   No results for input(s): \"APTT\" in the last 72 hours.     ABG:   Recent Labs     12/12/23  0935 12/12/23  1112   PHART 7.598* 7.623*   [id number] 30.5* 29.9*   PO2ART 54.6* 144.0*       Electronically signed by Carlos August RN on 12/14/2023 at 5:38 PM

## 2023-12-14 NOTE — PROGRESS NOTES
Pt requesting to use home cpap machine. Verified with CTS and given verbal order to use patient cpap machine.

## 2023-12-14 NOTE — PROGRESS NOTES
Physician Progress Note      Radha Hendrickson  [facility] #:                  [id number]  :                       1945  ADMIT DATE:       2023 6:33 AM  DISCH DATE:  RESPONDING  PROVIDER #:        Sonia Graf APRN - CNP          QUERY TEXT:    Pt is s/p CABG. Noted documentation of MADONNA with ATN by Nephrology consultant. If possible, please document in progress notes and discharge summary:    The medical record reflects the following:  Risk Factors: s/p CABG, anemia, diuresis, post op hypotension  Clinical Indicators: \"Acute Kidney Injury.- Likely hemodynamically mediated   ATN. \" noted by Nephrology, - Baseline serum creatinine of 0.9-1.1, up to 1.9   post op  Treatment: serial labs, Nephrology consult, levophed, blood transfusion,    avoid hypotension and nephrotoxic agents, supportive care    Thank you,  Nguyễn Cardenas RN, CDS  [email or web address]  Options provided:  -- MADONNA with ATN confirmed not present on admission  -- MADONNA with ATN ruled out, MADONNA only  -- Defer to Nephrology consultant documentation regarding MADONNA with ATN  -- Other - I will add my own diagnosis  -- Disagree - Not applicable / Not valid  -- Disagree - Clinically unable to determine / Unknown  -- Refer to Clinical Documentation Reviewer    PROVIDER RESPONSE TEXT:    The diagnosis of MADONNA with ATN was confirmed not present on admission. Query created by: Nguyễn Cardenas on 2023 9:38 AM      QUERY TEXT:    Pt is s/p CABG. Pulmonology note on  notes, \"Patient's x-ray chest shows   worsening pulm infiltrates/edema\". If possible, please document in the   progress notes and discharge summary if you are evaluating and/or treating any   of the following:     The medical record reflects the following:  Risk Factors: post op CABG, fluid overload, MADONNA, chronic diastolic CHF  Clinical Indicators: significant extremity edema, bilateral pleural effusion,   \"Patient's x-ray chest shows worsening pulm infiltrates/edema\" noted by   Pulmonology, EF 55%  Treatment: Lasix infusion, I&O's, daily weights, CXR, ECHO, supportive care    Thank you,  Andie Kim RN, CDS  [email or web address]  Options provided:  -- Acute pulmonary edema due to acute on chronic diastolic CHF  -- Noncardiogenic acute pulmonary edema due to fluid overload  -- Other - I will add my own diagnosis  -- Disagree - Not applicable / Not valid  -- Disagree - Clinically unable to determine / Unknown  -- Refer to Clinical Documentation Reviewer    PROVIDER RESPONSE TEXT:    This patient has noncardiogenic acute pulmonary edema due to fluid overload.    Query created by: Andie Kim on 12/11/2023 11:01 AM      Electronically signed by:  MAYRA Abad CNP 12/14/2023 1:30 PM

## 2023-12-14 NOTE — PLAN OF CARE
Problem: Chronic Conditions and Co-morbidities  Goal: Patient's chronic conditions and co-morbidity symptoms are monitored and maintained or improved  Outcome: Progressing     Problem: Discharge Planning  Goal: Discharge to home or other facility with appropriate resources  Outcome: Progressing     Problem: Pain  Goal: Verbalizes/displays adequate comfort level or baseline comfort level  Outcome: Progressing     Problem: ABCDS Injury Assessment  Goal: Absence of physical injury  Outcome: Progressing     Problem: Safety - Adult  Goal: Free from fall injury  Outcome: Progressing     Problem: Neurosensory - Adult  Goal: Achieves stable or improved neurological status  Outcome: Progressing  Goal: Absence of seizures  Outcome: Progressing  Goal: Remains free of injury related to seizures activity  Outcome: Progressing     Problem: Respiratory - Adult  Goal: Achieves optimal ventilation and oxygenation  Outcome: Progressing     Problem: Cardiovascular - Adult  Goal: Maintains optimal cardiac output and hemodynamic stability  Outcome: Progressing  Goal: Absence of cardiac dysrhythmias or at baseline  Outcome: Progressing     Problem: Skin/Tissue Integrity - Adult  Goal: Skin integrity remains intact  Outcome: Progressing  Goal: Incisions, wounds, or drain sites healing without S/S of infection  Outcome: Progressing  Goal: Oral mucous membranes remain intact  Outcome: Progressing     Problem: Musculoskeletal - Adult  Goal: Return mobility to safest level of function  Outcome: Progressing  Goal: Maintain proper alignment of affected body part  Outcome: Progressing  Goal: Return ADL status to a safe level of function  Outcome: Progressing     Problem: Gastrointestinal - Adult  Goal: Minimal or absence of nausea and vomiting  Outcome: Progressing  Goal: Maintains or returns to baseline bowel function  Outcome: Progressing  Goal: Maintains adequate nutritional intake  Outcome: Progressing     Problem: Genitourinary -

## 2023-12-14 NOTE — PROGRESS NOTES
12/13/23 2114   NIV Type   NIV Started/Stopped On   Equipment Type v60   Mode Bilevel   Mask Type Full face mask   Assessment   Pulse 79   Respirations 24   SpO2 100 %   Comfort Level Good   Using Accessory Muscles No   Mask Compliance Good   Skin Assessment Clean, dry, & intact   Skin Protection for O2 Device Yes   Location Nose   Settings/Measurements   PIP Observed 10 cm H20  (ramp applied for comfort)   IPAP 15 cmH20   CPAP/EPAP 5 cmH2O   Vt (Measured) 727 mL   Rate Ordered 15   Insp Rise Time (%) 2 %   FiO2  35 %   I Time/ I Time % 1.05 s   Minute Volume (L/min) 17.4 Liters   Mask Leak (lpm) 2 lpm   Patient's Home Machine No   Alarm Settings   Alarms On Y

## 2023-12-14 NOTE — PROGRESS NOTES
Writer at bedside due to bipap alarming. Patient has self removed bipap mask multiple times this evening. Education provided to patient and risks/benefits reviewed. Patient is alert and oriented x4 and refusing to wear bipap at this time. Patient placed back on 4L nasal cannula, RT aware.      Electronically signed by Nighat Rosado RN on 12/14/2023 at 12:29 AM

## 2023-12-14 NOTE — PROGRESS NOTES
Physical Therapy  Facility/Department: [facility], [place] C2 CARD TELEMETRY  Daily Treatment Note  NAME: Mikhail Connolly  : 1945  MRN: [id number]    Date of Service: 2023    Discharge Recommendations:  IP Rehab, Patient able to tolerate 3hrs of therapy a day   PT Equipment Recommendations  Equipment Needed: No  Other: Defer    Patient Diagnosis(es): There were no encounter diagnoses. Barriers to Home Discharge:   [] Steps to access home entry or bed/bath:   [x] Unable to transfer, ambulate, or propel wheelchair household distances without assist   [x] Limited available assist at home upon discharge    [x] Patient or family requests d/c to post-acute facility    [] Poor cognition/safety awareness for d/c to home alone    [] Unable to maintain ordered weight bearing status    [] Patient with salient signs of long-standing immobility   [] Decreased independence with ADLs   [x] Increased risk for falls   [] Other:    Assessment   Assessment: Pt continues to require mod Ax2 for bed mobility. Able to complete sit<>stand from bed 2x with CGA and take side steps towards HOB. Pt with good participation and able to complete 15 reps of BLE seated exercises at EOB with rest preaks PRN. Limited this date by CRRT and unable to ambulate pt at this time. Pt will continue to benefit from skilled PT services to address current deficits. Continue to rec IPR at DC when deemed medically appropriate. Co-tx collaboration this date to safely meet goals and will have better occupational performance outcomes with in a co-treatment than 1:1 session. Activity Tolerance: Other (comment); Patient limited by endurance (CRRT)  Equipment Needed: No  Other: Defer     Plan    Physical Therapy Plan  General Plan: 5-7 times per week  Current Treatment Recommendations: Strengthening;Balance training;Functional mobility training;Transfer training; Endurance training;Pain management; Neuromuscular re-education;Gait training;Stair training; Safety Extend to 12/20/23 due to longer than anticipated LOS -- 12/14 continue all goals  Short Term Goal 1: Pt will perform bed mobility with min(A)  Short Term Goal 2: Pt will perform transfers with SBA  Short Term Goal 3: Pt will ambulate 100 ft with LRAD and SBA  Short Term Goal 4: Pt will perform 12-15 reps of BLE exercises by 12/9/23; progress to 12/15/23 -- MET 12/14/23  Additional Goals?: No  Patient Goals   Patient Goals : \"to go home\"    Education  Patient Education  Education Given To: Patient  Education Provided: Role of Therapy;Plan of Care;Precautions;Transfer Training  Education Provided Comments: Pt able to recall 3/3 sternal precautions at start of session  Education Method: Verbal  Barriers to Learning: Hearing  Education Outcome: Verbalized understanding;Continued education needed    AM-PAC - Mobility    AM-PAC Basic Mobility - Inpatient   How much help is needed turning from your back to your side while in a flat bed without using bedrails?: A Lot  How much help is needed moving from lying on your back to sitting on the side of a flat bed without using bedrails?: A Lot  How much help is needed moving to and from a bed to a chair?: A Little  How much help is needed standing up from a chair using your arms?: A Little  How much help is needed walking in hospital room?: A Little  How much help is needed climbing 3-5 steps with a railing?: A Lot  AM-PAC Inpatient Mobility Raw Score : 15  AM-PAC Inpatient T-Scale Score : 39.45  Mobility Inpatient CMS 0-100% Score: 57.7  Mobility Inpatient CMS G-Code Modifier : CK         Therapy Time   Individual Concurrent Group Co-treatment   Time In       1130   Time Out       1203   Minutes       33   Timed Code Treatment Minutes: 33 Minutes       Jessica Kim, PT, DPT    If pt is unable to be seen after this session, please let this note serve as discharge summary.  Please see case management note for discharge disposition.  Thank you.

## 2023-12-14 NOTE — PLAN OF CARE
Problem: Chronic Conditions and Co-morbidities  Goal: Patient's chronic conditions and co-morbidity symptoms are monitored and maintained or improved  12/14/2023 1039 by Carlos August, RN  Outcome: Progressing  Flowsheets (Taken 12/14/2023 0800)  Care Plan - Patient's Chronic Conditions and Co-Morbidity Symptoms are Monitored and Maintained or Improved:   Monitor and assess patient's chronic conditions and comorbid symptoms for stability, deterioration, or improvement   Collaborate with multidisciplinary team to address chronic and comorbid conditions and prevent exacerbation or deterioration   Update acute care plan with appropriate goals if chronic or comorbid symptoms are exacerbated and prevent overall improvement and discharge     Problem: Discharge Planning  Goal: Discharge to home or other facility with appropriate resources  12/14/2023 1039 by Carlos August, RN  Outcome: Progressing  Flowsheets (Taken 12/14/2023 0800)  Discharge to home or other facility with appropriate resources:   Identify barriers to discharge with patient and caregiver   Arrange for needed discharge resources and transportation as appropriate   Identify discharge learning needs (meds, wound care, etc)   Arrange for interpreters to assist at discharge as needed   Refer to discharge planning if patient needs post-hospital services based on physician order or complex needs related to functional status, cognitive ability or social support system     Problem: Pain  Goal: Verbalizes/displays adequate comfort level or baseline comfort level  12/14/2023 1039 by Carlos August, RN  Outcome: Progressing     Problem: ABCDS Injury Assessment  Goal: Absence of physical injury  12/14/2023 1039 by Carlos August, RN  Outcome: Progressing     Problem: Safety - Adult  Goal: Free from fall injury  12/14/2023 1039 by Carlos August, RN  Outcome: Progressing     Problem: Neurosensory - Adult  Goal: Achieves stable or improved neurological status  12/14/2023  serum sodium as indicated or ordered   Monitor response to interventions for patient's volume status, including labs, urine output, blood pressure (other measures as available)   Encourage oral intake as appropriate   Instruct patient on fluid and nutrition restrictions as appropriate  Goal: Glucose maintained within prescribed range  12/14/2023 1039 by Loyd Hennessy RN  Outcome: Progressing  Flowsheets (Taken 12/14/2023 0800)  Glucose maintained within prescribed range:   Monitor blood glucose as ordered   Assess for signs and symptoms of hyperglycemia and hypoglycemia   Assess barriers to adequate nutritional intake and initiate nutrition consult as needed   Administer ordered medications to maintain glucose within target range   Instruct patient on self management of diabetes and initiate consult as needed    Problem: Hematologic - Adult  Goal: Maintains hematologic stability  12/14/2023 1039 by Loyd Hennessy RN  Outcome: Progressing  Flowsheets (Taken 12/14/2023 0800)  Maintains hematologic stability:   Assess for signs and symptoms of bleeding or hemorrhage   Monitor labs for bleeding or clotting disorders   Administer blood products/factors as ordered    Problem: Nutrition Deficit:  Goal: Optimize nutritional status  12/14/2023 1039 by Loyd Hennessy RN  Outcome: Progressing     Problem: Skin/Tissue Integrity  Goal: Absence of new skin breakdown  Description: 1. Monitor for areas of redness and/or skin breakdown  2. Assess vascular access sites hourly  3. Every 4-6 hours minimum:  Change oxygen saturation probe site  4. Every 4-6 hours:  If on nasal continuous positive airway pressure, respiratory therapy assess nares and determine need for appliance change or resting period.   12/14/2023 1039 by Loyd Hennessy RN  Outcome: Progressing

## 2023-12-14 NOTE — PROGRESS NOTES
Sit: Contact-guard assistance;Additional time  Gait Training  Gait Training: No (Not able to ambulate due to CRRT)     ADL  LE Dressing: Dependent/Total  LE Dressing Skilled Clinical Factors: don socks in bed  Toileting: Dependent/Total  Toileting Skilled Clinical Factors: rosa  Skin Care: Chlorhexidine wipes;Soap and water  OT Exercises  A/AROM Exercises: x15 BUE seated EOB: wrist flexion/extension, elbow flexion/extension     Safety Devices  Type of Devices: All carlo prominences offloaded;All fall risk precautions in place;Nurse notified;Heels elevated for pressure relief;Gait belt;Left in bed;Bed alarm in place  Restraints  Restraints Initially in Place: No     Patient Education  Education Given To: Patient;Family  Education Provided: Role of Therapy;Plan of Care;Home Exercise Program;Precautions;ADL Adaptive Strategies;Transfer Training  Education Provided Comments: disease specific: role of OT, sternal px, HEP  Education Method: Demonstration;Verbal  Barriers to Learning: None  Education Outcome: Verbalized understanding;Continued education needed    Goals  Short Term Goals  Time Frame for Short Term Goals: 12/13/23 -- unless otherwise stated - extend goals to 12/20 to reflect LOS- goals ongoing 12/14  Short Term Goal 1: Pt will complete functional transfers/ toilet transfer w/ CGA and LRAD  Short Term Goal 2: Pt will complete 2-3 grooming tasks in unsupportive sitting w/ SUPV and good adherence to sternal precautions  Short Term Goal 3: Pt will complete x15 reps BUE therex within adherence to sternal precautions w/o fatigue -- by 12/11/23 - extend goal to 12/15  Short Term Goal 4: Pt will complete UB dressing w/ min A  Patient Goals   Patient goals : \"to go home\"    AM-PAC - ADL  AM-PAC Daily Activity - Inpatient   How much help is needed for putting on and taking off regular lower body clothing?: Total  How much help is needed for bathing (which includes washing, rinsing, drying)?: A Lot  How much help is  needed for toileting (which includes using toilet, bedpan, or urinal)?: Total  How much help is needed for putting on and taking off regular upper body clothing?: A Lot  How much help is needed for taking care of personal grooming?: A Little  How much help for eating meals?: A Little  AM-[facility] In[facility] Daily Activity Raw Score: 12  AM-PAC Inpatient ADL T-Scale Score : 30.6  ADL Inpatient CMS 0-100% Score: 66.57  ADL Inpatient CMS G-Code Modifier : CL    Therapy Time   Individual Concurrent Group Co-treatment   Time In 1130         Time Out 1203         Minutes 33         Timed Code Treatment Minutes: Angela [place]

## 2023-12-14 NOTE — PROGRESS NOTES
Comprehensive Nutrition Assessment    Type and Reason for Visit:  Reassess    Nutrition Recommendations/Plan:   RD to modify diet to LAURENT and encourage PO intake   Continue ensure TID   If unable to tolerate diet advancement, consider alternative methods of nutrition. Consult RD for recommendations. Monitor nutrition adequacy, pertinent labs, bowel habits, wt changes, and clinical progress     Malnutrition Assessment:  Malnutrition Status: At risk for malnutrition (Comment) (12/14/23 1137)    Context:  Acute Illness     Findings of the 6 clinical characteristics of malnutrition:  Energy Intake:  50% or less of estimated energy requirements for 5 or more days    Nutrition Assessment:    Follow up: Vascath placed 12/12, CRRT started yesterday. Diet NPO since 12/12 d/t BIPAP, diet advanced to cardiac diet today. No intakes yet. RD to modify diet to LAURENT to promote PO intake. Pt and family member reports no intakes over the past few days, pt may be able to tolerate ensure. RN reports pt has had some applesauce w/ meds but thats the only nutrition he has received recently. Ensure added w/ meals. If unable to tolerate diet advancement, consider alternative methods of nutrition. Consult RD for recommendations. Will continue to monitor. Nutrition Related Findings:    No updated labs today. + BM yesterday. IVF @ 100 ml/hr. Wound Type: Surgical Incision       Current Nutrition Intake & Therapies:    Average Meal Intake: 0%  Average Supplements Intake: 0%  ADULT DIET; Regular; Low Fat/Low Chol/High Fiber/2 gm Na  ADULT ORAL NUTRITION SUPPLEMENT; Breakfast, Lunch, Dinner; Standard High Calorie/High Protein Oral Supplement    Anthropometric Measures:  Height: 172.7 cm (5' 8\")  Ideal Body Weight (IBW): 154 lbs (70 kg)       Current Body Weight: 135.2 kg (298 lb), 204.5 % IBW.  Weight Source: Standing Scale  Current BMI (kg/m2): 45.3        Weight Adjustment For: No Adjustment                 BMI Categories: Obese Class 3 (BMI

## 2023-12-14 NOTE — CARE COORDINATION
LOS- 9-  Nephro wants to DC Crrt and monitor renal vs intermittent HD- Pt can go to ARU when he is medically stable- No pre cert needed

## 2023-12-15 ENCOUNTER — APPOINTMENT (OUTPATIENT)
Dept: GENERAL RADIOLOGY | Age: 78
DRG: 235 | End: 2023-12-15
Attending: THORACIC SURGERY (CARDIOTHORACIC VASCULAR SURGERY)
Payer: MEDICARE

## 2023-12-15 LAB
ALBUMIN SERPL-MCNC: 3.1 G/DL (ref 3.4–5)
ALBUMIN SERPL-MCNC: 3.2 G/DL (ref 3.4–5)
ANION GAP SERPL CALCULATED.3IONS-SCNC: 10 MMOL/L (ref 3–16)
ANION GAP SERPL CALCULATED.3IONS-SCNC: 10 MMOL/L (ref 3–16)
BUN SERPL-MCNC: 31 MG/DL (ref 7–20)
BUN SERPL-MCNC: 50 MG/DL (ref 7–20)
CA-I BLD-SCNC: 1.04 MMOL/L (ref 1.12–1.32)
CA-I BLD-SCNC: 1.05 MMOL/L (ref 1.12–1.32)
CA-I BLD-SCNC: 1.11 MMOL/L (ref 1.12–1.32)
CALCIUM SERPL-MCNC: 8.4 MG/DL (ref 8.3–10.6)
CALCIUM SERPL-MCNC: 8.6 MG/DL (ref 8.3–10.6)
CHLORIDE SERPL-SCNC: 100 MMOL/L (ref 99–110)
CHLORIDE SERPL-SCNC: 102 MMOL/L (ref 99–110)
CO2 SERPL-SCNC: 23 MMOL/L (ref 21–32)
CO2 SERPL-SCNC: 23 MMOL/L (ref 21–32)
CREAT SERPL-MCNC: 1.2 MG/DL (ref 0.8–1.3)
CREAT SERPL-MCNC: 1.3 MG/DL (ref 0.8–1.3)
DEPRECATED RDW RBC AUTO: 16.7 % (ref 12.4–15.4)
GFR SERPLBLD CREATININE-BSD FMLA CKD-EPI: 56 ML/MIN/{1.73_M2}
GFR SERPLBLD CREATININE-BSD FMLA CKD-EPI: >60 ML/MIN/{1.73_M2}
GLUCOSE BLD-MCNC: 162 MG/DL (ref 70–99)
GLUCOSE BLD-MCNC: 82 MG/DL (ref 70–99)
GLUCOSE BLD-MCNC: 97 MG/DL (ref 70–99)
GLUCOSE BLD-MCNC: 98 MG/DL (ref 70–99)
GLUCOSE SERPL-MCNC: 118 MG/DL (ref 70–99)
GLUCOSE SERPL-MCNC: 143 MG/DL (ref 70–99)
HCT VFR BLD AUTO: 30.3 % (ref 40.5–52.5)
HGB BLD-MCNC: 9.6 G/DL (ref 13.5–17.5)
MAGNESIUM SERPL-MCNC: 3.1 MG/DL (ref 1.8–2.4)
MCH RBC QN AUTO: 29.5 PG (ref 26–34)
MCHC RBC AUTO-ENTMCNC: 31.8 G/DL (ref 31–36)
MCV RBC AUTO: 92.7 FL (ref 80–100)
PERFORMED ON: ABNORMAL
PERFORMED ON: NORMAL
PH BLDV: 7.37 [PH] (ref 7.35–7.45)
PH BLDV: 7.39 [PH] (ref 7.35–7.45)
PH BLDV: 7.45 [PH] (ref 7.35–7.45)
PHOSPHATE SERPL-MCNC: 2.4 MG/DL (ref 2.5–4.9)
PHOSPHATE SERPL-MCNC: 3.5 MG/DL (ref 2.5–4.9)
PLATELET # BLD AUTO: 346 K/UL (ref 135–450)
PMV BLD AUTO: 7.4 FL (ref 5–10.5)
POTASSIUM SERPL-SCNC: 3.8 MMOL/L (ref 3.5–5.1)
POTASSIUM SERPL-SCNC: 4.2 MMOL/L (ref 3.5–5.1)
RBC # BLD AUTO: 3.27 M/UL (ref 4.2–5.9)
SODIUM SERPL-SCNC: 133 MMOL/L (ref 136–145)
SODIUM SERPL-SCNC: 135 MMOL/L (ref 136–145)
WBC # BLD AUTO: 16.3 K/UL (ref 4–11)

## 2023-12-15 PROCEDURE — 36415 COLL VENOUS BLD VENIPUNCTURE: CPT

## 2023-12-15 PROCEDURE — 97530 THERAPEUTIC ACTIVITIES: CPT

## 2023-12-15 PROCEDURE — 6370000000 HC RX 637 (ALT 250 FOR IP)

## 2023-12-15 PROCEDURE — 6370000000 HC RX 637 (ALT 250 FOR IP): Performed by: THORACIC SURGERY (CARDIOTHORACIC VASCULAR SURGERY)

## 2023-12-15 PROCEDURE — 83735 ASSAY OF MAGNESIUM: CPT

## 2023-12-15 PROCEDURE — 94761 N-INVAS EAR/PLS OXIMETRY MLT: CPT

## 2023-12-15 PROCEDURE — 6370000000 HC RX 637 (ALT 250 FOR IP): Performed by: NURSE PRACTITIONER

## 2023-12-15 PROCEDURE — 6360000002 HC RX W HCPCS

## 2023-12-15 PROCEDURE — 97110 THERAPEUTIC EXERCISES: CPT

## 2023-12-15 PROCEDURE — 97116 GAIT TRAINING THERAPY: CPT

## 2023-12-15 PROCEDURE — 2580000003 HC RX 258: Performed by: INTERNAL MEDICINE

## 2023-12-15 PROCEDURE — 71045 X-RAY EXAM CHEST 1 VIEW: CPT

## 2023-12-15 PROCEDURE — 80069 RENAL FUNCTION PANEL: CPT

## 2023-12-15 PROCEDURE — 2700000000 HC OXYGEN THERAPY PER DAY

## 2023-12-15 PROCEDURE — 2580000003 HC RX 258

## 2023-12-15 PROCEDURE — 85027 COMPLETE CBC AUTOMATED: CPT

## 2023-12-15 PROCEDURE — 6360000002 HC RX W HCPCS: Performed by: INTERNAL MEDICINE

## 2023-12-15 PROCEDURE — 99024 POSTOP FOLLOW-UP VISIT: CPT | Performed by: NURSE PRACTITIONER

## 2023-12-15 PROCEDURE — 89220 SPUTUM SPECIMEN COLLECTION: CPT

## 2023-12-15 PROCEDURE — 94669 MECHANICAL CHEST WALL OSCILL: CPT

## 2023-12-15 PROCEDURE — 2140000000 HC CCU INTERMEDIATE R&B

## 2023-12-15 PROCEDURE — 94640 AIRWAY INHALATION TREATMENT: CPT

## 2023-12-15 PROCEDURE — 82330 ASSAY OF CALCIUM: CPT

## 2023-12-15 RX ORDER — SODIUM CHLORIDE 9 MG/ML
INJECTION, SOLUTION INTRAVENOUS PRN
Status: DISCONTINUED | OUTPATIENT
Start: 2023-12-15 | End: 2023-12-19 | Stop reason: HOSPADM

## 2023-12-15 RX ADMIN — CLOPIDOGREL BISULFATE 75 MG: 75 TABLET ORAL at 08:35

## 2023-12-15 RX ADMIN — OXYCODONE 5 MG: 5 TABLET ORAL at 08:36

## 2023-12-15 RX ADMIN — CHLORHEXIDINE GLUCONATE 15 ML: 1.2 RINSE ORAL at 08:35

## 2023-12-15 RX ADMIN — AMIODARONE HYDROCHLORIDE 200 MG: 200 TABLET ORAL at 08:42

## 2023-12-15 RX ADMIN — INSULIN LISPRO 2 UNITS: 100 INJECTION, SOLUTION INTRAVENOUS; SUBCUTANEOUS at 12:38

## 2023-12-15 RX ADMIN — MEROPENEM 1000 MG: 1 INJECTION, POWDER, FOR SOLUTION INTRAVENOUS at 04:02

## 2023-12-15 RX ADMIN — ACETAMINOPHEN 650 MG: 325 TABLET ORAL at 04:00

## 2023-12-15 RX ADMIN — ATORVASTATIN CALCIUM 40 MG: 40 TABLET, FILM COATED ORAL at 20:35

## 2023-12-15 RX ADMIN — ALBUTEROL SULFATE 2.5 MG: 2.5 SOLUTION RESPIRATORY (INHALATION) at 19:34

## 2023-12-15 RX ADMIN — METOPROLOL TARTRATE 25 MG: 25 TABLET, FILM COATED ORAL at 08:35

## 2023-12-15 RX ADMIN — ACETAMINOPHEN 650 MG: 325 TABLET ORAL at 16:03

## 2023-12-15 RX ADMIN — SODIUM CHLORIDE, PRESERVATIVE FREE 10 ML: 5 INJECTION INTRAVENOUS at 20:41

## 2023-12-15 RX ADMIN — ASPIRIN 81 MG: 81 TABLET, COATED ORAL at 08:35

## 2023-12-15 RX ADMIN — FAMOTIDINE 20 MG: 20 TABLET ORAL at 08:35

## 2023-12-15 RX ADMIN — CHLORHEXIDINE GLUCONATE 15 ML: 1.2 RINSE ORAL at 20:35

## 2023-12-15 RX ADMIN — HEPARIN SODIUM 5000 UNITS: 5000 INJECTION INTRAVENOUS; SUBCUTANEOUS at 21:41

## 2023-12-15 RX ADMIN — BISACODYL 5 MG: 5 TABLET, COATED ORAL at 08:35

## 2023-12-15 RX ADMIN — OXYCODONE 5 MG: 5 TABLET ORAL at 13:48

## 2023-12-15 RX ADMIN — ALBUTEROL SULFATE 2.5 MG: 2.5 SOLUTION RESPIRATORY (INHALATION) at 07:29

## 2023-12-15 RX ADMIN — ALBUTEROL SULFATE 2.5 MG: 2.5 SOLUTION RESPIRATORY (INHALATION) at 11:42

## 2023-12-15 RX ADMIN — HEPARIN SODIUM 5000 UNITS: 5000 INJECTION INTRAVENOUS; SUBCUTANEOUS at 06:24

## 2023-12-15 RX ADMIN — INSULIN GLARGINE 20 UNITS: 100 INJECTION, SOLUTION SUBCUTANEOUS at 20:35

## 2023-12-15 RX ADMIN — MEROPENEM 1000 MG: 1 INJECTION, POWDER, FOR SOLUTION INTRAVENOUS at 16:10

## 2023-12-15 RX ADMIN — ACETAMINOPHEN 650 MG: 325 TABLET ORAL at 08:35

## 2023-12-15 RX ADMIN — METHOCARBAMOL 1000 MG: 500 TABLET ORAL at 08:35

## 2023-12-15 RX ADMIN — METHOCARBAMOL 1000 MG: 500 TABLET ORAL at 12:38

## 2023-12-15 RX ADMIN — ACETAMINOPHEN 650 MG: 325 TABLET ORAL at 20:35

## 2023-12-15 RX ADMIN — METHOCARBAMOL 1000 MG: 500 TABLET ORAL at 20:35

## 2023-12-15 RX ADMIN — POLYETHYLENE GLYCOL 3350 17 G: 17 POWDER, FOR SOLUTION ORAL at 08:35

## 2023-12-15 RX ADMIN — ALBUTEROL SULFATE 2.5 MG: 2.5 SOLUTION RESPIRATORY (INHALATION) at 15:48

## 2023-12-15 RX ADMIN — HEPARIN SODIUM 5000 UNITS: 5000 INJECTION INTRAVENOUS; SUBCUTANEOUS at 16:03

## 2023-12-15 RX ADMIN — METOPROLOL TARTRATE 25 MG: 25 TABLET, FILM COATED ORAL at 20:35

## 2023-12-15 ASSESSMENT — PAIN SCALES - GENERAL
PAINLEVEL_OUTOF10: 6
PAINLEVEL_OUTOF10: 4
PAINLEVEL_OUTOF10: 8

## 2023-12-15 ASSESSMENT — PAIN DESCRIPTION - ORIENTATION: ORIENTATION: MID

## 2023-12-15 ASSESSMENT — PAIN DESCRIPTION - LOCATION: LOCATION: BACK

## 2023-12-15 ASSESSMENT — PAIN DESCRIPTION - DESCRIPTORS: DESCRIPTORS: ACHING

## 2023-12-15 NOTE — CARE COORDINATION
LOS 10 - Pt is doing intermittent HD- will probably be ready to go to  ARU on SUnday.   PT, wife, ARU are aware per CTS recommendation

## 2023-12-15 NOTE — PROGRESS NOTES
CVTS Cardiothoracic Progress Note:                              CC:  Post op follow up      Surgery:12/5  OPEN CORONARY ARTERY BYPASS GRAFTING TIMES FOUR USING INTERNAL MAMMARY ARTERY, SAPHENOUS VEIN GRAFT, ON PUMP, LILY, TEMPORARY PACING WIRES, LEFT ATRIAL APPENDAGE CLIP PLACEMENT WITH STERNAL PLATING AND BILATERAL [facility] course:  12/5 DOS- Extubated @ [phone]. Pain difficult to control. High Levo requirement. 12/6 POD 1- Hgb 7 this AM. 1 unit PRBC given. Able to wean off Levo. Scheduled Gabapentin and Robaxin ordered for pain. TPW removed. Plan to remove MS chest tube     12/7 POD2- OOB to chair this AM. Cr up to 1.7. D/C Gabapentin. Switched Arixtra to Heparin. Hypotensive on standing this AM. Albumin given. Started Lasix gtt. 12.8 POD3- Still with difficult pain control. On typical post op pain control. Unable to give Gabapentin and Toradol 2/2 Cr. Hgb low this morning. 1 unit PRBC ordered by oncall. Cr 1.9 this AM. Consult Nephro. 12/11 POD 6 - Cr 1.6 this AM. Lasix gtt decreased to 5 mg/hr . MS incision oozing. Added Augmentin PO x 5 days. Prevena to MS incision. 12/12 Cr up to 1.8, lasix back up to 10 mg/hr per Nephro. More lethargic today,  requiring bipap. ABG alkalotic. Discussed HD with nephro. Dr. Martina Forbes will place the vas cath. 12/13 started pt on CRRT for assistance with clearance. 12/14 Discussed with Dr. Celia Yepez, will transition pt to iHD now that his blood pressure is improving. 12/15 sitting up in chair in nad. Will make sure it's ok with Nephro prior to removing rosa.     Past Medical History:   Diagnosis Date    Arthritis     CAD (coronary artery disease)     Cancer ([address])     nose    GERD (gastroesophageal reflux disease)     Hearing aid worn     denise    Hyperlipidemia     Hypertension     Osteoarthritis     Sleep apnea     CPAP    Wears glasses     reading        Past Surgical History:   Procedure Laterality Date    CATARACT EXTRACTION, imaging reviewed as above    11. Post-op Drains/Wires: remove rosa today, was ok with Nephro    12. D/C plan: Pending PT/OT recs and clinical course. ARU accepted when medically ready.     13. Continue post-op care of patient in the ICU    GI prophylaxis: Pepcid  DVT prophylaxis: Arixtra  ________________________________________________________________________    PRATEEK Sung - CNP  12/15/2023  12:06 PM

## 2023-12-15 NOTE — PROGRESS NOTES
Occupational Therapy  Facility/Department: [facility] C2 CARD TELEMETRY  Daily Treatment Note  NAME: Kristopher Watkins  : 1945  MRN: [id number]    Date of Service: 12/15/2023    Discharge Recommendations:  IP Rehab  OT Equipment Recommendations  Equipment Needed: No  Other: Defer to next level of care    Therapy discharge recommendations take into account each patient's current medical complexities and are subject to input/oversight from the patient's healthcare team.     Barriers to Home Discharge:   [] Steps to access home entry or bed/bath:   [x] Unable to transfer, ambulate, or propel wheelchair household distances without assist   [x] Limited available assist at home upon discharge    [x] Patient or family requests d/c to post-acute facility    [] Poor cognition/safety awareness for d/c to home alone    [] Unable to maintain ordered weight bearing status    [] Patient with salient signs of long-standing immobility   [x] Decreased independence with ADLs   [x] Increased risk for falls   [] Other:    If pt is unable to be seen after this session, please let this note serve as discharge summary.  Please see case management note for discharge disposition.  Thank you.    Patient Diagnosis(es): There were no encounter diagnoses.     Assessment    Assessment: Pt tolerated co-tx session fair, requiring mod Ax2 for bed mobility. He required CGA+min A for sit>stand transfer and min+mod A for stand>sit transfer with VC for hand placement. Pt tolerated ambulation to doorway this date with rollator and CGA, however very fatigued after and remained seated in chair after rest break. Pt tolerated x15 BUE AROM ex in chair with good display of strength. Pt is limited by weakness, fatigue, balance, sternal px, endurance, safety awareness, and activity tolerance and will benefit from continued acute OT. Recommend IPR upon d/c to address functional deficits and increase safety and IND prior to return home. Pt displays good motivation

## 2023-12-15 NOTE — PROGRESS NOTES
Shift: [id number]    Procedure: OPEN CORONARY ARTERY BYPASS GRAFTING TIMES FOUR USING INTERNAL MAMMARY ARTERY, SAPHENOUS VEIN GRAFT, ON PUMP, LILY, TEMPORARY PACING WIRES, LEFT ATRIAL APPENDAGE CLIP PLACEMENT WITH STERNAL PLATING AND BILATERAL PECTORALIS BLOCKS    Admit from OR (time and date): 1445 12/5/23    Transition (time and date):        Surgery, return to OR no     Nursing assessment at handoff  stable    Most recent vitals: BP (!) 149/57   Pulse 76   Temp 99.3 °F (37.4 °C) (Oral)   Resp 16   Ht 1.727 m (5' 8\")   Wt 134.8 kg (297 lb 2.9 oz)   SpO2 96%   BMI 45.19 kg/m²      Increased O2 requirements: no O2 requirements: Oxygen Therapy  SpO2: 96 %  Pulse Oximetry Type: Continuous  SPO2 High Alarm Limit: 100  SPO2 Low Alarm Limit POX: 89  Pulse Oximeter Device Mode: Continuous  Pulse Oximeter Device Location: Finger  O2 Device: Nasal cannula  Oximetry Probe Site Changed: No  Skin Assessment: Clean, dry, & intact  Skin Protection for O2 Device: Yes  Orientation: Middle  Location: Nose  Intervention(s): Skin Barrier  FiO2 : 35 %  O2 Flow Rate (L/min): 6 L/min  Blood Gas  Performed?: No  Oxygen Therapy: Supplemental oxygen  Vent Mode: CPAP/PS     Admission weight Weight - Scale: (!) 141.1 kg (311 lb)  Today's weight   Wt Readings from Last 1 Encounters:   12/15/23 134.8 kg (297 lb 2.9 oz)         EF: 55-60%    Drop in Urinary Output no     Rhythm Changes Normal Sinus Rhythm    Pacing Wires Removed:  [x] Yes  [] NO  [] Platelets < 50,000  [] Arrhythmia  [] Bradycardia [] Valve Replacement  [] Pacing for Cardiac Index  []Physician Order    Lines/Drains  LDA Insertion Date Discontinued Date Dressing Changes   Art line      Central Line 12/5/23     Méndez 12/13/23     Chest Tube MS  LP 12/5/23 MS 12/6    Wires A&V 12/5/23 12/6/23    ETT 12/5/23 12/5/23 8275      MING Drain      VasCath      Impella        Interventions After Office Hours  Problem(Brief) Date Time Intervention Physician contacted                      Drip rates at handoff:    amiodarone      sodium chloride      DOBUTamine Stopped (12/14/23 1315)    sodium chloride 5 mL/hr at 12/14/23 1711    sodium chloride      dextrose         Hospital Course:  POD# 12/5/23 Days  -Out of CVOR 1445  -wean and extubate 1745  -20 meq KCL  -2 amps NaBicarb  -Pain- precedex 0.2mcg/kg/min  -UO: 480 ml  -CT MS: 320 LP: 170 ml    POD # 0 Nights   -Titrated levo down  -precedexn off  -1 Unit RBC given per order  - 250 ml albumin hypotension  -A/V wires removed per order  -Calcium chloride 1 Gm     POD#1 Days  - Levo Off  - FlowTrac removed  - M/S CT Removed  - Walked to chair, worked with PT/OT  - Pain control is biggest issue    POD#1 NIGHT   - BIPAP at night, (hospital BIPAP), pt kept de-sating on the home BIPAP  - Hemoglobin dropped to 7.3, CTS called and an order of 1 PRBC started   - NC rate flow increase to 6 after moving to the chair.  - pain controlled with Morphine and Oxy  - Albumin 500 started at 6;30 for correcting BP   - walked to in the room for the scale and chair. POD #8 Nights 12/13  -CRRT net even  - UOP: 305ml  -refusing bipap, 4L NC otherwise. decreased to 3L once up  in chair   -Ca+ replaced x3  -H&H stable     POD 9 Days:  - CRRT stopped  - given okay by CTS to use home cpap machine   - sodium phos replaced     POD 9 night  - pt stable, no chest pain, no discomfort, slept well during night  - home CPAP started at 11 pm, once arrived  - did not tolerated ambulation, standing wt and up to chair by 6 am      Lab Data:  CBC:   Recent Labs     12/13/23  0420 12/13/23  1157 12/13/23  2309 12/14/23  0509   WBC 14.2*  --   --  14.0*   HGB 8.1*   < > 8.2* 8.0*   HCT 24.9*   < > 25.5* 24.4*   MCV 89.5  --   --  90.9     --   --  265    < > = values in this interval not displayed.        BMP:    Recent Labs     12/14/23  1122 12/15/23  0017   * 135*   K 4.2 4.2   CO2 27 23   BUN 21* 31*   CREATININE 0.9 1.2       LIVR:   Recent Labs

## 2023-12-15 NOTE — PROGRESS NOTES
Patient refuses hospital bipap. Patient states family is bringing in home unit. Pulled our unit from room.

## 2023-12-15 NOTE — PROGRESS NOTES
Physical Therapy  Facility/Department: [facility] C2 CARD TELEMETRY  Daily Treatment Note  NAME: Kacie Reilly  : 1945  MRN: [id number]    Date of Service: 12/15/2023    Discharge Recommendations:  IP Rehab, Patient able to tolerate 3hrs of therapy a day   PT Equipment Recommendations  Equipment Needed: No  Other: Defer    Patient Diagnosis(es): There were no encounter diagnoses. Barriers to Home Discharge:   [] Steps to access home entry or bed/bath:   [x] Unable to transfer, ambulate, or propel wheelchair household distances without assist   [x] Limited available assist at home upon discharge    [x] Patient or family requests d/c to post-acute facility    [] Poor cognition/safety awareness for d/c to home alone    [] Unable to maintain ordered weight bearing status    [] Patient with salient signs of long-standing immobility   [] Decreased independence with ADLs   [x] Increased risk for falls   [] Other:    Assessment   Assessment: Pt continues to require mod Ax2 for bed mobility. Able to complete sit<>stand from bed with CGA and from chair 3x with min A. Pt no longer on CRRT and is able to ambulate ~40ft with rollator and CGA. Pt requires frequent stand rest break during ambulation due to fatigue. Pt limited this date by pain and endurance, require increased time throughout session. Pt will continue to benefit from skilled PT services to address current deficits. Continue to rec IPR at DC when deemed medically appropriate. Co-tx collaboration this date to safely meet goals and will have better occupational performance outcomes with in a co-treatment than 1:1 session. Activity Tolerance: Patient limited by endurance  Equipment Needed: No  Other: Defer     Plan    Physical Therapy Plan  General Plan: 5-7 times per week  Current Treatment Recommendations: Strengthening;Balance training;Functional mobility training;Transfer training; Endurance training;Pain management; Neuromuscular re-education;Gait training;Stair training;Safety education & training;Patient/Caregiver education & training;Home exercise program;Therapeutic activities     Restrictions  Restrictions/Precautions  Restrictions/Precautions: Fall Risk, Cardiac  Position Activity Restriction  Sternal Precautions: No Pushing, No Pulling, 5# Lifting Restrictions  Other position/activity restrictions: ICU monitoring, R central line     Subjective    Subjective  Subjective: Pt in bed upon arrival, RN cleared for therapy.  Pain: Reports 7/10 pain in back  Orientation  Overall Orientation Status: Within Functional Limits  Orientation Level: Oriented X4  Cognition  Overall Cognitive Status: WFL     Objective   Vitals  Pulse: 78  BP: (!) 114/58  BP Location: Left upper arm  MAP (Calculated): 77  SpO2: 99 %  O2 Device: Nasal cannula (3L O2)  Comment: After ambulation /70 and SpO2 98% on 3 L  Bed Mobility Training  Bed Mobility Training: Yes  Supine to Sit: Moderate assistance;Assist X2;Additional time;Adaptive equipment  Sit to Supine: Other (comment) (In chair at end of session)  Balance  Sitting: Intact  Standing: Impaired (With rollator)  Standing - Static: Constant support;Good  Standing - Dynamic: Fair;Constant support  Transfer Training  Transfer Training: Yes  Sit to Stand: Contact-guard assistance;Minimum assistance;Additional time (CGA from bed, min A from chair. Completed 1 stand from EOB and 3 from chair)  Stand to Sit: Additional time;Minimum assistance;Moderate assistance (Demos decreased eccentric control when sitting to chair)  Stand Pivot Transfers:  (Demos decreased eccentric control when sitting to chair)  Gait Training  Gait Training: Yes  Gait  Overall Level of Assistance: Contact-guard assistance  Distance (ft): 40 Feet (Frequent standing rest breaks)  Assistive Device: Gait belt;Walker, rollator  Interventions: Verbal cues;Safety awareness training  Base of Support: Widened  Speed/Nhung: Slow;Pace decreased (< 100 feet/min)  Step

## 2023-12-15 NOTE — CARE COORDINATION
Patient has been accepted to ARU when medically ready, will need Rapid Covid collected prior to admit. Dora Morales RN

## 2023-12-16 ENCOUNTER — APPOINTMENT (OUTPATIENT)
Dept: CT IMAGING | Age: 78
DRG: 235 | End: 2023-12-16
Attending: THORACIC SURGERY (CARDIOTHORACIC VASCULAR SURGERY)
Payer: MEDICARE

## 2023-12-16 ENCOUNTER — APPOINTMENT (OUTPATIENT)
Dept: GENERAL RADIOLOGY | Age: 78
DRG: 235 | End: 2023-12-16
Attending: THORACIC SURGERY (CARDIOTHORACIC VASCULAR SURGERY)
Payer: MEDICARE

## 2023-12-16 PROBLEM — Z87.891 EX-HEAVY CIGARETTE SMOKER (20-39 PER DAY): Status: ACTIVE | Noted: 2023-12-16

## 2023-12-16 LAB
ALBUMIN SERPL-MCNC: 3.3 G/DL (ref 3.4–5)
ANION GAP SERPL CALCULATED.3IONS-SCNC: 11 MMOL/L (ref 3–16)
BACTERIA BLD CULT ORG #2: NORMAL
BACTERIA BLD CULT: NORMAL
BUN SERPL-MCNC: 51 MG/DL (ref 7–20)
CA-I BLD-SCNC: 1.11 MMOL/L (ref 1.12–1.32)
CALCIUM SERPL-MCNC: 8.7 MG/DL (ref 8.3–10.6)
CHLORIDE SERPL-SCNC: 101 MMOL/L (ref 99–110)
CO2 SERPL-SCNC: 21 MMOL/L (ref 21–32)
CREAT SERPL-MCNC: 1.1 MG/DL (ref 0.8–1.3)
DEPRECATED RDW RBC AUTO: 16.9 % (ref 12.4–15.4)
GFR SERPLBLD CREATININE-BSD FMLA CKD-EPI: >60 ML/MIN/{1.73_M2}
GLUCOSE BLD-MCNC: 113 MG/DL (ref 70–99)
GLUCOSE BLD-MCNC: 137 MG/DL (ref 70–99)
GLUCOSE BLD-MCNC: 87 MG/DL (ref 70–99)
GLUCOSE BLD-MCNC: 98 MG/DL (ref 70–99)
GLUCOSE SERPL-MCNC: 123 MG/DL (ref 70–99)
HCT VFR BLD AUTO: 26.5 % (ref 40.5–52.5)
HGB BLD-MCNC: 8.5 G/DL (ref 13.5–17.5)
MCH RBC QN AUTO: 29.3 PG (ref 26–34)
MCHC RBC AUTO-ENTMCNC: 32.2 G/DL (ref 31–36)
MCV RBC AUTO: 90.9 FL (ref 80–100)
PERFORMED ON: ABNORMAL
PERFORMED ON: ABNORMAL
PERFORMED ON: NORMAL
PERFORMED ON: NORMAL
PH BLDV: 7.42 [PH] (ref 7.35–7.45)
PHOSPHATE SERPL-MCNC: 3.1 MG/DL (ref 2.5–4.9)
PLATELET # BLD AUTO: 342 K/UL (ref 135–450)
PMV BLD AUTO: 7.3 FL (ref 5–10.5)
POTASSIUM SERPL-SCNC: 4.1 MMOL/L (ref 3.5–5.1)
PROCALCITONIN SERPL IA-MCNC: 0.16 NG/ML (ref 0–0.15)
RBC # BLD AUTO: 2.91 M/UL (ref 4.2–5.9)
SODIUM SERPL-SCNC: 133 MMOL/L (ref 136–145)
WBC # BLD AUTO: 18.4 K/UL (ref 4–11)

## 2023-12-16 PROCEDURE — 94669 MECHANICAL CHEST WALL OSCILL: CPT

## 2023-12-16 PROCEDURE — 6360000004 HC RX CONTRAST MEDICATION: Performed by: INTERNAL MEDICINE

## 2023-12-16 PROCEDURE — 36415 COLL VENOUS BLD VENIPUNCTURE: CPT

## 2023-12-16 PROCEDURE — 2580000003 HC RX 258

## 2023-12-16 PROCEDURE — 87641 MR-STAPH DNA AMP PROBE: CPT

## 2023-12-16 PROCEDURE — 6370000000 HC RX 637 (ALT 250 FOR IP)

## 2023-12-16 PROCEDURE — 6370000000 HC RX 637 (ALT 250 FOR IP): Performed by: THORACIC SURGERY (CARDIOTHORACIC VASCULAR SURGERY)

## 2023-12-16 PROCEDURE — 2140000000 HC CCU INTERMEDIATE R&B

## 2023-12-16 PROCEDURE — 6360000002 HC RX W HCPCS

## 2023-12-16 PROCEDURE — 71045 X-RAY EXAM CHEST 1 VIEW: CPT

## 2023-12-16 PROCEDURE — 6360000002 HC RX W HCPCS: Performed by: INTERNAL MEDICINE

## 2023-12-16 PROCEDURE — 99024 POSTOP FOLLOW-UP VISIT: CPT

## 2023-12-16 PROCEDURE — 71260 CT THORAX DX C+: CPT

## 2023-12-16 PROCEDURE — 82330 ASSAY OF CALCIUM: CPT

## 2023-12-16 PROCEDURE — 94761 N-INVAS EAR/PLS OXIMETRY MLT: CPT

## 2023-12-16 PROCEDURE — 85027 COMPLETE CBC AUTOMATED: CPT

## 2023-12-16 PROCEDURE — 2580000003 HC RX 258: Performed by: RADIOLOGY

## 2023-12-16 PROCEDURE — 2580000003 HC RX 258: Performed by: INTERNAL MEDICINE

## 2023-12-16 PROCEDURE — 84145 PROCALCITONIN (PCT): CPT

## 2023-12-16 PROCEDURE — 6360000002 HC RX W HCPCS: Performed by: RADIOLOGY

## 2023-12-16 PROCEDURE — 99223 1ST HOSP IP/OBS HIGH 75: CPT | Performed by: INTERNAL MEDICINE

## 2023-12-16 PROCEDURE — 80069 RENAL FUNCTION PANEL: CPT

## 2023-12-16 PROCEDURE — 6370000000 HC RX 637 (ALT 250 FOR IP): Performed by: NURSE PRACTITIONER

## 2023-12-16 PROCEDURE — 2700000000 HC OXYGEN THERAPY PER DAY

## 2023-12-16 PROCEDURE — 2580000003 HC RX 258: Performed by: THORACIC SURGERY (CARDIOTHORACIC VASCULAR SURGERY)

## 2023-12-16 PROCEDURE — 94640 AIRWAY INHALATION TREATMENT: CPT

## 2023-12-16 RX ORDER — ACETAMINOPHEN 500 MG
1000 TABLET ORAL EVERY 6 HOURS
Status: DISCONTINUED | OUTPATIENT
Start: 2023-12-16 | End: 2023-12-19 | Stop reason: HOSPADM

## 2023-12-16 RX ORDER — FUROSEMIDE 20 MG/1
20 TABLET ORAL 2 TIMES DAILY
Status: DISCONTINUED | OUTPATIENT
Start: 2023-12-16 | End: 2023-12-19 | Stop reason: HOSPADM

## 2023-12-16 RX ORDER — AMIODARONE HYDROCHLORIDE 200 MG/1
400 TABLET ORAL 3 TIMES DAILY
Status: DISCONTINUED | OUTPATIENT
Start: 2023-12-16 | End: 2023-12-19

## 2023-12-16 RX ORDER — LACTULOSE 10 G/15ML
20 SOLUTION ORAL 3 TIMES DAILY
Status: DISCONTINUED | OUTPATIENT
Start: 2023-12-16 | End: 2023-12-16 | Stop reason: ALTCHOICE

## 2023-12-16 RX ORDER — METOPROLOL TARTRATE 50 MG/1
50 TABLET, FILM COATED ORAL 2 TIMES DAILY
Status: DISCONTINUED | OUTPATIENT
Start: 2023-12-16 | End: 2023-12-19 | Stop reason: HOSPADM

## 2023-12-16 RX ORDER — LINEZOLID 2 MG/ML
600 INJECTION, SOLUTION INTRAVENOUS EVERY 12 HOURS
Status: DISCONTINUED | OUTPATIENT
Start: 2023-12-16 | End: 2023-12-18

## 2023-12-16 RX ADMIN — MORPHINE SULFATE 4 MG: 4 INJECTION, SOLUTION INTRAMUSCULAR; INTRAVENOUS at 20:34

## 2023-12-16 RX ADMIN — METOPROLOL TARTRATE 25 MG: 25 TABLET, FILM COATED ORAL at 08:15

## 2023-12-16 RX ADMIN — LINEZOLID 600 MG: 600 INJECTION, SOLUTION INTRAVENOUS at 14:16

## 2023-12-16 RX ADMIN — AMIODARONE HYDROCHLORIDE 1 MG/MIN: 50 INJECTION, SOLUTION INTRAVENOUS at 02:34

## 2023-12-16 RX ADMIN — HEPARIN SODIUM 5000 UNITS: 5000 INJECTION INTRAVENOUS; SUBCUTANEOUS at 06:59

## 2023-12-16 RX ADMIN — SODIUM CHLORIDE 5 ML/HR: 9 INJECTION, SOLUTION INTRAVENOUS at 17:40

## 2023-12-16 RX ADMIN — CHLORHEXIDINE GLUCONATE 15 ML: 1.2 RINSE ORAL at 08:19

## 2023-12-16 RX ADMIN — ACETAMINOPHEN 650 MG: 325 TABLET ORAL at 08:08

## 2023-12-16 RX ADMIN — POLYETHYLENE GLYCOL 3350 17 G: 17 POWDER, FOR SOLUTION ORAL at 08:10

## 2023-12-16 RX ADMIN — ALBUTEROL SULFATE 2.5 MG: 2.5 SOLUTION RESPIRATORY (INHALATION) at 15:37

## 2023-12-16 RX ADMIN — SODIUM CHLORIDE, PRESERVATIVE FREE 10 ML: 5 INJECTION INTRAVENOUS at 20:39

## 2023-12-16 RX ADMIN — IOPAMIDOL 75 ML: 755 INJECTION, SOLUTION INTRAVENOUS at 17:19

## 2023-12-16 RX ADMIN — METOPROLOL TARTRATE 50 MG: 50 TABLET, FILM COATED ORAL at 20:35

## 2023-12-16 RX ADMIN — FUROSEMIDE 20 MG: 20 TABLET ORAL at 17:44

## 2023-12-16 RX ADMIN — FUROSEMIDE 40 MG: 40 TABLET ORAL at 11:35

## 2023-12-16 RX ADMIN — METHOCARBAMOL 1000 MG: 500 TABLET ORAL at 20:35

## 2023-12-16 RX ADMIN — ALBUTEROL SULFATE 2.5 MG: 2.5 SOLUTION RESPIRATORY (INHALATION) at 20:03

## 2023-12-16 RX ADMIN — METOPROLOL TARTRATE 25 MG: 25 TABLET, FILM COATED ORAL at 11:33

## 2023-12-16 RX ADMIN — ALBUTEROL SULFATE 2.5 MG: 2.5 SOLUTION RESPIRATORY (INHALATION) at 12:12

## 2023-12-16 RX ADMIN — SODIUM CHLORIDE, PRESERVATIVE FREE 10 ML: 5 INJECTION INTRAVENOUS at 08:19

## 2023-12-16 RX ADMIN — MEROPENEM 1000 MG: 1 INJECTION, POWDER, FOR SOLUTION INTRAVENOUS at 04:17

## 2023-12-16 RX ADMIN — OXYCODONE 5 MG: 5 TABLET ORAL at 08:08

## 2023-12-16 RX ADMIN — CLOPIDOGREL BISULFATE 75 MG: 75 TABLET ORAL at 08:15

## 2023-12-16 RX ADMIN — BISACODYL 5 MG: 5 TABLET, COATED ORAL at 08:15

## 2023-12-16 RX ADMIN — FAMOTIDINE 20 MG: 20 TABLET ORAL at 08:15

## 2023-12-16 RX ADMIN — MEROPENEM 1000 MG: 1 INJECTION, POWDER, FOR SOLUTION INTRAVENOUS at 17:40

## 2023-12-16 RX ADMIN — ATORVASTATIN CALCIUM 40 MG: 40 TABLET, FILM COATED ORAL at 20:35

## 2023-12-16 RX ADMIN — ASPIRIN 81 MG: 81 TABLET, COATED ORAL at 08:15

## 2023-12-16 RX ADMIN — AMIODARONE HYDROCHLORIDE 400 MG: 200 TABLET ORAL at 14:19

## 2023-12-16 RX ADMIN — ACETAMINOPHEN 1000 MG: 500 TABLET ORAL at 20:35

## 2023-12-16 RX ADMIN — METHOCARBAMOL 1000 MG: 500 TABLET ORAL at 08:09

## 2023-12-16 RX ADMIN — AMIODARONE HYDROCHLORIDE 150 MG: 50 INJECTION, SOLUTION INTRAVENOUS at 02:10

## 2023-12-16 RX ADMIN — ACETAMINOPHEN 1000 MG: 500 TABLET ORAL at 14:19

## 2023-12-16 RX ADMIN — AMIODARONE HYDROCHLORIDE 400 MG: 200 TABLET ORAL at 20:35

## 2023-12-16 RX ADMIN — AMIODARONE HYDROCHLORIDE 200 MG: 200 TABLET ORAL at 08:15

## 2023-12-16 RX ADMIN — HEPARIN SODIUM 5000 UNITS: 5000 INJECTION INTRAVENOUS; SUBCUTANEOUS at 22:25

## 2023-12-16 RX ADMIN — OXYCODONE 5 MG: 5 TABLET ORAL at 17:41

## 2023-12-16 RX ADMIN — HEPARIN SODIUM 5000 UNITS: 5000 INJECTION INTRAVENOUS; SUBCUTANEOUS at 14:20

## 2023-12-16 RX ADMIN — METHOCARBAMOL 1000 MG: 500 TABLET ORAL at 14:19

## 2023-12-16 RX ADMIN — LACTULOSE 20 G: 20 SOLUTION ORAL at 11:37

## 2023-12-16 ASSESSMENT — PAIN DESCRIPTION - DESCRIPTORS
DESCRIPTORS: ACHING

## 2023-12-16 ASSESSMENT — PAIN SCALES - GENERAL
PAINLEVEL_OUTOF10: 7
PAINLEVEL_OUTOF10: 5
PAINLEVEL_OUTOF10: 3
PAINLEVEL_OUTOF10: 6
PAINLEVEL_OUTOF10: 5
PAINLEVEL_OUTOF10: 5
PAINLEVEL_OUTOF10: 8
PAINLEVEL_OUTOF10: 5
PAINLEVEL_OUTOF10: 3

## 2023-12-16 ASSESSMENT — PAIN DESCRIPTION - ORIENTATION
ORIENTATION: MID

## 2023-12-16 ASSESSMENT — PAIN DESCRIPTION - LOCATION
LOCATION: BACK;CHEST
LOCATION: BACK
LOCATION: BACK
LOCATION: ABDOMEN;CHEST;BACK
LOCATION: GENERALIZED;CHEST;BACK

## 2023-12-16 ASSESSMENT — PAIN - FUNCTIONAL ASSESSMENT
PAIN_FUNCTIONAL_ASSESSMENT: PREVENTS OR INTERFERES SOME ACTIVE ACTIVITIES AND ADLS

## 2023-12-16 ASSESSMENT — PAIN DESCRIPTION - PAIN TYPE
TYPE: CHRONIC PAIN
TYPE: SURGICAL PAIN

## 2023-12-16 ASSESSMENT — PAIN DESCRIPTION - ONSET: ONSET: ON-GOING

## 2023-12-16 ASSESSMENT — PAIN DESCRIPTION - FREQUENCY
FREQUENCY: INTERMITTENT
FREQUENCY: INTERMITTENT

## 2023-12-16 NOTE — CONSULTS
Consult Placed to Infectious Disease through [facility]    Who: Dr. Jose Camp  Date: 12/16/2023   Time: 9:29 AM      Electronically signed by Jt Mosquera on 12/16/2023 at 9:28 AM

## 2023-12-16 NOTE — PLAN OF CARE
Problem: Chronic Conditions and Co-morbidities  Goal: Patient's chronic conditions and co-morbidity symptoms are monitored and maintained or improved  Outcome: Progressing     Problem: Discharge Planning  Goal: Discharge to home or other facility with appropriate resources  Outcome: Progressing     Problem: Pain  Goal: Verbalizes/displays adequate comfort level or baseline comfort level  Outcome: Progressing     Problem: ABCDS Injury Assessment  Goal: Absence of physical injury  Outcome: Progressing     Problem: Safety - Adult  Goal: Free from fall injury  Outcome: Progressing     Problem: Neurosensory - Adult  Goal: Achieves stable or improved neurological status  Outcome: Progressing  Goal: Remains free of injury related to seizures activity  Outcome: Progressing     Problem: Respiratory - Adult  Goal: Achieves optimal ventilation and oxygenation  Outcome: Progressing     Problem: Skin/Tissue Integrity - Adult  Goal: Incisions, wounds, or drain sites healing without S/S of infection  Outcome: Progressing  Flowsheets (Taken 12/15/2023 1924)  Incisions, Wounds, or Drain Sites Healing Without Sign and Symptoms of Infection:   ADMISSION and DAILY: Assess and document risk factors for pressure ulcer development   TWICE DAILY: Assess and document dressing/incision, wound bed, drain sites and surrounding tissue  Goal: Oral mucous membranes remain intact  Outcome: Progressing  Flowsheets (Taken 12/15/2023 1924)  Oral Mucous Membranes Remain Intact: Assess oral mucosa and hygiene practices     Problem: Musculoskeletal - Adult  Goal: Return mobility to safest level of function  Outcome: Progressing

## 2023-12-16 NOTE — CONSULTS
Infectious Diseases   Consult Note        Admission Date: 12/5/2023  Hospital Day: Hospital Day: 12   Attending: Nhung Singh MD  Date of service: 12/16/23     Reason for admission: Coronary artery disease, unspecified vessel or lesion type, unspecified whether angina present, unspecified whether native or transplanted heart [I25.10]  Coronary artery disease due to calcified coronary lesion [I25.10, I25.84]  CAD in native artery [I25.10]    Chief complaint/ Reason for consult: Worsening leukocytosis      Microbiology:        I have reviewed allavailable micro lab data and cultures    Blood culture (2/2) - collected on 12/20/2023: Negative  Urine culture  - collected on 12/12/2023: Negative      Antibiotics and immunizations:       Current antibiotics: All antibiotics and their doses were reviewed by me    Recent Abx Admin                     meropenem (MERREM) 1,000 mg in sodium chloride 0.9 % 100 mL IVPB (mini-bag) (mg) 1,000 mg New Bag 12/16/23 0417     1,000 mg New Bag 12/15/23 1610                      Immunization History: All immunization history was reviewed by me today. Immunization History   Administered Date(s) Administered    COVID-19, PFIZER PURPLE top, DILUTE for use, (age 15 y+), 30mcg/0.3mL 03/05/2021, 03/26/2021       Known drug allergies: All allergies were reviewed and updated    Allergies   Allergen Reactions    Lasix [Furosemide]      Calf pain    Neosporin [Neomycin-Polymyx-Gramicid]      rash    Polysporin [Bacitracin-Polymyxin B]      rash       Social history:     Social History:  All social andepidemiologic history was reviewed and updated by me today as needed. Tobacco use:   reports that he quit smoking about 45 years ago. His smoking use included cigarettes. He started smoking about 60 years ago. He has a 45.0 pack-year smoking history.  He has never used smokeless tobacco.  Alcohol use:   reports current alcohol use of about 2.0 standard drinks of alcohol per CHEST PORTABLE   Preliminary Result   Stable examination status post recent open heart surgery with findings of CHF   bilateral effusions.  No pneumothorax.  Stable central line.         XR CHEST PORTABLE   Final Result   Persistent pulmonary vascular congestion, small pleural effusions, and   bibasilar consolidation, similar to prior.         XR CHEST PORTABLE   Final Result   No significant interval change.         XR CHEST PORTABLE   Final Result   1.  Left-sided IJ catheter with the tip in the superior vena cava.  No   pneumothorax.      2.  Small left pleural effusion with left basilar airspace disease         XR CHEST PORTABLE   Final Result   Stable exam with small bilateral pleural effusions with bibasilar airspace   disease         XR CHEST PORTABLE   Final Result   Removal of right IJ sheath      Stable to minimal improvement in bibasilar opacities/small bilateral pleural   effusions.         XR CHEST PORTABLE   Final Result   Unchanged bibasilar opacities suggesting bilateral pleural effusions and   bibasilar subsegmental atelectasis      Stable cardiomegaly         XR CHEST PORTABLE   Final Result   Bilateral pleuroparenchymal disease, decreased on the right         XR CHEST PORTABLE   Final Result   Bilateral pleuroparenchymal disease, slightly increased on the left         XR CHEST PORTABLE   Final Result   Worsening asymmetric airspace changes in the right lower lung zone with   associated pleural effusion.         XR CHEST PORTABLE   Final Result   Improved aeration of the lungs      Stable tiny left apical pneumothorax         XR CHEST PORTABLE   Final Result   Increased pleural-parenchymal disease on the left.         XR CHEST PORTABLE   Final Result   1. Mild pulmonary vascular congestion.         XR CHEST PORTABLE    (Results Pending)       Outside records:    Labs, Microbiology, Radiology and pertinent results from Care everywhere, if available, were reviewed as a part ofthe

## 2023-12-16 NOTE — PROGRESS NOTES
The pt developed A-Fib three times each time it lasted for about 5 to 1 minutes. At 2 am CTS paged and Dr. Ashley Quiros updated. An order to start Amiodarone protocol with the bolus and maintenance is obtained and carried on. A downtime form is completed and sent to pharmacy at 2 am, medication bags received, bolus has been infused and maintenance 1 mg is started right after.  Electronically signed by Srinivas Means RN on 12/16/23 at 4:01 AM EST

## 2023-12-16 NOTE — PROGRESS NOTES
Since after removal of the rosa catheter at 6;30 pm, the pt had voided one time (200) at 12;30 bladder scan revealed 300 ml in the bladder. CTS paged and updated, Dr. Marquita Girard recommended keep observing. Mid night labs results were updated as well, no replacements is needed. Electronically signed by Birdie Hinton RN on 12/16/23 at 12:42 AM EST.

## 2023-12-16 NOTE — PROGRESS NOTES
CVTS Cardiothoracic Progress Note:                              CC:  Post op follow up      Surgery:12/5  OPEN CORONARY ARTERY BYPASS GRAFTING TIMES FOUR USING INTERNAL MAMMARY ARTERY, SAPHENOUS VEIN GRAFT, ON PUMP, LILY, TEMPORARY PACING WIRES, LEFT ATRIAL APPENDAGE CLIP PLACEMENT WITH STERNAL PLATING AND BILATERAL [facility] course:  12/5 DOS- Extubated @ [id number]. Pain difficult to control. High Levo requirement. 12/6 POD 1- Hgb 7 this AM. 1 unit PRBC given. Able to wean off Levo. Scheduled Gabapentin and Robaxin ordered for pain. TPW removed. Plan to remove MS chest tube     12/7 POD2- OOB to chair this AM. Cr up to 1.7. D/C Gabapentin. Switched Arixtra to Heparin. Hypotensive on standing this AM. Albumin given. Started Lasix gtt. 12.8 POD3- Still with difficult pain control. On typical post op pain control. Unable to give Gabapentin and Toradol 2/2 Cr. Hgb low this morning. 1 unit PRBC ordered by oncall. Cr 1.9 this AM. Consult Nephro. 12/11 POD 6 - Cr 1.6 this AM. Lasix gtt decreased to 5 mg/hr . MS incision oozing. Added Augmentin PO x 5 days. Prevena to MS incision. 12/12 Cr up to 1.8, lasix back up to 10 mg/hr per Nephro. More lethargic today,  requiring bipap. ABG alkalotic. Discussed HD with nephro. Dr. Aparna Chaidez will place the vas cath. 12/13 started pt on CRRT for assistance with clearance. 12/14 Discussed with Dr. Davian Painting, will transition pt to iHD now that his blood pressure is improving. 12/15 sitting up in chair in nad. Will make sure it's ok with Nephro prior to removing rosa. 12/16- OOB to chair. Weight trending up. Resumed PO Lasix.       Past Medical History:   Diagnosis Date    Arthritis     CAD (coronary artery disease)     Cancer ([address])     nose    GERD (gastroesophageal reflux disease)     Hearing aid worn     denise    Hyperlipidemia     Hypertension     Osteoarthritis     Sleep apnea     CPAP    Wears glasses     reading        Past Surgical

## 2023-12-17 ENCOUNTER — APPOINTMENT (OUTPATIENT)
Dept: GENERAL RADIOLOGY | Age: 78
DRG: 235 | End: 2023-12-17
Attending: THORACIC SURGERY (CARDIOTHORACIC VASCULAR SURGERY)
Payer: MEDICARE

## 2023-12-17 LAB
ALBUMIN SERPL-MCNC: 3.3 G/DL (ref 3.4–5)
ANION GAP SERPL CALCULATED.3IONS-SCNC: 12 MMOL/L (ref 3–16)
BLOOD BANK DISPENSE STATUS: NORMAL
BLOOD BANK PRODUCT CODE: NORMAL
BPU ID: NORMAL
BUN SERPL-MCNC: 54 MG/DL (ref 7–20)
CALCIUM SERPL-MCNC: 8.6 MG/DL (ref 8.3–10.6)
CHLORIDE SERPL-SCNC: 101 MMOL/L (ref 99–110)
CO2 SERPL-SCNC: 21 MMOL/L (ref 21–32)
CREAT SERPL-MCNC: 1.1 MG/DL (ref 0.8–1.3)
DEPRECATED RDW RBC AUTO: 16.4 % (ref 12.4–15.4)
DESCRIPTION BLOOD BANK: NORMAL
GFR SERPLBLD CREATININE-BSD FMLA CKD-EPI: >60 ML/MIN/{1.73_M2}
GLUCOSE BLD-MCNC: 104 MG/DL (ref 70–99)
GLUCOSE BLD-MCNC: 114 MG/DL (ref 70–99)
GLUCOSE BLD-MCNC: 117 MG/DL (ref 70–99)
GLUCOSE SERPL-MCNC: 107 MG/DL (ref 70–99)
HCT VFR BLD AUTO: 27.3 % (ref 40.5–52.5)
HGB BLD-MCNC: 8.9 G/DL (ref 13.5–17.5)
MCH RBC QN AUTO: 29.2 PG (ref 26–34)
MCHC RBC AUTO-ENTMCNC: 32.5 G/DL (ref 31–36)
MCV RBC AUTO: 90 FL (ref 80–100)
MRSA DNA SPEC QL NAA+PROBE: NORMAL
PERFORMED ON: ABNORMAL
PHOSPHATE SERPL-MCNC: 3.5 MG/DL (ref 2.5–4.9)
PLATELET # BLD AUTO: 402 K/UL (ref 135–450)
PMV BLD AUTO: 6.7 FL (ref 5–10.5)
POTASSIUM SERPL-SCNC: 3.9 MMOL/L (ref 3.5–5.1)
RBC # BLD AUTO: 3.04 M/UL (ref 4.2–5.9)
SODIUM SERPL-SCNC: 134 MMOL/L (ref 136–145)
WBC # BLD AUTO: 18.4 K/UL (ref 4–11)

## 2023-12-17 PROCEDURE — 6370000000 HC RX 637 (ALT 250 FOR IP)

## 2023-12-17 PROCEDURE — 2140000000 HC CCU INTERMEDIATE R&B

## 2023-12-17 PROCEDURE — 2700000000 HC OXYGEN THERAPY PER DAY

## 2023-12-17 PROCEDURE — 2580000003 HC RX 258: Performed by: INTERNAL MEDICINE

## 2023-12-17 PROCEDURE — 6360000002 HC RX W HCPCS

## 2023-12-17 PROCEDURE — 6360000002 HC RX W HCPCS: Performed by: INTERNAL MEDICINE

## 2023-12-17 PROCEDURE — 36592 COLLECT BLOOD FROM PICC: CPT

## 2023-12-17 PROCEDURE — 94660 CPAP INITIATION&MGMT: CPT

## 2023-12-17 PROCEDURE — 99024 POSTOP FOLLOW-UP VISIT: CPT

## 2023-12-17 PROCEDURE — 94761 N-INVAS EAR/PLS OXIMETRY MLT: CPT

## 2023-12-17 PROCEDURE — 85027 COMPLETE CBC AUTOMATED: CPT

## 2023-12-17 PROCEDURE — 94640 AIRWAY INHALATION TREATMENT: CPT

## 2023-12-17 PROCEDURE — 71045 X-RAY EXAM CHEST 1 VIEW: CPT

## 2023-12-17 PROCEDURE — 80069 RENAL FUNCTION PANEL: CPT

## 2023-12-17 PROCEDURE — 2580000003 HC RX 258

## 2023-12-17 PROCEDURE — 6370000000 HC RX 637 (ALT 250 FOR IP): Performed by: THORACIC SURGERY (CARDIOTHORACIC VASCULAR SURGERY)

## 2023-12-17 PROCEDURE — 94669 MECHANICAL CHEST WALL OSCILL: CPT

## 2023-12-17 RX ORDER — ALBUTEROL SULFATE 2.5 MG/3ML
2.5 SOLUTION RESPIRATORY (INHALATION) EVERY 6 HOURS PRN
Status: DISCONTINUED | OUTPATIENT
Start: 2023-12-17 | End: 2023-12-19 | Stop reason: HOSPADM

## 2023-12-17 RX ADMIN — ALBUTEROL SULFATE 2.5 MG: 2.5 SOLUTION RESPIRATORY (INHALATION) at 11:53

## 2023-12-17 RX ADMIN — AMIODARONE HYDROCHLORIDE 400 MG: 200 TABLET ORAL at 21:03

## 2023-12-17 RX ADMIN — OXYCODONE 5 MG: 5 TABLET ORAL at 12:24

## 2023-12-17 RX ADMIN — OXYCODONE 5 MG: 5 TABLET ORAL at 16:07

## 2023-12-17 RX ADMIN — METHOCARBAMOL 1000 MG: 500 TABLET ORAL at 21:04

## 2023-12-17 RX ADMIN — LINEZOLID 600 MG: 600 INJECTION, SOLUTION INTRAVENOUS at 12:19

## 2023-12-17 RX ADMIN — OXYCODONE 5 MG: 5 TABLET ORAL at 21:03

## 2023-12-17 RX ADMIN — MEROPENEM 1000 MG: 1 INJECTION, POWDER, FOR SOLUTION INTRAVENOUS at 16:19

## 2023-12-17 RX ADMIN — HEPARIN SODIUM 5000 UNITS: 5000 INJECTION INTRAVENOUS; SUBCUTANEOUS at 12:28

## 2023-12-17 RX ADMIN — FAMOTIDINE 20 MG: 20 TABLET ORAL at 07:52

## 2023-12-17 RX ADMIN — AMIODARONE HYDROCHLORIDE 400 MG: 200 TABLET ORAL at 07:52

## 2023-12-17 RX ADMIN — CHLORHEXIDINE GLUCONATE 15 ML: 1.2 RINSE ORAL at 21:04

## 2023-12-17 RX ADMIN — METHOCARBAMOL 1000 MG: 500 TABLET ORAL at 12:24

## 2023-12-17 RX ADMIN — FUROSEMIDE 20 MG: 20 TABLET ORAL at 07:53

## 2023-12-17 RX ADMIN — ALBUTEROL SULFATE 2.5 MG: 2.5 SOLUTION RESPIRATORY (INHALATION) at 19:42

## 2023-12-17 RX ADMIN — HEPARIN SODIUM 5000 UNITS: 5000 INJECTION INTRAVENOUS; SUBCUTANEOUS at 05:47

## 2023-12-17 RX ADMIN — ALBUTEROL SULFATE 2.5 MG: 2.5 SOLUTION RESPIRATORY (INHALATION) at 15:22

## 2023-12-17 RX ADMIN — MORPHINE SULFATE 2 MG: 2 INJECTION, SOLUTION INTRAMUSCULAR; INTRAVENOUS at 01:42

## 2023-12-17 RX ADMIN — INSULIN GLARGINE 20 UNITS: 100 INJECTION, SOLUTION SUBCUTANEOUS at 21:03

## 2023-12-17 RX ADMIN — MEROPENEM 1000 MG: 1 INJECTION, POWDER, FOR SOLUTION INTRAVENOUS at 03:54

## 2023-12-17 RX ADMIN — SODIUM CHLORIDE, PRESERVATIVE FREE 10 ML: 5 INJECTION INTRAVENOUS at 07:57

## 2023-12-17 RX ADMIN — METOPROLOL TARTRATE 50 MG: 50 TABLET, FILM COATED ORAL at 21:03

## 2023-12-17 RX ADMIN — FUROSEMIDE 20 MG: 20 TABLET ORAL at 16:09

## 2023-12-17 RX ADMIN — HEPARIN SODIUM 5000 UNITS: 5000 INJECTION INTRAVENOUS; SUBCUTANEOUS at 21:03

## 2023-12-17 RX ADMIN — ACETAMINOPHEN 1000 MG: 500 TABLET ORAL at 16:07

## 2023-12-17 RX ADMIN — AMIODARONE HYDROCHLORIDE 400 MG: 200 TABLET ORAL at 12:24

## 2023-12-17 RX ADMIN — BISACODYL 5 MG: 5 TABLET, COATED ORAL at 07:53

## 2023-12-17 RX ADMIN — ACETAMINOPHEN 1000 MG: 500 TABLET ORAL at 21:03

## 2023-12-17 RX ADMIN — ACETAMINOPHEN 1000 MG: 500 TABLET ORAL at 03:51

## 2023-12-17 RX ADMIN — METOPROLOL TARTRATE 50 MG: 50 TABLET, FILM COATED ORAL at 07:52

## 2023-12-17 RX ADMIN — ALBUTEROL SULFATE 2.5 MG: 2.5 SOLUTION RESPIRATORY (INHALATION) at 07:51

## 2023-12-17 RX ADMIN — CLOPIDOGREL BISULFATE 75 MG: 75 TABLET ORAL at 07:52

## 2023-12-17 RX ADMIN — POLYETHYLENE GLYCOL 3350 17 G: 17 POWDER, FOR SOLUTION ORAL at 07:53

## 2023-12-17 RX ADMIN — METHOCARBAMOL 1000 MG: 500 TABLET ORAL at 07:52

## 2023-12-17 RX ADMIN — OXYCODONE 5 MG: 5 TABLET ORAL at 08:13

## 2023-12-17 RX ADMIN — ATORVASTATIN CALCIUM 40 MG: 40 TABLET, FILM COATED ORAL at 21:03

## 2023-12-17 RX ADMIN — LINEZOLID 600 MG: 600 INJECTION, SOLUTION INTRAVENOUS at 01:35

## 2023-12-17 RX ADMIN — ASPIRIN 81 MG: 81 TABLET, COATED ORAL at 07:52

## 2023-12-17 ASSESSMENT — PAIN SCALES - GENERAL
PAINLEVEL_OUTOF10: 7
PAINLEVEL_OUTOF10: 6
PAINLEVEL_OUTOF10: 6
PAINLEVEL_OUTOF10: 4
PAINLEVEL_OUTOF10: 5
PAINLEVEL_OUTOF10: 5

## 2023-12-17 ASSESSMENT — PAIN DESCRIPTION - DESCRIPTORS
DESCRIPTORS: ACHING
DESCRIPTORS: ACHING;DISCOMFORT;NAGGING
DESCRIPTORS: ACHING

## 2023-12-17 ASSESSMENT — PAIN DESCRIPTION - LOCATION
LOCATION: ABDOMEN;CHEST;BACK
LOCATION: INCISION
LOCATION: INCISION
LOCATION: ABDOMEN;CHEST;BACK
LOCATION: CHEST;INCISION

## 2023-12-17 ASSESSMENT — PAIN SCALES - WONG BAKER: WONGBAKER_NUMERICALRESPONSE: 0

## 2023-12-17 ASSESSMENT — PAIN DESCRIPTION - ORIENTATION
ORIENTATION: MID

## 2023-12-17 ASSESSMENT — PAIN - FUNCTIONAL ASSESSMENT: PAIN_FUNCTIONAL_ASSESSMENT: PREVENTS OR INTERFERES SOME ACTIVE ACTIVITIES AND ADLS

## 2023-12-17 ASSESSMENT — PAIN DESCRIPTION - PAIN TYPE: TYPE: SURGICAL PAIN

## 2023-12-17 ASSESSMENT — PAIN DESCRIPTION - ONSET: ONSET: ON-GOING

## 2023-12-17 ASSESSMENT — PAIN DESCRIPTION - FREQUENCY: FREQUENCY: INTERMITTENT

## 2023-12-17 NOTE — PLAN OF CARE
Problem: Discharge Planning  Goal: Discharge to home or other facility with appropriate resources  Outcome: Progressing  Flowsheets (Taken 12/16/2023 0800)  Discharge to home or other facility with appropriate resources: Identify barriers to discharge with patient and caregiver     Problem: Respiratory - Adult  Goal: Achieves optimal ventilation and oxygenation  Outcome: Progressing   Weaned to RA    Problem: Cardiovascular - Adult  Goal: Maintains optimal cardiac output and hemodynamic stability  Outcome: Progressing  Flowsheets (Taken 12/16/2023 0800)  Maintains optimal cardiac output and hemodynamic stability:   Monitor blood pressure and heart rate   Monitor urine output and notify Licensed Independent Practitioner for values outside of normal range  Goal: Absence of cardiac dysrhythmias or at baseline  Outcome: Progressing  Flowsheets (Taken 12/16/2023 0800)  Absence of cardiac dysrhythmias or at baseline:   Monitor cardiac rate and rhythm   Administer antiarrhythmia medication and electrolyte replacement as ordered   Assess for signs of decreased cardiac output     Problem: Chronic Conditions and Co-morbidities  Goal: Patient's chronic conditions and co-morbidity symptoms are monitored and maintained or improved  Recent Flowsheet Documentation  Taken 12/16/2023 0800 by Linn Macias RN  Care Plan - Patient's Chronic Conditions and Co-Morbidity Symptoms are Monitored and Maintained or Improved:   Monitor and assess patient's chronic conditions and comorbid symptoms for stability, deterioration, or improvement   Collaborate with multidisciplinary team to address chronic and comorbid conditions and prevent exacerbation or deterioration     Problem: Neurosensory - Adult  Goal: Achieves stable or improved neurological status  Recent Flowsheet Documentation  Taken 12/16/2023 0800 by Linn Macias RN  Achieves stable or improved neurological status: Assess for and report changes in neurological status    Problem: Skin/Tissue Integrity - Adult  Goal: Skin integrity remains intact  Recent Flowsheet Documentation  Taken 12/16/2023 0800 by Dick Rainey RN  Skin Integrity Remains Intact: Monitor for areas of redness and/or skin breakdown     Problem: Musculoskeletal - Adult  Goal: Return mobility to safest level of function  Recent Flowsheet Documentation  Taken 12/16/2023 0800 by Dick Rainey RN  Return Mobility to Safest Level of Function:   Obtain physical therapy/occupational therapy consults as needed   Assess patient stability and activity tolerance for standing, transferring and ambulating with or without assistive devices   Assist with transfers and ambulation using safe patient handling equipment as needed     Problem: Infection - Adult  Goal: Absence of infection at discharge  Recent Flowsheet Documentation  Taken 12/16/2023 0800 by Dick Rainey RN  Absence of infection at discharge: Assess and monitor for signs and symptoms of infection     Problem: Metabolic/Fluid and Electrolytes - Adult  Goal: Electrolytes maintained within normal limits  Recent Flowsheet Documentation  Taken 12/16/2023 0800 by Dick Rainey RN  Electrolytes maintained within normal limits:   Administer electrolyte replacement as ordered   Monitor labs and assess patient for signs and symptoms of electrolyte imbalances   Monitor response to electrolyte replacements, including repeat lab results as appropriate  Goal: Hemodynamic stability and optimal renal function maintained  Recent Flowsheet Documentation  Taken 12/16/2023 0800 by Dick Rainey RN  Hemodynamic stability and optimal renal function maintained:   Monitor labs and assess for signs and symptoms of volume excess or deficit   Monitor intake, output and patient weight  Goal: Glucose maintained within prescribed range  Recent Flowsheet Documentation  Taken 12/16/2023 0800 by Dick Rainey RN  Glucose maintained within prescribed range: Monitor blood

## 2023-12-17 NOTE — PROGRESS NOTES
Shift change, bedside report given to   [facility]. Pt exhibits no s/s of distress. Call light in reach. Care has been transferred at this time.

## 2023-12-17 NOTE — PROGRESS NOTES
CVTS Cardiothoracic Progress Note:                              CC:  Post op follow up      Surgery:12/5  OPEN CORONARY ARTERY BYPASS GRAFTING TIMES FOUR USING INTERNAL MAMMARY ARTERY, SAPHENOUS VEIN GRAFT, ON PUMP, LILY, TEMPORARY PACING WIRES, LEFT ATRIAL APPENDAGE CLIP PLACEMENT WITH STERNAL PLATING AND BILATERAL [facility] course:  12/5 DOS- Extubated @ [id number]. Pain difficult to control. High Levo requirement. 12/6 POD 1- Hgb 7 this AM. 1 unit PRBC given. Able to wean off Levo. Scheduled Gabapentin and Robaxin ordered for pain. TPW removed. Plan to remove MS chest tube     12/7 POD2- OOB to chair this AM. Cr up to 1.7. D/C Gabapentin. Switched Arixtra to Heparin. Hypotensive on standing this AM. Albumin given. Started Lasix gtt. 12.8 POD3- Still with difficult pain control. On typical post op pain control. Unable to give Gabapentin and Toradol 2/2 Cr. Hgb low this morning. 1 unit PRBC ordered by oncall. Cr 1.9 this AM. Consult Nephro. 12/11 POD 6 - Cr 1.6 this AM. Lasix gtt decreased to 5 mg/hr . MS incision oozing. Added Augmentin PO x 5 days. Prevena to MS incision. 12/12 Cr up to 1.8, lasix back up to 10 mg/hr per Nephro. More lethargic today,  requiring bipap. ABG alkalotic. Discussed HD with nephro. Dr. Derik Alcazar will place the vas cath. 12/13 started pt on CRRT for assistance with clearance. 12/14 Discussed with Dr. Nilsa Gage, will transition pt to iHD now that his blood pressure is improving. 12/15 sitting up in chair in nad. Will make sure it's ok with Nephro prior to removing rosa. 12/16- OOB to chair. Weight trending up. Resumed PO Lasix. 12/17- OOB to chair. Weight trending down. C/O SOB. Normal O2 on RA. Home CPAP not working. Has been refusing hospital BiPAP.      Past Medical History:   Diagnosis Date    Arthritis     CAD (coronary artery disease)     Cancer ([address])     nose    GERD (gastroesophageal reflux disease)     Hearing aid worn     denise

## 2023-12-17 NOTE — PROGRESS NOTES
12/17/23 1318   NIV Type   $NIV $Daily Charge   NIV Started/Stopped On   Equipment Type V60   Mode Bilevel   Mask Type Full face mask   Mask Size Medium   Assessment   Pulse 72   Respirations 16   SpO2 100 %   Breath Sounds   Right Upper Lobe Diminished   Right Middle Lobe Diminished   Right Lower Lobe Diminished   Left Upper Lobe Diminished   Left Lower Lobe Diminished   Settings/Measurements   PIP Observed 10 cm H20   IPAP 10 cmH20   CPAP/EPAP 5 cmH2O   Vt (Measured) 760 mL   Rate Ordered 12   FiO2  40 %   I Time/ I Time % 1 s   Minute Volume (L/min) 13 Liters   Mask Leak (lpm) 0 lpm   Alarm Settings   Alarms On Y   Low Pressure (cmH2O) 30 cmH2O   High Pressure (cmH2O) 8 cmH2O   Apnea (secs) 20 secs   RR Low (bpm) 6   RR High (bpm) 40 br/min   Oxygen Therapy/Pulse Ox   O2 Therapy Oxygen   $Oxygen $Daily Charge   O2 Device PAP (positive airway pressure)   $Pulse Oximeter $Spot check (multiple/continuous)     Patient hasn't slept well the last two nights on his home cpap waking up couldn't catch his breath per patient now taking a nap on our unit.

## 2023-12-17 NOTE — PROGRESS NOTES
0510 Pt was bathed, right femoral arterial line was removed. Pressure was held for 20 mins. Pt has low platelet count, so extra precautions were taken to observe for bleeding. 0630 Lab called to state Potassium was 2.9. Replacement protocol initiated. Right femoral site checked again, no bleeding or oozing.

## 2023-12-18 ENCOUNTER — APPOINTMENT (OUTPATIENT)
Dept: GENERAL RADIOLOGY | Age: 78
DRG: 235 | End: 2023-12-18
Attending: THORACIC SURGERY (CARDIOTHORACIC VASCULAR SURGERY)
Payer: MEDICARE

## 2023-12-18 ENCOUNTER — APPOINTMENT (OUTPATIENT)
Dept: INTERVENTIONAL RADIOLOGY/VASCULAR | Age: 78
DRG: 235 | End: 2023-12-18
Attending: THORACIC SURGERY (CARDIOTHORACIC VASCULAR SURGERY)
Payer: MEDICARE

## 2023-12-18 LAB
ALBUMIN SERPL-MCNC: 3.5 G/DL (ref 3.4–5)
ANION GAP SERPL CALCULATED.3IONS-SCNC: 11 MMOL/L (ref 3–16)
BASOPHILS # BLD: 0.2 K/UL (ref 0–0.2)
BASOPHILS NFR BLD: 1 %
BUN SERPL-MCNC: 58 MG/DL (ref 7–20)
CALCIUM SERPL-MCNC: 8.6 MG/DL (ref 8.3–10.6)
CHLORIDE SERPL-SCNC: 100 MMOL/L (ref 99–110)
CO2 SERPL-SCNC: 22 MMOL/L (ref 21–32)
CREAT SERPL-MCNC: 1.2 MG/DL (ref 0.8–1.3)
DEPRECATED RDW RBC AUTO: 16.9 % (ref 12.4–15.4)
EOSINOPHIL # BLD: 0.2 K/UL (ref 0–0.6)
EOSINOPHIL NFR BLD: 1 %
GFR SERPLBLD CREATININE-BSD FMLA CKD-EPI: >60 ML/MIN/{1.73_M2}
GLUCOSE BLD-MCNC: 110 MG/DL (ref 70–99)
GLUCOSE BLD-MCNC: 117 MG/DL (ref 70–99)
GLUCOSE SERPL-MCNC: 103 MG/DL (ref 70–99)
HCT VFR BLD AUTO: 28.9 % (ref 40.5–52.5)
HGB BLD-MCNC: 9.3 G/DL (ref 13.5–17.5)
LYMPHOCYTES # BLD: 1.7 K/UL (ref 1–5.1)
LYMPHOCYTES NFR BLD: 10 %
MCH RBC QN AUTO: 29.1 PG (ref 26–34)
MCHC RBC AUTO-ENTMCNC: 32.1 G/DL (ref 31–36)
MCV RBC AUTO: 90.7 FL (ref 80–100)
METAMYELOCYTES NFR BLD MANUAL: 1 %
MONOCYTES # BLD: 0.5 K/UL (ref 0–1.3)
MONOCYTES NFR BLD: 3 %
NEUTROPHILS # BLD: 14.8 K/UL (ref 1.7–7.7)
NEUTROPHILS NFR BLD: 82 %
NEUTS BAND NFR BLD MANUAL: 2 % (ref 0–7)
PERFORMED ON: ABNORMAL
PERFORMED ON: ABNORMAL
PHOSPHATE SERPL-MCNC: 4.1 MG/DL (ref 2.5–4.9)
PLATELET # BLD AUTO: 478 K/UL (ref 135–450)
PLATELET BLD QL SMEAR: ABNORMAL
PMV BLD AUTO: 7.3 FL (ref 5–10.5)
POLYCHROMASIA BLD QL SMEAR: ABNORMAL
POTASSIUM SERPL-SCNC: 4.2 MMOL/L (ref 3.5–5.1)
RBC # BLD AUTO: 3.18 M/UL (ref 4.2–5.9)
SLIDE REVIEW: ABNORMAL
SODIUM SERPL-SCNC: 133 MMOL/L (ref 136–145)
WBC # BLD AUTO: 17.4 K/UL (ref 4–11)

## 2023-12-18 PROCEDURE — 94660 CPAP INITIATION&MGMT: CPT

## 2023-12-18 PROCEDURE — 2580000003 HC RX 258: Performed by: INTERNAL MEDICINE

## 2023-12-18 PROCEDURE — 94761 N-INVAS EAR/PLS OXIMETRY MLT: CPT

## 2023-12-18 PROCEDURE — 94640 AIRWAY INHALATION TREATMENT: CPT

## 2023-12-18 PROCEDURE — 85025 COMPLETE CBC W/AUTO DIFF WBC: CPT

## 2023-12-18 PROCEDURE — 6360000002 HC RX W HCPCS

## 2023-12-18 PROCEDURE — 6370000000 HC RX 637 (ALT 250 FOR IP)

## 2023-12-18 PROCEDURE — 2580000003 HC RX 258

## 2023-12-18 PROCEDURE — 97530 THERAPEUTIC ACTIVITIES: CPT

## 2023-12-18 PROCEDURE — 99232 SBSQ HOSP IP/OBS MODERATE 35: CPT | Performed by: INTERNAL MEDICINE

## 2023-12-18 PROCEDURE — 6370000000 HC RX 637 (ALT 250 FOR IP): Performed by: THORACIC SURGERY (CARDIOTHORACIC VASCULAR SURGERY)

## 2023-12-18 PROCEDURE — 32555 ASPIRATE PLEURA W/ IMAGING: CPT

## 2023-12-18 PROCEDURE — 94669 MECHANICAL CHEST WALL OSCILL: CPT

## 2023-12-18 PROCEDURE — 71045 X-RAY EXAM CHEST 1 VIEW: CPT

## 2023-12-18 PROCEDURE — 2140000000 HC CCU INTERMEDIATE R&B

## 2023-12-18 PROCEDURE — 99024 POSTOP FOLLOW-UP VISIT: CPT | Performed by: NURSE PRACTITIONER

## 2023-12-18 PROCEDURE — 2709999900 IR US GUIDED THORACENTESIS

## 2023-12-18 PROCEDURE — 6360000002 HC RX W HCPCS: Performed by: INTERNAL MEDICINE

## 2023-12-18 PROCEDURE — 2700000000 HC OXYGEN THERAPY PER DAY

## 2023-12-18 PROCEDURE — 87070 CULTURE OTHR SPECIMN AEROBIC: CPT

## 2023-12-18 PROCEDURE — 87205 SMEAR GRAM STAIN: CPT

## 2023-12-18 PROCEDURE — 36592 COLLECT BLOOD FROM PICC: CPT

## 2023-12-18 PROCEDURE — 97116 GAIT TRAINING THERAPY: CPT

## 2023-12-18 PROCEDURE — 80069 RENAL FUNCTION PANEL: CPT

## 2023-12-18 PROCEDURE — 97110 THERAPEUTIC EXERCISES: CPT

## 2023-12-18 RX ADMIN — HEPARIN SODIUM 5000 UNITS: 5000 INJECTION INTRAVENOUS; SUBCUTANEOUS at 14:18

## 2023-12-18 RX ADMIN — ACETAMINOPHEN 1000 MG: 500 TABLET ORAL at 09:27

## 2023-12-18 RX ADMIN — MEROPENEM 1000 MG: 1 INJECTION, POWDER, FOR SOLUTION INTRAVENOUS at 06:16

## 2023-12-18 RX ADMIN — ACETAMINOPHEN 1000 MG: 500 TABLET ORAL at 14:18

## 2023-12-18 RX ADMIN — METOPROLOL TARTRATE 50 MG: 50 TABLET, FILM COATED ORAL at 09:27

## 2023-12-18 RX ADMIN — ALBUTEROL SULFATE 2.5 MG: 2.5 SOLUTION RESPIRATORY (INHALATION) at 08:21

## 2023-12-18 RX ADMIN — METOPROLOL TARTRATE 50 MG: 50 TABLET, FILM COATED ORAL at 20:55

## 2023-12-18 RX ADMIN — FUROSEMIDE 20 MG: 20 TABLET ORAL at 09:27

## 2023-12-18 RX ADMIN — ACETAMINOPHEN 1000 MG: 500 TABLET ORAL at 20:55

## 2023-12-18 RX ADMIN — METHOCARBAMOL 1000 MG: 500 TABLET ORAL at 14:18

## 2023-12-18 RX ADMIN — METHOCARBAMOL 1000 MG: 500 TABLET ORAL at 09:26

## 2023-12-18 RX ADMIN — METHOCARBAMOL 1000 MG: 500 TABLET ORAL at 20:55

## 2023-12-18 RX ADMIN — AMIODARONE HYDROCHLORIDE 400 MG: 200 TABLET ORAL at 09:27

## 2023-12-18 RX ADMIN — CHLORHEXIDINE GLUCONATE 15 ML: 1.2 RINSE ORAL at 20:56

## 2023-12-18 RX ADMIN — SODIUM CHLORIDE, PRESERVATIVE FREE 10 ML: 5 INJECTION INTRAVENOUS at 20:56

## 2023-12-18 RX ADMIN — FUROSEMIDE 20 MG: 20 TABLET ORAL at 16:59

## 2023-12-18 RX ADMIN — ALBUTEROL SULFATE 2.5 MG: 2.5 SOLUTION RESPIRATORY (INHALATION) at 21:06

## 2023-12-18 RX ADMIN — POLYETHYLENE GLYCOL 3350 17 G: 17 POWDER, FOR SOLUTION ORAL at 09:27

## 2023-12-18 RX ADMIN — ATORVASTATIN CALCIUM 40 MG: 40 TABLET, FILM COATED ORAL at 20:56

## 2023-12-18 RX ADMIN — AMIODARONE HYDROCHLORIDE 400 MG: 200 TABLET ORAL at 14:18

## 2023-12-18 RX ADMIN — ALBUTEROL SULFATE 2.5 MG: 2.5 SOLUTION RESPIRATORY (INHALATION) at 16:23

## 2023-12-18 RX ADMIN — LINEZOLID 600 MG: 600 INJECTION, SOLUTION INTRAVENOUS at 01:56

## 2023-12-18 RX ADMIN — AMIODARONE HYDROCHLORIDE 400 MG: 200 TABLET ORAL at 20:55

## 2023-12-18 RX ADMIN — HEPARIN SODIUM 5000 UNITS: 5000 INJECTION INTRAVENOUS; SUBCUTANEOUS at 06:17

## 2023-12-18 RX ADMIN — CHLORHEXIDINE GLUCONATE 15 ML: 1.2 RINSE ORAL at 09:26

## 2023-12-18 RX ADMIN — BISACODYL 5 MG: 5 TABLET, COATED ORAL at 09:27

## 2023-12-18 RX ADMIN — HEPARIN SODIUM 5000 UNITS: 5000 INJECTION INTRAVENOUS; SUBCUTANEOUS at 21:49

## 2023-12-18 RX ADMIN — ASPIRIN 81 MG: 81 TABLET, COATED ORAL at 09:27

## 2023-12-18 RX ADMIN — FAMOTIDINE 20 MG: 20 TABLET ORAL at 09:26

## 2023-12-18 RX ADMIN — ONDANSETRON 4 MG: 4 TABLET, ORALLY DISINTEGRATING ORAL at 09:25

## 2023-12-18 NOTE — PROGRESS NOTES
times per week  Specific Instructions for Next Treatment: Progress ther ex and mobility as tolerated  Current Treatment Recommendations: Strengthening;Balance training;Functional mobility training;Transfer training;Endurance training;Pain management;Neuromuscular re-education;Gait training;Stair training;Safety education & training;Patient/Caregiver education & training;Home exercise program;Therapeutic activities     Restrictions  Restrictions/Precautions  Restrictions/Precautions: Fall Risk, Cardiac, General Precautions  Position Activity Restriction  Sternal Precautions: No Pushing, No Pulling, 5# Lifting Restrictions  Other position/activity restrictions: Telemetry with ICU monitoring, R central line, IV lines     Subjective    Subjective: Pt agreeable to work with PT this morning.  States he's feeling pretty tired and \"worn out\" today.  \"I don't know how much you're going to get out of me today, but I'll do anything you want for therapy.\"  Pain: Pt c/o 6-7/10 surgical pain in mid-sternum and rest and during amb.  Overall Orientation Status: Within Functional Limits     Objective   Vitals  Pulse: 66  Heart Rate Source: Monitor  BP: 112/57 (post-amb)  BP Location: Left upper arm  BP Method: Automatic  Patient Position: Semi fowlers  SpO2: 97%  O2 Device: None (Room air)    Bed Mobility Training  Interventions: Safety awareness training;Verbal cues;Visual cues;Manual cues  Supine to Sit:  (BREEZY - pt OOB upon entry of PT)  Sit to Supine: Maximum assistance (HOB at 0 degrees)  Scooting: Maximum assistance;Assist X2 (maxA x 2 to scoot fully up in bed, pt assisting by pushing with BLE)    Balance  Sitting: Intact  Standing: Impaired  Standing - Static: Good (using 4WW)  Standing - Dynamic: Fair;Occasional (using 4WW)    Transfer Training  Interventions: Safety awareness training;Verbal cues  Sit to Stand: Moderate assistance (from recliner chair to 4WW)  Stand to Sit: Contact-guard assistance  Bed to Chair: Minimum  bed without using bedrails?: A Lot  How much help is needed moving from lying on your back to sitting on the side of a flat bed without using bedrails?: A Lot  How much help is needed moving to and from a bed to a chair?: A Little  How much help is needed standing up from a chair using your arms?: A Little  How much help is needed walking in hospital room?: A Little  How much help is needed climbing 3-5 steps with a railing?: A Little  AM-PAC Inpatient Mobility Raw Score : 16  AM-PAC Inpatient T-Scale Score : 40.78  Mobility Inpatient CMS 0-100% Score: 54.16  Mobility Inpatient CMS G-Code Modifier : CK         Therapy Time   Individual Concurrent Group Co-treatment   Time In 1033         Time Out 1112         Minutes 39         Timed Code Treatment Minutes: [address] [place], [place] #[id number]

## 2023-12-18 NOTE — PROGRESS NOTES
CVTS Cardiothoracic Progress Note:                              CC:  Post op follow up      Surgery:12/5  OPEN CORONARY ARTERY BYPASS GRAFTING TIMES FOUR USING INTERNAL MAMMARY ARTERY, SAPHENOUS VEIN GRAFT, ON PUMP, LILY, TEMPORARY PACING WIRES, LEFT ATRIAL APPENDAGE CLIP PLACEMENT WITH STERNAL PLATING AND BILATERAL [facility] course:  12/5 DOS- Extubated @ [phone]. Pain difficult to control. High Levo requirement. 12/6 POD 1- Hgb 7 this AM. 1 unit PRBC given. Able to wean off Levo. Scheduled Gabapentin and Robaxin ordered for pain. TPW removed. Plan to remove MS chest tube     12/7 POD2- OOB to chair this AM. Cr up to 1.7. D/C Gabapentin. Switched Arixtra to Heparin. Hypotensive on standing this AM. Albumin given. Started Lasix gtt. 12.8 POD3- Still with difficult pain control. On typical post op pain control. Unable to give Gabapentin and Toradol 2/2 Cr. Hgb low this morning. 1 unit PRBC ordered by oncall. Cr 1.9 this AM. Consult Nephro. 12/11 POD 6 - Cr 1.6 this AM. Lasix gtt decreased to 5 mg/hr . MS incision oozing. Added Augmentin PO x 5 days. Prevena to MS incision. 12/12 Cr up to 1.8, lasix back up to 10 mg/hr per Nephro. More lethargic today,  requiring bipap. ABG alkalotic. Discussed HD with nephro. Dr. Marcy Recinos will place the vas cath. 12/13 started pt on CRRT for assistance with clearance. 12/14 Discussed with Dr. James Garibay, will transition pt to iHD now that his blood pressure is improving. 12/15 sitting up in chair in nad. Will make sure it's ok with Nephro prior to removing rosa. 12/16- OOB to chair. Weight trending up. Resumed PO Lasix. 12/17- OOB to chair. Weight trending down. C/O SOB. Normal O2 on RA. Home CPAP not working. Has been refusing hospital BiPAP. 12/18 Infectious disease MD consulted. Started Linezolid, continue Merrem. Left pleural effusion we will ask IR to drain.     Past Medical History:   Diagnosis Date    Arthritis     CAD

## 2023-12-18 NOTE — PLAN OF CARE
Problem: Chronic Conditions and Co-morbidities  Goal: Patient's chronic conditions and co-morbidity symptoms are monitored and maintained or improved  Outcome: Progressing  Flowsheets (Taken 12/17/2023 2000)  Care Plan - Patient's Chronic Conditions and Co-Morbidity Symptoms are Monitored and Maintained or Improved: Monitor and assess patient's chronic conditions and comorbid symptoms for stability, deterioration, or improvement     Problem: Discharge Planning  Goal: Discharge to home or other facility with appropriate resources  Outcome: Progressing  Flowsheets (Taken 12/17/2023 2000)  Discharge to home or other facility with appropriate resources:   Identify barriers to discharge with patient and caregiver   Arrange for needed discharge resources and transportation as appropriate     Problem: Pain  Goal: Verbalizes/displays adequate comfort level or baseline comfort level  Outcome: Progressing  Flowsheets (Taken 12/17/2023 2000)  Verbalizes/displays adequate comfort level or baseline comfort level:   Encourage patient to monitor pain and request assistance   Assess pain using appropriate pain scale     Problem: ABCDS Injury Assessment  Goal: Absence of physical injury  Outcome: Progressing     Problem: Safety - Adult  Goal: Free from fall injury  Outcome: Progressing     Problem: Neurosensory - Adult  Goal: Achieves stable or improved neurological status  Outcome: Adequate for Discharge  Flowsheets (Taken 12/17/2023 2000)  Achieves stable or improved neurological status:   Assess for and report changes in neurological status   Maintain blood pressure and fluid volume within ordered parameters to optimize cerebral perfusion and minimize risk of hemorrhage     Problem: Respiratory - Adult  Goal: Achieves optimal ventilation and oxygenation  Outcome: Progressing     Problem: Cardiovascular - Adult  Goal: Maintains optimal cardiac output and hemodynamic stability  Outcome: Progressing  Flowsheets (Taken 12/17/2023

## 2023-12-18 NOTE — PROGRESS NOTES
Comprehensive Nutrition Assessment    Type and Reason for Visit:  Reassess    Nutrition Recommendations/Plan:   Continue LAURENT diet and encourage PO intake   Continue ensure w/ meals   Encourage pt to have family bring in food  Due to inadequate intake, discussion of nutrition support should be considered. Consult dietitian if tube feeding is desired and consistent with patient's plan of care. Monitor nutrition adequacy, pertinent labs, bowel habits, wt changes, and clinical progress     Malnutrition Assessment:  Malnutrition Status: At risk for malnutrition (Comment) (12/14/23 1137)    Context:  Acute Illness     Findings of the 6 clinical characteristics of malnutrition:  Energy Intake:  50% or less of estimated energy requirements for 5 or more days    Nutrition Assessment:    Follow up: Pt continues on LAURENT diet. PO intakes continue to be poor, 1-50, mostly 1-25% of meals. Pt reports poor appetite and intake partially d/t not liking the hospital food. Encouraged pt to have family bring in food, pt reports he will ask his family to bring in chicken salad or sandwiches. Reports drinking about 1 ensure daily. Reports nausea and diarrhea. RD encouraged fiber intake. Not appropriate for diet education d/t poor intake. If PO intake does not improve, may need to consider alternative methods of nutrition, consult RD for recommendations. Continue to encourage PO intake, will continue to monitor. Nutrition Related Findings:    Nausea and diarrhea. Na 133. BLE + 2, BUE + 1 edema. Wound Type: Surgical Incision       Current Nutrition Intake & Therapies:    Average Meal Intake: 1-25%, 0%, 26-50%  Average Supplements Intake: 26-50%  ADULT ORAL NUTRITION SUPPLEMENT; Breakfast, Lunch, Dinner; Standard High Calorie/High Protein Oral Supplement  ADULT DIET; Regular;  No Added Salt (3-4 gm)    Anthropometric Measures:  Height: 172.7 cm (5' 8\")  Ideal Body Weight (IBW): 154 lbs (70 kg)       Current Body Weight: 134.7 kg (297 lb),

## 2023-12-18 NOTE — OR NURSING
Pt arrived for image guided left Thoracentesis. Vero Washington explained the procedure including the risk and benefits of the procedure. All questions answered. Pt verbalizes understanding of the procedure and states no more questions. Consent confirmed. Vital signs stable. Labs, allergies, medications, and code status reviewed. No contraindications noted. Time out completed prior to procedure.     Vital Signs  Vitals:    12/18/23 1102   BP:    Pulse: 66   Resp:    Temp:    SpO2: 97%    (vital signs in table format)      Allergies  Lasix [furosemide], Neosporin [neomycin-polymyx-gramicid], and Polysporin [bacitracin-polymyxin b] (allergies)    Labs  Lab Results   Component Value Date    INR 1.22 (H) 12/07/2023    PROTIME 15.4 (H) 12/07/2023     Lab Results   Component Value Date    CREATININE 1.2 12/18/2023    BUN 58 (H) 12/18/2023     (L) 12/18/2023    GFR >60 05/17/2013    K 4.2 12/18/2023     12/18/2023    CO2 22 12/18/2023     Lab Results   Component Value Date    WBC 17.4 (H) 12/18/2023    HGB 9.3 (L) 12/18/2023    HCT 28.9 (L) 12/18/2023    MCV 90.7 12/18/2023     (H) 12/18/2023

## 2023-12-18 NOTE — PROGRESS NOTES
12/18/23 0008   NIV Type   NIV Started/Stopped On   Equipment Type v60   Mode Bilevel   Mask Type Full face mask   Mask Size Medium   Assessment   Pulse 62   Respirations 13   SpO2 98 %   Comfort Level Good   Using Accessory Muscles No   Mask Compliance Good   Skin Assessment Clean, dry, & intact   Skin Protection for O2 Device Yes   Location Nose   Breath Sounds   Right Upper Lobe Diminished   Right Middle Lobe Diminished   Right Lower Lobe Diminished   Left Upper Lobe Diminished   Left Lower Lobe Diminished   Settings/Measurements   IPAP 10 cmH20   CPAP/EPAP 5 cmH2O   Vt (Measured) 1026 mL   Rate Ordered 12   FiO2  40 %   Minute Volume (L/min) 12 Liters   Mask Leak (lpm) 2 lpm   Patient's Home Machine No   Electrical Safety Check Performed Yes

## 2023-12-18 NOTE — PROGRESS NOTES
Patient: Caren See  [id number]  Date: 12/18/2023      Chief Complaint: s/p CABG    History of Present Illness/Hospital Course:  Arnold Martinez is a 69year old male with a past medical history significant for CAD, GERD, HLD, HTN, sleep apnea, and morbid obesity who presented to [facility] on 12/5/23 for planned open CABG x4 with left atrial appendage clip placement with sternal plating. Post operative course has been notable for hypotension, MADONNA, and acute blood loss anemia. Nephrology is following. He was temporarily on CRRT. Interval History:  Patient with persistent leukocytosis. ID is consulted. He underwent left thoracentesis today. Today Dheeraj Liang is seen with nursing present. He is seated edge of bed. He denies acute complaints at this time. He remains interested in ARU on discharge. has a past medical history of Arthritis, CAD (coronary artery disease), Cancer ([address]), GERD (gastroesophageal reflux disease), Hearing aid worn, Hyperlipidemia, Hypertension, Osteoarthritis, Sleep apnea, and Wears glasses. has a past surgical history that includes Coronary angioplasty with stent; Coronary angioplasty (08/26/2015); skin biopsy; Colonoscopy; Endoscopy, colon, diagnostic; Cataract removal (Bilateral); Total knee arthroplasty (Right, 09/18/2019); joint replacement; knee surgery; Cataract extraction, bilateral (Bilateral); and Coronary artery bypass graft (N/A, 12/5/2023). reports that he quit smoking about 45 years ago. His smoking use included cigarettes. He started smoking about 61 years ago. He has a 45.0 pack-year smoking history. He has never used smokeless tobacco. He reports current alcohol use of about 2.0 standard drinks of alcohol per week. He reports that he does not use drugs. family history includes Coronary Art Dis in his father and mother; Heart Disease in his father and mother.       REVIEW OF SYSTEMS:   Denies f/c, n/v, cp, sob    Physical Examination:  Vitals: Patient Vitals for

## 2023-12-18 NOTE — PROGRESS NOTES
Infectious Disease Follow up Notes    CC :  post-op leukocytosis      Antibiotics:  Linezolid 600 IV q12, s 12/16  Meropenem 1g q12, s 12/13     Augmentin 12/11-12/12  Monserrat-op cefazolin and vanc     Admit Date:   12/5/2023  Hospital Day: 14    Subjective:   He remains AF on RA   BM x2 recorded prior 24h  He is having bedside L thoracentesis currently.  Fluid frankly bloody.  Suspected clotted blood at base of the lung which could not be aspirated per IR.    Patient says he feels ok.  No specific concerns.    Objective:     Patient Vitals for the past 8 hrs:   BP Temp Temp src Pulse Resp SpO2 Weight   12/18/23 1102 -- -- -- 66 -- 97 % --   12/18/23 1000 116/60 -- -- 79 16 100 % --   12/18/23 0900 115/78 -- -- 79 -- -- --   12/18/23 0805 119/72 98.2 °F (36.8 °C) Oral 68 18 96 % --   12/18/23 0600 113/76 98.4 °F (36.9 °C) Oral 61 20 95 % 134.8 kg (297 lb 2.9 oz)   12/18/23 0500 100/68 -- -- 61 18 95 % --   12/18/23 0403 105/78 -- -- 62 13 96 % --       EXAM:  General:   alert, oriented, sitting upright in bed for procedure, NAD  Non-toxic appearing     HEENT:   NCAT, PERRL, sclera anicteric  MMM, no thrush   NECK:   Supple      LUNGS:    decreased breath sounds at bases    CV:   sternum stable, no erythema or drainage   RRR  ABD:  Soft, NT.  BS present     EXT: No focal rash         LINE:   PIV in place         Scheduled Meds:   metoprolol tartrate  50 mg Oral BID    amiodarone  400 mg Oral TID    acetaminophen  1,000 mg Oral Q6H    furosemide  20 mg Oral BID    linezolid  600 mg IntraVENous Q12H    meropenem  1,000 mg IntraVENous Q12H    heparin (porcine)  5,000 Units SubCUTAneous 3 times per day    methocarbamol  1,000 mg Oral TID    famotidine  20 mg Oral Daily    insulin lispro  0-12 Units SubCUTAneous TID WC    insulin lispro  0-6 Units SubCUTAneous Nightly    sodium chloride flush  5-40 mL IntraVENous 2 times per day    aspirin  81 mg  ventricular systolic function with ejection fraction of 55-60%. No regional wall motion abnormalites are seen. There is paradoxical motion of the septum. Compared to previous study from 12-8-2023 no changes noted in left   ventricular function. There is a trivial to small circumferential pericardial effusion noted. There is a small degree of respiratory variation in both mitral and   tricuspid end flow velocities.     CXR 12/18/23- increased congestion     Assessment:     Patient Active Problem List    Diagnosis Date Noted    Coronary atherosclerosis 09/29/2011     Priority: High     Overview Note:     Cath and Stent  RCA 4/10/09      Mixed hyperlipidemia 09/29/2011     Priority: Medium    Essential hypertension 09/29/2011     Priority: Medium    Ex-heavy cigarette smoker (20-39 per day) 12/16/2023    Postoperative anemia 12/07/2023    MADONNA (acute kidney injury) ([address]) 12/07/2023    Leukocytosis 12/07/2023    Grade I diastolic dysfunction 68/84/7221    Restrictive lung disease 12/07/2023    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/07/2023    Alcohol use 12/07/2023    Pulmonary infiltrate 12/07/2023    Pleural effusion on left 12/07/2023    Coronary artery disease due to calcified coronary lesion 12/05/2023    CAD in native artery 12/05/2023    Chest pain 10/24/2023    Abnormal stress test 10/24/2023    Unstable angina ([address]) 10/24/2023    Primary localized osteoarthritis of right knee 09/18/2019    Status post total right knee replacement 09/18/2019    Morbid obesity with BMI of 45.0-49.9, adult ([address]) 09/18/2019    Primary osteoarthritis of right knee 12/04/2018    Chronic diastolic congestive heart failure ([address]) 02/08/2018    CAD (coronary artery disease) 02/08/2018    Shortness of breath 02/20/2015    SALLIE (obstructive sleep apnea) 02/20/2015       Background of CAD, GERD, HLD, HTN, SALLIE, alcohol abuse     Admitted 12/5/23 for CABG x4v, ROBBI clip, sternal plating    MADONNA    Persistent leukocytosis in the absence of fever and despite

## 2023-12-18 NOTE — PROGRESS NOTES
12/18/23 0403   NIV Type   NIV Started/Stopped On   Equipment Type V60   Mode Bilevel   Mask Type Full face mask   Mask Size Medium   Assessment   Pulse 62   Respirations 13   /78   SpO2 96 %   Comfort Level Good   Using Accessory Muscles No   Skin Protection for O2 Device Yes   Location Nose   Breath Sounds   Right Upper Lobe Diminished   Right Middle Lobe Diminished   Right Lower Lobe Diminished   Left Upper Lobe Diminished   Left Lower Lobe Diminished   Settings/Measurements   IPAP 10 cmH20   CPAP/EPAP 5 cmH2O   Vt (Measured) 715 mL   Rate Ordered 12   FiO2  40 %   Minute Volume (L/min) 9.8 Liters   Mask Leak (lpm) 4 lpm   Patient's Home Machine No   Electrical Safety Check Performed Yes

## 2023-12-19 ENCOUNTER — APPOINTMENT (OUTPATIENT)
Dept: GENERAL RADIOLOGY | Age: 78
DRG: 235 | End: 2023-12-19
Attending: THORACIC SURGERY (CARDIOTHORACIC VASCULAR SURGERY)
Payer: MEDICARE

## 2023-12-19 ENCOUNTER — HOSPITAL ENCOUNTER (INPATIENT)
Age: 78
LOS: 11 days | Discharge: HOME HEALTH | DRG: 092 | End: 2023-12-30
Attending: STUDENT IN AN ORGANIZED HEALTH CARE EDUCATION/TRAINING PROGRAM | Admitting: STUDENT IN AN ORGANIZED HEALTH CARE EDUCATION/TRAINING PROGRAM
Payer: MEDICARE

## 2023-12-19 VITALS
WEIGHT: 295.86 LBS | OXYGEN SATURATION: 97 % | TEMPERATURE: 98.3 F | HEART RATE: 62 BPM | BODY MASS INDEX: 44.84 KG/M2 | RESPIRATION RATE: 13 BRPM | HEIGHT: 68 IN | SYSTOLIC BLOOD PRESSURE: 99 MMHG | DIASTOLIC BLOOD PRESSURE: 57 MMHG

## 2023-12-19 DIAGNOSIS — I10 ESSENTIAL HYPERTENSION, BENIGN: ICD-10-CM

## 2023-12-19 DIAGNOSIS — R06.02 SHORTNESS OF BREATH: ICD-10-CM

## 2023-12-19 DIAGNOSIS — I25.119 CORONARY ARTERY DISEASE INVOLVING NATIVE HEART WITH ANGINA PECTORIS, UNSPECIFIED VESSEL OR LESION TYPE (HCC): ICD-10-CM

## 2023-12-19 DIAGNOSIS — I25.119 ATHEROSCLEROSIS OF CORONARY ARTERY WITH ANGINA PECTORIS, UNSPECIFIED VESSEL OR LESION TYPE, UNSPECIFIED WHETHER NATIVE OR TRANSPLANTED HEART (HCC): ICD-10-CM

## 2023-12-19 PROBLEM — D64.9 POSTOPERATIVE ANEMIA: Status: RESOLVED | Noted: 2023-12-07 | Resolved: 2023-12-19

## 2023-12-19 PROBLEM — R91.8 PULMONARY INFILTRATE: Status: RESOLVED | Noted: 2023-12-07 | Resolved: 2023-12-19

## 2023-12-19 PROBLEM — N17.9 AKI (ACUTE KIDNEY INJURY) (HCC): Status: RESOLVED | Noted: 2023-12-07 | Resolved: 2023-12-19

## 2023-12-19 PROBLEM — G72.81 CRITICAL ILLNESS MYOPATHY: Status: ACTIVE | Noted: 2023-12-19

## 2023-12-19 PROBLEM — J90 PLEURAL EFFUSION ON LEFT: Status: RESOLVED | Noted: 2023-12-07 | Resolved: 2023-12-19

## 2023-12-19 PROBLEM — D72.829 LEUKOCYTOSIS: Status: RESOLVED | Noted: 2023-12-07 | Resolved: 2023-12-19

## 2023-12-19 LAB
ALBUMIN SERPL-MCNC: 3.4 G/DL (ref 3.4–5)
ANION GAP SERPL CALCULATED.3IONS-SCNC: 11 MMOL/L (ref 3–16)
BASOPHILS # BLD: 0.1 K/UL (ref 0–0.2)
BASOPHILS NFR BLD: 1 %
BUN SERPL-MCNC: 67 MG/DL (ref 7–20)
CALCIUM SERPL-MCNC: 8.6 MG/DL (ref 8.3–10.6)
CHLORIDE SERPL-SCNC: 100 MMOL/L (ref 99–110)
CO2 SERPL-SCNC: 21 MMOL/L (ref 21–32)
CREAT SERPL-MCNC: 1.3 MG/DL (ref 0.8–1.3)
DEPRECATED RDW RBC AUTO: 16.6 % (ref 12.4–15.4)
EOSINOPHIL # BLD: 0.5 K/UL (ref 0–0.6)
EOSINOPHIL NFR BLD: 4 %
GFR SERPLBLD CREATININE-BSD FMLA CKD-EPI: 56 ML/MIN/{1.73_M2}
GLUCOSE SERPL-MCNC: 104 MG/DL (ref 70–99)
HCT VFR BLD AUTO: 26.3 % (ref 40.5–52.5)
HGB BLD-MCNC: 8.6 G/DL (ref 13.5–17.5)
LYMPHOCYTES # BLD: 1.1 K/UL (ref 1–5.1)
LYMPHOCYTES NFR BLD: 9.9 %
MCH RBC QN AUTO: 29.3 PG (ref 26–34)
MCHC RBC AUTO-ENTMCNC: 32.6 G/DL (ref 31–36)
MCV RBC AUTO: 89.8 FL (ref 80–100)
MONOCYTES # BLD: 0.9 K/UL (ref 0–1.3)
MONOCYTES NFR BLD: 7.6 %
NEUTROPHILS # BLD: 8.8 K/UL (ref 1.7–7.7)
NEUTROPHILS NFR BLD: 77.5 %
PHOSPHATE SERPL-MCNC: 3.7 MG/DL (ref 2.5–4.9)
PLATELET # BLD AUTO: 433 K/UL (ref 135–450)
PMV BLD AUTO: 6.8 FL (ref 5–10.5)
POTASSIUM SERPL-SCNC: 4.4 MMOL/L (ref 3.5–5.1)
RBC # BLD AUTO: 2.93 M/UL (ref 4.2–5.9)
SARS-COV-2 RDRP RESP QL NAA+PROBE: NOT DETECTED
SODIUM SERPL-SCNC: 132 MMOL/L (ref 136–145)
WBC # BLD AUTO: 11.4 K/UL (ref 4–11)

## 2023-12-19 PROCEDURE — 2580000003 HC RX 258

## 2023-12-19 PROCEDURE — 6370000000 HC RX 637 (ALT 250 FOR IP)

## 2023-12-19 PROCEDURE — 6370000000 HC RX 637 (ALT 250 FOR IP): Performed by: STUDENT IN AN ORGANIZED HEALTH CARE EDUCATION/TRAINING PROGRAM

## 2023-12-19 PROCEDURE — 99024 POSTOP FOLLOW-UP VISIT: CPT | Performed by: NURSE PRACTITIONER

## 2023-12-19 PROCEDURE — 85025 COMPLETE CBC W/AUTO DIFF WBC: CPT

## 2023-12-19 PROCEDURE — 97116 GAIT TRAINING THERAPY: CPT

## 2023-12-19 PROCEDURE — 6370000000 HC RX 637 (ALT 250 FOR IP): Performed by: THORACIC SURGERY (CARDIOTHORACIC VASCULAR SURGERY)

## 2023-12-19 PROCEDURE — 71045 X-RAY EXAM CHEST 1 VIEW: CPT

## 2023-12-19 PROCEDURE — 94669 MECHANICAL CHEST WALL OSCILL: CPT

## 2023-12-19 PROCEDURE — 97530 THERAPEUTIC ACTIVITIES: CPT

## 2023-12-19 PROCEDURE — 6360000002 HC RX W HCPCS

## 2023-12-19 PROCEDURE — 80069 RENAL FUNCTION PANEL: CPT

## 2023-12-19 PROCEDURE — 94640 AIRWAY INHALATION TREATMENT: CPT

## 2023-12-19 PROCEDURE — 87635 SARS-COV-2 COVID-19 AMP PRB: CPT

## 2023-12-19 PROCEDURE — 94660 CPAP INITIATION&MGMT: CPT

## 2023-12-19 PROCEDURE — 1280000000 HC REHAB R&B

## 2023-12-19 PROCEDURE — 97535 SELF CARE MNGMENT TRAINING: CPT

## 2023-12-19 RX ORDER — SODIUM CHLORIDE 9 MG/ML
25 INJECTION, SOLUTION INTRAVENOUS PRN
Status: DISCONTINUED | OUTPATIENT
Start: 2023-12-19 | End: 2023-12-20

## 2023-12-19 RX ORDER — ATORVASTATIN CALCIUM 40 MG/1
40 TABLET, FILM COATED ORAL NIGHTLY
Status: CANCELLED | OUTPATIENT
Start: 2023-12-19

## 2023-12-19 RX ORDER — HEPARIN SODIUM 1000 [USP'U]/ML
2800 INJECTION, SOLUTION INTRAVENOUS; SUBCUTANEOUS PRN
Status: DISCONTINUED | OUTPATIENT
Start: 2023-12-19 | End: 2023-12-30 | Stop reason: HOSPADM

## 2023-12-19 RX ORDER — ATORVASTATIN CALCIUM 40 MG/1
40 TABLET, FILM COATED ORAL NIGHTLY
Status: DISCONTINUED | OUTPATIENT
Start: 2023-12-19 | End: 2023-12-30 | Stop reason: HOSPADM

## 2023-12-19 RX ORDER — ACETAMINOPHEN 500 MG
1000 TABLET ORAL EVERY 6 HOURS
Status: DISCONTINUED | OUTPATIENT
Start: 2023-12-19 | End: 2023-12-19

## 2023-12-19 RX ORDER — ALBUTEROL SULFATE 2.5 MG/3ML
2.5 SOLUTION RESPIRATORY (INHALATION)
Status: DISCONTINUED | OUTPATIENT
Start: 2023-12-19 | End: 2023-12-19

## 2023-12-19 RX ORDER — SODIUM CHLORIDE 0.9 % (FLUSH) 0.9 %
5-40 SYRINGE (ML) INJECTION PRN
Status: DISCONTINUED | OUTPATIENT
Start: 2023-12-19 | End: 2023-12-20

## 2023-12-19 RX ORDER — SODIUM CHLORIDE 0.9 % (FLUSH) 0.9 %
5-40 SYRINGE (ML) INJECTION EVERY 12 HOURS SCHEDULED
Status: CANCELLED | OUTPATIENT
Start: 2023-12-19

## 2023-12-19 RX ORDER — AMIODARONE HYDROCHLORIDE 200 MG/1
400 TABLET ORAL 2 TIMES DAILY
Status: CANCELLED | OUTPATIENT
Start: 2023-12-19 | End: 2023-12-22

## 2023-12-19 RX ORDER — FAMOTIDINE 20 MG/1
20 TABLET, FILM COATED ORAL DAILY
Status: CANCELLED | OUTPATIENT
Start: 2023-12-20

## 2023-12-19 RX ORDER — ASPIRIN 81 MG/1
81 TABLET ORAL DAILY
Status: DISCONTINUED | OUTPATIENT
Start: 2023-12-20 | End: 2023-12-30 | Stop reason: HOSPADM

## 2023-12-19 RX ORDER — HEPARIN SODIUM 1000 [USP'U]/ML
2800 INJECTION, SOLUTION INTRAVENOUS; SUBCUTANEOUS PRN
Status: CANCELLED | OUTPATIENT
Start: 2023-12-19

## 2023-12-19 RX ORDER — ALBUTEROL SULFATE 2.5 MG/3ML
2.5 SOLUTION RESPIRATORY (INHALATION)
Status: CANCELLED | OUTPATIENT
Start: 2023-12-19

## 2023-12-19 RX ORDER — CLOPIDOGREL BISULFATE 75 MG/1
75 TABLET ORAL DAILY
Status: CANCELLED | OUTPATIENT
Start: 2023-12-20

## 2023-12-19 RX ORDER — ASPIRIN 81 MG/1
81 TABLET ORAL DAILY
Status: CANCELLED | OUTPATIENT
Start: 2023-12-20

## 2023-12-19 RX ORDER — ACETAMINOPHEN 325 MG/1
650 TABLET ORAL EVERY 4 HOURS PRN
Status: DISCONTINUED | OUTPATIENT
Start: 2023-12-19 | End: 2023-12-30 | Stop reason: HOSPADM

## 2023-12-19 RX ORDER — SALIVA STIMULANT COMB. NO.3
SPRAY, NON-AEROSOL (ML) MUCOUS MEMBRANE PRN
Status: CANCELLED | OUTPATIENT
Start: 2023-12-19

## 2023-12-19 RX ORDER — BISACODYL 5 MG/1
5 TABLET, DELAYED RELEASE ORAL DAILY
Status: ON HOLD | DISCHARGE
Start: 2023-12-20 | End: 2023-12-29

## 2023-12-19 RX ORDER — BISACODYL 5 MG/1
5 TABLET, DELAYED RELEASE ORAL DAILY
Status: DISCONTINUED | OUTPATIENT
Start: 2023-12-20 | End: 2023-12-30 | Stop reason: HOSPADM

## 2023-12-19 RX ORDER — ONDANSETRON 4 MG/1
4 TABLET, ORALLY DISINTEGRATING ORAL EVERY 8 HOURS PRN
Status: CANCELLED | OUTPATIENT
Start: 2023-12-19

## 2023-12-19 RX ORDER — AMIODARONE HYDROCHLORIDE 400 MG/1
400 TABLET ORAL 2 TIMES DAILY
Qty: 6 TABLET | Refills: 0 | Status: ON HOLD | DISCHARGE
Start: 2023-12-19 | End: 2023-12-29

## 2023-12-19 RX ORDER — ONDANSETRON 2 MG/ML
4 INJECTION INTRAMUSCULAR; INTRAVENOUS EVERY 6 HOURS PRN
Status: CANCELLED | OUTPATIENT
Start: 2023-12-19

## 2023-12-19 RX ORDER — AMIODARONE HYDROCHLORIDE 200 MG/1
200 TABLET ORAL DAILY
Status: DISCONTINUED | OUTPATIENT
Start: 2023-12-22 | End: 2023-12-19 | Stop reason: HOSPADM

## 2023-12-19 RX ORDER — METOPROLOL TARTRATE 50 MG/1
50 TABLET, FILM COATED ORAL 2 TIMES DAILY
Status: CANCELLED | OUTPATIENT
Start: 2023-12-19

## 2023-12-19 RX ORDER — MECOBALAMIN 5000 MCG
5 TABLET,DISINTEGRATING ORAL NIGHTLY PRN
Status: DISCONTINUED | OUTPATIENT
Start: 2023-12-19 | End: 2023-12-22

## 2023-12-19 RX ORDER — FAMOTIDINE 20 MG/1
20 TABLET, FILM COATED ORAL DAILY
Status: DISCONTINUED | OUTPATIENT
Start: 2023-12-20 | End: 2023-12-30 | Stop reason: HOSPADM

## 2023-12-19 RX ORDER — HYDRALAZINE HYDROCHLORIDE 10 MG/1
10 TABLET, FILM COATED ORAL EVERY 6 HOURS PRN
Status: DISCONTINUED | OUTPATIENT
Start: 2023-12-19 | End: 2023-12-30 | Stop reason: HOSPADM

## 2023-12-19 RX ORDER — ALBUTEROL SULFATE 2.5 MG/3ML
2.5 SOLUTION RESPIRATORY (INHALATION) EVERY 6 HOURS PRN
Status: DISCONTINUED | OUTPATIENT
Start: 2023-12-19 | End: 2023-12-30 | Stop reason: HOSPADM

## 2023-12-19 RX ORDER — SODIUM CHLORIDE 9 MG/ML
INJECTION, SOLUTION INTRAVENOUS PRN
Status: DISCONTINUED | OUTPATIENT
Start: 2023-12-19 | End: 2023-12-20

## 2023-12-19 RX ORDER — FUROSEMIDE 20 MG/1
20 TABLET ORAL 2 TIMES DAILY
Status: CANCELLED | OUTPATIENT
Start: 2023-12-19

## 2023-12-19 RX ORDER — SODIUM CHLORIDE 0.9 % (FLUSH) 0.9 %
5-40 SYRINGE (ML) INJECTION EVERY 12 HOURS SCHEDULED
Status: DISCONTINUED | OUTPATIENT
Start: 2023-12-19 | End: 2023-12-20

## 2023-12-19 RX ORDER — AMIODARONE HYDROCHLORIDE 200 MG/1
200 TABLET ORAL DAILY
Qty: 15 TABLET | Refills: 0 | Status: ON HOLD | DISCHARGE
Start: 2023-12-22 | End: 2023-12-29

## 2023-12-19 RX ORDER — ALBUTEROL SULFATE 2.5 MG/3ML
2.5 SOLUTION RESPIRATORY (INHALATION) EVERY 6 HOURS PRN
Status: CANCELLED | OUTPATIENT
Start: 2023-12-19

## 2023-12-19 RX ORDER — FUROSEMIDE 20 MG/1
20 TABLET ORAL 2 TIMES DAILY
Qty: 14 TABLET | Refills: 0 | Status: ON HOLD | DISCHARGE
Start: 2023-12-19 | End: 2023-12-29

## 2023-12-19 RX ORDER — AMIODARONE HYDROCHLORIDE 200 MG/1
200 TABLET ORAL DAILY
Status: DISCONTINUED | OUTPATIENT
Start: 2023-12-22 | End: 2023-12-30 | Stop reason: HOSPADM

## 2023-12-19 RX ORDER — DEXTROSE MONOHYDRATE 100 MG/ML
INJECTION, SOLUTION INTRAVENOUS CONTINUOUS PRN
Status: DISCONTINUED | OUTPATIENT
Start: 2023-12-19 | End: 2023-12-30 | Stop reason: HOSPADM

## 2023-12-19 RX ORDER — AMIODARONE HYDROCHLORIDE 200 MG/1
200 TABLET ORAL DAILY
Status: CANCELLED | OUTPATIENT
Start: 2023-12-22 | End: 2024-01-06

## 2023-12-19 RX ORDER — METHOCARBAMOL 500 MG/1
500 TABLET, FILM COATED ORAL 3 TIMES DAILY
Qty: 30 TABLET | Status: ON HOLD | DISCHARGE
Start: 2023-12-19 | End: 2023-12-29

## 2023-12-19 RX ORDER — SODIUM CHLORIDE 9 MG/ML
INJECTION, SOLUTION INTRAVENOUS PRN
Status: CANCELLED | OUTPATIENT
Start: 2023-12-19

## 2023-12-19 RX ORDER — AMIODARONE HYDROCHLORIDE 200 MG/1
400 TABLET ORAL 2 TIMES DAILY
Status: COMPLETED | OUTPATIENT
Start: 2023-12-19 | End: 2023-12-21

## 2023-12-19 RX ORDER — METOPROLOL TARTRATE 50 MG/1
50 TABLET, FILM COATED ORAL 2 TIMES DAILY
Status: DISCONTINUED | OUTPATIENT
Start: 2023-12-19 | End: 2023-12-30 | Stop reason: HOSPADM

## 2023-12-19 RX ORDER — AMIODARONE HYDROCHLORIDE 200 MG/1
400 TABLET ORAL 2 TIMES DAILY
Status: DISCONTINUED | OUTPATIENT
Start: 2023-12-19 | End: 2023-12-19 | Stop reason: HOSPADM

## 2023-12-19 RX ORDER — SODIUM CHLORIDE 9 MG/ML
25 INJECTION, SOLUTION INTRAVENOUS PRN
Status: CANCELLED | OUTPATIENT
Start: 2023-12-19

## 2023-12-19 RX ORDER — DEXTROSE MONOHYDRATE 100 MG/ML
INJECTION, SOLUTION INTRAVENOUS CONTINUOUS PRN
Status: CANCELLED | OUTPATIENT
Start: 2023-12-19

## 2023-12-19 RX ORDER — BISACODYL 10 MG
10 SUPPOSITORY, RECTAL RECTAL DAILY PRN
Status: CANCELLED | OUTPATIENT
Start: 2023-12-19

## 2023-12-19 RX ORDER — HYDRALAZINE HYDROCHLORIDE 10 MG/1
10 TABLET, FILM COATED ORAL EVERY 6 HOURS PRN
Status: CANCELLED | OUTPATIENT
Start: 2023-12-19

## 2023-12-19 RX ORDER — GLUCAGON 1 MG/ML
1 KIT INJECTION PRN
Status: DISCONTINUED | OUTPATIENT
Start: 2023-12-19 | End: 2023-12-30 | Stop reason: HOSPADM

## 2023-12-19 RX ORDER — METHOCARBAMOL 500 MG/1
1000 TABLET, FILM COATED ORAL 3 TIMES DAILY
Status: DISCONTINUED | OUTPATIENT
Start: 2023-12-19 | End: 2023-12-30 | Stop reason: HOSPADM

## 2023-12-19 RX ORDER — BISACODYL 5 MG/1
5 TABLET, DELAYED RELEASE ORAL DAILY
Status: CANCELLED | OUTPATIENT
Start: 2023-12-20

## 2023-12-19 RX ORDER — FUROSEMIDE 20 MG/1
20 TABLET ORAL 2 TIMES DAILY
Status: DISPENSED | OUTPATIENT
Start: 2023-12-19 | End: 2023-12-26

## 2023-12-19 RX ORDER — ACETAMINOPHEN 500 MG
1000 TABLET ORAL EVERY 8 HOURS SCHEDULED
Status: DISCONTINUED | OUTPATIENT
Start: 2023-12-19 | End: 2023-12-30 | Stop reason: HOSPADM

## 2023-12-19 RX ORDER — FAMOTIDINE 20 MG/1
20 TABLET, FILM COATED ORAL DAILY
Qty: 30 TABLET | Refills: 0 | Status: ON HOLD | DISCHARGE
Start: 2023-12-20 | End: 2023-12-29

## 2023-12-19 RX ORDER — ONDANSETRON 2 MG/ML
4 INJECTION INTRAMUSCULAR; INTRAVENOUS EVERY 6 HOURS PRN
Status: DISCONTINUED | OUTPATIENT
Start: 2023-12-19 | End: 2023-12-20

## 2023-12-19 RX ORDER — SALIVA STIMULANT COMB. NO.3
SPRAY, NON-AEROSOL (ML) MUCOUS MEMBRANE PRN
Status: DISCONTINUED | OUTPATIENT
Start: 2023-12-19 | End: 2023-12-30 | Stop reason: HOSPADM

## 2023-12-19 RX ORDER — METOPROLOL TARTRATE 50 MG/1
50 TABLET, FILM COATED ORAL 2 TIMES DAILY
Qty: 60 TABLET | Refills: 0 | Status: ON HOLD | DISCHARGE
Start: 2023-12-19 | End: 2023-12-29

## 2023-12-19 RX ORDER — OXYCODONE HYDROCHLORIDE 5 MG/1
5 TABLET ORAL EVERY 4 HOURS PRN
Status: DISCONTINUED | OUTPATIENT
Start: 2023-12-19 | End: 2023-12-30 | Stop reason: HOSPADM

## 2023-12-19 RX ORDER — BISACODYL 10 MG
10 SUPPOSITORY, RECTAL RECTAL DAILY PRN
Status: DISCONTINUED | OUTPATIENT
Start: 2023-12-19 | End: 2023-12-30 | Stop reason: HOSPADM

## 2023-12-19 RX ORDER — OXYCODONE HYDROCHLORIDE 5 MG/1
5 TABLET ORAL EVERY 4 HOURS PRN
Status: CANCELLED | OUTPATIENT
Start: 2023-12-19

## 2023-12-19 RX ORDER — OXYCODONE HYDROCHLORIDE 5 MG/1
5 TABLET ORAL EVERY 6 HOURS PRN
Qty: 28 TABLET | Refills: 0 | Status: ON HOLD | OUTPATIENT
Start: 2023-12-19 | End: 2023-12-29

## 2023-12-19 RX ORDER — CLOPIDOGREL BISULFATE 75 MG/1
75 TABLET ORAL DAILY
Status: DISCONTINUED | OUTPATIENT
Start: 2023-12-20 | End: 2023-12-30 | Stop reason: HOSPADM

## 2023-12-19 RX ORDER — POLYETHYLENE GLYCOL 3350 17 G/17G
17 POWDER, FOR SOLUTION ORAL DAILY
Status: DISCONTINUED | OUTPATIENT
Start: 2023-12-20 | End: 2023-12-30 | Stop reason: HOSPADM

## 2023-12-19 RX ORDER — POLYETHYLENE GLYCOL 3350 17 G/17G
17 POWDER, FOR SOLUTION ORAL DAILY
Status: CANCELLED | OUTPATIENT
Start: 2023-12-20

## 2023-12-19 RX ORDER — SODIUM CHLORIDE 0.9 % (FLUSH) 0.9 %
5-40 SYRINGE (ML) INJECTION PRN
Status: CANCELLED | OUTPATIENT
Start: 2023-12-19

## 2023-12-19 RX ORDER — METHOCARBAMOL 500 MG/1
1000 TABLET, FILM COATED ORAL 3 TIMES DAILY
Status: CANCELLED | OUTPATIENT
Start: 2023-12-19

## 2023-12-19 RX ORDER — ACETAMINOPHEN 500 MG
1000 TABLET ORAL EVERY 6 HOURS
Status: CANCELLED | OUTPATIENT
Start: 2023-12-19

## 2023-12-19 RX ORDER — ONDANSETRON 4 MG/1
4 TABLET, ORALLY DISINTEGRATING ORAL EVERY 8 HOURS PRN
Status: DISCONTINUED | OUTPATIENT
Start: 2023-12-19 | End: 2023-12-30 | Stop reason: HOSPADM

## 2023-12-19 RX ORDER — FUROSEMIDE 20 MG/1
20 TABLET ORAL 2 TIMES DAILY
Status: DISCONTINUED | OUTPATIENT
Start: 2023-12-19 | End: 2023-12-19

## 2023-12-19 RX ADMIN — ACETAMINOPHEN 1000 MG: 500 TABLET ORAL at 03:52

## 2023-12-19 RX ADMIN — SODIUM CHLORIDE, PRESERVATIVE FREE 10 ML: 5 INJECTION INTRAVENOUS at 07:40

## 2023-12-19 RX ADMIN — METHOCARBAMOL 1000 MG: 500 TABLET ORAL at 14:49

## 2023-12-19 RX ADMIN — CHLORHEXIDINE GLUCONATE 15 ML: 1.2 RINSE ORAL at 07:39

## 2023-12-19 RX ADMIN — METHOCARBAMOL 1000 MG: 500 TABLET ORAL at 20:18

## 2023-12-19 RX ADMIN — HEPARIN SODIUM 5000 UNITS: 5000 INJECTION INTRAVENOUS; SUBCUTANEOUS at 05:41

## 2023-12-19 RX ADMIN — BISACODYL 5 MG: 5 TABLET, COATED ORAL at 07:39

## 2023-12-19 RX ADMIN — METOPROLOL TARTRATE 50 MG: 50 TABLET, FILM COATED ORAL at 07:39

## 2023-12-19 RX ADMIN — METOPROLOL TARTRATE 50 MG: 50 TABLET, FILM COATED ORAL at 20:17

## 2023-12-19 RX ADMIN — FUROSEMIDE 20 MG: 20 TABLET ORAL at 17:22

## 2023-12-19 RX ADMIN — METHOCARBAMOL 1000 MG: 500 TABLET ORAL at 07:39

## 2023-12-19 RX ADMIN — CLOPIDOGREL BISULFATE 75 MG: 75 TABLET ORAL at 07:39

## 2023-12-19 RX ADMIN — Medication 5 MG: at 22:17

## 2023-12-19 RX ADMIN — ACETAMINOPHEN 650 MG: 325 TABLET ORAL at 17:26

## 2023-12-19 RX ADMIN — OXYCODONE 5 MG: 5 TABLET ORAL at 17:26

## 2023-12-19 RX ADMIN — Medication: at 07:40

## 2023-12-19 RX ADMIN — ASPIRIN 81 MG: 81 TABLET, COATED ORAL at 07:39

## 2023-12-19 RX ADMIN — ATORVASTATIN CALCIUM 40 MG: 40 TABLET, FILM COATED ORAL at 20:18

## 2023-12-19 RX ADMIN — FUROSEMIDE 20 MG: 20 TABLET ORAL at 07:39

## 2023-12-19 RX ADMIN — ACETAMINOPHEN 1000 MG: 500 TABLET ORAL at 14:49

## 2023-12-19 RX ADMIN — AMIODARONE HYDROCHLORIDE 400 MG: 200 TABLET ORAL at 20:17

## 2023-12-19 RX ADMIN — ALBUTEROL SULFATE 2.5 MG: 2.5 SOLUTION RESPIRATORY (INHALATION) at 08:36

## 2023-12-19 RX ADMIN — POLYETHYLENE GLYCOL 3350 17 G: 17 POWDER, FOR SOLUTION ORAL at 07:39

## 2023-12-19 RX ADMIN — FAMOTIDINE 20 MG: 20 TABLET ORAL at 07:39

## 2023-12-19 RX ADMIN — AMIODARONE HYDROCHLORIDE 400 MG: 200 TABLET ORAL at 07:39

## 2023-12-19 ASSESSMENT — PAIN SCALES - GENERAL
PAINLEVEL_OUTOF10: 8
PAINLEVEL_OUTOF10: 4
PAINLEVEL_OUTOF10: 0
PAINLEVEL_OUTOF10: 7
PAINLEVEL_OUTOF10: 5

## 2023-12-19 ASSESSMENT — PAIN DESCRIPTION - DESCRIPTORS
DESCRIPTORS: ACHING;DISCOMFORT
DESCRIPTORS: SHARP;THROBBING
DESCRIPTORS: ACHING

## 2023-12-19 ASSESSMENT — PAIN DESCRIPTION - ORIENTATION
ORIENTATION: MID
ORIENTATION: MID

## 2023-12-19 ASSESSMENT — PAIN DESCRIPTION - LOCATION
LOCATION: STERNUM
LOCATION: CHEST;INCISION
LOCATION: GENERALIZED

## 2023-12-19 ASSESSMENT — PAIN - FUNCTIONAL ASSESSMENT: PAIN_FUNCTIONAL_ASSESSMENT: ACTIVITIES ARE NOT PREVENTED

## 2023-12-19 NOTE — PLAN OF CARE
agreement    Plan of Care: Pt to be seen 5 out of 7 days per week, 0   mins (exact) per day for 0 days (exact)             Dysphagia: N/A           Speech/Language/Cognition: N/A        CASE MANAGEMENT:  Goals:   Assist patient/family with discharge planning, patient/family counseling,   and coordination with insurance during ARU stay.     QIM / IRF ARACELI SCORES:  ITEM CURRENT SCORE GOAL   Eating CARE Score: 6 Discharge Goal: Independent   Oral Hygiene CARE Score: 4 Discharge Goal: Independent   Toileting Hygiene CARE Score: 2 Discharge Goal: Independent   Shower/Bathe Self CARE Score: 2 Discharge Goal: Independent   Upper Body Dressing CARE Score: 3 Discharge Goal: Independent   Lower Body Dressing CARE Score: 2 Discharge Goal: Independent   On/Off Footwear CARE Score: 1 Discharge Goal: Independent   Roll Left & Right CARE Score: 3 Discharge Goal: Independent   Sit to Lying  CARE Score: 3 Discharge Goal: Independent   Lying to Sitting EOB CARE Score: 3 Discharge Goal: Independent   Sit to Stand CARE Score: 3 Discharge Goal: Independent   Chair/Bed to Chair Transfer CARE Score: 4 Discharge Goal: Independent   Toilet Transfer CARE Score: 4 Discharge Goal: Independent   Car Transfer CARE Score: 3 Discharge Goal: Independent   Walk 10 Feet CARE Score: 4 Discharge Goal: Independent   Walk 50 Feet, 2 Turns CARE Score: 4 Discharge Goal: Independent   Walk 150 Feet CARE Score: 4 Discharge Goal: Independent   Walk 10 Feet, Uneven Surface CARE Score: 88 Discharge Goal: Independent   1 Step (Curb) CARE Score: 4 Discharge Goal: Independent   4 Steps CARE Score: 4 Discharge Goal: Independent   12 Steps CARE Score: 88 Discharge Goal: Independent    Object CARE Score: 88 Discharge Goal: Independent   Wheel 50 feet, 2 turns CARE Score: 9 Discharge Goal: Not Applicable   Wheel 633 Feet CARE Score: 9 Discharge Goal: Not Applicable          Mahi Owens will be seen a minimum of 3 hours of therapy per day, a minimum of 5 out of 7 days per week.    [] In this rare instance due to the nature of this patient's medical involvement, this patient will be seen 15 hours per week (900 minutes within a 7 day period).    Treatments may include therapeutic exercises, gait training, neuromuscular re-ed, transfer training, community reintegration, bed mobility, w/c mobility and training, self care, home mgmt, cognitive training, energy conservation,dysphagia tx, speech/language/communication therapy, group therapy, and patient/family education. In addition, dietician/nutritionist may monitor calorie count as well as intake and collaboratively work with SLP on dietary upgrades.  Neuropsychology/Psychology may evaluate and provide necessary support.    Medical issues being managed closely and that require 24 hour availability of a physician:   [] Swallowing Precautions  [x] Bowel/Bladder Fx  [x] Weight bearing precautions   [] Wound Care    [x] Pain Mgmt   [x] Infection Protection   [x] DVT Prophylaxis   [x] Fall Precautions  [x] Fluid/Electrolyte/Nutrition Balance   [] Voice Protection   [x] Respiratory  [] Other:    Medical Prognosis: [x] Good  [] Fair    [] Guarded   Total expected IRF days 10  Anticipated discharge destination:    [] Home Independently   [] Home Modified Independent  [x] Home with supervision    []SNF     [] Other                                           Physician anticipated functional outcomes:  Home w/ supervision and home health services.    IPOC brief synthesis: Kristopher Watkins is a 78 year old male with a past medical history significant for CAD, GERD, HLD, HTN, sleep apnea, and morbid obesity who presented to [facility] on 12/5/23 for planned open CABG x4 with left atrial appendage clip placement with sternal plating. He was admitted to [facility] on 12/19 due to functional deficits below his baseline.      This plan has been reviewed with Kristopher Watkins on 12/20 in a language the patient understands.  Kristopher Watkins has had the  opportunity to include input with the therapy team.      I have reviewed this initial plan of care and agree with its contents:    Title   Name    Date    Time    Physician: Wen Ji.  Joanna Johnson MD    Case Mgmt: Maria Victoria Womack RN    OT: Libra Ornelas OTR/L    PT: Prabhjot Grayson PT, DPT    RN: Sheron Pires RN    ST: Shelley Shelton MA [facility] SLP    : Sonia Vargas OTR/L    Other:

## 2023-12-19 NOTE — DISCHARGE INSTR - COC
Continuity of Care Form    Patient Name: Woody De Guzman   :  1945  MRN:  [id number]    Admit date:  2023  Discharge date:  ***    Code Status Order: Full Code   Advance Directives:     Admitting Physician:  Shon Martinez MD  PCP: Gabbie Crandall DO    Discharging Nurse: [facility] Unit/Room#: 3773/9855-55  Discharging Unit Phone Number: ***    Emergency Contact:   Extended Emergency Contact Information  Primary Emergency Contact: Lisa Watkins  Address: [address] [place] [address] [place] Phone: [phone]  Mobile Phone: [phone]  Relation: Spouse  Secondary Emergency Contact: Dayne P[place] [address] [place] Phone: [phone]  Mobile Phone: [phone]  Relation: Child    Past Surgical History:  Past Surgical History:   Procedure Laterality Date    CATARACT EXTRACTION, BILATERAL Bilateral     CATARACT REMOVAL Bilateral     COLONOSCOPY      CORONARY ANGIOPLASTY  2015    CORONARY ANGIOPLASTY WITH STENT PLACEMENT      x5    CORONARY ARTERY BYPASS GRAFT N/A 2023    OPEN CORONARY ARTERY BYPASS GRAFTING TIMES FOUR USING INTERNAL MAMMARY ARTERY, SAPHENOUS VEIN GRAFT, ON PUMP, LILY, TEMPORARY PACING WIRES, LEFT ATRIAL APPENDAGE CLIP PLACEMENT WITH STERNAL PLATING AND BILATERAL PECTORALIS BLOCKS performed by Shon Martinez MD at [facility], [address] [place] [address], [address] [place] [address] BIOPSY      nose    TOTAL KNEE ARTHROPLASTY Right 2019    [address] [place] [address] performed by Burna Ahumada, MD at [address] [place] [address]       Immunization History:   Immunization History   Administered Date(s) Administered    COVID-19, PFIZER PURPLE top, DILUTE for use, (age 15 y+), 30mcg/0.3mL 2021, 2021       Active Problems:  Patient Active Problem List   Diagnosis Code    Mixed hyperlipidemia E78.2    Essential

## 2023-12-19 NOTE — PROGRESS NOTES
Occupational Therapy  Facility/Department: [facility] C2 CARD TELEMETRY  Daily Treatment Note  NAME: Navya Castro  : 1945  MRN: [id number]    Date of Service: 2023    Discharge Recommendations:  IP Rehab  OT Equipment Recommendations  Other: Defer to next level of care      Patient Diagnosis(es): The encounter diagnosis was Acute post-operative pain. Assessment    Assessment: Pt demos improved tolerance of therapy. Pt Mod A for bed mobility and GA for functional t/fs with 4WW. Pt SBA for grooming and toileting and Dep for LE dressing. Pt continues to beneift from acute OT and is functioning below baseline. Pt seen in cotx this day to maximize functional progress towards goals that could not be achieved in 1:1 session. Activity Tolerance: Patient tolerated treatment well;Patient limited by endurance  Discharge Recommendations: IP Rehab  Other: Defer to next level of care      Plan   Occupational Therapy Plan  Times Per Week: 4-6x / week  Current Treatment Recommendations: Strengthening;ROM;Balance training;Functional mobility training; Endurance training;Pain management; Safety education & training;Patient/Caregiver education & training;Self-Care / ADL     Restrictions  Restrictions/Precautions  Restrictions/Precautions: Fall Risk;Cardiac;General Precautions  Position Activity Restriction  Sternal Precautions: No Pushing; No Pulling;5# Lifting Restrictions  Other position/activity restrictions: Telemetry with ICU monitoring, R central line, IV lines    Subjective   Subjective  Subjective: Pt in bed on PT/OT arrival, pleasant and agreeable to tx. Pt reports pain however does not rate  Orientation  Overall Orientation Status: Within Functional Limits  Orientation Level: Oriented X4  Pain: Reports pain from laying in bed, does not give rating.   Cognition  Overall Cognitive Status: WFL        Objective    Vitals  BP: 110/73  HR: 61  O2: 97% RA  Bed Mobility Training  Bed Mobility Training: Yes  Interventions:

## 2023-12-19 NOTE — PROGRESS NOTES
[facility] Progress Note           Subjective/   78 y.o. year old male who we are seeing in consultation for MADONNA, fluid overload.     HPI:  Renal function better, non-oliguric.  Shortness of breath better, on RA.    ROS:  +weak, intake reduced.     Objective/   GEN:  Chronically ill, /62   Pulse 60   Temp 97.8 °F (36.6 °C) (Oral)   Resp 13   Ht 1.727 m (5' 8\")   Wt 134.2 kg (295 lb 13.7 oz)   SpO2 97%   BMI 44.98 kg/m²   HEENT: non-icteric, no JVD  CV: S1, S2 without m/r/g; +LE edema  RESP: CTA B without w/r/r; breathing wnl  ABD: +bs, soft, nt, no hsm  SKIN: warm, no rashes    Data/  Recent Labs     12/17/23  0607 12/18/23  0920 12/19/23  0455   WBC 18.4* 17.4* 11.4*   HGB 8.9* 9.3* 8.6*   HCT 27.3* 28.9* 26.3*   MCV 90.0 90.7 89.8    478* 433       Recent Labs     12/17/23  0607 12/18/23  0615 12/19/23  0455   * 133* 132*   K 3.9 4.2 4.4    100 100   CO2 21 22 21   GLUCOSE 107* 103* 104*   PHOS 3.5 4.1 3.7   BUN 54* 58* 67*   CREATININE 1.1 1.2 1.3   LABGLOM >60 >60 56*         Assessment/Plan     Acute Kidney Injury.  - Likely hemodynamically mediated ATN.  - Baseline serum creatinine of 0.9-1.1mg/dL.  Started on intermittent hemodialysis on 12/12, temporary CRRT afterwards  -No indication for dialysis today, temporary line removed due to leukocytosis   - On lasix 20 mg po bid and SCr is back to 1.1-1.3 range / stable and clinically improved     Shortness of breath:  - intermittently had been not wanting to be on Bilevel  - CT 12/16 showed moderate pericardial effusion and moderate to large left pleural effusion  - Deferred to CT surgery on draining     Leukocytosis:   - On Zyvox and Merrem  - Removed temp line  - CT 12/16  - Resolving     Hypertension.  - BP is acceptable.     Anemia.  - Blood loss anemia.  - Stable      CAD.  - S/P CABG x 4, ROBBI clip on 12/5/23.  - CTS following.    ____________________________________  Carlos Frazier MD  [facility]  [email or web address]  Office: [phone]

## 2023-12-19 NOTE — PROGRESS NOTES
12/19/23 0002   NIV Type   Equipment Type v60   Mode Bilevel   Mask Type Full face mask   Mask Size Medium   Assessment   Pulse 61   Respirations 13   /86   SpO2 98 %   Comfort Level Good   Using Accessory Muscles No   Mask Compliance Good   Skin Assessment Clean, dry, & intact   Skin Protection for O2 Device Yes   Orientation Middle   Location Nose   Settings/Measurements   IPAP 10 cmH20   CPAP/EPAP 5 cmH2O   Vt (Measured) 804 mL   Rate Ordered 12   FiO2  21 %   Minute Volume (L/min) 10.3 Liters   Mask Leak (lpm) 0 lpm   Patient's Home Machine No   Alarm Settings   Alarms On Y   Low Pressure (cmH2O) 6 cmH2O   High Pressure (cmH2O) 30 cmH2O   Delay Alarm 20 sec(s)   RR Low (bpm) 6   RR High (bpm) 40 br/min

## 2023-12-19 NOTE — PROGRESS NOTES
Widened  Speed/Nhung: Slow;Pace decreased (< 100 feet/min)  Step Length: Right shortened;Left shortened  Gait Abnormalities: Decreased step clearance;Trunk sway increased (slow pace, increased forward trunk flexion gait is steady with no LOB)           Safety Devices  Type of Devices: All carlo prominences offloaded;All fall risk precautions in place;Nurse notified;Gait belt;Call light within reach;Bed alarm in place;Left in bed;Patient at risk for falls  Restraints  Restraints Initially in Place: No       Goals  Short Term Goals  Time Frame for Short Term Goals: 7 days (12/13/23) unless otherwise noted - extend to 12/23/23 due to longer than anticipated LOS  Short Term Goal 1: Pt will perform bed mobility with min(A)  Short Term Goal 2: Pt will perform transfers with SBA -- MET 12/19  Short Term Goal 3: Pt will ambulate 100 ft with LRAD and SBA -- MET 12/19  Short Term Goal 4: Pt will perform 12-15 reps of BLE exercises by 12/9/23, progress to 12/15/23 - MET 12/14/23  Additional Goals?: No  Patient Goals   Patient Goals : \"to go home\"    Education  Patient Education  Education Given To: Patient  Education Provided: Role of Therapy;Plan of Care;Precautions;Transfer Training;Equipment;Energy Conservation  Education Provided Comments: Pt able to recall 3/3 sternal precautions at start of session  Education Method: Demonstration;Verbal  Barriers to Learning: Hearing  Education Outcome: Verbalized understanding;Demonstrated understanding;Continued education needed    AM-PAC - Mobility    AM-PAC Basic Mobility - Inpatient   How much help is needed turning from your back to your side while in a flat bed without using bedrails?: A Lot  How much help is needed moving from lying on your back to sitting on the side of a flat bed without using bedrails?: A Lot  How much help is needed moving to and from a bed to a chair?: A Little  How much help is needed standing up from a chair using your arms?: A Little  How much help is  needed walking in hospital room?: A Little  How much help is needed climbing 3-5 steps with a railing?: A Little  AM-PAC Inpatient Mobility Raw Score : 16  AM-PAC Inpatient T-Scale Score : 40.78  Mobility Inpatient CMS 0-100% Score: 54.16  Mobility Inpatient CMS G-Code Modifier : CK         Therapy Time   Individual Concurrent Group Co-treatment   Time In       0900   Time Out       0923   Minutes       23   Timed Code Treatment Minutes: [address], PT, DPT    If pt is unable to be seen after this session, please let this note serve as discharge summary. Please see case management note for discharge disposition. Thank you.

## 2023-12-19 NOTE — CARE COORDINATION
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 Educate patient/family on team conferences. Discuss family training with the patient/family on how it is encouraged on the unit. Order for \"discharge planning\" has been addressed. 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 Lives with spouse in a 2 level home with level entry. Home has walk-n-shower. DME at home: [] Cam Chandni     [] Inés Brito       [] ИРИНА        [] [facility]      [] Shower Chair        [] O2       [] HHN     [] CPAP       [] BiPap      [] Hospital Bed       [] W/C        [x] Other: Grab bars   Medical concerns requiring training: Restrictions/Precautions  Restrictions/Precautions: Fall Risk;Cardiac;General Precautions  Position Activity Restriction  Sternal Precautions: No Pushing; No Pulling;5# Lifting Restrictions     Medication concerns:  Require financial assistance with meds? [x] No       [] If yes, please explain:   [] Yes              Services prior to admission: none   Preference for [facility] or OP Therapy: [x] Home health       [] Outpatient       [] No preference   Patient's goal(s):  \"to go home\"     Working or volunteering PTA?

## 2023-12-19 NOTE — PLAN OF CARE
Problem: Chronic Conditions and Co-morbidities  Goal: Patient's chronic conditions and co-morbidity symptoms are monitored and maintained or improved  Outcome: Progressing  Flowsheets (Taken 12/18/2023 2000)  Care Plan - Patient's Chronic Conditions and Co-Morbidity Symptoms are Monitored and Maintained or Improved: Monitor and assess patient's chronic conditions and comorbid symptoms for stability, deterioration, or improvement     Problem: Discharge Planning  Goal: Discharge to home or other facility with appropriate resources  Outcome: Progressing  Flowsheets (Taken 12/18/2023 2000)  Discharge to home or other facility with appropriate resources:   Identify barriers to discharge with patient and caregiver   Identify discharge learning needs (meds, wound care, etc)   Arrange for needed discharge resources and transportation as appropriate   Refer to discharge planning if patient needs post-hospital services based on physician order or complex needs related to functional status, cognitive ability or social support system     Problem: Pain  Goal: Verbalizes/displays adequate comfort level or baseline comfort level  Outcome: Progressing     Problem: ABCDS Injury Assessment  Goal: Absence of physical injury  Outcome: Progressing     Problem: Safety - Adult  Goal: Free from fall injury  Outcome: Progressing

## 2023-12-19 NOTE — PLAN OF CARE
improved neurological status  Outcome: Adequate for Discharge  Goal: Absence of seizures  Outcome: Adequate for Discharge  Goal: Remains free of injury related to seizures activity  Outcome: Adequate for Discharge     Problem: Respiratory - Adult  Goal: Achieves optimal ventilation and oxygenation  Outcome: Adequate for Discharge     Problem: Cardiovascular - Adult  Goal: Maintains optimal cardiac output and hemodynamic stability  Outcome: Adequate for Discharge  Goal: Absence of cardiac dysrhythmias or at baseline  Outcome: Adequate for Discharge     Problem: Skin/Tissue Integrity - Adult  Goal: Skin integrity remains intact  Outcome: Adequate for Discharge  Flowsheets (Taken 12/19/2023 0003 by Merna Elliott RN)  Skin Integrity Remains Intact: Monitor for areas of redness and/or skin breakdown  Goal: Incisions, wounds, or drain sites healing without S/S of infection  Outcome: Adequate for Discharge  Flowsheets (Taken 12/19/2023 0003 by Merna Elliott RN)  Incisions, Wounds, or Drain Sites Healing Without Sign and Symptoms of Infection: ADMISSION and DAILY: Assess and document risk factors for pressure ulcer development     Problem: Musculoskeletal - Adult  Goal: Return mobility to safest level of function  Outcome: Adequate for Discharge  Goal: Maintain proper alignment of affected body part  Outcome: Adequate for Discharge  Goal: Return ADL status to a safe level of function  Outcome: Adequate for Discharge     Problem: Gastrointestinal - Adult  Goal: Maintains adequate nutritional intake  Outcome: Adequate for Discharge     Problem: Infection - Adult  Goal: Absence of infection at discharge  Outcome: Adequate for Discharge  Goal: Absence of infection during hospitalization  Outcome: Adequate for Discharge     Problem: Metabolic/Fluid and Electrolytes - Adult  Goal: Electrolytes maintained within normal limits  Outcome: Adequate for Discharge  Goal: Hemodynamic stability and optimal renal function

## 2023-12-19 NOTE — PROGRESS NOTES
12/18/23 2206   NIV Type   NIV Started/Stopped On   Equipment Type v60   Mode Bilevel   Mask Type Full face mask   Mask Size Medium   Assessment   Pulse 64   Respirations 21   /71   SpO2 96 %   Comfort Level Good   Using Accessory Muscles No   Mask Compliance Good   Skin Assessment Clean, dry, & intact   Skin Protection for O2 Device Yes   Orientation Middle   Location Nose   Settings/Measurements   IPAP 10 cmH20   CPAP/EPAP 5 cmH2O   Vt (Measured) 631 mL   Rate Ordered 12   FiO2  21 %   Minute Volume (L/min) 14.1 Liters   Mask Leak (lpm) 0 lpm   Patient's Home Machine No   Alarm Settings   Alarms On Y   Low Pressure (cmH2O) 6 cmH2O   High Pressure (cmH2O) 30 cmH2O   Delay Alarm 20 sec(s)   RR Low (bpm) 6   RR High (bpm) 40 br/min

## 2023-12-19 NOTE — DISCHARGE SUMMARY
Orders for discharge to ARU placed. Pt to be moved by 1300. Handoff given to ARU staff. No issues during transfer.

## 2023-12-19 NOTE — PROGRESS NOTES
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  []  0. Does not apply - I have not had any pain or hurting in the past 5 days  [x]  1. Rarely or not at all  []  2. Occasionally  []  3. Frequently  []  4. Almost constantly  []  8. Unable to answer    Over the past 5 days, how often have you limited your participation in rehabilitation therapy sessions due to pain?  []  0. Does not apply - I have not received rehabilitation therapy in the past 5 days  [x]  1. Rarely or not at all  []  2. Occasionally  []  3. Frequently  []  4. Almost constantly  []  8. Unable to answer    Pain Interference with Day-to-Day Activities:  Over the past 5 days, how often have you limited your day-to-day activities (excluding rehabilitation therapy session)? []  1. Rarely or not at all  [x]  2. Occasionally  []  3. Frequently  []  4. Almost constantly  []  8. Unable to answer      High-Risk Drug Classes: Use and Indication   Is taking: Check if the pt is taking any medications by pharmacological classification  Indication noted: If column 1 is checked, check if there is an indication noted for all meds in the drug class Is taking  (check all that apply) Indication noted (check all that apply)   Antipsychotic [] []   Anticoagulant [x] [x]   Antibiotic [] []   Opioid [x] [x]   Antiplatelet [] []   Hypoglycemic (including insulin) [] []   None of the above [] []     Special Treatments, Procedures, and Programs   Check all of the following treatments, procedures, and programs that apply on admission. On admission (check all that apply)   Cancer Treatments    A1. Chemotherapy []   A2. IV []   A3. Oral []   A10. Other []   B1. Radiation []   Respiratory Therapies    C1.  Oxygen Therapy []   C2. Continuous []   C3. Intermittent []   C4. High-concentration []   D1. Suctioning []   D2. Scheduled []   D3. As needed []   E1. Tracheostomy Care []   F1. Invasive Mechanical Ventilator (ventilator or respirator) []   G1. Non-invasive Mechanical Ventilator []   G2. BiPAP []   G3. CPAP [x]   Other    H1. IV Medications []   H2. Vasoactive medications []   H3. Antibiotics []   H4. Anticoagulation []   H10. Other []   I1. Transfusions []   J1. Dialysis []   J2. Hemodialysis []   J3. Peritoneal dialysis []   O1. IV access []   O2. Peripheral []   O3. Midline []   O4.  Central (PICC, tunneled, port) []   None of the above []     Signature:  Luis Nunes RN

## 2023-12-19 NOTE — PROGRESS NOTES
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 Alert and oriented x4. Oriented to room and call light system. Oriented to rehab routine and therapy schedules. Informed about care conferences and ordering of meals. Drug / Medication Review:   Medications were reviewed by RN at time of admission  [x]  No potential or actual clinically significant medication issues were noted. []  Potential or actual clinically significant medication issues were found and MD was notified. (Specified below if applicable)   []  Allergy to medication   []  Drug interactions (drug/drug, drug/food, drug/disease interactions)   []  Duplicate drug   []  Omission (drug missing from prescribed regimen)   []  Non adherence   []  Adverse reaction   []  Wrong patient, drug, dose, route, time error   []  Ineffective drug therapy    Patient Mood Interview    Symptom Presence  0. No (enter 0 in column 2)  1. Yes (enter 0-3 in column 2)  9. No response (leave column 2 blank  Symptom Frequency  0. Never or 1 day  1. 2-6 days (several days)  2. 7-11 days (half or more days)  3. 12-14 days (nearly everyday) 1. Symptom Presence 2. Symptom Frequency    Enter scores in boxes   Little interest or pleasure in doing things   0 0   Feeling down, depressed, or hopeless   0 0   If either A or B is coded 2 or 3, CONTINUE asking the questions below. If not, END the interview.      Trouble falling or staying asleep, or sleeping too much       Feeling tired or having little energy       Poor appetite or overeating       Feeling bad about yourself - or that you are a failure or have let yourself or your family down       Trouble concentrating on things, such as reading the newspaper or watching television initiated:  Not Applicable      [facility] nurse consulted for Pressure Injury (Stage 3,4, Unstageable, DTI, NWPT, Complex wounds)and New or Established Ostomies:  Not Applicable    Primary Nurse eSignature: Surya Murphy RN  Co-signer eSignature:  Milly Barnett RN

## 2023-12-19 NOTE — CARE COORDINATION
LOS 14- Pt is discharged and moving  to [address] [place] [address] at 1 pm today.  . Pt is aware and family

## 2023-12-19 NOTE — PROGRESS NOTES
Patient: Luca Olea  [id number]  Date: 12/19/2023      Chief Complaint: s/p CABG    History of Present Illness/Hospital Course:  Hal Pan is a 69year old male with a past medical history significant for CAD, GERD, HLD, HTN, sleep apnea, and morbid obesity who presented to [facility] on 12/5/23 for planned open CABG x4 with left atrial appendage clip placement with sternal plating. Post operative course has been notable for hypotension, MADONNA, and acute blood loss anemia. Nephrology is following. He was temporarily on CRRT. ID was consulted for persistent leukocytosis. On 12/18 he underwent left thoracentesis. Interval History:  No acute events overnight. Today Bora Syed is seen in his room with nursing and family present. He reports fatigue and states he has not been sleeping. Discussed melatonin. has a past medical history of Arthritis, CAD (coronary artery disease), Cancer ([address]), GERD (gastroesophageal reflux disease), Hearing aid worn, Hyperlipidemia, Hypertension, Osteoarthritis, Sleep apnea, and Wears glasses. has a past surgical history that includes Coronary angioplasty with stent; Coronary angioplasty (08/26/2015); skin biopsy; Colonoscopy; Endoscopy, colon, diagnostic; Cataract removal (Bilateral); Total knee arthroplasty (Right, 09/18/2019); joint replacement; knee surgery; Cataract extraction, bilateral (Bilateral); and Coronary artery bypass graft (N/A, 12/5/2023). reports that he quit smoking about 45 years ago. His smoking use included cigarettes. He started smoking about 61 years ago. He has a 45.0 pack-year smoking history. He has never used smokeless tobacco. He reports current alcohol use of about 2.0 standard drinks of alcohol per week. He reports that he does not use drugs. family history includes Coronary Art Dis in his father and mother; Heart Disease in his father and mother.       REVIEW OF SYSTEMS:   Denies f/c, n/v, cp, sob    Physical Examination:  Vitals: Patient

## 2023-12-19 NOTE — DISCHARGE SUMMARY
Cardiac, Vascular & Thoracic Surgery  Discharge Summary    Patient:  Timmy Nicholson 1945 [id number]   Admission Date:  12/5/2023  6:33 AM  Discharge Date:  12/19/23    Principle Diagnosis:  Coronary artery disease due to calcified coronary lesion    Secondary Diagnosis:  Principal Problem:    Coronary artery disease due to calcified coronary lesion  Active Problems:    SALLIE (obstructive sleep apnea)    Morbid obesity with BMI of 45.0-49.9, adult (HCC)    CAD in native artery    Grade I diastolic dysfunction    Restrictive lung disease    S/P CABG x 4    Alcohol use    Ex-heavy cigarette smoker (20-39 per day)  Resolved Problems:    Postoperative anemia    MADONNA (acute kidney injury) ([address])    Leukocytosis    Pulmonary infiltrate    Pleural effusion on left      Cardiac Cath: 11/1/23 with Dr. Michael Woods   Findings:  1. Left main coronary artery 70% tapering distal stenosis. It gave off the left anterior descending artery and left circumflex. 2. Left anterior descending artery has moderate atherosclerotic disease. It was moderate in size. It gave off septal perforators and a moderate sized diagonal branch. The LAD covered the entire apex of the left ventricle. 3. Left circumflex has moderate atherosclerotic disease. It was moderate in size. There was a moderate sized obtuse marginal branch. 4. Right coronary artery has severe atherosclerotic disease. There are previously placed stents within the proximal right coronary artery. The stents are overall patent but there is a area in the distal stent margin that has in segment disease at about 70%. There is also complex disease noted of the distal PDA which is 90% in severity. 5. Left ventriculogram showed normal LVEF at 55-60%. Wall motion was normal . There was no significant mitral valve or aortic valve disease noted. LVEDP was normal. There was no gradient noted across the aortic valve during pullback of the catheter.      /89   Pulse 56   Ht INCISIONS DAILY WITH A CLEAN WASHCLOTH AND ANTIBACTERIAL SOAP. Do not wash your incisions after you have cleansed other parts of your body    Follow up with Cardiothoracic Surgeon, Dr. Carlos one week after discharge.    Follow up with Cardiologist, Dr. Trivedi in one month.    Maricruz Major, APRN - CNP

## 2023-12-19 NOTE — PROGRESS NOTES
12/19/23 1555   Encounter Summary   Encounter Overview/Reason  Spiritual/Emotional Needs   Service Provided For: Patient   Referral/Consult From: Other    Last Encounter  12/19/23  ( prayed with pt)   Complexity of Encounter Low   Begin Time 1515   End Time  1535   Total Time Calculated 20 min   Spiritual/Emotional needs   Type Spiritual Support   Assessment/Intervention/Outcome   Assessment Coping   Intervention Active listening;Prayer (assurance of)/[place]   Outcome Comfort;Expressed Gratitude

## 2023-12-20 PROBLEM — E44.0 MODERATE MALNUTRITION (HCC): Status: ACTIVE | Noted: 2023-12-20

## 2023-12-20 LAB
ANION GAP SERPL CALCULATED.3IONS-SCNC: 11 MMOL/L (ref 3–16)
BACTERIA SPEC RESP CULT: NORMAL
BASOPHILS # BLD: 0 K/UL (ref 0–0.2)
BASOPHILS NFR BLD: 0.5 %
BUN SERPL-MCNC: 61 MG/DL (ref 7–20)
CALCIUM SERPL-MCNC: 8.6 MG/DL (ref 8.3–10.6)
CHLORIDE SERPL-SCNC: 104 MMOL/L (ref 99–110)
CO2 SERPL-SCNC: 21 MMOL/L (ref 21–32)
CREAT SERPL-MCNC: 1.1 MG/DL (ref 0.8–1.3)
DEPRECATED RDW RBC AUTO: 17 % (ref 12.4–15.4)
EOSINOPHIL # BLD: 0.3 K/UL (ref 0–0.6)
EOSINOPHIL NFR BLD: 3.5 %
GFR SERPLBLD CREATININE-BSD FMLA CKD-EPI: >60 ML/MIN/{1.73_M2}
GLUCOSE SERPL-MCNC: 97 MG/DL (ref 70–99)
GRAM STN SPEC: NORMAL
HCT VFR BLD AUTO: 26.8 % (ref 40.5–52.5)
HGB BLD-MCNC: 8.9 G/DL (ref 13.5–17.5)
LYMPHOCYTES # BLD: 1 K/UL (ref 1–5.1)
LYMPHOCYTES NFR BLD: 10.8 %
MCH RBC QN AUTO: 29.9 PG (ref 26–34)
MCHC RBC AUTO-ENTMCNC: 33.2 G/DL (ref 31–36)
MCV RBC AUTO: 90.1 FL (ref 80–100)
MONOCYTES # BLD: 0.9 K/UL (ref 0–1.3)
MONOCYTES NFR BLD: 9.1 %
NEUTROPHILS # BLD: 7.3 K/UL (ref 1.7–7.7)
NEUTROPHILS NFR BLD: 76.1 %
PLATELET # BLD AUTO: 448 K/UL (ref 135–450)
PMV BLD AUTO: 6.8 FL (ref 5–10.5)
POTASSIUM SERPL-SCNC: 4.4 MMOL/L (ref 3.5–5.1)
RBC # BLD AUTO: 2.98 M/UL (ref 4.2–5.9)
SODIUM SERPL-SCNC: 136 MMOL/L (ref 136–145)
WBC # BLD AUTO: 9.6 K/UL (ref 4–11)

## 2023-12-20 PROCEDURE — 97166 OT EVAL MOD COMPLEX 45 MIN: CPT

## 2023-12-20 PROCEDURE — 97162 PT EVAL MOD COMPLEX 30 MIN: CPT

## 2023-12-20 PROCEDURE — 1280000000 HC REHAB R&B

## 2023-12-20 PROCEDURE — 97110 THERAPEUTIC EXERCISES: CPT

## 2023-12-20 PROCEDURE — 97530 THERAPEUTIC ACTIVITIES: CPT

## 2023-12-20 PROCEDURE — 36415 COLL VENOUS BLD VENIPUNCTURE: CPT

## 2023-12-20 PROCEDURE — 97535 SELF CARE MNGMENT TRAINING: CPT

## 2023-12-20 PROCEDURE — 80048 BASIC METABOLIC PNL TOTAL CA: CPT

## 2023-12-20 PROCEDURE — 85025 COMPLETE CBC W/AUTO DIFF WBC: CPT

## 2023-12-20 PROCEDURE — 6360000002 HC RX W HCPCS: Performed by: STUDENT IN AN ORGANIZED HEALTH CARE EDUCATION/TRAINING PROGRAM

## 2023-12-20 PROCEDURE — 97116 GAIT TRAINING THERAPY: CPT

## 2023-12-20 PROCEDURE — 6370000000 HC RX 637 (ALT 250 FOR IP): Performed by: STUDENT IN AN ORGANIZED HEALTH CARE EDUCATION/TRAINING PROGRAM

## 2023-12-20 PROCEDURE — 92523 SPEECH SOUND LANG COMPREHEN: CPT

## 2023-12-20 RX ORDER — HEPARIN SODIUM 5000 [USP'U]/ML
5000 INJECTION, SOLUTION INTRAVENOUS; SUBCUTANEOUS EVERY 8 HOURS SCHEDULED
Status: DISCONTINUED | OUTPATIENT
Start: 2023-12-20 | End: 2023-12-26

## 2023-12-20 RX ADMIN — METHOCARBAMOL 1000 MG: 500 TABLET ORAL at 08:44

## 2023-12-20 RX ADMIN — OXYCODONE 5 MG: 5 TABLET ORAL at 21:26

## 2023-12-20 RX ADMIN — FAMOTIDINE 20 MG: 20 TABLET, FILM COATED ORAL at 08:41

## 2023-12-20 RX ADMIN — METHOCARBAMOL 1000 MG: 500 TABLET ORAL at 21:26

## 2023-12-20 RX ADMIN — ATORVASTATIN CALCIUM 40 MG: 40 TABLET, FILM COATED ORAL at 21:26

## 2023-12-20 RX ADMIN — ACETAMINOPHEN 1000 MG: 500 TABLET ORAL at 13:53

## 2023-12-20 RX ADMIN — FUROSEMIDE 20 MG: 20 TABLET ORAL at 08:44

## 2023-12-20 RX ADMIN — POLYETHYLENE GLYCOL 3350 17 G: 17 POWDER, FOR SOLUTION ORAL at 08:44

## 2023-12-20 RX ADMIN — CLOPIDOGREL BISULFATE 75 MG: 75 TABLET ORAL at 08:40

## 2023-12-20 RX ADMIN — ASPIRIN 81 MG: 81 TABLET, COATED ORAL at 08:40

## 2023-12-20 RX ADMIN — HEPARIN SODIUM 5000 UNITS: 5000 INJECTION INTRAVENOUS; SUBCUTANEOUS at 21:32

## 2023-12-20 RX ADMIN — Medication 5 MG: at 21:25

## 2023-12-20 RX ADMIN — AMIODARONE HYDROCHLORIDE 400 MG: 200 TABLET ORAL at 08:41

## 2023-12-20 RX ADMIN — METHOCARBAMOL 1000 MG: 500 TABLET ORAL at 13:53

## 2023-12-20 RX ADMIN — METOPROLOL TARTRATE 50 MG: 50 TABLET, FILM COATED ORAL at 21:25

## 2023-12-20 RX ADMIN — BISACODYL 5 MG: 5 TABLET, COATED ORAL at 08:41

## 2023-12-20 RX ADMIN — METOPROLOL TARTRATE 50 MG: 50 TABLET, FILM COATED ORAL at 08:44

## 2023-12-20 RX ADMIN — AMIODARONE HYDROCHLORIDE 400 MG: 200 TABLET ORAL at 21:25

## 2023-12-20 RX ADMIN — FUROSEMIDE 20 MG: 20 TABLET ORAL at 17:01

## 2023-12-20 RX ADMIN — HEPARIN SODIUM 5000 UNITS: 5000 INJECTION INTRAVENOUS; SUBCUTANEOUS at 15:14

## 2023-12-20 RX ADMIN — ACETAMINOPHEN 1000 MG: 500 TABLET ORAL at 21:25

## 2023-12-20 ASSESSMENT — PAIN DESCRIPTION - ONSET: ONSET: ON-GOING

## 2023-12-20 ASSESSMENT — PAIN DESCRIPTION - FREQUENCY: FREQUENCY: INTERMITTENT

## 2023-12-20 ASSESSMENT — PAIN DESCRIPTION - LOCATION: LOCATION: BACK

## 2023-12-20 ASSESSMENT — PAIN - FUNCTIONAL ASSESSMENT: PAIN_FUNCTIONAL_ASSESSMENT: PREVENTS OR INTERFERES SOME ACTIVE ACTIVITIES AND ADLS

## 2023-12-20 ASSESSMENT — PAIN DESCRIPTION - PAIN TYPE: TYPE: ACUTE PAIN

## 2023-12-20 ASSESSMENT — PAIN SCALES - GENERAL
PAINLEVEL_OUTOF10: 2
PAINLEVEL_OUTOF10: 9

## 2023-12-20 ASSESSMENT — PAIN DESCRIPTION - ORIENTATION: ORIENTATION: MID;LOWER

## 2023-12-20 ASSESSMENT — PAIN DESCRIPTION - DESCRIPTORS: DESCRIPTORS: ACHING;DISCOMFORT

## 2023-12-20 NOTE — CONSULTS
Comprehensive Nutrition Assessment    Type and Reason for Visit:  Initial, Consult    Nutrition Recommendations/Plan:   Continue no added salt diet   Continue Ensure with meals - encourage pt to drink at least 2x daily if PO intakes remain poor   Encourage PO intakes as tolerated   Monitor diet education needs as appropriate   Monitor nutrition adequacy, pertinent labs, bowel habits, wt changes, and clinical progress     Malnutrition Assessment:  Malnutrition Status: Moderate malnutrition (12/20/23 1322)    Context:  Acute Illness     Findings of the 6 clinical characteristics of malnutrition:  Energy Intake:  50% or less of estimated energy requirements for 5 or more days  Weight Loss:   (3% weight loss in 2 weeks)     Fluid Accumulation:  Mild Extremities, Generalized    Nutrition Assessment:    New ARU pt admitted with critical illness myopathy. S/p CABG x4 with ROBBI clip and sternal plating on 12/5. Required CRRT. S/p thoracentesis on 12/18. Consulted for oral nutrition supplements. Pt nutritionally compromised AEB poor appetite and PO intakes. Pt reports does not any of the hospital food. States friend brought in [address] yesterday, and pt did not like that either. Discussed importance of adequate nutrition for healing s/p CABG. Pt only occasionally drinking ONS, encouraged if pt not going to eat. Refused menu today, reports has tried all food at the hospital and it's all gross. Weights trending down per EMR. Will continue to monitor. Nutrition Related Findings:    3% weight loss in 2 weeks. Active BS. BM on 12/19. +1 generalized and BUE edema. +2 BLE edema. Wound Type: Surgical Incision       Current Nutrition Intake & Therapies:    Average Meal Intake: 0%, 1-25%, 26-50%  Average Supplements Intake: 0%, 1-25%, %  ADULT ORAL NUTRITION SUPPLEMENT; Breakfast, Lunch, Dinner; Standard High Calorie/High Protein Oral Supplement  ADULT DIET; Regular;  No Added Salt (3-4 gm)    Anthropometric

## 2023-12-20 NOTE — PROGRESS NOTES
12/19/23 2322   NIV Type   NIV Started/Stopped On   Equipment Type V60   Mode Bilevel   Mask Type Full face mask   Mask Size Medium   Assessment   Respirations 24   SpO2 97 %   Comfort Level Good   Using Accessory Muscles No   Mask Compliance Good   Skin Assessment Clean, dry, & intact   Skin Protection for O2 Device Yes   Location Nose   Intervention(s) Skin Barrier   Breath Sounds   Right Upper Lobe Clear   Right Middle Lobe Diminished   Right Lower Lobe Diminished   Left Upper Lobe Clear   Left Lower Lobe Diminished   Settings/Measurements   IPAP 10 cmH20   CPAP/EPAP 5 cmH2O   Vt (Measured) 655 mL   Rate Ordered 12   FiO2  21 %   Minute Volume (L/min) 14.2 Liters   Mask Leak (lpm) 5 lpm   Patient's Home Machine No   Electrical Safety Check Performed Yes   Alarm Settings   Alarms On Y

## 2023-12-20 NOTE — H&P
Patient: Libby De La Rosa  [id number]  Date: 12/20/2023      Chief Complaint: s/p CABG    History of Present Illness/Hospital Course:  Radha Vallejo is a 69year old male with a past medical history significant for CAD, GERD, HLD, HTN, sleep apnea, and morbid obesity who presented to [facility] on 12/5/23 for planned open CABG x4 with left atrial appendage clip placement with sternal plating. Post operative course has been notable for hypotension, MADONNA, and acute blood loss anemia. Nephrology is following. He was temporarily on CRRT. ID was consulted for persistent leukocytosis. On 12/18 he underwent left thoracentesis. He was admitted to [facility] on 12/19 due to functional deficits below his baseline. Today he is seen with therapy and his wife present. He reports sleeping better last night with melatonin. He notes some continued shortness of breath with activity. He has been using the hospital CPAP. His wife will bring in his home CPAP for us to look at. Prior Level of Function:  Independent for self care, indoor mobility, stairs, functional cognition     Current Level of Function:  Supervision for eating, oral hygiene, walk 50 feet with two turns  Mod assist for sit to stand, chair/bed transfer, toilet transfer  Max assist for upper body dressing, sit to lying  Dependent for toileting hygiene     has a past medical history of Arthritis, CAD (coronary artery disease), Cancer ([address]), GERD (gastroesophageal reflux disease), Hearing aid worn, Hyperlipidemia, Hypertension, Osteoarthritis, Sleep apnea, and Wears glasses. has a past surgical history that includes Coronary angioplasty with stent; Coronary angioplasty (08/26/2015); skin biopsy; Colonoscopy; Endoscopy, colon, diagnostic; Cataract removal (Bilateral); Total knee arthroplasty (Right, 09/18/2019); joint replacement; knee surgery; Cataract extraction, bilateral (Bilateral); and Coronary artery bypass graft (N/A, 12/5/2023).      reports that he quit smoking for this patient by day four of the patients stay based upon the Preadmission Screen, this Post-Admission Evaluation, and the therapy evaluations. Rehabilitation Diagnosis:  Neurologic, 3.8, Neuromuscular Disorders, e.g. Critical Illness Myopathy, Other Myopathy    Assessment and Plan:  Radha Vallejo is a 69year old male with a past medical history significant for CAD, GERD, HLD, HTN, sleep apnea, and morbid obesity who [facility] for planned open CABG x4 with left atrial appendage clip placement with sternal plating. He was admitted to [facility] on 12/19 due to functional deficits below his baseline. CAD s/p CABG x4  - asa, statin, plavix, BB  - sternal precautions  - PT, OT, ST    Atrial Fibrillation   - amiodarone wean  - BB    Acute blood loss anemia   - monitor and transfuse for Hb<7    Pulmonary insufficiency  Left pleural effusion  - s/p thoracentesis 12/18  - adequately oxygenating on room air  - IS    History of HTN  - holding home lisinopril due to hypotension    SALLIE  - on CPAP at home, will likely need to reach out to CPAP company prior to dc  - using hospital BiPAP    Morbid obesity   - BMI 44  - encourage lifestyle modifications    Bowels: adjust medications as needed for regular bowel movements    Bladder: Check PVR x 3. [facility] if PVR > 200ml or if any volume is > 500 ml. Sleep: melatonin provided prn. PPX  DVT: heparin  GI: pepcid     Follow up appointments: PCPMally.  Duane Price MD 12/20/2023, 1:01 PM

## 2023-12-20 NOTE — PROGRESS NOTES
Physical Therapy  Facility/Department: [facility]  Rehabilitation Physical Therapy Initial Assessment/Treatment     NAME: Merlin Fore  : 1945 (69 y.o.)  MRN: [id number]  CODE STATUS: Full Code    Date of Service: 23      Past Medical History:   Diagnosis Date    Arthritis     CAD (coronary artery disease)     Cancer ([address])     nose    GERD (gastroesophageal reflux disease)     Hearing aid worn     denise    Hyperlipidemia     Hypertension     Osteoarthritis     Sleep apnea     CPAP    Wears glasses     reading     Past Surgical History:   Procedure Laterality Date    CATARACT EXTRACTION, BILATERAL Bilateral     CATARACT REMOVAL Bilateral     COLONOSCOPY      CORONARY ANGIOPLASTY  2015    CORONARY ANGIOPLASTY WITH STENT PLACEMENT      x5    CORONARY ARTERY BYPASS GRAFT N/A 2023    OPEN CORONARY ARTERY BYPASS GRAFTING TIMES FOUR USING INTERNAL MAMMARY ARTERY, SAPHENOUS VEIN GRAFT, ON PUMP, LILY, TEMPORARY PACING WIRES, LEFT ATRIAL APPENDAGE CLIP PLACEMENT WITH STERNAL PLATING AND BILATERAL PECTORALIS BLOCKS performed by Junie Potter MD at [facility], [address] [facility], [address] BIOPSY      nose    TOTAL KNEE ARTHROPLASTY Right 2019    RIGHT [place] Organ WITH ADDUCTOR CANAL BLOCK FOR PAIN CONTROL             HARRIET KARYN  CPT CODE - 46786 performed by Austen Malcolm MD at [facility] OR       Chart Reviewed: Yes  Patient assessed for rehabilitation services?: Yes  Family / Caregiver Present: No  Referring Practitioner: Ho Christian MD  Referral Date : 23  Diagnosis: CABGx4  General Comment  Comments: RN cleared pt for PT eval    Restrictions:  Restrictions/Precautions: Fall Risk;Cardiac  Position Activity Restriction  Sternal Precautions: No Pushing; No Pulling;5# Lifting Restrictions  Other position/activity restrictions: sternal precautions     SUBJECTIVE  Subjective: pt found in chair on arrival, agreeable to reports having vertigo previously     Treatment Diagnosis: decreased activity tolerance  Therapy Prognosis: Fair  Decision Making: Medium Complexity  Discharge Recommendations: 24 hour supervision or assist;Home with Home health PT  PT D/C Equipment  Walker: Rollator (4 Wheeled)  Other: bariatric for wider seat  PT Equipment Recommendations  Equipment Needed: Yes  Mobility Devices: Margarite Oseguera: Rollator (4 Wheeled)  Other: bariatric for wider seat    CLINICAL IMPRESSION   Pt seen for PT evaluation following admission for CABGx4 on 12/5, hospital stay complicated by MADONNA requiring CRRT. Prior to admission, pt was independent with mobility with no AD and lives with spouse in two level home with level entry. Pt currently requires up to mod A with bed mobility, min A for transfers from lower surfaces, and CGA/SBA for ambulation up to 194 ft with 4WW. Pt also performs 4 steps with BHR and CGA, unable to perform more due to SOB/fatigue. Pt would continue to benefit from skilled therapy during LOS to progress activity tolerance and independence level prior to d/c. Recommending home with 24/7 A and HHPT at d/c, pt would likely need bariatric 4WW for d/c for endurance and wider seat.     GOALS  Patient Goals   Patient Goals : \"to get out of here\"  Short Term Goals  Time Frame for Short Term Goals: 6 days (12/25/23)  Short Term Goal 1: pt will perform bed mobility with CGA  Short Term Goal 2: pt will perform functional transfers with LRAD and SBA  Short Term Goal 3: pt will ambulate 150 ft with LRAD and supv  Short Term Goal 4: pt will perform 4 steps with BHR and SBA  Long Term Goals  Time Frame for Long Term Goals : 10 days (12/29/23)  Long Term Goal 1: pt will perform bed mobility with mod-ind  Long Term Goal 2: pt will perform functional transfers with LRAD and mod-ind  Long Term Goal 3: pt will ambulate 150 ft with LRAD and mod-ind  Long Term Goal 4: pt will perform 12 steps with BHR and mod-ind    PLAN OF CARE  Frequency: 1-2 treatment sessions per day, 5-7 days per week  Physical Therapy Plan  General Plan: 5-7 times per week  Specific Instructions for Next Treatment: Progress ther ex and mobility as tolerated  Current Treatment Recommendations: Strengthening;Balance training;Functional mobility training;Transfer training;Endurance training;Pain management;Neuromuscular re-education;Gait training;Stair training;Safety education & training;Patient/Caregiver education & training;Therapeutic activities;Equipment evaluation, education, & procurement;Home exercise program  Safety Devices  Type of Devices: All fall risk precautions in place;Nurse notified;Gait belt;Call light within reach;Patient at risk for falls;Left in bed;Bed alarm in place  Restraints  Restraints Initially in Place: No    EDUCATION  Education  Education Given To: Patient  Education Provided: Role of Therapy;Plan of Care;Precautions;Safety;Mobility Training;Transfer Training;Family Education;Equipment;Fall Prevention Strategies;Energy Conservation  Education Provided Comments: role of PT, ARU expectations, sternal precautions, PLB  Education Method: Verbal  Barriers to Learning: None  Education Outcome: Verbalized understanding;Continued education needed        Therapy Time   Individual Concurrent Group Co-treatment   Time In 1330         Time Out 1500         Minutes 90           Timed Code Treatment Minutes: 70 Minutes (20 min eval)       Hebr Jimenez PT, 12/20/23 at 3:45 PM

## 2023-12-21 PROCEDURE — 97530 THERAPEUTIC ACTIVITIES: CPT

## 2023-12-21 PROCEDURE — 6360000002 HC RX W HCPCS: Performed by: STUDENT IN AN ORGANIZED HEALTH CARE EDUCATION/TRAINING PROGRAM

## 2023-12-21 PROCEDURE — 6370000000 HC RX 637 (ALT 250 FOR IP): Performed by: STUDENT IN AN ORGANIZED HEALTH CARE EDUCATION/TRAINING PROGRAM

## 2023-12-21 PROCEDURE — 97535 SELF CARE MNGMENT TRAINING: CPT

## 2023-12-21 PROCEDURE — 1280000000 HC REHAB R&B

## 2023-12-21 PROCEDURE — 97110 THERAPEUTIC EXERCISES: CPT

## 2023-12-21 PROCEDURE — 97116 GAIT TRAINING THERAPY: CPT

## 2023-12-21 RX ADMIN — METOPROLOL TARTRATE 50 MG: 50 TABLET, FILM COATED ORAL at 20:16

## 2023-12-21 RX ADMIN — POLYETHYLENE GLYCOL 3350 17 G: 17 POWDER, FOR SOLUTION ORAL at 08:46

## 2023-12-21 RX ADMIN — Medication: at 20:21

## 2023-12-21 RX ADMIN — METHOCARBAMOL 1000 MG: 500 TABLET ORAL at 20:15

## 2023-12-21 RX ADMIN — ACETAMINOPHEN 1000 MG: 500 TABLET ORAL at 13:05

## 2023-12-21 RX ADMIN — METOPROLOL TARTRATE 50 MG: 50 TABLET, FILM COATED ORAL at 09:20

## 2023-12-21 RX ADMIN — ACETAMINOPHEN 1000 MG: 500 TABLET ORAL at 20:16

## 2023-12-21 RX ADMIN — ASPIRIN 81 MG: 81 TABLET, COATED ORAL at 08:45

## 2023-12-21 RX ADMIN — OXYCODONE 5 MG: 5 TABLET ORAL at 08:50

## 2023-12-21 RX ADMIN — HEPARIN SODIUM 5000 UNITS: 5000 INJECTION INTRAVENOUS; SUBCUTANEOUS at 04:54

## 2023-12-21 RX ADMIN — Medication 5 MG: at 20:15

## 2023-12-21 RX ADMIN — OXYCODONE 5 MG: 5 TABLET ORAL at 20:16

## 2023-12-21 RX ADMIN — HEPARIN SODIUM 5000 UNITS: 5000 INJECTION INTRAVENOUS; SUBCUTANEOUS at 20:21

## 2023-12-21 RX ADMIN — FAMOTIDINE 20 MG: 20 TABLET, FILM COATED ORAL at 08:45

## 2023-12-21 RX ADMIN — FUROSEMIDE 20 MG: 20 TABLET ORAL at 16:14

## 2023-12-21 RX ADMIN — CLOPIDOGREL BISULFATE 75 MG: 75 TABLET ORAL at 08:45

## 2023-12-21 RX ADMIN — METHOCARBAMOL 1000 MG: 500 TABLET ORAL at 08:45

## 2023-12-21 RX ADMIN — METHOCARBAMOL 1000 MG: 500 TABLET ORAL at 14:08

## 2023-12-21 RX ADMIN — FUROSEMIDE 20 MG: 20 TABLET ORAL at 09:31

## 2023-12-21 RX ADMIN — BISACODYL 5 MG: 5 TABLET, COATED ORAL at 08:45

## 2023-12-21 RX ADMIN — AMIODARONE HYDROCHLORIDE 400 MG: 200 TABLET ORAL at 20:16

## 2023-12-21 RX ADMIN — HEPARIN SODIUM 5000 UNITS: 5000 INJECTION INTRAVENOUS; SUBCUTANEOUS at 14:08

## 2023-12-21 RX ADMIN — AMIODARONE HYDROCHLORIDE 400 MG: 200 TABLET ORAL at 09:20

## 2023-12-21 RX ADMIN — OXYCODONE 5 MG: 5 TABLET ORAL at 14:08

## 2023-12-21 RX ADMIN — ATORVASTATIN CALCIUM 40 MG: 40 TABLET, FILM COATED ORAL at 20:16

## 2023-12-21 ASSESSMENT — PAIN SCALES - GENERAL
PAINLEVEL_OUTOF10: 5
PAINLEVEL_OUTOF10: 6
PAINLEVEL_OUTOF10: 4
PAINLEVEL_OUTOF10: 5
PAINLEVEL_OUTOF10: 8

## 2023-12-21 ASSESSMENT — PAIN DESCRIPTION - DESCRIPTORS
DESCRIPTORS: ACHING
DESCRIPTORS: ACHING;DISCOMFORT

## 2023-12-21 ASSESSMENT — PAIN DESCRIPTION - LOCATION
LOCATION: BACK

## 2023-12-21 NOTE — PROGRESS NOTES
Occupational Therapy  Facility/Department: [facility]  Rehabilitation Occupational Therapy Daily Treatment Note    Date: 23  Patient Name: Berta Winslow       Room: 5171/0200-52  MRN: [id number]  Account: [de-identified]   : 1945  (80 y.o.) Gender: male                    Past Medical History:  has a past medical history of Arthritis, CAD (coronary artery disease), Cancer ([address]), GERD (gastroesophageal reflux disease), Hearing aid worn, Hyperlipidemia, Hypertension, Osteoarthritis, Sleep apnea, and Wears glasses. Past Surgical History:   has a past surgical history that includes Coronary angioplasty with stent; Coronary angioplasty (2015); skin biopsy; Colonoscopy; Endoscopy, colon, diagnostic; Cataract removal (Bilateral); Total knee arthroplasty (Right, 2019); joint replacement; knee surgery; Cataract extraction, bilateral (Bilateral); and Coronary artery bypass graft (N/A, 2023). Restrictions  Restrictions/Precautions: Fall Risk, Cardiac  Other position/activity restrictions: sternal precautions    Subjective  Subjective: Pt in recliner upon OT arrival. Denies pain. /63, Hr 66, O2 95% on RA. Objective    ADL  Pt declined ADL needs this session, but OT spent a significant amount of time during session providing pt with in depth education on sternal px in relation to I/ADLs. Reviewed OT CABG handout with pt and pt verbalized understanding of all education provided.     Functional Mobility  Device: 4-Wheeled walker  Activity: To/From therapy gym  Assistance Level: Stand by assist  Skilled Clinical Factors: 1 seated rest break gym>room  Sit to Supine  Assistance Level: Minimal assistance  Skilled Clinical Factors: to  manage RLE  Supine to Sit  Assistance Level: Minimal assistance  Skilled Clinical Factors: on mat table with wedge behind pt  Sit to Stand  Assistance Level: Contact guard assist  Skilled Clinical Factors: from various height surfaces, lowest surface was 19 in  Stand to Sit  Assistance Level: Contact guard assist     TA  Pt completed multiple sit to stands from EOM to simulate standing from various height surfaces at home. Able to complete all stand with CGA    Assessment   Session focused on fxl mobility, endurance, and ADL education in relation to sternal px. OT reviewed CABG handout in depth with pt regarding how to maintain sternal px during I/ADLs. Pt verbalized understanding. Pt completed 2 bouts of fxl mobility room<>therapy gym for increased endurance required for I/ADLs. Pt required 1 seated rest break on the way back from therapy gym>room. Continue POC. Safety Devices  Safety Devices in place: Yes  Type of devices: All fall risk precautions in place;Call light within reach; Left in chair;Nurse notified;Gait belt    Patient Education  Education  Education Given To: Patient  Education Provided: Role of Therapy;Plan of Care;Safety;ADL Function  Education Provided Comments: extensive education on sternal px in relation to  Education Method: Verbal  Barriers to Learning: None  Education Outcome: Verbalized understanding;Demonstrated understanding;Continued education needed    Plan  Occupational Therapy Plan  Current Treatment Recommendations: Strengthening;ROM;Balance training;Functional mobility training; Endurance training;Pain management; Safety education & training;Patient/Caregiver education & training;Self-Care / ADL    Goals  Patient Goals   Patient goals : \"to go home\"  Short Term Goals  Time Frame for Short Term Goals: 5 days (12/24/23)  Short Term Goal 1: Pt will perform toilet transfer with SPV and LRAD  Short Term Goal 2: Pt will perform UB dressing with set-up  Short Term Goal 3: Pt will perform LB dressing with Jimy and AE PRN  Short Term Goal 4: Pt will tolerate standing for ~4 min with SBA to increase endurance for standing ADL tasks and functional mobility  Short Term Goal 5: Pt will perform x15 reps of BUE therex to increase strength for functional

## 2023-12-21 NOTE — PLAN OF CARE
Problem: Discharge Planning  Goal: Discharge to home or other facility with appropriate resources  Outcome: Progressing     Problem: Safety - Adult  Goal: Free from fall injury  12/21/2023 1021 by Kathy Villasenor RN  Outcome: Progressing  12/20/2023 2340 by Jean Joshi RN  Outcome: Progressing     Problem: ABCDS Injury Assessment  Goal: Absence of physical injury  Outcome: Progressing     Problem: Pain  Goal: Verbalizes/displays adequate comfort level or baseline comfort level  Outcome: Progressing     Problem: Nutrition Deficit:  Goal: Optimize nutritional status  Outcome: Progressing

## 2023-12-21 NOTE — PROGRESS NOTES
Physical Therapy  Facility/Department: [facility]  Rehabilitation Physical Therapy Treatment Note    NAME: Allie Valles  : 1945 (69 y.o.)  MRN: [id number]  CODE STATUS: Full Code    Date of Service: 23       Restrictions:  Restrictions/Precautions: Fall Risk, Cardiac  Position Activity Restriction  Sternal Precautions: No Pushing, No Pulling, 5# Lifting Restrictions  Other position/activity restrictions: sternal precautions     SUBJECTIVE  Subjective  Subjective: pt found on toilet upon arrival  Pain: pt denies pain, reports SOB               OBJECTIVE  Cognition  Overall Cognitive Status: WFL  Orientation  Overall Orientation Status: Within Functional Limits  Orientation Level: Oriented X4    Functional Mobility  Balance  Sitting Balance: Independent  Standing Balance: Contact guard assistance  Transfers  Device: Walker (rollator)  Sit to Stand  Assistance Level: Minimal assistance  Skilled Clinical Factors: from rollator, WC, toilet  Stand to Sit  Assistance Level: Minimal assistance  Skilled Clinical Factors: from rollator, WC, toilet  Bed To/From Chair  Technique: Stand pivot  Assistance Level: Contact guard assist  Skilled Clinical Factors: with rollator      Environmental Mobility  Ambulation  Surface: Level surface  Device: 4-Wheeled walker  Distance: 10 ft  Additional Factors: Verbal cues; Increased time to complete  Assistance Level: Contact guard assist  Gait Deviations: Slow aditya             PT Exercises  Exercise Treatment: seated at sink for oral care supervision      ASSESSMENT/PROGRESS TOWARDS GOALS  Vital Signs  Pulse: (!) 116  Heart Rate Source: Monitor  BP: 121/63  BP Location: Right upper arm  MAP (Calculated): 82  SpO2: 97 %  O2 Device: None (Room air)       Assessment  Assessment: Pt seen for gait, sit to stands, transfers, sitting balance, and endurance training. Pt on toilet at start of session with successful BM.  Pt required extra time for SOB and fatitgue and high pulse (~120 with LRAD and supv  Short Term Goal 4: pt will perform 4 steps with BHR and SBA  Long Term Goals  Time Frame for Long Term Goals : 10 days (12/29/23)  Long Term Goal 1: pt will perform bed mobility with mod-ind  Long Term Goal 2: pt will perform functional transfers with LRAD and mod-ind  Long Term Goal 3: pt will ambulate 150 ft with LRAD and mod-ind  Long Term Goal 4: pt will perform 12 steps with BHR and mod-ind    PLAN OF CARE/SAFETY  Physical Therapy Plan  General Plan: 5-7 times per week  Current Treatment Recommendations: Strengthening;Balance training;Functional mobility training;Transfer training;Endurance training;Pain management;Neuromuscular re-education;Gait training;Stair training;Safety education & training;Patient/Caregiver education & training;Therapeutic activities;Equipment evaluation, education, & procurement;Home exercise program  Safety Devices  Type of Devices: All fall risk precautions in place;Nurse notified;Gait belt;Call light within reach;Patient at risk for falls;Left in chair;Chair alarm in place  Restraints  Restraints Initially in Place: No    EDUCATION  Education  Education Given To: Patient  Education Provided: Role of Therapy;Plan of Care;Precautions;Safety;Mobility Training;Transfer Training;Family Education;Equipment;Fall Prevention Strategies;Energy Conservation  Education Method: Verbal  Barriers to Learning: None  Education Outcome: Verbalized understanding;Continued education needed        Therapy Time   Individual Concurrent Group Co-treatment   Time In 0900         Time Out 0930         Minutes 30           Second Session Therapy Time:   Individual Concurrent Group Co-treatment   Time In 1030         Time Out 1130         Minutes 60           Timed Code Treatment Minutes: 90 Minutes       Tonya Rincon PT, 12/21/23 at 10:11 AM

## 2023-12-21 NOTE — PROGRESS NOTES
Occupational Therapy  Facility/Department: [facility]  Rehabilitation Occupational Therapy Daily Treatment Note    Date: 23  Patient Name: Braydon Aranda       Room: 6543/1110-24  MRN: [id number]  Account: [de-identified]   : 1945  (80 y.o.) Gender: male      Past Medical History:  has a past medical history of Arthritis, CAD (coronary artery disease), Cancer ([address]), GERD (gastroesophageal reflux disease), Hearing aid worn, Hyperlipidemia, Hypertension, Osteoarthritis, Sleep apnea, and Wears glasses. Past Surgical History:   has a past surgical history that includes Coronary angioplasty with stent; Coronary angioplasty (2015); skin biopsy; Colonoscopy; Endoscopy, colon, diagnostic; Cataract removal (Bilateral); Total knee arthroplasty (Right, 2019); joint replacement; knee surgery; Cataract extraction, bilateral (Bilateral); and Coronary artery bypass graft (N/A, 2023). Restrictions  Restrictions/Precautions: Fall Risk, Cardiac  Other position/activity restrictions: sternal precautions    Subjective  Subjective: Pt received for OT session resting in bedside chair. /72, HR 67, 96% O2 on RA. Agreeble to session. Restrictions/Precautions: Fall Risk;Cardiac     Objective   Cognition  Overall Cognitive Status: WFL  Orientation  Overall Orientation Status: Within Functional Limits  Orientation Level: Oriented X4         OT Exercises  Exercise Treatment: completed x15 BUE AROM exercises w/ 3# DB within sternal precautions. Pt completed the following exercises: wrist supination/pronation, chest press, hammer curl, bicep curl. Rest breaks in between each set of exercises required. Assessment    Pt received for OT session sitting in bedside chair. Agreeable to session and pleasant throughout. Pt verbally reported all sternal px with no cues from writer. During session, pt completed 4 x 15 BUE AROM exercises w/ 3# DB within sternal precautions.  Pt completed the following exercises: wrist

## 2023-12-21 NOTE — PROGRESS NOTES
12/21/23 1431   Encounter Summary   Encounter Overview/Reason  Spiritual/Emotional Needs   Service Provided For: Patient   Referral/Consult From: Rounding   Support System Spouse   Last Encounter  12/21/23   Complexity of Encounter Low   Begin Time 1420   End Time  1431   Total Time Calculated 11 min   Spiritual/Emotional needs   Type Spiritual Support   Assessment/Intervention/Outcome   Assessment Coping   Intervention Active listening   Outcome Comfort

## 2023-12-21 NOTE — PROGRESS NOTES
Kristopher Watkins  12/21/2023  [id number]    Chief Complaint: Critical illness myopathy    Subjective:   No acute events overnight. Today Kristopher is seen with his wife present. He reports that he is sleeping better at night. He notes some continued shortness of breath with therapies. Nursing noting that his HR was elevated and BP soft this am.     ROS: denies f/c, n/v, cp, sob    Objective:  Patient Vitals for the past 24 hrs:   BP Temp Temp src Pulse Resp SpO2 Weight   12/21/23 0920 -- -- -- -- 18 -- --   12/21/23 0909 121/63 -- -- (!) 116 -- 97 % --   12/21/23 0830 (!) 106/59 97.4 °F (36.3 °C) Oral 89 18 94 % --   12/21/23 0500 -- -- -- -- -- -- 132.4 kg (291 lb 14.2 oz)   12/20/23 2156 -- -- -- -- 18 -- --   12/20/23 2115 133/65 98.3 °F (36.8 °C) Oral 74 18 96 % --     Gen: No distress, pleasant.   HEENT: Normocephalic, atraumatic.   CV: Regular rate and rhythm.   Resp: No respiratory distress.   Abd: Soft, nondistended  Ext: No edema.   Neuro: Alert, oriented, appropriately interactive.     Wt Readings from Last 3 Encounters:   12/21/23 132.4 kg (291 lb 14.2 oz)   12/19/23 134.2 kg (295 lb 13.7 oz)   11/29/23 (!) 139.9 kg (308 lb 6.4 oz)       Laboratory data:   Lab Results   Component Value Date    WBC 9.6 12/20/2023    HGB 8.9 (L) 12/20/2023    HCT 26.8 (L) 12/20/2023    MCV 90.1 12/20/2023     12/20/2023       Lab Results   Component Value Date/Time     12/20/2023 07:53 AM    K 4.4 12/20/2023 07:53 AM     12/20/2023 07:53 AM    CO2 21 12/20/2023 07:53 AM    BUN 61 12/20/2023 07:53 AM    CREATININE 1.1 12/20/2023 07:53 AM    GLUCOSE 97 12/20/2023 07:53 AM    CALCIUM 8.6 12/20/2023 07:53 AM        Therapy progress:  Physical therapy:  Bed Mobility:     Sit>supine:     Supine>sit:     Transfers:  Device: Walker (rollator)  Sit>stand:  Assistance Level: Minimal assistance  Skilled Clinical Factors: from rollator, WC, toilet  Stand>sit:  Assistance Level: Minimal assistance  Skilled Clinical Factors:  prn.     PPX  DVT: heparin  GI: pepcid      Follow up appointments: PCP, CT Surgery    Therapy progress: min assist for sit to stand    Dwayne Price MD 12/21/2023, 3:06 PM

## 2023-12-22 LAB
ANION GAP SERPL CALCULATED.3IONS-SCNC: 20 MMOL/L (ref 3–16)
BASOPHILS # BLD: 0.1 K/UL (ref 0–0.2)
BASOPHILS NFR BLD: 1 %
BUN SERPL-MCNC: 51 MG/DL (ref 7–20)
CALCIUM SERPL-MCNC: 8.6 MG/DL (ref 8.3–10.6)
CHLORIDE SERPL-SCNC: 101 MMOL/L (ref 99–110)
CO2 SERPL-SCNC: 19 MMOL/L (ref 21–32)
CREAT SERPL-MCNC: 1.1 MG/DL (ref 0.8–1.3)
DEPRECATED RDW RBC AUTO: 17.1 % (ref 12.4–15.4)
EOSINOPHIL # BLD: 0.4 K/UL (ref 0–0.6)
EOSINOPHIL NFR BLD: 3.8 %
GFR SERPLBLD CREATININE-BSD FMLA CKD-EPI: >60 ML/MIN/{1.73_M2}
GLUCOSE SERPL-MCNC: 103 MG/DL (ref 70–99)
HCT VFR BLD AUTO: 26.8 % (ref 40.5–52.5)
HGB BLD-MCNC: 8.9 G/DL (ref 13.5–17.5)
LYMPHOCYTES # BLD: 1.1 K/UL (ref 1–5.1)
LYMPHOCYTES NFR BLD: 11.7 %
MCH RBC QN AUTO: 30 PG (ref 26–34)
MCHC RBC AUTO-ENTMCNC: 33.2 G/DL (ref 31–36)
MCV RBC AUTO: 90.4 FL (ref 80–100)
MONOCYTES # BLD: 1 K/UL (ref 0–1.3)
MONOCYTES NFR BLD: 10.7 %
NEUTROPHILS # BLD: 7 K/UL (ref 1.7–7.7)
NEUTROPHILS NFR BLD: 72.8 %
PLATELET # BLD AUTO: 459 K/UL (ref 135–450)
PMV BLD AUTO: 6.7 FL (ref 5–10.5)
POTASSIUM SERPL-SCNC: 4.8 MMOL/L (ref 3.5–5.1)
RBC # BLD AUTO: 2.96 M/UL (ref 4.2–5.9)
SODIUM SERPL-SCNC: 140 MMOL/L (ref 136–145)
WBC # BLD AUTO: 9.6 K/UL (ref 4–11)

## 2023-12-22 PROCEDURE — 36415 COLL VENOUS BLD VENIPUNCTURE: CPT

## 2023-12-22 PROCEDURE — 97116 GAIT TRAINING THERAPY: CPT

## 2023-12-22 PROCEDURE — 80048 BASIC METABOLIC PNL TOTAL CA: CPT

## 2023-12-22 PROCEDURE — 97535 SELF CARE MNGMENT TRAINING: CPT

## 2023-12-22 PROCEDURE — 85025 COMPLETE CBC W/AUTO DIFF WBC: CPT

## 2023-12-22 PROCEDURE — 97110 THERAPEUTIC EXERCISES: CPT

## 2023-12-22 PROCEDURE — 6370000000 HC RX 637 (ALT 250 FOR IP): Performed by: STUDENT IN AN ORGANIZED HEALTH CARE EDUCATION/TRAINING PROGRAM

## 2023-12-22 PROCEDURE — 97530 THERAPEUTIC ACTIVITIES: CPT

## 2023-12-22 PROCEDURE — 6360000002 HC RX W HCPCS: Performed by: STUDENT IN AN ORGANIZED HEALTH CARE EDUCATION/TRAINING PROGRAM

## 2023-12-22 PROCEDURE — 1280000000 HC REHAB R&B

## 2023-12-22 RX ORDER — MECOBALAMIN 5000 MCG
10 TABLET,DISINTEGRATING ORAL NIGHTLY
Status: DISCONTINUED | OUTPATIENT
Start: 2023-12-22 | End: 2023-12-30 | Stop reason: HOSPADM

## 2023-12-22 RX ADMIN — OXYCODONE 5 MG: 5 TABLET ORAL at 08:04

## 2023-12-22 RX ADMIN — ACETAMINOPHEN 1000 MG: 500 TABLET ORAL at 14:30

## 2023-12-22 RX ADMIN — ACETAMINOPHEN 1000 MG: 500 TABLET ORAL at 04:44

## 2023-12-22 RX ADMIN — FUROSEMIDE 20 MG: 20 TABLET ORAL at 08:05

## 2023-12-22 RX ADMIN — HEPARIN SODIUM 5000 UNITS: 5000 INJECTION INTRAVENOUS; SUBCUTANEOUS at 04:44

## 2023-12-22 RX ADMIN — Medication 10 MG: at 21:41

## 2023-12-22 RX ADMIN — FUROSEMIDE 20 MG: 20 TABLET ORAL at 16:44

## 2023-12-22 RX ADMIN — METHOCARBAMOL 1000 MG: 500 TABLET ORAL at 10:29

## 2023-12-22 RX ADMIN — CLOPIDOGREL BISULFATE 75 MG: 75 TABLET ORAL at 08:05

## 2023-12-22 RX ADMIN — HEPARIN SODIUM 5000 UNITS: 5000 INJECTION INTRAVENOUS; SUBCUTANEOUS at 21:38

## 2023-12-22 RX ADMIN — METHOCARBAMOL 1000 MG: 500 TABLET ORAL at 14:30

## 2023-12-22 RX ADMIN — ACETAMINOPHEN 1000 MG: 500 TABLET ORAL at 21:37

## 2023-12-22 RX ADMIN — FAMOTIDINE 20 MG: 20 TABLET, FILM COATED ORAL at 08:05

## 2023-12-22 RX ADMIN — METHOCARBAMOL 1000 MG: 500 TABLET ORAL at 21:37

## 2023-12-22 RX ADMIN — ATORVASTATIN CALCIUM 40 MG: 40 TABLET, FILM COATED ORAL at 21:37

## 2023-12-22 RX ADMIN — ASPIRIN 81 MG: 81 TABLET, COATED ORAL at 08:05

## 2023-12-22 RX ADMIN — BISACODYL 5 MG: 5 TABLET, COATED ORAL at 08:05

## 2023-12-22 RX ADMIN — METOPROLOL TARTRATE 50 MG: 50 TABLET, FILM COATED ORAL at 21:37

## 2023-12-22 RX ADMIN — AMIODARONE HYDROCHLORIDE 200 MG: 200 TABLET ORAL at 08:05

## 2023-12-22 RX ADMIN — HEPARIN SODIUM 5000 UNITS: 5000 INJECTION INTRAVENOUS; SUBCUTANEOUS at 14:30

## 2023-12-22 RX ADMIN — OXYCODONE 5 MG: 5 TABLET ORAL at 21:41

## 2023-12-22 ASSESSMENT — PAIN SCALES - GENERAL
PAINLEVEL_OUTOF10: 5

## 2023-12-22 ASSESSMENT — PAIN DESCRIPTION - DESCRIPTORS
DESCRIPTORS: ACHING
DESCRIPTORS: ACHING

## 2023-12-22 ASSESSMENT — PAIN DESCRIPTION - LOCATION
LOCATION: BACK
LOCATION: BACK

## 2023-12-22 NOTE — PROGRESS NOTES
Physical Therapy  Facility/Department: [facility]  Rehabilitation Physical Therapy Treatment Note    NAME: Luca Olea  : 1945 (69 y.o.)  MRN: [id number]  CODE STATUS: Full Code    Date of Service: 23       Restrictions:  Restrictions/Precautions: Fall Risk, Cardiac  Position Activity Restriction  Sternal Precautions: No Pushing, No Pulling, 5# Lifting Restrictions  Other position/activity restrictions: sternal precautions     SUBJECTIVE     Pt agreeable to PT tx session this date    OBJECTIVE  Cognition  Overall Cognitive Status: WFL  Orientation  Overall Orientation Status: Within Functional Limits  Orientation Level: Oriented X4    Functional Mobility  Balance  Sitting Balance: Independent  Standing Balance: Contact guard assistance  Transfers  Device: Walker  Sit to Stand  Assistance Level: Contact guard assist  Stand to Sit  Assistance Level: Contact guard assist      Environmental Mobility  Ambulation  Surface: Level surface  Device: Rollator  Distance: 448+925 ft  Activity: Within Unit  Activity Comments: Pt required rest breaks after each bout of amb d/t fatigue  Additional Factors: Verbal cues; Increased time to complete  Assistance Level: Stand by assist  Gait Deviations: Slow adiyta  Stairs  Stair Height: 6''  Number of Stairs: 4+4+4  Additional Factors: Hand placement cues; Non-reciprocal going up;Non-reciprocal going down (RLE lead)  Assistance Level: Contact guard assist  Skilled Clinical Factors: Pt required frequent verbal cues for proper AROM for sternal PRX             PT Exercises  Exercise Treatment: SCIFIT 5 mins at level 1.3, HR: 67, c/o fatigue and req rest break after. ASSESSMENT/PROGRESS TOWARDS GOALS  Vital Signs  Pulse: 67  BP: 113/68  BP Location: Right upper arm  MAP (Calculated): 83  SpO2: 95 %  O2 Device: None (Room air)    Assessment  Assessment: Pt seen for gait and transfer training, stair training, and ther-act focused on endurance this date.  Pt req grossly SBA place    EDUCATION  Education  Education Given To: Patient  Education Provided: Role of Therapy;Plan of Care;Precautions; Safety; Mobility Training;Transfer Training;Family Education;Equipment; Fall Prevention Strategies; Energy Conservation  Education Provided Comments: Pt educated on importance of energy conservation for current deficits  Education Method: Verbal  Barriers to Learning: None  Education Outcome: Verbalized understanding;Continued education needed;Demonstrated understanding    Addendum Second Session -Maryam Bird PT  Assessment: Pt found in chair at [address] reporting fatigue from therapy this date, left with OT at [address]: 106/63 sitting, 146/96 post gait, 122/96 post break    Transfers: STS MOD A to rollator (x3 throughout session)    Gait: 250 ft x2 with CGA and rollator     Exercises: BLE seated ishaan AFNG x15, stood in bathroom for urination supervision and use of grab bars for balance      Therapy Time   Individual Concurrent Group Co-treatment   Time In 1230         Time Out 1330         Minutes 60           Second Session Therapy Time:   Individual Concurrent Group Co-treatment   Time In 1330         Time Out 1400         Minutes 30             Timed Code Treatment Minutes: [facility], [address] [place] [address], 12/22/23 at 1:30 PM

## 2023-12-22 NOTE — PLAN OF CARE
Problem: Safety - Adult  Goal: Free from fall injury  12/21/2023 2356 by Kristine Vaca RN  Outcome: Progressing     Problem: Pain  Goal: Verbalizes/displays adequate comfort level or baseline comfort level  12/21/2023 2356 by Kristine Vaca RN  Outcome: Progressing

## 2023-12-22 NOTE — PLAN OF CARE
Problem: Safety - Adult  Goal: Free from fall injury  12/22/2023 1125 by Alfreda Yang RN  Outcome: Progressing  12/21/2023 2356 by Martín Guillen RN  Outcome: Progressing     Problem: Pain  Goal: Verbalizes/displays adequate comfort level or baseline comfort level  12/22/2023 1125 by Alfreda Yang RN  Outcome: Progressing  12/21/2023 2356 by Martín Guillen RN  Outcome: Progressing

## 2023-12-22 NOTE — PROGRESS NOTES
Comprehensive Nutrition Assessment    Type and Reason for Visit:  Reassess    Nutrition Recommendations/Plan:   Continue no added salt diet   Decrease Ensure to BID - prefers chocolate   Encourage PO intakes as tolerated   Monitor nutrition adequacy, pertinent labs, bowel habits, wt changes, and clinical progress     Malnutrition Assessment:  Malnutrition Status: Moderate malnutrition (12/20/23 1322)    Context:  Acute Illness     Findings of the 6 clinical characteristics of malnutrition:  Energy Intake:  50% or less of estimated energy requirements for 5 or more days  Weight Loss:   (3% weight loss in 2 weeks)     Fluid Accumulation:  Mild Extremities, Generalized    Nutrition Assessment:    Follow up: Pt remains nutritionally at risk AEB decreased appetite. PO intakes appear improved per EMR, now %. Weights stable per EMR since admit to ARU. Only drinking one Ensure daily, will decrease to BID. Encouraged pt attempt to drink 2 Ensures daily. Will add flavor preference. Will continue to monitor. Nutrition Related Findings:    Labs reviewed. BM on 12/21. Active BS. +1 generalized and BUE edema. +2 BLE edema. Wound Type: Surgical Incision       Current Nutrition Intake & Therapies:    Average Meal Intake: 26-50%, 51-75%, %  Average Supplements Intake: Unable to assess  ADULT ORAL NUTRITION SUPPLEMENT; Breakfast, Lunch, Dinner; Standard High Calorie/High Protein Oral Supplement  ADULT DIET; Regular; No Added Salt (3-4 gm)    Anthropometric Measures:  Height: 172.7 cm (5' 8\")  Ideal Body Weight (IBW): 154 lbs (70 kg)    Admission Body Weight: 131.5 kg (290 lb) (bed scale)  Current Body Weight: 132 kg (291 lb), 188.3 % IBW.  Weight Source: Bed Scale  Current BMI (kg/m2): 44.3  Usual Body Weight: 135.6 kg (299 lb) (standing scale 12/5/23)  % Weight Change (Calculated): -3                    BMI Categories: Obese Class 3 (BMI 40.0 or greater)    Estimated Daily Nutrient Needs:  Energy Requirements Based On: Kcal/kg (25-30)  Weight Used for Energy Requirements: Ideal  Energy (kcal/day): 8859-0782 kcal  Weight Used for Protein Requirements: Ideal (1.0-1.2 g/kg)  Protein (g/day): 70-84 g  Method Used for Fluid Requirements: 1 ml/kcal  Fluid (ml/day): 5164-4153 mL    Nutrition Diagnosis:   Moderate malnutrition related to inadequate protein-energy intake as evidenced by poor intake prior to admission, weight loss, localized or generalized fluid accumulation, intake 0-25%, intake 26-50%    Nutrition Interventions:   Food and/or Nutrient Delivery: Continue Current Diet, Modify Oral Nutrition Supplement  Nutrition Education/Counseling: Education initiated (Provided as IP - monitor further diet education needs as appropriate)  Coordination of Nutrition Care: Continue to monitor while inpatient, Interdisciplinary Rounds       Goals:  Previous Goal Met: Progressing toward Goal(s)  Goals: PO intake 50% or greater, prior to discharge       Nutrition Monitoring and Evaluation:   Behavioral-Environmental Outcomes: None Identified  Food/Nutrient Intake Outcomes: Food and Nutrient Intake, Supplement Intake  Physical Signs/Symptoms Outcomes: Biochemical Data, Meal Time Behavior, Nutrition Focused Physical Findings, Weight    Discharge Planning:    Continue current diet, Continue Oral Nutrition Supplement     Lourdes Martinez, MS, RD, LD  Contact: [phone]

## 2023-12-22 NOTE — PROGRESS NOTES
Occupational Therapy  Facility/Department: [facility]  Rehabilitation Occupational Therapy Daily Treatment Note    Date: 23  Patient Name: Kristopher Watkins       Room: 0159/0159-01  MRN: [id number]  Account: [id number]   : 1945  (78 y.o.) Gender: male                  Past Medical History:  has a past medical history of Arthritis, CAD (coronary artery disease), Cancer (HCC), GERD (gastroesophageal reflux disease), Hearing aid worn, Hyperlipidemia, Hypertension, Osteoarthritis, Sleep apnea, and Wears glasses.  Past Surgical History:   has a past surgical history that includes Coronary angioplasty with stent; Coronary angioplasty (2015); skin biopsy; Colonoscopy; Endoscopy, colon, diagnostic; Cataract removal (Bilateral); Total knee arthroplasty (Right, 2019); joint replacement; knee surgery; Cataract extraction, bilateral (Bilateral); and Coronary artery bypass graft (N/A, 2023).    Restrictions  Restrictions/Precautions: Fall Risk, Cardiac  Other position/activity restrictions: sternal precautions    Subjective  Subjective: Pt in recliner upon OT arrival. Denies pain. /60, Hr 74, O2 99% on RA.  Restrictions/Precautions: Fall Risk;Cardiac             Objective     Cognition  Overall Cognitive Status: WFL  Orientation  Overall Orientation Status: Within Functional Limits  Orientation Level: Oriented X4         ADL  Feeding  Assistance Level: Independent  Upper Extremity Dressing  Assistance Level: Minimal assistance  Skilled Clinical Factors: Jimy to don shirt over R shoulder  Lower Extremity Dressing  Equipment Provided: Reachers  Assistance Level: Minimal assistance  Skilled Clinical Factors: pt educated on use of reacher for donning underwear/pants. Pt demonstrated understanding of equipment, but required Jimy to fully don pants over ankles          Functional Mobility  Device: 4-Wheeled walker  Activity: To/From therapy gym  Assistance Level: Stand by assist  Skilled Clinical  Factors: pt able to walk to gym, perform standing balance exercises, and walk back to room with 2 seated rest breaks  Sit to Stand  Assistance Level: Contact guard assist  Skilled Clinical Factors: from recliner and chair in gym  Stand to Sit  Assistance Level: Contact guard assist  Skilled Clinical Factors: to recliner and chair in gym     OT Exercises  Exercise Treatment: completed x15 BUE AROM exercises w/ 3# DB within sternal precautions. Pt completed the following exercises: wrist supination/pronation, bicep curl, shoulder flexion to 90*, wrist flexion/extension, grasp/release. Rest breaks in between each set of exercises required. Dynamic Standing Balance Exercises: Pt stood at table to put together a puzzle of a map of the [employer]. Puzzle pieces were spread around table, so pt had to reach outside of his DEBBIE while adhering to sternal precautions (not resting arms on table) to collect pieces to add to the puzzle. After ~8 min, pt required a seated rest break. On second set, pt stood on black balance board for ~11 min to finished putting all puzzle pieces together. SpO2 remained in 90's throughout entire activity. Assessment  Assessment:   First Session: Pt tolerated session well this date. Pt required CGA for all functional transfers with min verbal cues for sternal precautions. He performed LB dressing with Jimy with use of reacher. He required SBA for funcitonal mobility to/from therapy gym with rollator. While in gym, pt performed x15 reps of BUE therex with rest breaks between reps. He also participated in a dynamic standing balance activity where he stood at a table to put a puzzle together; he stood for ~8 min on first stand and ~11 min on black balance board on second stand. Pt continues to be limited by decreased endurance and strength, and requires continued education on sternal precautions. Continue OT POC.   Second Session: Pt seated in recliner upon OT arrival, reporting feeling very fatigued after

## 2023-12-23 PROCEDURE — 6360000002 HC RX W HCPCS: Performed by: STUDENT IN AN ORGANIZED HEALTH CARE EDUCATION/TRAINING PROGRAM

## 2023-12-23 PROCEDURE — 97110 THERAPEUTIC EXERCISES: CPT

## 2023-12-23 PROCEDURE — 6370000000 HC RX 637 (ALT 250 FOR IP): Performed by: STUDENT IN AN ORGANIZED HEALTH CARE EDUCATION/TRAINING PROGRAM

## 2023-12-23 PROCEDURE — 97530 THERAPEUTIC ACTIVITIES: CPT

## 2023-12-23 PROCEDURE — 97535 SELF CARE MNGMENT TRAINING: CPT

## 2023-12-23 PROCEDURE — 1280000000 HC REHAB R&B

## 2023-12-23 PROCEDURE — 97116 GAIT TRAINING THERAPY: CPT

## 2023-12-23 RX ADMIN — ACETAMINOPHEN 1000 MG: 500 TABLET ORAL at 15:03

## 2023-12-23 RX ADMIN — METOPROLOL TARTRATE 50 MG: 50 TABLET, FILM COATED ORAL at 21:28

## 2023-12-23 RX ADMIN — METHOCARBAMOL 1000 MG: 500 TABLET ORAL at 21:27

## 2023-12-23 RX ADMIN — HEPARIN SODIUM 5000 UNITS: 5000 INJECTION INTRAVENOUS; SUBCUTANEOUS at 15:02

## 2023-12-23 RX ADMIN — METOPROLOL TARTRATE 50 MG: 50 TABLET, FILM COATED ORAL at 08:59

## 2023-12-23 RX ADMIN — FUROSEMIDE 20 MG: 20 TABLET ORAL at 08:59

## 2023-12-23 RX ADMIN — HEPARIN SODIUM 5000 UNITS: 5000 INJECTION INTRAVENOUS; SUBCUTANEOUS at 06:59

## 2023-12-23 RX ADMIN — ACETAMINOPHEN 1000 MG: 500 TABLET ORAL at 06:59

## 2023-12-23 RX ADMIN — CLOPIDOGREL BISULFATE 75 MG: 75 TABLET ORAL at 08:59

## 2023-12-23 RX ADMIN — POLYETHYLENE GLYCOL 3350 17 G: 17 POWDER, FOR SOLUTION ORAL at 09:00

## 2023-12-23 RX ADMIN — Medication 10 MG: at 21:27

## 2023-12-23 RX ADMIN — BISACODYL 5 MG: 5 TABLET, COATED ORAL at 08:59

## 2023-12-23 RX ADMIN — OXYCODONE 5 MG: 5 TABLET ORAL at 21:27

## 2023-12-23 RX ADMIN — ASPIRIN 81 MG: 81 TABLET, COATED ORAL at 08:59

## 2023-12-23 RX ADMIN — AMIODARONE HYDROCHLORIDE 200 MG: 200 TABLET ORAL at 08:59

## 2023-12-23 RX ADMIN — ACETAMINOPHEN 1000 MG: 500 TABLET ORAL at 21:27

## 2023-12-23 RX ADMIN — METHOCARBAMOL 1000 MG: 500 TABLET ORAL at 15:03

## 2023-12-23 RX ADMIN — BISACODYL 10 MG: 10 SUPPOSITORY RECTAL at 21:40

## 2023-12-23 RX ADMIN — FAMOTIDINE 20 MG: 20 TABLET, FILM COATED ORAL at 08:59

## 2023-12-23 RX ADMIN — HEPARIN SODIUM 5000 UNITS: 5000 INJECTION INTRAVENOUS; SUBCUTANEOUS at 21:30

## 2023-12-23 RX ADMIN — ATORVASTATIN CALCIUM 40 MG: 40 TABLET, FILM COATED ORAL at 21:27

## 2023-12-23 ASSESSMENT — PAIN SCALES - GENERAL
PAINLEVEL_OUTOF10: 4
PAINLEVEL_OUTOF10: 1

## 2023-12-23 ASSESSMENT — PAIN DESCRIPTION - LOCATION: LOCATION: BACK

## 2023-12-23 ASSESSMENT — PAIN DESCRIPTION - DESCRIPTORS: DESCRIPTORS: ACHING

## 2023-12-23 NOTE — PROGRESS NOTES
Physical Therapy  Facility/Department: [facility]  Rehabilitation Physical Therapy Treatment Note    NAME: Merlin Fore  : 1945 (69 y.o.)  MRN: [id number]  CODE STATUS: Full Code    Date of Service: 23       Restrictions:  Restrictions/Precautions: Fall Risk, Cardiac  Position Activity Restriction  Sternal Precautions: No Pushing, No Pulling, 5# Lifting Restrictions  Other position/activity restrictions: sternal precautions     SUBJECTIVE  Subjective  Subjective: Pt seated in recliner, agreeable to PT tx but reports fatigue  Pain: Pt denies c/o pain               OBJECTIVE  Cognition  Overall Cognitive Status: WFL  Orientation  Overall Orientation Status: Within Functional Limits  Orientation Level: Oriented X4    Functional Mobility  Balance  Sitting Balance: Independent  Standing Balance: Contact guard assistance  Standing Balance  Activity: CGA with 4WW  Transfers  Surface: From chair with arms; Wheelchair  Additional Factors: Verbal cues  Device: Walker (6LV)  Sit to Stand  Assistance Level: Contact guard assist  Skilled Clinical Factors: Pt completes multiple t/f from recliner, w/c and seat of 4WW with grossly CGA, min cues for sternal px  Stand to Sit  Assistance Level: Contact guard assist  Skilled Clinical Factors: Pt completes multiple t/f from recliner, w/c and seat of 4WW with grossly CGA, min cues for sternal px  Stand Pivot  Assistance Level: Contact guard assist  Skilled Clinical Factors: with 4WW, min cues for sternal px      Environmental Mobility  Ambulation  Surface: Level surface  Device: Rollator  Distance: 100' x 2  Activity: Within Unit  Activity Comments: Pt is limited by fatigue, following first bout of gait HR 77, following second bout of gait, HR noted to be 36-42 on machine, taken manually at 40, /67.  RN notified immediately and pt assisted back to room with HR returning to 60's following 1-2 minutes but again drops to 40's for 1-2 minutes and returns to 60's x 2 more

## 2023-12-23 NOTE — PROGRESS NOTES
Occupational Therapy  Facility/Department: [facility]  Rehabilitation Occupational Therapy Daily Treatment Note    Date: 23  Patient Name: Jason Flores       Room: 1694/9962-52  MRN: [id number]  Account: [de-identified]   : 1945  (80 y.o.) Gender: male                  Past Medical History:  has a past medical history of Arthritis, CAD (coronary artery disease), Cancer ([address]), GERD (gastroesophageal reflux disease), Hearing aid worn, Hyperlipidemia, Hypertension, Osteoarthritis, Sleep apnea, and Wears glasses. Past Surgical History:   has a past surgical history that includes Coronary angioplasty with stent; Coronary angioplasty (2015); skin biopsy; Colonoscopy; Endoscopy, colon, diagnostic; Cataract removal (Bilateral); Total knee arthroplasty (Right, 2019); joint replacement; knee surgery; Cataract extraction, bilateral (Bilateral); and Coronary artery bypass graft (N/A, 2023). Restrictions  Restrictions/Precautions: Fall Risk, Cardiac  Other position/activity restrictions: sternal precautions    Subjective  Subjective: Pt in recliner upon OT arrival. Denies pain. Pt requested to perform ADLs this date. /66, HR 74, SpO2 96% on RA  Restrictions/Precautions: Fall Risk;Cardiac             Objective     Cognition  Overall Cognitive Status: WFL  Orientation  Overall Orientation Status: Within Functional Limits  Orientation Level: Oriented X4         ADL  Feeding  Assistance Level: Independent  Grooming/Oral Hygiene  Assistance Level: Supervision  Skilled Clinical Factors: oral hygiene and shaving seated on rollator seat  Upper Extremity Bathing  Assistance Level: Supervision  Skilled Clinical Factors: seated on TTB  Lower Extremity Bathing  Equipment Provided: Long-handled sponge  Assistance Level: Verbal cues; Minimal assistance  Skilled Clinical Factors: seated on TTB, pt educated on use of long handled sponge and demonstrated understanding of AE. Jimy for drying feet.   Upper Extremity Dressing  Assistance Level: Minimal assistance  Skilled Clinical Factors: Jimy to don shirt over R shoulder  Lower Extremity Dressing  Equipment Provided: Reachers  Assistance Level: Verbal cues;Contact guard assist  Skilled Clinical Factors: Pt used reacher to don pants with  verbal cues for technique, CGA while donning pants over hips  Putting On/Taking Off Footwear  Equipment Provided: Sock aid  Assistance Level: Maximum assistance  Skilled Clinical Factors: pt educated on use of sock aid for donning socks and demonstrated understanding by donning socks with SBA and vc's, totalA to don daniel hose  Toileting  Assistance Level: Stand by assist  Skilled Clinical Factors: urinating in stance at toilet  Tub/Shower Transfers  Type: Shower  Transfer From: Walker (rollator)  Transfer To: Tub transfer bench  Additional Factors: Cues for hand placement (cues for sternal precautions)  Assistance Level: Contact guard assist          Functional Mobility  Device: 4-Wheeled walker  Activity: To/From bathroom  Assistance Level: Stand by assist  Sit to Stand  Assistance Level: Stand by assist  Skilled Clinical Factors: from recliner  Stand to Sit  Assistance Level: Stand by assist  Skilled Clinical Factors: to TTB and recliner         Assessment  Assessment:   First Session: Pt tolerated OT session well this AM. Pt requested to perform ADLs this date. He required SBA for toileting, SPV for UB bathing, Jimy for UB dressing, Jimy for LB bathing, CGA for LB dressing with AE, SPV for grooming, and maxA for footwear. He required SBA-CGA for functional transfers while demonstrating good understanding of sternal precautions. He performs functional mobility to/from bathroom with rollator with SBA. He is limited by decreased endurance and requires seated rest breaks during activities. Continue OT POC.  Second Session: Pt resting in bed upon OT arrival, agreeable to session. /67, HR: 68, SpO2: 97%. Pt reports 5/10 pain in

## 2023-12-24 PROCEDURE — 1280000000 HC REHAB R&B

## 2023-12-24 PROCEDURE — 6360000002 HC RX W HCPCS: Performed by: STUDENT IN AN ORGANIZED HEALTH CARE EDUCATION/TRAINING PROGRAM

## 2023-12-24 PROCEDURE — 6370000000 HC RX 637 (ALT 250 FOR IP): Performed by: STUDENT IN AN ORGANIZED HEALTH CARE EDUCATION/TRAINING PROGRAM

## 2023-12-24 RX ADMIN — METHOCARBAMOL 1000 MG: 500 TABLET ORAL at 14:23

## 2023-12-24 RX ADMIN — AMIODARONE HYDROCHLORIDE 200 MG: 200 TABLET ORAL at 08:47

## 2023-12-24 RX ADMIN — METHOCARBAMOL 1000 MG: 500 TABLET ORAL at 19:56

## 2023-12-24 RX ADMIN — METOPROLOL TARTRATE 50 MG: 50 TABLET, FILM COATED ORAL at 08:45

## 2023-12-24 RX ADMIN — Medication 10 MG: at 19:56

## 2023-12-24 RX ADMIN — CLOPIDOGREL BISULFATE 75 MG: 75 TABLET ORAL at 08:46

## 2023-12-24 RX ADMIN — METOPROLOL TARTRATE 50 MG: 50 TABLET, FILM COATED ORAL at 19:56

## 2023-12-24 RX ADMIN — POLYETHYLENE GLYCOL 3350 17 G: 17 POWDER, FOR SOLUTION ORAL at 08:46

## 2023-12-24 RX ADMIN — ACETAMINOPHEN 1000 MG: 500 TABLET ORAL at 19:55

## 2023-12-24 RX ADMIN — HEPARIN SODIUM 5000 UNITS: 5000 INJECTION INTRAVENOUS; SUBCUTANEOUS at 23:41

## 2023-12-24 RX ADMIN — ACETAMINOPHEN 1000 MG: 500 TABLET ORAL at 08:47

## 2023-12-24 RX ADMIN — ACETAMINOPHEN 1000 MG: 500 TABLET ORAL at 14:23

## 2023-12-24 RX ADMIN — METHOCARBAMOL 1000 MG: 500 TABLET ORAL at 08:46

## 2023-12-24 RX ADMIN — HEPARIN SODIUM 5000 UNITS: 5000 INJECTION INTRAVENOUS; SUBCUTANEOUS at 06:37

## 2023-12-24 RX ADMIN — ATORVASTATIN CALCIUM 40 MG: 40 TABLET, FILM COATED ORAL at 19:56

## 2023-12-24 RX ADMIN — OXYCODONE 5 MG: 5 TABLET ORAL at 23:41

## 2023-12-24 RX ADMIN — HEPARIN SODIUM 5000 UNITS: 5000 INJECTION INTRAVENOUS; SUBCUTANEOUS at 14:23

## 2023-12-24 RX ADMIN — FAMOTIDINE 20 MG: 20 TABLET, FILM COATED ORAL at 08:46

## 2023-12-24 RX ADMIN — ASPIRIN 81 MG: 81 TABLET, COATED ORAL at 08:46

## 2023-12-25 LAB
ANION GAP SERPL CALCULATED.3IONS-SCNC: 11 MMOL/L (ref 3–16)
BASOPHILS # BLD: 0.1 K/UL (ref 0–0.2)
BASOPHILS NFR BLD: 1.1 %
BUN SERPL-MCNC: 37 MG/DL (ref 7–20)
CALCIUM SERPL-MCNC: 8.9 MG/DL (ref 8.3–10.6)
CHLORIDE SERPL-SCNC: 104 MMOL/L (ref 99–110)
CO2 SERPL-SCNC: 20 MMOL/L (ref 21–32)
CREAT SERPL-MCNC: 1 MG/DL (ref 0.8–1.3)
DEPRECATED RDW RBC AUTO: 17.1 % (ref 12.4–15.4)
EOSINOPHIL # BLD: 0.3 K/UL (ref 0–0.6)
EOSINOPHIL NFR BLD: 3.6 %
GFR SERPLBLD CREATININE-BSD FMLA CKD-EPI: >60 ML/MIN/{1.73_M2}
GLUCOSE SERPL-MCNC: 96 MG/DL (ref 70–99)
HCT VFR BLD AUTO: 27.1 % (ref 40.5–52.5)
HGB BLD-MCNC: 8.9 G/DL (ref 13.5–17.5)
LYMPHOCYTES # BLD: 1 K/UL (ref 1–5.1)
LYMPHOCYTES NFR BLD: 12.8 %
MCH RBC QN AUTO: 29.6 PG (ref 26–34)
MCHC RBC AUTO-ENTMCNC: 32.7 G/DL (ref 31–36)
MCV RBC AUTO: 90.6 FL (ref 80–100)
MONOCYTES # BLD: 0.8 K/UL (ref 0–1.3)
MONOCYTES NFR BLD: 10.4 %
NEUTROPHILS # BLD: 5.8 K/UL (ref 1.7–7.7)
NEUTROPHILS NFR BLD: 72.1 %
PLATELET # BLD AUTO: 407 K/UL (ref 135–450)
PMV BLD AUTO: 6.6 FL (ref 5–10.5)
POTASSIUM SERPL-SCNC: 4.5 MMOL/L (ref 3.5–5.1)
RBC # BLD AUTO: 2.99 M/UL (ref 4.2–5.9)
SODIUM SERPL-SCNC: 135 MMOL/L (ref 136–145)
WBC # BLD AUTO: 8 K/UL (ref 4–11)

## 2023-12-25 PROCEDURE — 6360000002 HC RX W HCPCS: Performed by: STUDENT IN AN ORGANIZED HEALTH CARE EDUCATION/TRAINING PROGRAM

## 2023-12-25 PROCEDURE — 1280000000 HC REHAB R&B

## 2023-12-25 PROCEDURE — 36415 COLL VENOUS BLD VENIPUNCTURE: CPT

## 2023-12-25 PROCEDURE — 6370000000 HC RX 637 (ALT 250 FOR IP): Performed by: STUDENT IN AN ORGANIZED HEALTH CARE EDUCATION/TRAINING PROGRAM

## 2023-12-25 PROCEDURE — 80048 BASIC METABOLIC PNL TOTAL CA: CPT

## 2023-12-25 PROCEDURE — 85025 COMPLETE CBC W/AUTO DIFF WBC: CPT

## 2023-12-25 RX ADMIN — ACETAMINOPHEN 1000 MG: 500 TABLET ORAL at 13:38

## 2023-12-25 RX ADMIN — ATORVASTATIN CALCIUM 40 MG: 40 TABLET, FILM COATED ORAL at 20:35

## 2023-12-25 RX ADMIN — OXYCODONE 5 MG: 5 TABLET ORAL at 13:41

## 2023-12-25 RX ADMIN — HEPARIN SODIUM 5000 UNITS: 5000 INJECTION INTRAVENOUS; SUBCUTANEOUS at 06:24

## 2023-12-25 RX ADMIN — BISACODYL 5 MG: 5 TABLET, COATED ORAL at 08:04

## 2023-12-25 RX ADMIN — POLYETHYLENE GLYCOL 3350 17 G: 17 POWDER, FOR SOLUTION ORAL at 08:04

## 2023-12-25 RX ADMIN — OXYCODONE 5 MG: 5 TABLET ORAL at 21:51

## 2023-12-25 RX ADMIN — AMIODARONE HYDROCHLORIDE 200 MG: 200 TABLET ORAL at 08:05

## 2023-12-25 RX ADMIN — Medication 10 MG: at 20:35

## 2023-12-25 RX ADMIN — CLOPIDOGREL BISULFATE 75 MG: 75 TABLET ORAL at 08:04

## 2023-12-25 RX ADMIN — ASPIRIN 81 MG: 81 TABLET, COATED ORAL at 08:05

## 2023-12-25 RX ADMIN — ACETAMINOPHEN 1000 MG: 500 TABLET ORAL at 06:24

## 2023-12-25 RX ADMIN — HEPARIN SODIUM 5000 UNITS: 5000 INJECTION INTRAVENOUS; SUBCUTANEOUS at 13:38

## 2023-12-25 RX ADMIN — METHOCARBAMOL 1000 MG: 500 TABLET ORAL at 08:04

## 2023-12-25 RX ADMIN — FAMOTIDINE 20 MG: 20 TABLET, FILM COATED ORAL at 08:04

## 2023-12-25 RX ADMIN — METHOCARBAMOL 1000 MG: 500 TABLET ORAL at 21:51

## 2023-12-25 RX ADMIN — METHOCARBAMOL 1000 MG: 500 TABLET ORAL at 13:38

## 2023-12-25 RX ADMIN — METOPROLOL TARTRATE 50 MG: 50 TABLET, FILM COATED ORAL at 20:35

## 2023-12-25 RX ADMIN — HEPARIN SODIUM 5000 UNITS: 5000 INJECTION INTRAVENOUS; SUBCUTANEOUS at 20:38

## 2023-12-25 RX ADMIN — METOPROLOL TARTRATE 50 MG: 50 TABLET, FILM COATED ORAL at 08:05

## 2023-12-25 RX ADMIN — ACETAMINOPHEN 650 MG: 325 TABLET ORAL at 20:35

## 2023-12-25 ASSESSMENT — PAIN DESCRIPTION - LOCATION
LOCATION: BACK

## 2023-12-25 ASSESSMENT — PAIN DESCRIPTION - ORIENTATION
ORIENTATION: MID
ORIENTATION: MID

## 2023-12-25 ASSESSMENT — PAIN DESCRIPTION - DESCRIPTORS
DESCRIPTORS: ACHING

## 2023-12-25 ASSESSMENT — PAIN SCALES - GENERAL
PAINLEVEL_OUTOF10: 4
PAINLEVEL_OUTOF10: 5
PAINLEVEL_OUTOF10: 7

## 2023-12-26 PROCEDURE — 97110 THERAPEUTIC EXERCISES: CPT

## 2023-12-26 PROCEDURE — 97530 THERAPEUTIC ACTIVITIES: CPT

## 2023-12-26 PROCEDURE — 97116 GAIT TRAINING THERAPY: CPT

## 2023-12-26 PROCEDURE — 6360000002 HC RX W HCPCS: Performed by: STUDENT IN AN ORGANIZED HEALTH CARE EDUCATION/TRAINING PROGRAM

## 2023-12-26 PROCEDURE — 6370000000 HC RX 637 (ALT 250 FOR IP): Performed by: STUDENT IN AN ORGANIZED HEALTH CARE EDUCATION/TRAINING PROGRAM

## 2023-12-26 PROCEDURE — 97535 SELF CARE MNGMENT TRAINING: CPT

## 2023-12-26 PROCEDURE — 1280000000 HC REHAB R&B

## 2023-12-26 RX ORDER — ENOXAPARIN SODIUM 100 MG/ML
30 INJECTION SUBCUTANEOUS 2 TIMES DAILY
Status: DISCONTINUED | OUTPATIENT
Start: 2023-12-27 | End: 2023-12-30 | Stop reason: HOSPADM

## 2023-12-26 RX ADMIN — CLOPIDOGREL BISULFATE 75 MG: 75 TABLET ORAL at 08:53

## 2023-12-26 RX ADMIN — OXYCODONE 5 MG: 5 TABLET ORAL at 08:52

## 2023-12-26 RX ADMIN — BISACODYL 5 MG: 5 TABLET, COATED ORAL at 08:52

## 2023-12-26 RX ADMIN — ACETAMINOPHEN 1000 MG: 500 TABLET ORAL at 21:28

## 2023-12-26 RX ADMIN — METHOCARBAMOL 1000 MG: 500 TABLET ORAL at 15:43

## 2023-12-26 RX ADMIN — METHOCARBAMOL 1000 MG: 500 TABLET ORAL at 21:28

## 2023-12-26 RX ADMIN — AMIODARONE HYDROCHLORIDE 200 MG: 200 TABLET ORAL at 08:52

## 2023-12-26 RX ADMIN — METHOCARBAMOL 1000 MG: 500 TABLET ORAL at 08:52

## 2023-12-26 RX ADMIN — ATORVASTATIN CALCIUM 40 MG: 40 TABLET, FILM COATED ORAL at 21:28

## 2023-12-26 RX ADMIN — ACETAMINOPHEN 1000 MG: 500 TABLET ORAL at 15:43

## 2023-12-26 RX ADMIN — METOPROLOL TARTRATE 50 MG: 50 TABLET, FILM COATED ORAL at 21:28

## 2023-12-26 RX ADMIN — METOPROLOL TARTRATE 50 MG: 50 TABLET, FILM COATED ORAL at 08:52

## 2023-12-26 RX ADMIN — FAMOTIDINE 20 MG: 20 TABLET, FILM COATED ORAL at 08:52

## 2023-12-26 RX ADMIN — HEPARIN SODIUM 5000 UNITS: 5000 INJECTION INTRAVENOUS; SUBCUTANEOUS at 05:23

## 2023-12-26 RX ADMIN — POLYETHYLENE GLYCOL 3350 17 G: 17 POWDER, FOR SOLUTION ORAL at 08:53

## 2023-12-26 RX ADMIN — Medication 10 MG: at 21:28

## 2023-12-26 RX ADMIN — ASPIRIN 81 MG: 81 TABLET, COATED ORAL at 08:52

## 2023-12-26 RX ADMIN — ACETAMINOPHEN 1000 MG: 500 TABLET ORAL at 05:22

## 2023-12-26 ASSESSMENT — PAIN DESCRIPTION - ORIENTATION: ORIENTATION: MID

## 2023-12-26 ASSESSMENT — PAIN SCALES - GENERAL
PAINLEVEL_OUTOF10: 4
PAINLEVEL_OUTOF10: 3
PAINLEVEL_OUTOF10: 7
PAINLEVEL_OUTOF10: 8
PAINLEVEL_OUTOF10: 0

## 2023-12-26 ASSESSMENT — PAIN DESCRIPTION - FREQUENCY: FREQUENCY: INTERMITTENT

## 2023-12-26 ASSESSMENT — PAIN - FUNCTIONAL ASSESSMENT: PAIN_FUNCTIONAL_ASSESSMENT: PREVENTS OR INTERFERES SOME ACTIVE ACTIVITIES AND ADLS

## 2023-12-26 ASSESSMENT — PAIN DESCRIPTION - PAIN TYPE: TYPE: ACUTE PAIN

## 2023-12-26 ASSESSMENT — PAIN DESCRIPTION - DESCRIPTORS: DESCRIPTORS: ACHING;DISCOMFORT

## 2023-12-26 ASSESSMENT — PAIN DESCRIPTION - LOCATION: LOCATION: BACK

## 2023-12-26 ASSESSMENT — PAIN DESCRIPTION - ONSET: ONSET: ON-GOING

## 2023-12-26 NOTE — PROGRESS NOTES
Occupational Therapy  Facility/Department: [facility]  Rehabilitation Occupational Therapy Daily Treatment Note    Date: 23  Patient Name: Mahi Owens       Room: 6723/4165-59  MRN: [id number]  Account: [de-identified]   : 1945  (80 y.o.) Gender: male            Past Medical History:  has a past medical history of Arthritis, CAD (coronary artery disease), Cancer ([address]), GERD (gastroesophageal reflux disease), Hearing aid worn, Hyperlipidemia, Hypertension, Osteoarthritis, Sleep apnea, and Wears glasses. Past Surgical History:   has a past surgical history that includes Coronary angioplasty with stent; Coronary angioplasty (2015); skin biopsy; Colonoscopy; Endoscopy, colon, diagnostic; Cataract removal (Bilateral); Total knee arthroplasty (Right, 2019); joint replacement; knee surgery; Cataract extraction, bilateral (Bilateral); and Coronary artery bypass graft (N/A, 2023). Restrictions  Restrictions/Precautions: Fall Risk, Cardiac  Other position/activity restrictions: sternal precautions    Subjective  Subjective: Pt in recliner with family present. Pt agreeable to OT treatment session. /71, HR 70, SPO2 96%  Restrictions/Precautions: Fall Risk;Cardiac      Objective     Cognition  Overall Cognitive Status: WFL  Orientation  Overall Orientation Status: Within Functional Limits  Orientation Level: Oriented X4           OT Exercises  A/AROM Exercises: x20 BUE ex 3# seated in chair: elbow flex/ext, forearm rotation, shoulder flex, wrist flex/ext     Assessment  Assessment  Assessment: First Session: Pt demo'd increased tolerance to therapy when completing BUE ex in chair, pt able to complete 5x20 BUE ex within sternal px. Pt required 1 rest break following BUE ex. Pt presented with mild SOB when talking and completing ex. Pt continues to benefit from skilled OT while in IPR, continue OT POC.   Activity Tolerance: Patient tolerated treatment well;Patient limited by

## 2023-12-26 NOTE — PROGRESS NOTES
Physical Therapy  Facility/Department: [facility]  Rehabilitation Physical Therapy Treatment Note    NAME: Abril Martinez  : 1945 (69 y.o.)  MRN: [id number]  CODE STATUS: Full Code    Date of Service: 23       Restrictions:  Restrictions/Precautions: Fall Risk, Cardiac  Position Activity Restriction  Sternal Precautions: No Pushing, No Pulling, 5# Lifting Restrictions  Other position/activity restrictions: sternal precautions     SUBJECTIVE  Subjective  Subjective: pt in recliner, reports feeling \"worn out\"  Pain: Pt reporting 5/10 pain in chest and back          OBJECTIVE  80 bpm, 95%, 113/63; 93 bpm, 96% following ambulation, HR stable throughout session. Cognition  Overall Cognitive Status: WFL  Orientation  Overall Orientation Status: Within Functional Limits  Orientation Level: Oriented X4    Functional Mobility  Sit to Stand  Assistance Level: Contact guard assist;Stand by assist  Skilled Clinical Factors: CGA-SBA from recliner and arm chair, maintains precautions  Stand to Sit  Assistance Level: Stand by assist      Environmental Mobility  Ambulation  Surface: Level surface  Device: Rollator  Distance: 2x 150 ft  Activity: Within Unit  Activity Comments: Pt ambulates with slow aditya, wide DEBBIE, short step length with 1 standing rest break d/t SOB  Additional Factors: Verbal cues; Increased time to complete  Assistance Level: Stand by assist  Gait Deviations: Slow aditya;Decreased step length bilateral;Decreased trunk rotation;Decreased heel strike right;Decreased heel strike left; Wide base of support      10x consecutive STS from chair with SBA, pt maintains precautions      ASSESSMENT/PROGRESS TOWARDS GOALS  Assessment  Assessment: Pt progressed to transfers with SBA this date. Patient seen for gait and transfers training. Patient completed ambulation with rollator and SBA. Pt continues to require frequent, prolonged rest breaks d/t decreased endurance.  Pt will continue to benefit from

## 2023-12-27 LAB
ANION GAP SERPL CALCULATED.3IONS-SCNC: 12 MMOL/L (ref 3–16)
BASOPHILS # BLD: 0 K/UL (ref 0–0.2)
BASOPHILS NFR BLD: 0.6 %
BUN SERPL-MCNC: 34 MG/DL (ref 7–20)
CALCIUM SERPL-MCNC: 8.9 MG/DL (ref 8.3–10.6)
CHLORIDE SERPL-SCNC: 105 MMOL/L (ref 99–110)
CO2 SERPL-SCNC: 20 MMOL/L (ref 21–32)
CREAT SERPL-MCNC: 1 MG/DL (ref 0.8–1.3)
DEPRECATED RDW RBC AUTO: 17.3 % (ref 12.4–15.4)
EOSINOPHIL # BLD: 0.4 K/UL (ref 0–0.6)
EOSINOPHIL NFR BLD: 4.7 %
GFR SERPLBLD CREATININE-BSD FMLA CKD-EPI: >60 ML/MIN/{1.73_M2}
GLUCOSE SERPL-MCNC: 99 MG/DL (ref 70–99)
HCT VFR BLD AUTO: 28.7 % (ref 40.5–52.5)
HGB BLD-MCNC: 9.2 G/DL (ref 13.5–17.5)
LYMPHOCYTES # BLD: 1.1 K/UL (ref 1–5.1)
LYMPHOCYTES NFR BLD: 14.7 %
MCH RBC QN AUTO: 29.2 PG (ref 26–34)
MCHC RBC AUTO-ENTMCNC: 32.2 G/DL (ref 31–36)
MCV RBC AUTO: 90.7 FL (ref 80–100)
MONOCYTES # BLD: 0.8 K/UL (ref 0–1.3)
MONOCYTES NFR BLD: 10.7 %
NEUTROPHILS # BLD: 5.4 K/UL (ref 1.7–7.7)
NEUTROPHILS NFR BLD: 69.3 %
PLATELET # BLD AUTO: 363 K/UL (ref 135–450)
PMV BLD AUTO: 6.6 FL (ref 5–10.5)
POTASSIUM SERPL-SCNC: 4.6 MMOL/L (ref 3.5–5.1)
RBC # BLD AUTO: 3.17 M/UL (ref 4.2–5.9)
SODIUM SERPL-SCNC: 137 MMOL/L (ref 136–145)
WBC # BLD AUTO: 7.8 K/UL (ref 4–11)

## 2023-12-27 PROCEDURE — 36415 COLL VENOUS BLD VENIPUNCTURE: CPT

## 2023-12-27 PROCEDURE — 97110 THERAPEUTIC EXERCISES: CPT

## 2023-12-27 PROCEDURE — 85025 COMPLETE CBC W/AUTO DIFF WBC: CPT

## 2023-12-27 PROCEDURE — 97116 GAIT TRAINING THERAPY: CPT

## 2023-12-27 PROCEDURE — 97530 THERAPEUTIC ACTIVITIES: CPT

## 2023-12-27 PROCEDURE — 6370000000 HC RX 637 (ALT 250 FOR IP): Performed by: STUDENT IN AN ORGANIZED HEALTH CARE EDUCATION/TRAINING PROGRAM

## 2023-12-27 PROCEDURE — 97535 SELF CARE MNGMENT TRAINING: CPT

## 2023-12-27 PROCEDURE — 1280000000 HC REHAB R&B

## 2023-12-27 PROCEDURE — 80048 BASIC METABOLIC PNL TOTAL CA: CPT

## 2023-12-27 PROCEDURE — 6360000002 HC RX W HCPCS: Performed by: STUDENT IN AN ORGANIZED HEALTH CARE EDUCATION/TRAINING PROGRAM

## 2023-12-27 RX ADMIN — METHOCARBAMOL 1000 MG: 500 TABLET ORAL at 21:27

## 2023-12-27 RX ADMIN — ATORVASTATIN CALCIUM 40 MG: 40 TABLET, FILM COATED ORAL at 21:27

## 2023-12-27 RX ADMIN — ACETAMINOPHEN 1000 MG: 500 TABLET ORAL at 21:27

## 2023-12-27 RX ADMIN — ENOXAPARIN SODIUM 30 MG: 100 INJECTION SUBCUTANEOUS at 09:35

## 2023-12-27 RX ADMIN — OXYCODONE 5 MG: 5 TABLET ORAL at 21:31

## 2023-12-27 RX ADMIN — METOPROLOL TARTRATE 50 MG: 50 TABLET, FILM COATED ORAL at 09:28

## 2023-12-27 RX ADMIN — Medication 10 MG: at 21:26

## 2023-12-27 RX ADMIN — CLOPIDOGREL BISULFATE 75 MG: 75 TABLET ORAL at 09:29

## 2023-12-27 RX ADMIN — METHOCARBAMOL 1000 MG: 500 TABLET ORAL at 09:29

## 2023-12-27 RX ADMIN — ASPIRIN 81 MG: 81 TABLET, COATED ORAL at 09:29

## 2023-12-27 RX ADMIN — ACETAMINOPHEN 1000 MG: 500 TABLET ORAL at 14:41

## 2023-12-27 RX ADMIN — FAMOTIDINE 20 MG: 20 TABLET, FILM COATED ORAL at 09:29

## 2023-12-27 RX ADMIN — METHOCARBAMOL 1000 MG: 500 TABLET ORAL at 14:41

## 2023-12-27 RX ADMIN — POLYETHYLENE GLYCOL 3350 17 G: 17 POWDER, FOR SOLUTION ORAL at 09:29

## 2023-12-27 RX ADMIN — OXYCODONE 5 MG: 5 TABLET ORAL at 09:27

## 2023-12-27 RX ADMIN — ENOXAPARIN SODIUM 30 MG: 100 INJECTION SUBCUTANEOUS at 21:26

## 2023-12-27 RX ADMIN — METOPROLOL TARTRATE 50 MG: 50 TABLET, FILM COATED ORAL at 21:26

## 2023-12-27 RX ADMIN — AMIODARONE HYDROCHLORIDE 200 MG: 200 TABLET ORAL at 09:28

## 2023-12-27 ASSESSMENT — PAIN DESCRIPTION - DESCRIPTORS
DESCRIPTORS: ACHING;DISCOMFORT;SORE
DESCRIPTORS: ACHING

## 2023-12-27 ASSESSMENT — PAIN DESCRIPTION - LOCATION
LOCATION: INCISION
LOCATION: OTHER (COMMENT)

## 2023-12-27 ASSESSMENT — PAIN DESCRIPTION - PAIN TYPE: TYPE: ACUTE PAIN;SURGICAL PAIN

## 2023-12-27 ASSESSMENT — PAIN - FUNCTIONAL ASSESSMENT
PAIN_FUNCTIONAL_ASSESSMENT: PREVENTS OR INTERFERES SOME ACTIVE ACTIVITIES AND ADLS
PAIN_FUNCTIONAL_ASSESSMENT: PREVENTS OR INTERFERES SOME ACTIVE ACTIVITIES AND ADLS

## 2023-12-27 ASSESSMENT — PAIN SCALES - GENERAL
PAINLEVEL_OUTOF10: 6
PAINLEVEL_OUTOF10: 6
PAINLEVEL_OUTOF10: 5

## 2023-12-27 ASSESSMENT — PAIN DESCRIPTION - ONSET: ONSET: GRADUAL

## 2023-12-27 ASSESSMENT — PAIN DESCRIPTION - ORIENTATION: ORIENTATION: MID

## 2023-12-27 ASSESSMENT — PAIN DESCRIPTION - FREQUENCY: FREQUENCY: INTERMITTENT

## 2023-12-27 NOTE — PROGRESS NOTES
Physical Therapy  Facility/Department: [facility]  Rehabilitation Physical Therapy Treatment Note    NAME: Timmy Nicholson  : 1945 (69 y.o.)  MRN: [id number]  CODE STATUS: Full Code    Date of Service: 23       Restrictions:  Restrictions/Precautions: Fall Risk, Cardiac  Position Activity Restriction  Sternal Precautions: No Pushing, No Pulling, 5# Lifting Restrictions  Other position/activity restrictions: sternal precautions     SUBJECTIVE  Subjective  Subjective: Pt sitting EOB, reporting fatigue but agreeable to therapy  Pain: Pt reporting 6/10 pain in back     OBJECTIVE  130/63, 84 bpm, 99%    Cognition  Overall Cognitive Status: WFL  Orientation  Overall Orientation Status: Within Functional Limits  Orientation Level: Oriented X4    Functional Mobility  Sit to Stand  Assistance Level: Stand by assist  Skilled Clinical Factors: from EOB, toilet, and rollator seat  Stand to Sit  Assistance Level: Stand by assist  Skilled Clinical Factors: to toilet, rollator seat, and reclinere      Environmental Mobility  Ambulation  Surface: Level surface  Device: Rollator  Distance: 20 ft to toilet, 10 ft to recliner  Activity: Within Unit  Activity Comments: Pt ambulates with slow aditya, wide DEBBIE, short step length. Additional Factors: Verbal cues; Increased time to complete  Assistance Level: Stand by assist;Supervision (SBA-supervision)  Gait Deviations: Slow aditya;Decreased step length bilateral;Decreased trunk rotation;Decreased heel strike right;Decreased heel strike left; Wide base of support         Pt threads shorts while seated on toilet with use of reacher and able to pull up pants to knees and then the majority of the way up upon standing. Pt stood x1 min with SBA. Pt requested to wash face and brush teeth. Pt too fatigued to complete standing and sat on rollator seat to complete. ASSESSMENT/PROGRESS TOWARDS GOALS  Assessment  Assessment: Pt progressing to supervision for gait.  Patient

## 2023-12-27 NOTE — PROGRESS NOTES
Occupational Therapy  Facility/Department: [facility]  Rehabilitation Occupational Therapy Daily Treatment Note    Date: 23  Patient Name: Beth Farr       Room: 6766/7920-58  MRN: [id number]  Account: [de-identified]   : 1945  (80 y.o.) Gender: male            Past Medical History:  has a past medical history of Arthritis, CAD (coronary artery disease), Cancer ([address]), GERD (gastroesophageal reflux disease), Hearing aid worn, Hyperlipidemia, Hypertension, Osteoarthritis, Sleep apnea, and Wears glasses. Past Surgical History:   has a past surgical history that includes Coronary angioplasty with stent; Coronary angioplasty (2015); skin biopsy; Colonoscopy; Endoscopy, colon, diagnostic; Cataract removal (Bilateral); Total knee arthroplasty (Right, 2019); joint replacement; knee surgery; Cataract extraction, bilateral (Bilateral); and Coronary artery bypass graft (N/A, 2023). Restrictions  Restrictions/Precautions: Fall Risk, Cardiac  Other position/activity restrictions: sternal precautions  Required Braces or Orthoses?: No    Subjective  Subjective: Pt in recliner with nursing present and providing morning medications. Pt agreeable to OT treatment session. Reports fatigue this morning. /60, HR 90, SPO2 98%  Restrictions/Precautions: Fall Risk;Cardiac        Objective     Cognition  Overall Cognitive Status: WFL  Orientation  Overall Orientation Status: Within Functional Limits  Orientation Level: Oriented X4        OT Exercises  A/AROM Exercises: x15 BUE ex 3# seated in chair: elbow flex/ext, shoulder int/ext rotation, wrist flex/ext, shoulder flex, forearm rotation. Pt presented with increased fatigue throughout session, requiring frequent rest breaks. Noted increased SOB when completing ex. Assessment  Assessment  Assessment: First Session: Pt presented with increased fatigue this date when completing BUE ex seated in chair.  Pt completed 5x15 BUE ex with increased rest breaks between ex. Educated pt on importance of rest outside of therapy and understanding of good and bad days. Pt requested shower at later session this afternoon. Continue OT POC.  Activity Tolerance: Patient limited by fatigue;Patient limited by endurance  Discharge Recommendations: 24 hour supervision or assist;Home with Home health OT  OT Equipment Recommendations  Equipment Needed: Yes  Mobility Devices: ADL Assistive Devices  ADL Assistive Devices: Sock-Aid Hard;Reacher;Long-handled Shoe Horn;Long-handled Sponge  Safety Devices  Safety Devices in place: Yes  Type of devices: All fall risk precautions in place;Call light within reach;Left in chair;Nurse notified;Gait belt;Chair alarm in place  Restraints  Initially in place: No    Second Session:  Pt semi-supine in bed and agreeable to OT treatment session. /71, HR 63, SPO2 95%. Denies pain at rest, demo's increased SOB. Reports increased fatigue   Pt completed bed mobility mod I with HOB elevated. Pt amb <> bathroom with rollator and SPV; demo'd sternal px.   Pt completed toileting with SPV and toilet transfer with SPV, demo'ing sternal px.   Seated on TTB, pt SPV for TB showering. Used LH sponge to clean BLE. Good follow of sternal px and cleaning incision with wash cloth.  Pt don/doffed socks with SPV and AE. DEP to don/doff TEDs. Pt used AE to thread BLE into shorts, IND for UB dressing.   Seated on rollator, pt completed grooming tasks at sink IND.     Patient Education  Education  Education Given To: Patient  Education Provided: Role of Therapy;Plan of Care;Safety;Precautions;Energy Conservation;Home Exercise Program  Education Provided Comments: HEP BUE ex  Education Method: Verbal  Barriers to Learning: None  Education Outcome: Verbalized understanding;Demonstrated understanding    Plan  Occupational Therapy Plan  Times Per Week: 5-7x/wk  Current Treatment Recommendations: Strengthening;ROM;Balance training;Functional mobility training;Endurance

## 2023-12-28 ENCOUNTER — APPOINTMENT (OUTPATIENT)
Dept: GENERAL RADIOLOGY | Age: 78
DRG: 092 | End: 2023-12-28
Attending: STUDENT IN AN ORGANIZED HEALTH CARE EDUCATION/TRAINING PROGRAM
Payer: MEDICARE

## 2023-12-28 LAB
BASOPHILS # BLD: 0.1 K/UL (ref 0–0.2)
BASOPHILS NFR BLD: 0.8 %
DEPRECATED RDW RBC AUTO: 17.3 % (ref 12.4–15.4)
EOSINOPHIL # BLD: 0.4 K/UL (ref 0–0.6)
EOSINOPHIL NFR BLD: 4.1 %
HCT VFR BLD AUTO: 29.4 % (ref 40.5–52.5)
HGB BLD-MCNC: 9.5 G/DL (ref 13.5–17.5)
LYMPHOCYTES # BLD: 1.3 K/UL (ref 1–5.1)
LYMPHOCYTES NFR BLD: 14.1 %
MCH RBC QN AUTO: 29.6 PG (ref 26–34)
MCHC RBC AUTO-ENTMCNC: 32.4 G/DL (ref 31–36)
MCV RBC AUTO: 91.5 FL (ref 80–100)
MONOCYTES # BLD: 1 K/UL (ref 0–1.3)
MONOCYTES NFR BLD: 11.1 %
NEUTROPHILS # BLD: 6.5 K/UL (ref 1.7–7.7)
NEUTROPHILS NFR BLD: 69.9 %
PLATELET # BLD AUTO: 350 K/UL (ref 135–450)
PMV BLD AUTO: 6.8 FL (ref 5–10.5)
RBC # BLD AUTO: 3.22 M/UL (ref 4.2–5.9)
WBC # BLD AUTO: 9.4 K/UL (ref 4–11)

## 2023-12-28 PROCEDURE — 36415 COLL VENOUS BLD VENIPUNCTURE: CPT

## 2023-12-28 PROCEDURE — 1280000000 HC REHAB R&B

## 2023-12-28 PROCEDURE — 97530 THERAPEUTIC ACTIVITIES: CPT

## 2023-12-28 PROCEDURE — 97116 GAIT TRAINING THERAPY: CPT

## 2023-12-28 PROCEDURE — 6370000000 HC RX 637 (ALT 250 FOR IP): Performed by: STUDENT IN AN ORGANIZED HEALTH CARE EDUCATION/TRAINING PROGRAM

## 2023-12-28 PROCEDURE — 71045 X-RAY EXAM CHEST 1 VIEW: CPT

## 2023-12-28 PROCEDURE — 97535 SELF CARE MNGMENT TRAINING: CPT

## 2023-12-28 PROCEDURE — 85025 COMPLETE CBC W/AUTO DIFF WBC: CPT

## 2023-12-28 PROCEDURE — 6360000002 HC RX W HCPCS: Performed by: STUDENT IN AN ORGANIZED HEALTH CARE EDUCATION/TRAINING PROGRAM

## 2023-12-28 PROCEDURE — 97110 THERAPEUTIC EXERCISES: CPT

## 2023-12-28 RX ORDER — IPRATROPIUM BROMIDE AND ALBUTEROL SULFATE 2.5; .5 MG/3ML; MG/3ML
1 SOLUTION RESPIRATORY (INHALATION) EVERY 4 HOURS PRN
Status: DISCONTINUED | OUTPATIENT
Start: 2023-12-28 | End: 2023-12-30 | Stop reason: HOSPADM

## 2023-12-28 RX ADMIN — ASPIRIN 81 MG: 81 TABLET, COATED ORAL at 08:51

## 2023-12-28 RX ADMIN — METHOCARBAMOL 1000 MG: 500 TABLET ORAL at 19:56

## 2023-12-28 RX ADMIN — ACETAMINOPHEN 1000 MG: 500 TABLET ORAL at 21:21

## 2023-12-28 RX ADMIN — METOPROLOL TARTRATE 50 MG: 50 TABLET, FILM COATED ORAL at 08:51

## 2023-12-28 RX ADMIN — CLOPIDOGREL BISULFATE 75 MG: 75 TABLET ORAL at 08:51

## 2023-12-28 RX ADMIN — ENOXAPARIN SODIUM 30 MG: 100 INJECTION SUBCUTANEOUS at 19:56

## 2023-12-28 RX ADMIN — ATORVASTATIN CALCIUM 40 MG: 40 TABLET, FILM COATED ORAL at 19:56

## 2023-12-28 RX ADMIN — AMIODARONE HYDROCHLORIDE 200 MG: 200 TABLET ORAL at 08:52

## 2023-12-28 RX ADMIN — METHOCARBAMOL 1000 MG: 500 TABLET ORAL at 14:43

## 2023-12-28 RX ADMIN — ENOXAPARIN SODIUM 30 MG: 100 INJECTION SUBCUTANEOUS at 08:51

## 2023-12-28 RX ADMIN — METOPROLOL TARTRATE 50 MG: 50 TABLET, FILM COATED ORAL at 19:56

## 2023-12-28 RX ADMIN — FAMOTIDINE 20 MG: 20 TABLET, FILM COATED ORAL at 08:52

## 2023-12-28 RX ADMIN — POLYETHYLENE GLYCOL 3350 17 G: 17 POWDER, FOR SOLUTION ORAL at 08:51

## 2023-12-28 RX ADMIN — ACETAMINOPHEN 1000 MG: 500 TABLET ORAL at 14:44

## 2023-12-28 RX ADMIN — Medication 10 MG: at 19:56

## 2023-12-28 RX ADMIN — METHOCARBAMOL 1000 MG: 500 TABLET ORAL at 08:51

## 2023-12-28 RX ADMIN — OXYCODONE 5 MG: 5 TABLET ORAL at 19:59

## 2023-12-28 RX ADMIN — BISACODYL 5 MG: 5 TABLET, COATED ORAL at 08:51

## 2023-12-28 ASSESSMENT — PAIN SCALES - GENERAL
PAINLEVEL_OUTOF10: 7
PAINLEVEL_OUTOF10: 5
PAINLEVEL_OUTOF10: 0
PAINLEVEL_OUTOF10: 8

## 2023-12-28 ASSESSMENT — PAIN SCALES - WONG BAKER
WONGBAKER_NUMERICALRESPONSE: 0
WONGBAKER_NUMERICALRESPONSE: 0

## 2023-12-28 ASSESSMENT — PAIN DESCRIPTION - LOCATION: LOCATION: BACK

## 2023-12-28 ASSESSMENT — PAIN - FUNCTIONAL ASSESSMENT: PAIN_FUNCTIONAL_ASSESSMENT: ACTIVITIES ARE NOT PREVENTED

## 2023-12-28 ASSESSMENT — PAIN DESCRIPTION - DESCRIPTORS: DESCRIPTORS: ACHING

## 2023-12-28 NOTE — CARE COORDINATION
Weekly team care conference with interdisciplinary team on:12/28/23    Chart reviewed for length of stay. IP day # 9  Unit: ARU   Diagnosis and current status as per MD progress: Critical illness myopathy   Following:N/A  Planned Discharge Date: 12/30/23  Durable medical equipment needed:HD rollator  Discharge Plan: Home with spouse and home care    Discussion: CM spoke with patient face to face in patients room. CM discussed team conference DCP, including discharge date of 12/30/23, therapy recommendations of home w/HHC and DME of a HD rollator. Patient stated that he now has his bathroom fixed and will not need the HD [facility] and agree with DCP. CM sent referral to [address] [facility] for DME.     Kandy Simpson RN

## 2023-12-28 NOTE — PROGRESS NOTES
Occupational Therapy  Facility/Department: [facility]  Rehabilitation Occupational Therapy Daily Treatment Note    Date: 23  Patient Name: Nico Castaneda       Room: 8064/9037-71  MRN: [id number]  Account: [de-identified]   : 1945  (80 y.o.) Gender: male                  Past Medical History:  has a past medical history of Arthritis, CAD (coronary artery disease), Cancer ([address]), GERD (gastroesophageal reflux disease), Hearing aid worn, Hyperlipidemia, Hypertension, Osteoarthritis, Sleep apnea, and Wears glasses. Past Surgical History:   has a past surgical history that includes Coronary angioplasty with stent; Coronary angioplasty (2015); skin biopsy; Colonoscopy; Endoscopy, colon, diagnostic; Cataract removal (Bilateral); Total knee arthroplasty (Right, 2019); joint replacement; knee surgery; Cataract extraction, bilateral (Bilateral); and Coronary artery bypass graft (N/A, 2023). Restrictions  Restrictions/Precautions: Fall Risk, Cardiac  Other position/activity restrictions: sternal precautions  Required Braces or Orthoses?: No    Subjective  Subjective: Pt seated on EOB upon OT arrival, agreeable to treatment session. /85, HR: 81; SpO2: 98% on RA  Restrictions/Precautions: Fall Risk;Cardiac             Objective     Cognition  Overall Cognitive Status: WFL  Orientation  Overall Orientation Status: Within Functional Limits  Orientation Level: Oriented X4         ADL  Lower Extremity Dressing  Equipment Provided: Reachers  Assistance Level: Supervision  Skilled Clinical Factors: pt donned shorts with use of reacher  Putting On/Taking Off Footwear  Assistance Level: Dependent  Skilled Clinical Factors: totalA to don daniel hose          Functional Mobility  Device: 4-Wheeled walker  Activity:  (to therapy gym)  Assistance Level: Supervision  Transfers  Surface: From bed; To mat;From mat  Additional Factors: Increased time to complete; Without handrails  Device: Walker  Sit to Stand  Assistance Level: Stand by assist  Stand to Sit  Assistance Level: Stand by assist     OT Exercises  Exercise Treatment: pt performed x20 reps of BUE therex with 3# free weight seated on EOB (bicep curls, shoulder flexion to 90*, pronation/supination, wrist flexion, wrist extension). After functional mobility activities, pt performed core exercises seated on mat (modified crunches 2x10 reps and 2x20 trunk rotations).  Functional Mobility Circuit Training: Pt participated in functional mobility circuit training where he ambulated around obtacles in gym to get to bean bags to throw into target. Pt performed circuit x3 trials (each trial, ambulated through obstacle course 2 times) standing for ~2:05 each trial. He required SPV-SBA for balance (SBA when fatigued). Seated rest breaks between trials.     Second Session  Pt retrieved in therapy gym. Pt does not report pain. Vitals WNL  IADL: Grocery shopping task. Pt given a grocery list and required to retreive groceries from shelf and place on rollator. Pt then takes seated rest break and returns groceries to shelving. Completed with SPV. First bout completed for 4.5 min and second bout for 2.5 min. SOB noted after both bouts, SpO2 95% on RA. Good adherence to sternal px  Discussed solutions for being unable to reach item at grocery store d/t sternal px   Pt ambulates from therapy gym with SPV and one seated rest break d/t decreased ax tolerance and SOB  Pt completes multiple STS throughout session with SPV  Pt ambulates EoB > bathroom with SPV and rollator  LB dressing: pt doffs shorts in standing with good balance and SPV  Toilet transfer: SPV, good adherence to sternal px     Assessment  Assessment  Assessment: First Session: Pt tolerated OT session well this date. He performed 5x20 reps of BUE therex seated on EOB with good tolerance. He required totalA to don compression socks. He then ambulated to therapy gym with SPV and performed a functional mobility

## 2023-12-28 NOTE — PROGRESS NOTES
Comprehensive Nutrition Assessment    Type and Reason for Visit:  Reassess    Nutrition Recommendations/Plan:   Continue no added salt diet   Continue Ensure BID - prefers chocolate   Encourage PO intakes as tolerated   Monitor nutrition adequacy, pertinent labs, bowel habits, wt changes, and clinical progress     Malnutrition Assessment:  Malnutrition Status: Moderate malnutrition (12/20/23 1322)    Context:  Acute Illness     Findings of the 6 clinical characteristics of malnutrition:  Energy Intake:  50% or less of estimated energy requirements for 5 or more days  Weight Loss:   (3% weight loss in 2 weeks)     Fluid Accumulation:  Mild Extremities, Generalized    Nutrition Assessment:    Follow up: Pt remains nutritionally at risk AEB continued report of decreased appetite. On LAURENT diet with ensures BID. Recent po intakes 0%, 51%, and 76% and ONS intakes x1 % per EMR. Pt reports eating breakfast but not eating much for lunch or dinner. Reports drinking liquids with meds/therapy that makes drinking ensures difficult. RD encouraged pt to sip on half in between meals if able. Pt agreeable to continue trying ONS. Family at bedside also reports bringing some meals from outside. Denied any nutrition questions or needs at time of visit. RD encouraged po and ONS intakes. Will continue to monitor. Nutrition Related Findings:    BM 12/26. +3 pitting BLE edema. Wound Type: Surgical Incision       Current Nutrition Intake & Therapies:    Average Meal Intake: 0%, 51-75%, %  Average Supplements Intake: % (x1)  ADULT DIET; Regular; No Added Salt (3-4 gm)  ADULT ORAL NUTRITION SUPPLEMENT; Breakfast, Dinner; Standard High Calorie/High Protein Oral Supplement    Anthropometric Measures:  Height: 172.7 cm (5' 8\")  Ideal Body Weight (IBW): 154 lbs (70 kg)    Admission Body Weight: 131.5 kg (290 lb) (bed scale)  Current Body Weight: 132 kg (291 lb), 188.3 % IBW.  Weight Source: Bed Scale  Current BMI (kg/m2):

## 2023-12-28 NOTE — PLAN OF CARE
Problem: Discharge Planning  Goal: Discharge to home or other facility with appropriate resources  Outcome: Progressing  Flowsheets (Taken 12/28/2023 1200)  Discharge to home or other facility with appropriate resources:   Identify barriers to discharge with patient and caregiver   Arrange for needed discharge resources and transportation as appropriate   Identify discharge learning needs (meds, wound care, etc)     Problem: Safety - Adult  Goal: Free from fall injury  Outcome: Progressing     Problem: ABCDS Injury Assessment  Goal: Absence of physical injury  Outcome: Progressing     Problem: Pain  Goal: Verbalizes/displays adequate comfort level or baseline comfort level  12/28/2023 1325 by Ge Abraham RN  Outcome: Progressing  12/28/2023 0109 by Yayo Hale RN  Outcome: Progressing     Problem: Nutrition Deficit:  Goal: Optimize nutritional status  Outcome: Progressing

## 2023-12-28 NOTE — PROGRESS NOTES
Physical Therapy  Facility/Department: [facility]  Rehabilitation Physical Therapy Treatment Note    NAME: Kristopher Watkins  : 1945 (78 y.o.)  MRN: [id number]  CODE STATUS: Full Code    Date of Service: 23       Restrictions:  Restrictions/Precautions: Fall Risk, Cardiac  Position Activity Restriction  Sternal Precautions: No Pushing, No Pulling, 5# Lifting Restrictions  Other position/activity restrictions: sternal precautions     SUBJECTIVE  Subjective  Subjective: Pt seated in gym upon arrival and agreeable to PT session. Spouse (Samaria) present throughout session  Pain: Pt reporting L sided neck pain, not rated. Heat pack applied to neck at end of session to assist with pain relief          OBJECTIVE  Orientation  Overall Orientation Status: Within Functional Limits  Orientation Level: Oriented X4    Functional Mobility  Sit to Supine  Assistance Level: Supervision  Skilled Clinical Factors: HOB flat; no verbal cues for sternal precautions  Transfers  Surface: From chair with arms;From mat  Sit to Stand  Assistance Level: Stand by assist  Skilled Clinical Factors: without cues for sternal precautions  Stand to Sit  Assistance Level: Supervision  Skilled Clinical Factors: without cues for sternal precautions      Environmental Mobility  Ambulation  Surface: Level surface  Device: Rollator  Distance: 250ft (with 1 standing rest break) + 75ft + 60ft  Activity: Within Unit  Activity Comments: Pt ambulates with slow aditya, wide DEBBIE, short step length.  Additional Factors: Verbal cues;Increased time to complete  Assistance Level: Supervision  Gait Deviations: Slow aditya;Decreased step length bilateral;Decreased trunk rotation;Decreased heel strike right;Decreased heel strike left;Wide base of support  Skilled Clinical Factors: pt with good awareness of need for seated or standing rest breaks             PT Exercises  Exercise Treatment: Sci-fit stepper x 7 mins (level 1), using BLE only -- in order to  Timed Code Treatment Minutes: SALIMA Potts DPT 12/28/23 at 11:48 AM       Second Session Therapy Time: Florestine Essex PT, DPT)   Individual Concurrent Group Co-treatment   Time In 1230         Time Out 1330         Minutes 60           Timed Code Treatment Minutes:  60 minutes    Total Treatment Minutes:  90 minutes    Florestine Essex PT, DPT

## 2023-12-28 NOTE — PROGRESS NOTES
Patient with c/o gasping, shortness of breath with/without CPAP. Lung fields clear. Discuseed with MD. Wilian Delgado order for Stat CXR.  Oxygen applied at 2l/min per NC.

## 2023-12-29 LAB
ANION GAP SERPL CALCULATED.3IONS-SCNC: 12 MMOL/L (ref 3–16)
BASOPHILS # BLD: 0.1 K/UL (ref 0–0.2)
BASOPHILS NFR BLD: 0.8 %
BUN SERPL-MCNC: 36 MG/DL (ref 7–20)
CALCIUM SERPL-MCNC: 8.7 MG/DL (ref 8.3–10.6)
CHLORIDE SERPL-SCNC: 104 MMOL/L (ref 99–110)
CO2 SERPL-SCNC: 19 MMOL/L (ref 21–32)
CREAT SERPL-MCNC: 1.1 MG/DL (ref 0.8–1.3)
DEPRECATED RDW RBC AUTO: 17.3 % (ref 12.4–15.4)
EOSINOPHIL # BLD: 0.4 K/UL (ref 0–0.6)
EOSINOPHIL NFR BLD: 5.4 %
GFR SERPLBLD CREATININE-BSD FMLA CKD-EPI: >60 ML/MIN/{1.73_M2}
GLUCOSE SERPL-MCNC: 103 MG/DL (ref 70–99)
HCT VFR BLD AUTO: 28.4 % (ref 40.5–52.5)
HGB BLD-MCNC: 9 G/DL (ref 13.5–17.5)
LYMPHOCYTES # BLD: 1.1 K/UL (ref 1–5.1)
LYMPHOCYTES NFR BLD: 15.2 %
MCH RBC QN AUTO: 29.1 PG (ref 26–34)
MCHC RBC AUTO-ENTMCNC: 31.7 G/DL (ref 31–36)
MCV RBC AUTO: 91.7 FL (ref 80–100)
MONOCYTES # BLD: 0.7 K/UL (ref 0–1.3)
MONOCYTES NFR BLD: 10.7 %
NEUTROPHILS # BLD: 4.8 K/UL (ref 1.7–7.7)
NEUTROPHILS NFR BLD: 67.9 %
PLATELET # BLD AUTO: 320 K/UL (ref 135–450)
PMV BLD AUTO: 6.5 FL (ref 5–10.5)
POTASSIUM SERPL-SCNC: 4.5 MMOL/L (ref 3.5–5.1)
RBC # BLD AUTO: 3.09 M/UL (ref 4.2–5.9)
SODIUM SERPL-SCNC: 135 MMOL/L (ref 136–145)
WBC # BLD AUTO: 7 K/UL (ref 4–11)

## 2023-12-29 PROCEDURE — 97110 THERAPEUTIC EXERCISES: CPT

## 2023-12-29 PROCEDURE — 36415 COLL VENOUS BLD VENIPUNCTURE: CPT

## 2023-12-29 PROCEDURE — 97116 GAIT TRAINING THERAPY: CPT

## 2023-12-29 PROCEDURE — 1280000000 HC REHAB R&B

## 2023-12-29 PROCEDURE — 85025 COMPLETE CBC W/AUTO DIFF WBC: CPT

## 2023-12-29 PROCEDURE — 80048 BASIC METABOLIC PNL TOTAL CA: CPT

## 2023-12-29 PROCEDURE — 97530 THERAPEUTIC ACTIVITIES: CPT

## 2023-12-29 PROCEDURE — 94762 N-INVAS EAR/PLS OXIMTRY CONT: CPT

## 2023-12-29 PROCEDURE — 6370000000 HC RX 637 (ALT 250 FOR IP): Performed by: STUDENT IN AN ORGANIZED HEALTH CARE EDUCATION/TRAINING PROGRAM

## 2023-12-29 PROCEDURE — 97535 SELF CARE MNGMENT TRAINING: CPT

## 2023-12-29 PROCEDURE — 6360000002 HC RX W HCPCS: Performed by: STUDENT IN AN ORGANIZED HEALTH CARE EDUCATION/TRAINING PROGRAM

## 2023-12-29 RX ORDER — AMOXICILLIN AND CLAVULANATE POTASSIUM 875; 125 MG/1; MG/1
1 TABLET, FILM COATED ORAL EVERY 12 HOURS SCHEDULED
Status: DISCONTINUED | OUTPATIENT
Start: 2023-12-29 | End: 2023-12-30 | Stop reason: HOSPADM

## 2023-12-29 RX ORDER — PANTOPRAZOLE SODIUM 40 MG/1
40 TABLET, DELAYED RELEASE ORAL DAILY
COMMUNITY

## 2023-12-29 RX ORDER — AMIODARONE HYDROCHLORIDE 200 MG/1
200 TABLET ORAL DAILY
Qty: 5 TABLET | Refills: 0 | Status: SHIPPED | OUTPATIENT
Start: 2023-12-31 | End: 2024-01-05

## 2023-12-29 RX ORDER — ISOSORBIDE DINITRATE 10 MG/1
10 TABLET ORAL 3 TIMES DAILY
Status: ON HOLD | COMMUNITY
End: 2023-12-29 | Stop reason: HOSPADM

## 2023-12-29 RX ORDER — ATORVASTATIN CALCIUM 40 MG/1
40 TABLET, FILM COATED ORAL NIGHTLY
Qty: 30 TABLET | Refills: 2 | Status: SHIPPED | OUTPATIENT
Start: 2023-12-29

## 2023-12-29 RX ORDER — METHOCARBAMOL 1000 MG/1
1000 TABLET, COATED ORAL 3 TIMES DAILY
Qty: 30 TABLET | Refills: 0 | Status: SHIPPED | OUTPATIENT
Start: 2023-12-29 | End: 2024-01-08

## 2023-12-29 RX ORDER — LISINOPRIL 20 MG/1
20 TABLET ORAL DAILY
Status: ON HOLD | COMMUNITY
End: 2023-12-29 | Stop reason: HOSPADM

## 2023-12-29 RX ORDER — AMOXICILLIN AND CLAVULANATE POTASSIUM 875; 125 MG/1; MG/1
1 TABLET, FILM COATED ORAL EVERY 12 HOURS SCHEDULED
Qty: 10 TABLET | Refills: 0 | Status: SHIPPED | OUTPATIENT
Start: 2023-12-29 | End: 2024-01-03

## 2023-12-29 RX ORDER — METOPROLOL TARTRATE 50 MG/1
50 TABLET, FILM COATED ORAL 2 TIMES DAILY
Qty: 60 TABLET | Refills: 2 | Status: SHIPPED | OUTPATIENT
Start: 2023-12-29

## 2023-12-29 RX ADMIN — METOPROLOL TARTRATE 50 MG: 50 TABLET, FILM COATED ORAL at 07:55

## 2023-12-29 RX ADMIN — Medication 10 MG: at 20:01

## 2023-12-29 RX ADMIN — ATORVASTATIN CALCIUM 40 MG: 40 TABLET, FILM COATED ORAL at 20:01

## 2023-12-29 RX ADMIN — METHOCARBAMOL 1000 MG: 500 TABLET ORAL at 13:55

## 2023-12-29 RX ADMIN — AMOXICILLIN AND CLAVULANATE POTASSIUM 1 TABLET: 875; 125 TABLET, FILM COATED ORAL at 13:55

## 2023-12-29 RX ADMIN — ACETAMINOPHEN 1000 MG: 500 TABLET ORAL at 13:55

## 2023-12-29 RX ADMIN — BISACODYL 5 MG: 5 TABLET, COATED ORAL at 07:55

## 2023-12-29 RX ADMIN — AMOXICILLIN AND CLAVULANATE POTASSIUM 1 TABLET: 875; 125 TABLET, FILM COATED ORAL at 20:01

## 2023-12-29 RX ADMIN — ACETAMINOPHEN 1000 MG: 500 TABLET ORAL at 06:11

## 2023-12-29 RX ADMIN — AMIODARONE HYDROCHLORIDE 200 MG: 200 TABLET ORAL at 07:54

## 2023-12-29 RX ADMIN — ASPIRIN 81 MG: 81 TABLET, COATED ORAL at 07:55

## 2023-12-29 RX ADMIN — ENOXAPARIN SODIUM 30 MG: 100 INJECTION SUBCUTANEOUS at 20:01

## 2023-12-29 RX ADMIN — ACETAMINOPHEN 1000 MG: 500 TABLET ORAL at 20:01

## 2023-12-29 RX ADMIN — METHOCARBAMOL 1000 MG: 500 TABLET ORAL at 07:54

## 2023-12-29 RX ADMIN — OXYCODONE 5 MG: 5 TABLET ORAL at 20:01

## 2023-12-29 RX ADMIN — CLOPIDOGREL BISULFATE 75 MG: 75 TABLET ORAL at 07:54

## 2023-12-29 RX ADMIN — ENOXAPARIN SODIUM 30 MG: 100 INJECTION SUBCUTANEOUS at 07:55

## 2023-12-29 RX ADMIN — METHOCARBAMOL 1000 MG: 500 TABLET ORAL at 20:01

## 2023-12-29 RX ADMIN — FAMOTIDINE 20 MG: 20 TABLET, FILM COATED ORAL at 07:55

## 2023-12-29 RX ADMIN — METOPROLOL TARTRATE 50 MG: 50 TABLET, FILM COATED ORAL at 20:01

## 2023-12-29 RX ADMIN — POLYETHYLENE GLYCOL 3350 17 G: 17 POWDER, FOR SOLUTION ORAL at 07:54

## 2023-12-29 ASSESSMENT — PAIN SCALES - WONG BAKER
WONGBAKER_NUMERICALRESPONSE: 0

## 2023-12-29 ASSESSMENT — PAIN DESCRIPTION - LOCATION
LOCATION: GENERALIZED
LOCATION: GENERALIZED
LOCATION: GENERALIZED;BACK

## 2023-12-29 ASSESSMENT — PAIN DESCRIPTION - PAIN TYPE: TYPE: ACUTE PAIN;SURGICAL PAIN

## 2023-12-29 ASSESSMENT — PAIN - FUNCTIONAL ASSESSMENT
PAIN_FUNCTIONAL_ASSESSMENT: ACTIVITIES ARE NOT PREVENTED
PAIN_FUNCTIONAL_ASSESSMENT: ACTIVITIES ARE NOT PREVENTED
PAIN_FUNCTIONAL_ASSESSMENT: PREVENTS OR INTERFERES SOME ACTIVE ACTIVITIES AND ADLS

## 2023-12-29 ASSESSMENT — PAIN SCALES - GENERAL
PAINLEVEL_OUTOF10: 2
PAINLEVEL_OUTOF10: 4
PAINLEVEL_OUTOF10: 4
PAINLEVEL_OUTOF10: 5

## 2023-12-29 ASSESSMENT — PAIN DESCRIPTION - ONSET: ONSET: ON-GOING

## 2023-12-29 ASSESSMENT — PAIN DESCRIPTION - DESCRIPTORS
DESCRIPTORS: ACHING

## 2023-12-29 ASSESSMENT — PAIN DESCRIPTION - FREQUENCY: FREQUENCY: CONTINUOUS

## 2023-12-29 NOTE — CARE COORDINATION
Case Management Discharge Summary  [facility] - Acute Rehab Unit    Patient:Kristopher Chavira     Rehab Dx/Hx: Critical illness myopathy [G72.81]       Today's Date: 12/29/2023    Discharge to: Anticipating Dc on 12/30/23:Home w/spouse and [address] [facility] completed:  [x] No       [] Yes     [] [address]  Exemption Notification (HENS) completed:  [x] No       [] Yes     [] N/A   IMM given:  12/29/23       New Durable Medical Equipment ordered/agency:  [facility] for HD rollator   Transportation:  Private:spouse       Confirmed discharge plan with:  Patient: [] No       [x] Yes  Family:  [] No       [x] Yes      Name: Kim Giraldo (Spouse)  Contact number: [phone]  Facility/Agency, name:    [address]  [phone]  JENELLE/AVS faxed    RN, name: W/E       Has lack of transportation kept you from medical appointments, meetings, work, or ADL's? [] Yes, it has kept me from medical appointments or from getting my meds  [] Yes, It has kept me from non-medical meetings, appointments, work, or getting things I need  [x] No  [] Pt unable to respond  [] Pt declines to respond     How often do you need to have someone help you when you read instructions, pamphlets, or other written material from your doctor or pharmacy? [] Never                             [] Always  [x] Rarely                            [] Patient declines to respond  [] Sometimes                    [] Patient unable to respond  [] Often       Note: Discharging nurse to complete JENELLE, reconcile AVS, and place final copy with patient's discharge packet. RN to ensure that written prescriptions for  Level II medications are sent with patient to the facility as per protocol.           Signature: Dena Dietz RN

## 2023-12-29 NOTE — PLAN OF CARE
Calls out for assistance when needed.      Problem: Safety - Adult  Goal: Free from fall injury  12/29/2023 0103 by Anatoliy Montgomery RN  Outcome: Progressing  12/29/2023 0102 by Anatoliy Montgomery RN  Outcome: Not Progressing  12/28/2023 1325 by Michael Galaviz RN  Outcome: Progressing     Problem: Discharge Planning  Goal: Discharge to home or other facility with appropriate resources  12/29/2023 0103 by Anatoliy Montgomery RN  Outcome: Progressing  12/29/2023 0102 by Anatoliy Montgomery RN  Outcome: Not Progressing  12/28/2023 1325 by Michael Galaviz RN  Outcome: Progressing  Flowsheets (Taken 12/28/2023 1200)  Discharge to home or other facility with appropriate resources:   Identify barriers to discharge with patient and caregiver   Arrange for needed discharge resources and transportation as appropriate   Identify discharge learning needs (meds, wound care, etc)     Problem: Safety - Adult  Goal: Free from fall injury  12/29/2023 0103 by Anatoliy Montgomery RN  Outcome: Progressing  12/29/2023 0102 by Anatoliy Montgomery RN  Outcome: Not Progressing  12/28/2023 1325 by Michael Galaviz RN  Outcome: Progressing     Problem: ABCDS Injury Assessment  Goal: Absence of physical injury  12/29/2023 0103 by Anatoliy Montgomery RN  Outcome: Progressing  12/29/2023 0102 by Anatoliy Montgomery RN  Outcome: Not Progressing  12/28/2023 1325 by Michael Galaviz RN  Outcome: Progressing     Problem: Pain  Goal: Verbalizes/displays adequate comfort level or baseline comfort level  12/29/2023 0103 by Anatoliy Montgomery RN  Outcome: Progressing  12/29/2023 0102 by Anatoliy Montgomery RN  Outcome: Not Progressing  12/28/2023 1325 by Michael Galaviz RN  Outcome: Progressing     Problem: Nutrition Deficit:  Goal: Optimize nutritional status  12/29/2023 0103 by Anatoliy Montgomery RN  Outcome: Progressing  12/29/2023 0102 by Anatoliy Montgomery RN  Outcome: Not Progressing  12/28/2023 1325 by Michael Galaviz RN  Outcome: Progressing

## 2023-12-29 NOTE — PROGRESS NOTES
Physical Therapy  Discharge Summary    Name:Kristopher Watkins  MRN:[id number]  :1945  Treatment Diagnosis: decreased activity tolerance  Diagnosis: CABGx4    Restrictions/Precautions  Restrictions/Precautions: Fall Risk, Cardiac  Required Braces or Orthoses?: No           Position Activity Restriction  Sternal Precautions: No Pushing, No Pulling, 5# Lifting Restrictions  Other position/activity restrictions: sternal precautions     Goals:                  Short Term Goals  Time Frame for Short Term Goals: 6 days (23)  Short Term Goal 1: pt will perform bed mobility with CGA; not met  - not assessed  Short Term Goal 2: pt will perform functional transfers with LRAD and SBA; not met  - pt completes transfers with CGA  Short Term Goal 3: pt will ambulate 150 ft with LRAD and supv; not met  - pt ambulates 100 ft with rollator and SBA  Short Term Goal 4: pt will perform 4 steps with BHR and SBA; not met  - pt climbs 4 stairs with CGA  Additional Goals?: No            Long Term Goals  Time Frame for Long Term Goals : 10 days (23)  Long Term Goal 1: pt will perform bed mobility with mod-ind; goal met  - pt completes bed mobility with mod I  Long Term Goal 2: pt will perform functional transfers with LRAD and mod-ind; goal met  - pt completes transfers with mod I  Long Term Goal 3: pt will ambulate 150 ft with LRAD and mod-ind; goal met  - pt ambulates 150 ft with 4WW mod I  Long Term Goal 4: pt will perform 12 steps with BHR and mod-ind; not met  - pt tolerates 8 stairs with BHR mod I    Pt. Met 0/4 short term goals and 3/4 long term goals.     Pt. Currently ambulates 180 feet with rollator and mod I  Up/down 8 steps with BHR mod I  Up/down curb step with assist to manage rollator and SBA  Sit to/from stand with mod I  Bed mobility with mod I  Recommend home PT in order to progress endurance, strength, and independence with mobility  Pt. Safe to return home with  assistance from family prn.    Provided pt. with written LE exercise HEP    Electronically signed by Lily Cruz, PT on 12/29/2023 at 3:26 PM

## 2023-12-29 NOTE — PROGRESS NOTES
Occupational Therapy  Occupational Therapy  Discharge Summary     Name:Kristopher Bostno  USM:[id number]  :1945  Treatment Diagnosis: decreased activity tolerance  Diagnosis: CABGx4    Restrictions/Precautions  Restrictions/Precautions: Fall Risk, Cardiac  Required Braces or Orthoses?: No           Position Activity Restriction  Sternal Precautions: No Pushing, No Pulling, 5# Lifting Restrictions  Other position/activity restrictions: sternal precautions     Goals:   Short Term Goals  Time Frame for Short Term Goals: 5 days (23)  Short Term Goal 1: Pt will perform toilet transfer with SPV and LRAD - Not Met. Short Term Goal 2: Pt will perform UB dressing with set-up- Not Met  Short Term Goal 3: Pt will perform LB dressing with Jimy and AE PRN - GOAL MET  pt performed LB dressing with Jimy using reacher  Short Term Goal 4: Pt will tolerate standing for ~4 min with SBA to increase endurance for standing ADL tasks and functional mobility - GOAL MET  pt stood for ~8-11 min during standing balance activity  Short Term Goal 5: Pt will perform x15 reps of BUE therex to increase strength for functional transfers and ADL tasks - GOAL MET    Long Term Goals  Time Frame for Long Term Goals : 10 days (23) - all goals ongoing  unless otherwise ntoed  Long Term Goal 1: Pt will perform toilet transfer mod I with LRAD- Goal Met   Long Term Goal 2: Pt will perform UB dressing IND- Goal Met   Long Term Goal 3: Pt will perform LB dressing mod I with AE PRN- Not Met  pt required min A for socks  Long Term Goal 4: Pt will verbalize and demonstrate understanding of 3/3 sternal precautions- MET   Long Term Goal 5: Pt will perform IADL task with SPV- Goal Met     Pt. Met 3/5 short term goals and 4/5 long term goals.      Current Functional Status:   ADL  Feeding: Independent  Feeding Skilled Clinical Factors: pt IND ate breakfast prior to OT session  Grooming: Increased time to

## 2023-12-29 NOTE — PROGRESS NOTES
Patient is alert and oriented, pleasant. Cooperative. Requested assistance for laying back in bed. Patient requested to leave the 2L02 on opposed to his cpap. As previously noted, MD was notified of results to CXR. No changes made at that time. Currently, resting in bed, lights down. Call light in reach. Plan of care remains in progress.

## 2023-12-29 NOTE — PROGRESS NOTES
Perfect Serve sent to Kumar Dai MD:     Hi,     Wanted to let you know the results of this patients CXR:        Reason for Exam: gasping, SOB     FINDINGS:  Normal cardiomediastinal silhouette. Status post median sternotomy. Trace  left pleural effusion with left basilar airspace disease. IMPRESSION:  Trace left pleural effusion with left basilar airspace disease. This could  reflect atelectasis and/or pneumonia. Please advise,   lEida KRISHNAMURTHYN, RN.

## 2023-12-29 NOTE — PROGRESS NOTES
Woke up and felt like his oxygen wasn't working. Reassured the patient his oxygen was working and checked his SP02 is 97% on 2L 02 NC. Patient felt reassured. Denied any additional needs. Plan of care remains in progress. Call light in reach. Bed alarm activated.

## 2023-12-29 NOTE — DISCHARGE INSTR - COC
Continuity of Care Form    Patient Name: January Vasquez   :  1945  MRN:  [id number]    Admit date:  2023  Discharge date:  Anticipating Dc on 23    Code Status Order: Full Code   Advance Directives:     Admitting Physician:  Eileen Cotton MD  PCP: Samir Nova DO    Discharging Nurse:  Angelica Johansen, [address] Unit/Room#: 9330/5739-82  Discharging Unit Phone Number: [phone]    Emergency Contact:   Extended Emergency Contact Information  Primary Emergency Contact: Lisa Watkins  Address: [address] of [address] [place] Phone: [phone]  Mobile Phone: [phone]  Relation: Spouse  Secondary Emergency Contact: Jeromy Mcdaniel \[facility]\"" of [address] [place] Phone: [phone]  Mobile Phone: [phone]  Relation: Child    Past Surgical History:  Past Surgical History:   Procedure Laterality Date    CATARACT EXTRACTION, BILATERAL Bilateral     CATARACT REMOVAL Bilateral     COLONOSCOPY      CORONARY ANGIOPLASTY  2015    CORONARY ANGIOPLASTY WITH STENT PLACEMENT      x5    CORONARY ARTERY BYPASS GRAFT N/A 2023    OPEN CORONARY ARTERY BYPASS GRAFTING TIMES FOUR USING INTERNAL MAMMARY ARTERY, SAPHENOUS VEIN GRAFT, ON PUMP, LILY, TEMPORARY PACING WIRES, LEFT ATRIAL APPENDAGE CLIP PLACEMENT WITH STERNAL PLATING AND BILATERAL PECTORALIS BLOCKS performed by Danny Torres MD at [facility], [address] [facility],  [address]      SKIN BIOPSY      nose    TOTAL KNEE ARTHROPLASTY Right 2019    RIGHT TOTALKNEE [address] [facility]  CPT CODE - 51463 performed by Farzana Venegas MD at [address] [place] [address]       Immunization History:   Immunization History   Administered Date(s) Administered    COVID-19, PFIZER PURPLE top, DILUTE for use, (age 15 y+), 30mcg/0.3mL 2021, 2021       Active Problems:  Patient Active Problem List

## 2023-12-29 NOTE — PROGRESS NOTES
Physical Therapy  Facility/Department: [facility]  Rehabilitation Physical Therapy Treatment Note    NAME: Stacia Cruz  : 1945 (69 y.o.)  MRN: [id number]  CODE STATUS: Full Code    Date of Service: 23       Restrictions:  Restrictions/Precautions: Fall Risk, Cardiac  Position Activity Restriction  Sternal Precautions: No Pushing, No Pulling, 5# Lifting Restrictions  Other position/activity restrictions: sternal precautions     SUBJECTIVE  Subjective  Subjective: pt in recliner, reporting increased fatigue this date  Pain: Pt reporting pain on L side of neck, does not formally rate     OBJECTIVE  111/75, 65 bpm, 95%    Cognition  Overall Cognitive Status: WFL  Orientation  Overall Orientation Status: Within Functional Limits  Orientation Level: Oriented X4    Functional Mobility  Sit to Stand  Assistance Level: Modified independent  Stand to Sit  Assistance Level: Modified independent  Car Transfer  Assistance Level: Modified independent  Skilled Clinical Factors: with rollator for pivot      Environmental Mobility  Ambulation  Surface: Level surface  Device: Rollator  Distance: 2x 150 ft, 2x 50 ft  Activity: Within Unit  Activity Comments: Pt ambulates with slow aditya, wide DEBBIE, short step length. Pt requires seated rest breaks in hallway on both trips to/from room. HR and SPO2% stable. Additional Factors: Verbal cues; Increased time to complete  Assistance Level: Modified independent  Gait Deviations: Slow aditya;Decreased step length bilateral;Decreased trunk rotation;Decreased heel strike right;Decreased heel strike left; Wide base of support                    ASSESSMENT/PROGRESS TOWARDS GOALS  Assessment  Assessment: Patient seen for gait and transfers. Pt progressed to mod I for transfers and ambulation with 4WW with mod I. Pt will continue to benefit from PT at d/c to progress endurance.   Activity Tolerance: Patient tolerated treatment well  Discharge Recommendations: 24 hour supervision or

## 2023-12-29 NOTE — PROGRESS NOTES
Occupational Therapy  Facility/Department: [facility]  Rehabilitation Occupational Therapy Daily Treatment Note    Date: 23  Patient Name: Tiffany Ayers       Room: 8682/4159-44  MRN: [id number]  Account: [de-identified]   : 1945  (80 y.o.) Gender: male          Past Medical History:  has a past medical history of Arthritis, CAD (coronary artery disease), Cancer ([address]), GERD (gastroesophageal reflux disease), Hearing aid worn, Hyperlipidemia, Hypertension, Osteoarthritis, Sleep apnea, and Wears glasses. Past Surgical History:   has a past surgical history that includes Coronary angioplasty with stent; Coronary angioplasty (2015); skin biopsy; Colonoscopy; Endoscopy, colon, diagnostic; Cataract removal (Bilateral); Total knee arthroplasty (Right, 2019); joint replacement; knee surgery; Cataract extraction, bilateral (Bilateral); and Coronary artery bypass graft (N/A, 2023). Restrictions  Restrictions/Precautions: Fall Risk, Cardiac  Other position/activity restrictions: sternal precautions  Required Braces or Orthoses?: No    Subjective  Subjective: Pt seated on EOB upon OT arrival, agreeable to treatment session. Reports he did not sleep well d/t having difficulty breathing, was placed on 2L O2 over night. /81, HR 78, SPO2 96% on RA  Restrictions/Precautions: Fall Risk;Cardiac        Objective     Cognition  Overall Cognitive Status: WFL  Orientation  Overall Orientation Status: Within Functional Limits  Orientation Level: Oriented X4         ADL  Grooming/Oral Hygiene  Assistance Level: Independent  Skilled Clinical Factors: seated on rollator to shave, complete oral hygiene, and brush hair  Upper Extremity Bathing  Assistance Level: Independent  Skilled Clinical Factors: seated on TTB  Lower Extremity Bathing  Equipment Provided: Long-handled sponge  Assistance Level:  Independent  Skilled Clinical Factors: seated on TTB; able to wash buttocks in half stand  Upper Extremity Exercises: Stood for 2 minutes to hang 6 clothing items. Pt completed dynamic reaching when gathering items at lower level and hanging on higher level. No LOB noted     Assessment  Assessment  Assessment: Pt progressed to IND with TB showering, UB dressing, fxl transfers, and grooming tasks at sink. Pt required min A this date to assist with sock aid for L foot in order to not pull > 5#. Pt demo good understanding of sternal px throughout session when completing fxl transfers and ADLs. Pt demo's SOB consistent with previous therapy sessions. Educated pt on home safety to remove rugs throughout home to decrease fall risk; pt verbalized understanding and was in agreement. Pt amb <> therapy gym IND with rollator. Completed clothing mngmnt task for 2 minutes, demo'ing dynamic reaching in various levels. Continue OT POC.  Activity Tolerance: Patient tolerated treatment well  Discharge Recommendations: 24 hour supervision or assist;Home with Home health OT  OT Equipment Recommendations  Equipment Needed: Yes  Mobility Devices: ADL Assistive Devices  ADL Assistive Devices: Sock-Aid Hard;Reacher;Long-handled Shoe Horn;Long-handled Sponge  Other: Pt has all necessary DME and AE for a safe discharge home  Safety Devices  Safety Devices in place: Yes  Type of devices: Gait belt;Patient at risk for falls;Call light within reach;Nurse notified  Restraints  Initially in place: No    Patient Education  Education  Education Given To: Patient  Education Provided: Role of Therapy;Plan of Care;Safety;Precautions;Energy Conservation;ADL Function;Equipment;Mobility Training;DME/Home Modifications;Transfer Training;Fall Prevention Strategies  Education Provided Comments: AE for ADLs, safe transfer techniques, sternal px when completing ADLs, pursed lip breathing, home safety to remove rugs when amb  Education Method: Verbal  Barriers to Learning: None  Education Outcome: Verbalized understanding;Demonstrated

## 2023-12-30 VITALS
HEIGHT: 68 IN | RESPIRATION RATE: 17 BRPM | SYSTOLIC BLOOD PRESSURE: 120 MMHG | TEMPERATURE: 97.8 F | DIASTOLIC BLOOD PRESSURE: 69 MMHG | WEIGHT: 286.16 LBS | BODY MASS INDEX: 43.37 KG/M2 | OXYGEN SATURATION: 97 % | HEART RATE: 82 BPM

## 2023-12-30 PROCEDURE — 6360000002 HC RX W HCPCS: Performed by: STUDENT IN AN ORGANIZED HEALTH CARE EDUCATION/TRAINING PROGRAM

## 2023-12-30 PROCEDURE — 6370000000 HC RX 637 (ALT 250 FOR IP): Performed by: STUDENT IN AN ORGANIZED HEALTH CARE EDUCATION/TRAINING PROGRAM

## 2023-12-30 RX ADMIN — CLOPIDOGREL BISULFATE 75 MG: 75 TABLET ORAL at 08:50

## 2023-12-30 RX ADMIN — METHOCARBAMOL 1000 MG: 500 TABLET ORAL at 08:54

## 2023-12-30 RX ADMIN — ACETAMINOPHEN 1000 MG: 500 TABLET ORAL at 13:33

## 2023-12-30 RX ADMIN — METOPROLOL TARTRATE 50 MG: 50 TABLET, FILM COATED ORAL at 08:53

## 2023-12-30 RX ADMIN — AMIODARONE HYDROCHLORIDE 200 MG: 200 TABLET ORAL at 08:53

## 2023-12-30 RX ADMIN — AMOXICILLIN AND CLAVULANATE POTASSIUM 1 TABLET: 875; 125 TABLET, FILM COATED ORAL at 08:52

## 2023-12-30 RX ADMIN — FAMOTIDINE 20 MG: 20 TABLET, FILM COATED ORAL at 08:50

## 2023-12-30 RX ADMIN — ASPIRIN 81 MG: 81 TABLET, COATED ORAL at 08:52

## 2023-12-30 RX ADMIN — ENOXAPARIN SODIUM 30 MG: 100 INJECTION SUBCUTANEOUS at 08:46

## 2023-12-30 RX ADMIN — METHOCARBAMOL 1000 MG: 500 TABLET ORAL at 13:33

## 2023-12-30 ASSESSMENT — PAIN SCALES - GENERAL
PAINLEVEL_OUTOF10: 0

## 2023-12-30 ASSESSMENT — PAIN - FUNCTIONAL ASSESSMENT
PAIN_FUNCTIONAL_ASSESSMENT: ACTIVITIES ARE NOT PREVENTED
PAIN_FUNCTIONAL_ASSESSMENT: ACTIVITIES ARE NOT PREVENTED

## 2023-12-30 NOTE — PROGRESS NOTES
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 Reviewed discharge summary (AVS) with patient and spouse including medications, physical instructions (safety) and diet. Pt in stable condition. Reconciled Medication List - Patient:   Medications were reviewed by RN at time of discharge with patient and/or family:  []  Via EMR   []  Via health information exchange  []  Verbal (e.g. in person, telephone, video conferencing)  [x]  Paper-based (e.g. fax, copies, printouts)   []  Other Methods (e.g. texting, email, CDs)    Reconciled Medication List - Subsequent provider:   [] No, current reconciled medication list not provided to the subsequent provider. [x] Yes, current reconciled medication list provided to the subsequent provider. [] Via EMR    [] Via health information exchange  [] Verbal (e.g. in person, telephone, video conferencing)  [x] Paper-based (e.g. fax, copies, printouts)   [] Other Methods (e.g. texting, email, CDs)    Discharge disposition:  Pt was discharged via:  [x]  Wheelchair  []  Stretcher  []  Safe ambulation and use of assistive device  []  Other:     Pt was discharged to:  [x]  Private car  []  Transportation service to next destination  []  Other:     Discharge destination:  [x]  Home  []   [address]  []  [address] [place] [address]  []  Other:        Discharge BIMS:  Number of word repeated after first attempt:  []  0. None []  1. One []  2. Two [x]  3. Three    Able to report correct year:  []  0. Missed by >5 years, or no answer  []  1. Missed by 2-5 years  []  2. Missed by 1 year  [x]  3. Correct    Able to report correct month:   []  0. Missed by >1 month, or no answer  []  1. Missed by 6 days to 1 month  [x]  2.  Accurate within 5 days    Able to report

## 2023-12-30 NOTE — PROGRESS NOTES
Patient called out to RN phone stating he was applying his CPAP due to feeling short of breath on room air. CPAP applied, SpO2 97%. No acute respiratory distress noted.

## 2023-12-30 NOTE — PROGRESS NOTES
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  []  0. Does not apply - I have not had any pain or hurting in the past 5 days  [x]  1. Rarely or not at all  []  2. Occasionally  []  3. Frequently  []  4. Almost constantly  []  8. Unable to answer    Over the past 5 days, how often have you limited your participation in rehabilitation therapy sessions due to pain?  []  0. Does not apply - I have not received rehabilitation therapy in the past 5 days  [x]  1. Rarely or not at all  []  2. Occasionally  []  3. Frequently  []  4. Almost constantly  []  8. Unable to answer    Pain Interference with Day-to-Day Activities:  Over the past 5 days, how often have you limited your day-to-day activities (excluding rehabilitation therapy session)?  [x]  1. Rarely or not at all  []  2. Occasionally  []  3. Frequently  []  4. Almost constantly  []  8. Unable to answer      High-Risk Drug Classes: Use and Indication   Is taking: Check if the pt is taking any medications by pharmacological classification  Indication noted: If column 1 is checked, check if there is an indication noted for all meds in the drug class Is taking  (check all that apply) Indication noted (check all that apply)   Antipsychotic [] []   Anticoagulant [x] [x]   Antibiotic [x] [x]   Opioid [] []   Antiplatelet [x] [x]   Hypoglycemic (including insulin) [] []   None of the above [] []     Special Treatments, Procedures, and Programs   Check all of the following treatments, procedures, and programs that apply on discharge.  On discharge (check all that apply)   Cancer Treatments    A1. Chemotherapy []   A2. IV []   A3. Oral []   A10. Other []   B1. Radiation []   Respiratory Therapies    C1. Oxygen  Therapy []   C2. Continuous []   C3. Intermittent []   C4. High-concentration []   D1. Suctioning []   D2. Scheduled []   D3. As needed []   E1. Tracheostomy Care []   F1. Invasive Mechanical Ventilator (ventilator or respirator) []   G1. Non-invasive Mechanical Ventilator []   G2. BiPAP []   G3. CPAP []   Other    H1. IV Medications []   H2. Vasoactive medications []   H3. Antibiotics []   H4. Anticoagulation []   H10. Other []   I1. Transfusions []   J1. Dialysis []   J2. Hemodialysis []   J3. Peritoneal dialysis []   O1. IV access []   O2. Peripheral []   O3. Midline []   O4. Central (PICC, tunneled, port) []   None of the above []     Signature:   Chitra Chen RN

## 2023-12-30 NOTE — PROGRESS NOTES
12/29/23 6839   Oxygen Therapy/Pulse Ox   O2 Device None (Room air)   SpO2 94 %   $Pulse Oximeter $Overnight

## 2024-01-02 NOTE — DISCHARGE SUMMARY
Physical Medicine & Rehabilitation  Discharge Summary     Patient Identification:  Kristopher Watkins  : 1945  Admit date: 2023  Discharge date: 2023   Attending provider: Maile Verduzco MD      Primary care provider: Francisco Miles DO     Discharge Diagnoses:   Active Hospital Problems    Diagnosis     Moderate malnutrition (HCC) [E44.0]     Critical illness myopathy [G72.81]        History of Present Illness/Acute Hospital Course:  Kristopher Watkins is a 78 year old male with a past medical history significant for CAD, GERD, HLD, HTN, sleep apnea, and morbid obesity who presented to [facility] on 23 for planned open CABG x4 with left atrial appendage clip placement with sternal plating. Post operative course has been notable for hypotension, MADONNA, and acute blood loss anemia. Nephrology is following. He was temporarily on CRRT. ID was consulted for persistent leukocytosis. On  he underwent left thoracentesis. He was admitted to [facility] on  due to functional deficits below his baseline. Today he is seen with therapy and his wife present. He reports sleeping better last night with melatonin. He notes some continued shortness of breath with activity. He has been using the hospital CPAP. His wife will bring in his home CPAP for us to look at.      Inpatient Rehabilitation Course:   Kristopher Watkins is a 78 y.o. male admitted to inpatient rehabilitation on 2023 with Critical illness myopathy.  The patient participated in an aggressive multidisciplinary inpatient rehabilitation program involving 3 hours of therapy per day, at least 5 days per week. Discharging home with family. Home care services and DME ordered. Patient will follow-up with PCP, [facility].     Impairments: impaired mobility and ADLs, impaired gait and balance    Medical Management:    CAD s/p CABG x4  - asa, statin, plavix, BB  - sternal precautions  - PT, OT, ST     Orthostasis  - lasix discontinued due to

## 2024-01-03 ENCOUNTER — TELEPHONE (OUTPATIENT)
Dept: CARDIOLOGY CLINIC | Age: 79
End: 2024-01-03

## 2024-01-03 NOTE — TELEPHONE ENCOUNTER
No, I do not have office the week of 1/30/24.  He has follow up scheduled with [facility] on 1/23/224.  Okay to wait until 1/30/24.    Neeta Joel, APRN - CNP

## 2024-01-03 NOTE — TELEPHONE ENCOUNTER
[employer] pt is needing a two week hsfu from his CABG x4 with . your next available is 1/30/24. [facility] would prefer two week followup if possible. Would you be able to add the pt to your schedule for the week of 1/22/24? VSP is booked for January at the locations he goes to. I informed CHRISTIANO Bello, 's RN, that I will contact the pt myself to arrange the followup.

## 2024-01-10 ENCOUNTER — HOSPITAL ENCOUNTER (OUTPATIENT)
Age: 79
Discharge: HOME | End: 2024-01-10
Payer: MEDICARE

## 2024-01-10 ENCOUNTER — HOSPITAL ENCOUNTER (OUTPATIENT)
Dept: GENERAL RADIOLOGY | Age: 79
Discharge: HOME | End: 2024-01-10
Payer: MEDICARE

## 2024-01-10 ENCOUNTER — HOSPITAL ENCOUNTER (OUTPATIENT)
Dept: CT IMAGING | Age: 79
Discharge: HOME | End: 2024-01-10
Payer: MEDICARE

## 2024-01-10 DIAGNOSIS — Z95.1 S/P CABG X 4: ICD-10-CM

## 2024-01-10 DIAGNOSIS — R06.02 SOB (SHORTNESS OF BREATH): ICD-10-CM

## 2024-01-10 DIAGNOSIS — M79.89 LEG SWELLING: ICD-10-CM

## 2024-01-10 DIAGNOSIS — I25.10 CORONARY ARTERY DISEASE INVOLVING NATIVE CORONARY ARTERY OF NATIVE HEART WITHOUT ANGINA PECTORIS: ICD-10-CM

## 2024-01-10 LAB
ANION GAP SERPL CALCULATED.3IONS-SCNC: 13 MMOL/L (ref 3–16)
BUN SERPL-MCNC: 36 MG/DL (ref 7–20)
CALCIUM SERPL-MCNC: 8.9 MG/DL (ref 8.3–10.6)
CHLORIDE SERPL-SCNC: 95 MMOL/L (ref 99–110)
CO2 SERPL-SCNC: 21 MMOL/L (ref 21–32)
CREAT SERPL-MCNC: 1.1 MG/DL (ref 0.8–1.3)
GFR SERPLBLD CREATININE-BSD FMLA CKD-EPI: >60 ML/MIN/{1.73_M2}
GLUCOSE SERPL-MCNC: 99 MG/DL (ref 70–99)
MAGNESIUM SERPL-MCNC: 2.3 MG/DL (ref 1.8–2.4)
POTASSIUM SERPL-SCNC: 4.4 MMOL/L (ref 3.5–5.1)
SODIUM SERPL-SCNC: 129 MMOL/L (ref 136–145)

## 2024-01-10 PROCEDURE — 71250 CT THORAX DX C-: CPT

## 2024-01-10 PROCEDURE — 71046 X-RAY EXAM CHEST 2 VIEWS: CPT

## 2024-01-10 PROCEDURE — 80048 BASIC METABOLIC PNL TOTAL CA: CPT

## 2024-01-10 PROCEDURE — 83735 ASSAY OF MAGNESIUM: CPT

## 2024-01-10 PROCEDURE — 36415 COLL VENOUS BLD VENIPUNCTURE: CPT

## 2024-01-11 DIAGNOSIS — Z95.1 S/P CABG X 4: Primary | ICD-10-CM

## 2024-01-12 ENCOUNTER — HOSPITAL ENCOUNTER (OUTPATIENT)
Age: 79
Discharge: HOME | End: 2024-01-12
Payer: MEDICARE

## 2024-01-12 ENCOUNTER — HOSPITAL ENCOUNTER (OUTPATIENT)
Dept: GENERAL RADIOLOGY | Age: 79
Discharge: HOME | End: 2024-01-12
Payer: MEDICARE

## 2024-01-12 ENCOUNTER — HOSPITAL ENCOUNTER (OUTPATIENT)
Dept: ULTRASOUND IMAGING | Age: 79
Discharge: HOME | End: 2024-01-12
Payer: MEDICARE

## 2024-01-12 VITALS
HEART RATE: 78 BPM | SYSTOLIC BLOOD PRESSURE: 114 MMHG | RESPIRATION RATE: 14 BRPM | DIASTOLIC BLOOD PRESSURE: 72 MMHG | OXYGEN SATURATION: 96 %

## 2024-01-12 DIAGNOSIS — E78.2 MIXED HYPERLIPIDEMIA: ICD-10-CM

## 2024-01-12 DIAGNOSIS — I25.10 CORONARY ARTERY DISEASE, UNSPECIFIED VESSEL OR LESION TYPE, UNSPECIFIED WHETHER ANGINA PRESENT, UNSPECIFIED WHETHER NATIVE OR TRANSPLANTED HEART: ICD-10-CM

## 2024-01-12 DIAGNOSIS — R94.39 ABNORMAL STRESS TEST: ICD-10-CM

## 2024-01-12 DIAGNOSIS — I50.32 CHRONIC DIASTOLIC CONGESTIVE HEART FAILURE (HCC): ICD-10-CM

## 2024-01-12 DIAGNOSIS — Z95.1 S/P CABG X 4: ICD-10-CM

## 2024-01-12 DIAGNOSIS — J90 PLEURAL EFFUSION: ICD-10-CM

## 2024-01-12 DIAGNOSIS — I10 ESSENTIAL HYPERTENSION: ICD-10-CM

## 2024-01-12 LAB
DEPRECATED RDW RBC AUTO: 17.6 % (ref 12.4–15.4)
HCT VFR BLD AUTO: 33.3 % (ref 40.5–52.5)
HGB BLD-MCNC: 10.9 G/DL (ref 13.5–17.5)
INR PPP: 1.25 (ref 0.84–1.16)
MCH RBC QN AUTO: 28.8 PG (ref 26–34)
MCHC RBC AUTO-ENTMCNC: 32.8 G/DL (ref 31–36)
MCV RBC AUTO: 87.7 FL (ref 80–100)
PLATELET # BLD AUTO: 423 K/UL (ref 135–450)
PMV BLD AUTO: 6.8 FL (ref 5–10.5)
PROTHROMBIN TIME: 15.7 SEC (ref 11.5–14.8)
RBC # BLD AUTO: 3.8 M/UL (ref 4.2–5.9)
WBC # BLD AUTO: 8.9 K/UL (ref 4–11)

## 2024-01-12 PROCEDURE — 85610 PROTHROMBIN TIME: CPT

## 2024-01-12 PROCEDURE — 36415 COLL VENOUS BLD VENIPUNCTURE: CPT

## 2024-01-12 PROCEDURE — 71045 X-RAY EXAM CHEST 1 VIEW: CPT

## 2024-01-12 PROCEDURE — 32555 ASPIRATE PLEURA W/ IMAGING: CPT

## 2024-01-12 PROCEDURE — 85027 COMPLETE CBC AUTOMATED: CPT

## 2024-01-12 NOTE — OR NURSING
Image guided Thoracentesis completed.  400 milliliters of dark, brown colored withdrawn.  Pt tolerated procedure without any signs or symptoms of distress. Vital signs stable.     DISCHARGED:  HOME: Discharge instructions reviewed with patient. Verbalized understanding.     SPECIMEN SENT:  Yes    Vital Signs  Vitals:    01/12/24 1511   BP: 114/72   Pulse: 78   Resp: 14   SpO2: 96%    (vital signs in table format)

## 2024-01-12 NOTE — BRIEF OP NOTE
Brief Postoperative Note    Kristopher Watkins  YOB: 1945  [id number]    Pre-operative Diagnosis: left pleural effusion    Post-operative Diagnosis: Same    Procedure: US-guided left thoracentesis    Anesthesia: Local    Surgeons: Thad Paz MD    Estimated Blood Loss: Less than 5 mL    Complications: None    Specimens: Was Obtained: left pleural fluid drained and sent for labs    Findings: Successful US-guided left thoracentesis.    Electronically signed by Thad Paz MD on 1/12/2024 at 3:10 PM

## 2024-01-12 NOTE — OR NURSING
Pt arrived for image guided left Thoracentesis. Dr. Paz explained the procedure including the risk and benefits of the procedure. All questions answered. Pt verbalizes understanding of the procedure and states no more questions. Consent confirmed. Vital signs stable. Labs, allergies, medications, and code status reviewed. No contraindications noted. Time out completed prior to procedure.    Vital Signs  Vitals:    01/12/24 1502   BP: 114/72   Pulse: 81   Resp: 16   SpO2: 100%    (vital signs in table format)      Allergies  Lasix [furosemide], Neosporin [neomycin-polymyx-gramicid], and Polysporin [bacitracin-polymyxin b] (allergies)    Labs  Lab Results   Component Value Date    INR 1.25 (H) 01/12/2024    PROTIME 15.7 (H) 01/12/2024     Lab Results   Component Value Date    CREATININE 1.1 01/10/2024    BUN 36 (H) 01/10/2024     (L) 01/10/2024    GFR >60 05/17/2013    K 4.4 01/10/2024    CL 95 (L) 01/10/2024    CO2 21 01/10/2024     Lab Results   Component Value Date    WBC 8.9 01/12/2024    HGB 10.9 (L) 01/12/2024    HCT 33.3 (L) 01/12/2024    MCV 87.7 01/12/2024     01/12/2024

## 2024-01-16 ENCOUNTER — HOSPITAL ENCOUNTER (OUTPATIENT)
Dept: GENERAL RADIOLOGY | Age: 79
Discharge: HOME | End: 2024-01-16
Payer: MEDICARE

## 2024-01-16 ENCOUNTER — HOSPITAL ENCOUNTER (OUTPATIENT)
Dept: ULTRASOUND IMAGING | Age: 79
Discharge: HOME | End: 2024-01-16
Payer: MEDICARE

## 2024-01-16 DIAGNOSIS — J90 PLEURAL EFFUSION: ICD-10-CM

## 2024-01-16 PROCEDURE — 32555 ASPIRATE PLEURA W/ IMAGING: CPT

## 2024-01-16 PROCEDURE — 71045 X-RAY EXAM CHEST 1 VIEW: CPT

## 2024-01-19 ENCOUNTER — HOSPITAL ENCOUNTER (OUTPATIENT)
Dept: GENERAL RADIOLOGY | Age: 79
Discharge: HOME | End: 2024-01-19
Payer: MEDICARE

## 2024-01-19 DIAGNOSIS — J90 PLEURAL EFFUSION: ICD-10-CM

## 2024-01-19 PROCEDURE — 71046 X-RAY EXAM CHEST 2 VIEWS: CPT

## 2024-01-24 ENCOUNTER — TELEPHONE (OUTPATIENT)
Age: 79
End: 2024-01-24

## 2024-01-24 NOTE — TELEPHONE ENCOUNTER
Dena RN with [facility] called for an update on patient.  She reports that patient has had a 4 lb weight gain over the past 3 days and continues to have SOB.  Weight today 1/24 was 275 lb, 1/23 273 lb,1/22 271 lb. Pre-op weight 311 lb and D/C weight 286 lb Patient was seen in office yesterday for post op visit and did report SOB w/exertion and rest.  He was started on Lasix 40 mg/K 20 meq bid x 7 days then daily for 30 days.  Since Lasix was just started will have patient continue to monitor and track weights.  Reiterated 1500 ml fluid and sodium restriction.  Instructed patient to call the office with any further weight gain.  They are aware and agreeable.

## 2024-01-29 ENCOUNTER — HOSPITAL ENCOUNTER (OUTPATIENT)
Dept: GENERAL RADIOLOGY | Age: 79
Discharge: HOME | End: 2024-01-29
Payer: MEDICARE

## 2024-01-29 DIAGNOSIS — J90 PLEURAL EFFUSION: ICD-10-CM

## 2024-01-29 PROCEDURE — 71046 X-RAY EXAM CHEST 2 VIEWS: CPT

## 2024-01-31 ENCOUNTER — OFFICE VISIT (OUTPATIENT)
Dept: CARDIOLOGY CLINIC | Age: 79
End: 2024-01-31
Payer: MEDICARE

## 2024-01-31 VITALS
BODY MASS INDEX: 41.68 KG/M2 | HEIGHT: 68 IN | HEART RATE: 76 BPM | DIASTOLIC BLOOD PRESSURE: 52 MMHG | WEIGHT: 275 LBS | OXYGEN SATURATION: 99 % | SYSTOLIC BLOOD PRESSURE: 84 MMHG

## 2024-01-31 DIAGNOSIS — I50.32 CHRONIC DIASTOLIC CONGESTIVE HEART FAILURE (HCC): ICD-10-CM

## 2024-01-31 DIAGNOSIS — I25.10 CORONARY ARTERY DISEASE, UNSPECIFIED VESSEL OR LESION TYPE, UNSPECIFIED WHETHER ANGINA PRESENT, UNSPECIFIED WHETHER NATIVE OR TRANSPLANTED HEART: Primary | ICD-10-CM

## 2024-01-31 DIAGNOSIS — Z95.1 S/P CABG X 4: ICD-10-CM

## 2024-01-31 DIAGNOSIS — G47.33 OSA (OBSTRUCTIVE SLEEP APNEA): ICD-10-CM

## 2024-01-31 DIAGNOSIS — E78.2 MIXED HYPERLIPIDEMIA: ICD-10-CM

## 2024-01-31 DIAGNOSIS — I10 ESSENTIAL HYPERTENSION: ICD-10-CM

## 2024-01-31 PROCEDURE — 3074F SYST BP LT 130 MM HG: CPT | Performed by: NURSE PRACTITIONER

## 2024-01-31 PROCEDURE — 1036F TOBACCO NON-USER: CPT | Performed by: NURSE PRACTITIONER

## 2024-01-31 PROCEDURE — G8484 FLU IMMUNIZE NO ADMIN: HCPCS | Performed by: NURSE PRACTITIONER

## 2024-01-31 PROCEDURE — 99215 OFFICE O/P EST HI 40 MIN: CPT | Performed by: NURSE PRACTITIONER

## 2024-01-31 PROCEDURE — 1123F ACP DISCUSS/DSCN MKR DOCD: CPT | Performed by: NURSE PRACTITIONER

## 2024-01-31 PROCEDURE — G8427 DOCREV CUR MEDS BY ELIG CLIN: HCPCS | Performed by: NURSE PRACTITIONER

## 2024-01-31 PROCEDURE — G8417 CALC BMI ABV UP PARAM F/U: HCPCS | Performed by: NURSE PRACTITIONER

## 2024-01-31 PROCEDURE — 3078F DIAST BP <80 MM HG: CPT | Performed by: NURSE PRACTITIONER

## 2024-01-31 RX ORDER — TORSEMIDE 100 MG/1
50 TABLET ORAL DAILY
Qty: 30 TABLET | Refills: 3 | Status: SHIPPED | OUTPATIENT
Start: 2024-01-31 | End: 2024-02-07 | Stop reason: DRUGHIGH

## 2024-01-31 RX ORDER — METOPROLOL SUCCINATE 25 MG/1
25 TABLET, EXTENDED RELEASE ORAL DAILY
Qty: 90 TABLET | Refills: 1 | Status: SHIPPED | OUTPATIENT
Start: 2024-01-31

## 2024-01-31 NOTE — PATIENT INSTRUCTIONS
Keep fluid intake around 64-65 oz per day  Stop the furosemide; start instead torsemide 100 mg, half tablet daily  Stay on same dose of potassium and magnesium daily  Stop the metoprolol tartrate completely, start instead metoprolol succinate (Toprol XL) 25 mg once daily  Stay on same dose of atorvastatin 40 mg daily (half of the 80 mg pill)  Okay to take melatonin for sleep   Gradually increase physical activity and weight lifting from 5 lbs to 10 lbs over couple of weeks  Referral to cardiac rehab phase II - continue with therapist at home for another week  Wait on sleep study for now - will revisit once fluid retention  Follow up with Francisco Miles DO regarding the iron deficiency  Have blood work for me on Monday or Tuesday at same time as blood work for PCP  Follow up with me in 4 weeks

## 2024-01-31 NOTE — PROGRESS NOTES
[facility]   Cardiac Evaluation    Primary Care Doctor:  Francisco Miles DO    Chief Complaint   Patient presents with    Follow-Up from Hospital     S/p CABG    Coronary Artery Disease    Hyperlipidemia    Hypertension    Fatigue    Shortness of Breath        Assessment:    1. Coronary artery disease, unspecified vessel or lesion type, unspecified whether angina present, unspecified whether native or transplanted heart    2. Essential hypertension    3. Mixed hyperlipidemia    4. SALLIE (obstructive sleep apnea)    5. S/P CABG x 4        Plan:   Keep fluid intake around 64-65 oz per day  Stop the furosemide; start instead torsemide 100 mg, half tablet daily  Stay on same dose of potassium and magnesium daily  Stop the metoprolol tartrate completely, start instead metoprolol succinate (Toprol XL) 25 mg once daily  Stay on same dose of atorvastatin 40 mg daily (half of the 80 mg pill)  Okay to take melatonin for sleep   Gradually increase physical activity and weight lifting from 5 lbs to 10 lbs over couple of weeks  Referral to cardiac rehab phase II - continue with therapist at home for another week  Wait on sleep study for now - will revisit once fluid retention resolved  Follow up with Francisco Miles DO regarding the iron deficiency  Have blood work for me on Monday or Tuesday at same time as blood work for PCP  Follow up with me in 4 weeks    Vitals:    01/31/24 1051 01/31/24 1106   BP: (!) 90/56 (!) 84/52   Pulse: 76    SpO2: 99%    Weight: 124.7 kg (275 lb)    Height: 1.727 m (5' 8\")        Primary Cardiologist: Dr. Francisco Trivedi     History of Present Illness:   I had the pleasure of seeing Kristopher Watkins (78 y.o.) in follow up for CAD, CHF, HTN, HLD, SALLIE. He had PTCA RCA 1995, 1996, 2004, 2009. [facility] 2015 showed stable CAD, patent RCA stents.  He was seen in October for symptoms of chest pain and shortness of breath.  He underwent stress test which is abnormal.  This was followed by left heart

## 2024-02-02 ENCOUNTER — TELEPHONE (OUTPATIENT)
Dept: CARDIOLOGY CLINIC | Age: 79
End: 2024-02-02

## 2024-02-02 NOTE — TELEPHONE ENCOUNTER
02/02 alla from [facility] care called and stated since pt had started taking torsemide in 24 hrs he has lost 8 lbs and feels much better, stated BP was normal. Pts wife just wanted to make sure this weight was ok and not concerning/ Alla did state this was her last visit there unless pt declines

## 2024-02-02 NOTE — TELEPHONE ENCOUNTER
Spoke with Alla home health nurse relayed NPDE message. Alla franco. Labs have already been placed.

## 2024-02-05 LAB
HCT VFR BLD CALC: 43.1 % (ref 40–51)
HEMOGLOBIN: 12.8 G/DL (ref 13.2–17.1)
MCH RBC QN AUTO: 27 PG (ref 27–33)
MCHC RBC AUTO-ENTMCNC: 29.7 G/DL (ref 30–36)
MCV RBC AUTO: 90.9 FL (ref 80–100)
PDW BLD-RTO: 16.5 % (ref 11–15)
PLATELET # BLD: 376 10*3/UL (ref 140–400)
PMV BLD AUTO: 9 FL (ref 9–13)
PRO BNP, N-TERMINAL: 359 PG/ML (ref 0–299)
RBC # BLD: 4.74 10*6/UL (ref 4.2–5.8)
WBC # BLD: 7.87 10*3/UL (ref 3.8–10.8)

## 2024-02-13 ENCOUNTER — HOSPITAL ENCOUNTER (EMERGENCY)
Age: 79
Discharge: HOME | End: 2024-02-13
Payer: MEDICARE

## 2024-02-13 VITALS
DIASTOLIC BLOOD PRESSURE: 84 MMHG | HEIGHT: 68 IN | TEMPERATURE: 97.7 F | SYSTOLIC BLOOD PRESSURE: 120 MMHG | BODY MASS INDEX: 39.4 KG/M2 | OXYGEN SATURATION: 95 % | RESPIRATION RATE: 22 BRPM | HEART RATE: 86 BPM | WEIGHT: 260 LBS

## 2024-02-13 DIAGNOSIS — S41.111A LACERATION OF RIGHT UPPER EXTREMITY, INITIAL ENCOUNTER: Primary | ICD-10-CM

## 2024-02-13 PROCEDURE — 99282 EMERGENCY DEPT VISIT SF MDM: CPT

## 2024-02-14 ENCOUNTER — HOSPITAL ENCOUNTER (OUTPATIENT)
Dept: CARDIAC REHAB | Age: 79
Setting detail: THERAPIES SERIES
Discharge: HOME | End: 2024-02-14

## 2024-02-14 NOTE — ED PROVIDER NOTES
hyperlipidemia    EMERGENCY DEPARTMENT COURSE and DIFFERENTIAL DIAGNOSIS/MDM:   Vitals:    Vitals:    02/13/24 1944   BP: 120/84   Pulse: 86   Resp: 22   Temp: 97.7 °F (36.5 °C)   TempSrc: Oral   SpO2: 95%   Weight: 117.9 kg (260 lb)   Height: 1.727 m (5' 8\")       Patient was given the following medications:  Medications - No data to display          Is this patient to be included in the SEP-1 Core Measure due to severe sepsis or septic shock?   No   Exclusion criteria - the patient is NOT to be included for SEP-1 Core Measure due to:  Infection is not suspected    CONSULTS: (Who and What was discussed)  None      Social Determinants : None    Records Reviewed : None    CC/HPI Summary, DDx, ED Course, and Reassessment: Patient was evaluated in the emergency department today for 2 superficial lacerations to his right forearm.  Bleeding is actually fairly well-controlled in the emergency department.  Wounds were cleaned and Surgifoam was placed with gauze and Coban dressing.  Patient has no evidence of infection at this time.  He has full active range of motion of wrist and digits therefore I have no concern for deeper laceration.  Admitted to the patient is a little confused as to why urgent care sent him to the emergency department as I do not see a true emergency at this time.  I recommend he follow-up with his primary care physician for further evaluation and treatment.  He is in agreement treatment plan.  Discharged home in good condition.    Disposition Considerations (include 1 Tests not done, Shared Decision Making, Pt Expectation of Test or Tx.):   I estimate there is LOW risk for CELLULITIS, COMPARTMENT SYNDROME, NECROTIZING FASCIITIS, TENDON OR NEUROVASCULAR INJURY, or FOREIGN BODY, thus I consider the discharge disposition reasonable. Also, there is no evidence or peritonitis, sepsis, or toxicity. Kristopher Watkins and I have discussed the diagnosis and risks, and we agree with discharging home to follow-up

## 2024-02-15 LAB
ANION GAP SERPL CALCULATED.3IONS-SCNC: 13 MMOL/L (ref 5–13)
BUN / CREAT RATIO: 19
BUN BLDV-MCNC: 26 MG/DL (ref 7–25)
CALCIUM SERPL-MCNC: 9.5 MG/DL (ref 8.5–10.5)
CHLORIDE BLD-SCNC: 103 MMOL/L (ref 98–110)
CO2: 27 MMOL/L (ref 22–29)
EGFR (CKD-EPI): 52 SEE NOTE
GLUCOSE TOLERANCE TEST FASTING: 102 MG/DL (ref 71–99)
POTASSIUM SERPL-SCNC: 4.2 MMOL/L (ref 3.5–5.1)
SODIUM BLD-SCNC: 143 MMOL/L (ref 135–146)

## 2024-02-16 ENCOUNTER — TELEPHONE (OUTPATIENT)
Dept: CARDIOLOGY CLINIC | Age: 79
End: 2024-02-16

## 2024-02-16 NOTE — TELEPHONE ENCOUNTER
----- Message from Diane M Enzweiler, APRN - CNP sent at 2/16/2024  3:06 PM EST -----  Cr has increased. Per last note, Neeta decreased Torsemide to 20 mg daily and take additional if weight > 273#    Attempted to call and discuss with pt regarding if he has been taking extra torsemide and what dose he is on daily. Also to inquire about CHF symptoms.  Left message for him to call office and d/w staff.

## 2024-02-19 ENCOUNTER — HOSPITAL ENCOUNTER (OUTPATIENT)
Dept: CARDIAC REHAB | Age: 79
Setting detail: THERAPIES SERIES
Discharge: HOME | End: 2024-02-19
Payer: MEDICARE

## 2024-02-19 PROCEDURE — 93798 PHYS/QHP OP CAR RHAB W/ECG: CPT

## 2024-02-19 NOTE — TELEPHONE ENCOUNTER
Yep, stay on the torsemide 20 mg daily. His weight is the same as on 2/7/24.   Stay the course.   Thank you, Neeta

## 2024-02-19 NOTE — TELEPHONE ENCOUNTER
Upon review, the renal panel is stable to slightly improved from recent labs at PCP ([facility]).     Continue with plan for torsemide 20 mg daily and adjust per weight.   Torsemide 20 mg daily if weight 268 pounds or less  Torsemide 40 mg daily if weight greater than/equal to 269 pounds  Call if weight greater than 273 pounds.    Please find out what his current weight is.  Keep appointment with me on 2/29/24 as scheduled.   Thank you, Neeta

## 2024-02-19 NOTE — TELEPHONE ENCOUNTER
Spoke with pt.'s wife (ok per HIPAA). Relayed NPDD message. She says pt weight today was 260 lbs.

## 2024-02-21 ENCOUNTER — HOSPITAL ENCOUNTER (OUTPATIENT)
Dept: CARDIAC REHAB | Age: 79
Setting detail: THERAPIES SERIES
Discharge: HOME | End: 2024-02-21
Payer: MEDICARE

## 2024-02-21 PROCEDURE — 93798 PHYS/QHP OP CAR RHAB W/ECG: CPT

## 2024-02-21 RX ORDER — POTASSIUM CHLORIDE 20 MEQ/1
20 TABLET, EXTENDED RELEASE ORAL DAILY
Qty: 90 TABLET | Refills: 1 | Status: SHIPPED | OUTPATIENT
Start: 2024-02-21

## 2024-02-21 RX ORDER — LANOLIN ALCOHOL/MO/W.PET/CERES
400 CREAM (GRAM) TOPICAL DAILY
Qty: 90 TABLET | Refills: 1 | Status: SHIPPED | OUTPATIENT
Start: 2024-02-21

## 2024-02-21 NOTE — TELEPHONE ENCOUNTER
Yes, stay on the potassium and magnesium supplements.  I have sent these in to his [facility] in [place].   Thank you, Neeta

## 2024-02-21 NOTE — TELEPHONE ENCOUNTER
Pt wife would like to know if pt should continue with magnesium and potassium while taking torsemide. If so pt would need a refill sent to [facility]. Please advise.

## 2024-02-23 ENCOUNTER — HOSPITAL ENCOUNTER (OUTPATIENT)
Dept: CARDIAC REHAB | Age: 79
Setting detail: THERAPIES SERIES
Discharge: HOME | End: 2024-02-23
Payer: MEDICARE

## 2024-02-23 PROCEDURE — 93798 PHYS/QHP OP CAR RHAB W/ECG: CPT

## 2024-02-26 ENCOUNTER — HOSPITAL ENCOUNTER (OUTPATIENT)
Dept: CARDIAC REHAB | Age: 79
Setting detail: THERAPIES SERIES
Discharge: HOME | End: 2024-02-26
Payer: MEDICARE

## 2024-02-26 PROCEDURE — 93798 PHYS/QHP OP CAR RHAB W/ECG: CPT

## 2024-02-28 ENCOUNTER — HOSPITAL ENCOUNTER (OUTPATIENT)
Dept: CARDIAC REHAB | Age: 79
Setting detail: THERAPIES SERIES
Discharge: HOME | End: 2024-02-28
Payer: MEDICARE

## 2024-02-28 PROCEDURE — 93798 PHYS/QHP OP CAR RHAB W/ECG: CPT

## 2024-02-29 ENCOUNTER — OFFICE VISIT (OUTPATIENT)
Dept: CARDIOLOGY CLINIC | Age: 79
End: 2024-02-29
Payer: MEDICARE

## 2024-02-29 VITALS
BODY MASS INDEX: 40.92 KG/M2 | WEIGHT: 270 LBS | DIASTOLIC BLOOD PRESSURE: 80 MMHG | OXYGEN SATURATION: 99 % | HEART RATE: 72 BPM | HEIGHT: 68 IN | SYSTOLIC BLOOD PRESSURE: 116 MMHG

## 2024-02-29 DIAGNOSIS — I50.32 CHRONIC DIASTOLIC CONGESTIVE HEART FAILURE (HCC): ICD-10-CM

## 2024-02-29 DIAGNOSIS — Z95.1 S/P CABG X 4: ICD-10-CM

## 2024-02-29 DIAGNOSIS — I10 ESSENTIAL HYPERTENSION: ICD-10-CM

## 2024-02-29 DIAGNOSIS — I25.119 CORONARY ARTERY DISEASE INVOLVING NATIVE HEART WITH ANGINA PECTORIS, UNSPECIFIED VESSEL OR LESION TYPE (HCC): ICD-10-CM

## 2024-02-29 DIAGNOSIS — E78.2 MIXED HYPERLIPIDEMIA: ICD-10-CM

## 2024-02-29 DIAGNOSIS — I50.43 ACUTE ON CHRONIC COMBINED SYSTOLIC (CONGESTIVE) AND DIASTOLIC (CONGESTIVE) HEART FAILURE (HCC): ICD-10-CM

## 2024-02-29 DIAGNOSIS — J90 BILATERAL PLEURAL EFFUSION: ICD-10-CM

## 2024-02-29 DIAGNOSIS — I10 ESSENTIAL HYPERTENSION, BENIGN: ICD-10-CM

## 2024-02-29 DIAGNOSIS — R06.02 SHORTNESS OF BREATH: ICD-10-CM

## 2024-02-29 DIAGNOSIS — I25.10 CORONARY ARTERY DISEASE, UNSPECIFIED VESSEL OR LESION TYPE, UNSPECIFIED WHETHER ANGINA PRESENT, UNSPECIFIED WHETHER NATIVE OR TRANSPLANTED HEART: Primary | ICD-10-CM

## 2024-02-29 DIAGNOSIS — G47.33 OSA (OBSTRUCTIVE SLEEP APNEA): ICD-10-CM

## 2024-02-29 DIAGNOSIS — I25.119 ATHEROSCLEROSIS OF CORONARY ARTERY WITH ANGINA PECTORIS, UNSPECIFIED VESSEL OR LESION TYPE, UNSPECIFIED WHETHER NATIVE OR TRANSPLANTED HEART (HCC): ICD-10-CM

## 2024-02-29 PROCEDURE — 3079F DIAST BP 80-89 MM HG: CPT | Performed by: NURSE PRACTITIONER

## 2024-02-29 PROCEDURE — G8417 CALC BMI ABV UP PARAM F/U: HCPCS | Performed by: NURSE PRACTITIONER

## 2024-02-29 PROCEDURE — G8427 DOCREV CUR MEDS BY ELIG CLIN: HCPCS | Performed by: NURSE PRACTITIONER

## 2024-02-29 PROCEDURE — 1036F TOBACCO NON-USER: CPT | Performed by: NURSE PRACTITIONER

## 2024-02-29 PROCEDURE — 99214 OFFICE O/P EST MOD 30 MIN: CPT | Performed by: NURSE PRACTITIONER

## 2024-02-29 PROCEDURE — 1123F ACP DISCUSS/DSCN MKR DOCD: CPT | Performed by: NURSE PRACTITIONER

## 2024-02-29 PROCEDURE — G8484 FLU IMMUNIZE NO ADMIN: HCPCS | Performed by: NURSE PRACTITIONER

## 2024-02-29 PROCEDURE — 3074F SYST BP LT 130 MM HG: CPT | Performed by: NURSE PRACTITIONER

## 2024-02-29 RX ORDER — TORSEMIDE 20 MG/1
TABLET ORAL
Qty: 135 TABLET | Refills: 1
Start: 2024-02-29

## 2024-02-29 RX ORDER — LANOLIN ALCOHOL/MO/W.PET/CERES
5 CREAM (GRAM) TOPICAL NIGHTLY PRN
COMMUNITY
Start: 2024-02-01

## 2024-02-29 RX ORDER — ATORVASTATIN CALCIUM 40 MG/1
40 TABLET, FILM COATED ORAL NIGHTLY
Qty: 90 TABLET | Refills: 2
Start: 2024-02-29

## 2024-02-29 NOTE — PATIENT INSTRUCTIONS
Keep fluid intake between 48 oz and 64 oz of all liquids per day  Increase the torsemide 20 mg to 2 pills per day alternating with 1 pill per day every other day  Decrease to 1 pill daily if weight drops to less than 257 lbs at home  Okay to increase activity without any limitations  Have blood work for me when you see PCP next (3/7/24)  Repeat CXR in 1 week (prior to appointment with PCP)  Continue with cardiac rehab  Will plan to re-address the sleep apnea once fluid status stable  Agree with seeing Dr. Frazier in April  Follow up with me in 1 month

## 2024-02-29 NOTE — PROGRESS NOTES
angiography was performed.  Then, Perclose was used to  close.  There were no complications.  FINDINGS:  1.  Hemodynamics:  /56, mean of 79, .  LVEDP was 11 mmHg.  There  was no gradient noted across the aortic valve.  2.  LV-gram revealed normal left ventricular systolic function with ejection  fraction of 55% to 60%.  There was trace mitral regurgitation.  CORONARY ANGIOGRAPHY:  1.  Left main was a large-caliber vessel that bifurcated.  It was long.  It  was normal.  2.  Left circumflex was a small vessel that continued in the posterior AV  groove as a very small-caliber vessel.  It gave off 2 obtuse marginal  branches.  The first was small to medium.  The second was very small.  They  were all normal.  3.  The LAD was a medium-caliber vessel that reached the apex, but did not  wrap around it.  It became a very small-caliber vessel close to the apex.  It  gave off 2 diagonal branches.  The first was very small, the second was  medium-caliber, and they were without significant disease.  4.  The right coronary artery was a large-caliber vessel and dominant.  There  were stents noted in the proximal to midsegment, which were patent with a 40%  in-stent restenosis of the stents in the mid-right coronary artery.  It gave  off a large-caliber right posterior descending artery that had a 40% stenosis  in the midsegment.  The right posterior descending artery reached the apex  and wrapped around it.  It also gave off a medium-caliber right  posterolateral branch.   IMPRESSION:  1.  Patent proximal to mid-right coronary artery stents with 40% discrete  in-stent restenosis of the mid-right coronary artery, stent, fractional flow  reserve across the lesion was 0.89, which is insignificant.   2.  Normal left ventricular systolic function with ejection fraction of 55%.  3.  Normal Left ventricular end-diastolic pressure, 11 mmHg.  RECOMMENDATIONS:  The patient's stents are patent, and in-stent restenosis

## 2024-03-01 ENCOUNTER — HOSPITAL ENCOUNTER (OUTPATIENT)
Dept: CARDIAC REHAB | Age: 79
Setting detail: THERAPIES SERIES
Discharge: HOME | End: 2024-03-01
Payer: MEDICARE

## 2024-03-01 PROCEDURE — 93798 PHYS/QHP OP CAR RHAB W/ECG: CPT

## 2024-03-04 ENCOUNTER — HOSPITAL ENCOUNTER (OUTPATIENT)
Dept: CARDIAC REHAB | Age: 79
Setting detail: THERAPIES SERIES
Discharge: HOME | End: 2024-03-04
Payer: MEDICARE

## 2024-03-04 PROCEDURE — 93798 PHYS/QHP OP CAR RHAB W/ECG: CPT

## 2024-03-06 ENCOUNTER — HOSPITAL ENCOUNTER (OUTPATIENT)
Dept: CARDIAC REHAB | Age: 79
Setting detail: THERAPIES SERIES
Discharge: HOME | End: 2024-03-06
Payer: MEDICARE

## 2024-03-06 PROCEDURE — 93798 PHYS/QHP OP CAR RHAB W/ECG: CPT

## 2024-03-07 ENCOUNTER — TELEPHONE (OUTPATIENT)
Dept: CARDIOLOGY CLINIC | Age: 79
End: 2024-03-07

## 2024-03-07 ENCOUNTER — HOSPITAL ENCOUNTER (OUTPATIENT)
Dept: GENERAL RADIOLOGY | Age: 79
Discharge: HOME | End: 2024-03-07
Payer: MEDICARE

## 2024-03-07 DIAGNOSIS — I25.10 CORONARY ARTERY DISEASE, UNSPECIFIED VESSEL OR LESION TYPE, UNSPECIFIED WHETHER ANGINA PRESENT, UNSPECIFIED WHETHER NATIVE OR TRANSPLANTED HEART: ICD-10-CM

## 2024-03-07 DIAGNOSIS — Z95.1 S/P CABG X 4: ICD-10-CM

## 2024-03-07 DIAGNOSIS — J90 BILATERAL PLEURAL EFFUSION: ICD-10-CM

## 2024-03-07 DIAGNOSIS — I10 ESSENTIAL HYPERTENSION: ICD-10-CM

## 2024-03-07 PROCEDURE — 71046 X-RAY EXAM CHEST 2 VIEWS: CPT

## 2024-03-07 NOTE — TELEPHONE ENCOUNTER
----- Message from Neeta Joel, PRATEEK - CNP sent at 3/7/2024  3:52 PM EST -----  Chest x-ray looks better compared to previous 2.  Awaiting blood test results.  Thank you, Neeta

## 2024-03-08 ENCOUNTER — HOSPITAL ENCOUNTER (OUTPATIENT)
Dept: CARDIAC REHAB | Age: 79
Setting detail: THERAPIES SERIES
Discharge: HOME | End: 2024-03-08
Payer: MEDICARE

## 2024-03-08 LAB
ALBUMIN SERPL-MCNC: 4.3 G/DL
ALP BLD-CCNC: 129 U/L
ALT SERPL-CCNC: 23 U/L
ANION GAP SERPL CALCULATED.3IONS-SCNC: 13 MMOL/L
AST SERPL-CCNC: 26 U/L
B-TYPE NATRIURETIC PEPTIDE: 73 PG/ML
BASOPHILS ABSOLUTE: ABNORMAL
BASOPHILS RELATIVE PERCENT: ABNORMAL
BILIRUB SERPL-MCNC: NORMAL MG/DL
BUN BLDV-MCNC: 22 MG/DL
CALCIUM SERPL-MCNC: 9.5 MG/DL
CHLORIDE BLD-SCNC: 102 MMOL/L
CHOLESTEROL, TOTAL: 133 MG/DL
CHOLESTEROL/HDL RATIO: NORMAL
CO2: 27 MMOL/L
CREAT SERPL-MCNC: 1.32 MG/DL
EGFR: 55
EOSINOPHILS ABSOLUTE: ABNORMAL
EOSINOPHILS RELATIVE PERCENT: ABNORMAL
FERRITIN: 351 NG/ML (ref 18–300)
GLUCOSE BLD-MCNC: 98 MG/DL
HBA1C MFR BLD: 5.6 %
HCT VFR BLD CALC: 40 % (ref 41–53)
HDLC SERPL-MCNC: 39 MG/DL (ref 35–70)
HEMOGLOBIN: 11.9 G/DL (ref 13.5–17.5)
IRON: 45
LDL CHOLESTEROL CALCULATED: 65 MG/DL (ref 0–160)
LYMPHOCYTES ABSOLUTE: ABNORMAL
LYMPHOCYTES RELATIVE PERCENT: ABNORMAL
MCH RBC QN AUTO: 27.5 PG
MCHC RBC AUTO-ENTMCNC: 29.8 G/DL
MCV RBC AUTO: 92.4 FL
MONOCYTES ABSOLUTE: ABNORMAL
MONOCYTES RELATIVE PERCENT: ABNORMAL
NEUTROPHILS ABSOLUTE: ABNORMAL
NEUTROPHILS RELATIVE PERCENT: ABNORMAL
NONHDLC SERPL-MCNC: 94 MG/DL
PLATELET # BLD: 295 K/ΜL
PMV BLD AUTO: 9.3 FL
POTASSIUM SERPL-SCNC: 3.8 MMOL/L
PTH INTACT: 102.7
RBC # BLD: 4.33 10^6/ΜL
SODIUM BLD-SCNC: 142 MMOL/L
T4 FREE: 0.8
TOTAL IRON BINDING CAPACITY: 279
TOTAL PROTEIN: 7.8
TRIGL SERPL-MCNC: 146 MG/DL
VITAMIN B-12: 1033
VLDLC SERPL CALC-MCNC: NORMAL MG/DL
WBC # BLD: 8.06 10^3/ML

## 2024-03-08 PROCEDURE — 93798 PHYS/QHP OP CAR RHAB W/ECG: CPT

## 2024-03-11 ENCOUNTER — HOSPITAL ENCOUNTER (OUTPATIENT)
Dept: CARDIAC REHAB | Age: 79
Setting detail: THERAPIES SERIES
Discharge: HOME | End: 2024-03-11
Payer: MEDICARE

## 2024-03-11 PROCEDURE — 93798 PHYS/QHP OP CAR RHAB W/ECG: CPT

## 2024-03-13 ENCOUNTER — HOSPITAL ENCOUNTER (OUTPATIENT)
Dept: CARDIAC REHAB | Age: 79
Setting detail: THERAPIES SERIES
Discharge: HOME | End: 2024-03-13
Payer: MEDICARE

## 2024-03-13 PROCEDURE — 93798 PHYS/QHP OP CAR RHAB W/ECG: CPT

## 2024-03-14 ENCOUNTER — TELEPHONE (OUTPATIENT)
Dept: CARDIOLOGY CLINIC | Age: 79
End: 2024-03-14

## 2024-03-14 NOTE — TELEPHONE ENCOUNTER
Received fax from the [facility], labs abstracted into epic for review. Labs scanned into media also    NPDD OOT

## 2024-03-14 NOTE — TELEPHONE ENCOUNTER
Reviewed labs from [facility]: Neeta ordered BMP and BNP and those were reviewed. His BMP is stable and kidney function stable. His BNP is 73 and does not suggest CHF.    Please call.

## 2024-03-15 ENCOUNTER — HOSPITAL ENCOUNTER (OUTPATIENT)
Dept: CARDIAC REHAB | Age: 79
Setting detail: THERAPIES SERIES
Discharge: HOME | End: 2024-03-15
Payer: MEDICARE

## 2024-03-15 PROCEDURE — 93798 PHYS/QHP OP CAR RHAB W/ECG: CPT

## 2024-03-15 NOTE — TELEPHONE ENCOUNTER
Spoke with Pt spouse Lisa per HIPAA, relayed NPDE. Lisa franco.    Lisa informed me about a missed call from NPDE. She was wanting to know about the chest xray. Informed her of NPDE message below from 3/13/2024. Lisa franco             . Enzweiler, Diane M, APRN - CNP   to [facility] Cardio Practice Staff       3/13/24  4:27 PM  Note      Neeta had message in chart regarding this CXR from 3/7/24:  Chest x-ray looks better compared to previous 2.  Awaiting blood test results.  Thank you, Neeta     Wife called back and wanted a return call. I attempted to call and left message that I reviewed CXR and agree that it looks better compared to the prior ones as well.  Continue same medications and lab results reviewed once they have been completed.

## 2024-03-18 ENCOUNTER — HOSPITAL ENCOUNTER (OUTPATIENT)
Dept: CARDIAC REHAB | Age: 79
Setting detail: THERAPIES SERIES
Discharge: HOME | End: 2024-03-18
Payer: MEDICARE

## 2024-03-18 PROCEDURE — 93798 PHYS/QHP OP CAR RHAB W/ECG: CPT

## 2024-03-19 ENCOUNTER — TELEPHONE (OUTPATIENT)
Dept: CARDIOLOGY CLINIC | Age: 79
End: 2024-03-19

## 2024-03-19 NOTE — TELEPHONE ENCOUNTER
Kristopher Watkins, 1945    Cardiac Risk Assessment    What type of procedure are you having?  EGD Colon    When is your procedure scheduled for?  4/18/24    Medications to be stopped.  Plavix 5 days    What physician is performing your procedure?  Dr. Shelton    Phone Number:   [phone]    Fax number to send the letter:   [phone]    Cardiologist:   LOPEZ    Last Appointment:   2/29/24 NPBRAIN    Next Appointment:   3/28/24 LOPEZ

## 2024-03-20 ENCOUNTER — HOSPITAL ENCOUNTER (OUTPATIENT)
Dept: CARDIAC REHAB | Age: 79
Setting detail: THERAPIES SERIES
Discharge: HOME | End: 2024-03-20
Payer: MEDICARE

## 2024-03-20 PROCEDURE — 93798 PHYS/QHP OP CAR RHAB W/ECG: CPT

## 2024-03-20 NOTE — TELEPHONE ENCOUNTER
Per Revised Cardiac Risk Index (Derick Criteria):   Estimated Risk of Adverse Outcome with Non-cardiac Surgery: LOW  Estimated Rate of Myocardial Infarction, Pulmonary Edema, Ventricular Fibrillation, Cardiac Arrest, or Complete Heart Block:  0.9%    Okay to hold Plavix for 5-7 days prior to procedure and resume as soon as able.     Neeta Joel, APRN - CNP

## 2024-03-22 ENCOUNTER — HOSPITAL ENCOUNTER (OUTPATIENT)
Dept: CARDIAC REHAB | Age: 79
Setting detail: THERAPIES SERIES
Discharge: HOME | End: 2024-03-22
Payer: MEDICARE

## 2024-03-22 PROCEDURE — 93798 PHYS/QHP OP CAR RHAB W/ECG: CPT

## 2024-03-25 ENCOUNTER — HOSPITAL ENCOUNTER (OUTPATIENT)
Dept: CARDIAC REHAB | Age: 79
Setting detail: THERAPIES SERIES
Discharge: HOME | End: 2024-03-25
Payer: MEDICARE

## 2024-03-25 PROCEDURE — 93798 PHYS/QHP OP CAR RHAB W/ECG: CPT

## 2024-03-25 RX ORDER — TORSEMIDE 20 MG/1
TABLET ORAL
Qty: 135 TABLET | Refills: 1 | Status: SHIPPED | OUTPATIENT
Start: 2024-03-25

## 2024-03-25 NOTE — TELEPHONE ENCOUNTER
Pt's wife Lisa called in stating that they need a refill of torsemide (DEMADEX) 20 MG tablet and that they only have 2 tablets left.       [facility] [id number] - [place], [place] - [facility] [address] - P [phone] - F [phone]

## 2024-03-27 ENCOUNTER — HOSPITAL ENCOUNTER (OUTPATIENT)
Dept: CARDIAC REHAB | Age: 79
Setting detail: THERAPIES SERIES
Discharge: HOME | End: 2024-03-27
Payer: MEDICARE

## 2024-03-27 PROCEDURE — 93798 PHYS/QHP OP CAR RHAB W/ECG: CPT

## 2024-03-28 ENCOUNTER — OFFICE VISIT (OUTPATIENT)
Dept: CARDIOLOGY CLINIC | Age: 79
End: 2024-03-28
Payer: MEDICARE

## 2024-03-28 VITALS
HEIGHT: 68 IN | WEIGHT: 264 LBS | HEART RATE: 73 BPM | SYSTOLIC BLOOD PRESSURE: 106 MMHG | OXYGEN SATURATION: 97 % | DIASTOLIC BLOOD PRESSURE: 70 MMHG | BODY MASS INDEX: 40.01 KG/M2

## 2024-03-28 DIAGNOSIS — D64.9 ANEMIA, UNSPECIFIED TYPE: ICD-10-CM

## 2024-03-28 DIAGNOSIS — I25.10 CORONARY ARTERY DISEASE INVOLVING NATIVE CORONARY ARTERY OF NATIVE HEART WITHOUT ANGINA PECTORIS: Primary | ICD-10-CM

## 2024-03-28 DIAGNOSIS — Z95.1 S/P CABG X 4: ICD-10-CM

## 2024-03-28 DIAGNOSIS — G47.33 OSA (OBSTRUCTIVE SLEEP APNEA): ICD-10-CM

## 2024-03-28 DIAGNOSIS — I50.32 CHRONIC DIASTOLIC CONGESTIVE HEART FAILURE (HCC): ICD-10-CM

## 2024-03-28 DIAGNOSIS — I10 ESSENTIAL HYPERTENSION: ICD-10-CM

## 2024-03-28 DIAGNOSIS — E78.2 MIXED HYPERLIPIDEMIA: ICD-10-CM

## 2024-03-28 DIAGNOSIS — N18.31 STAGE 3A CHRONIC KIDNEY DISEASE (HCC): ICD-10-CM

## 2024-03-28 PROCEDURE — G8417 CALC BMI ABV UP PARAM F/U: HCPCS | Performed by: NURSE PRACTITIONER

## 2024-03-28 PROCEDURE — 1036F TOBACCO NON-USER: CPT | Performed by: NURSE PRACTITIONER

## 2024-03-28 PROCEDURE — 99214 OFFICE O/P EST MOD 30 MIN: CPT | Performed by: NURSE PRACTITIONER

## 2024-03-28 PROCEDURE — 1123F ACP DISCUSS/DSCN MKR DOCD: CPT | Performed by: NURSE PRACTITIONER

## 2024-03-28 PROCEDURE — 3078F DIAST BP <80 MM HG: CPT | Performed by: NURSE PRACTITIONER

## 2024-03-28 PROCEDURE — G8427 DOCREV CUR MEDS BY ELIG CLIN: HCPCS | Performed by: NURSE PRACTITIONER

## 2024-03-28 PROCEDURE — 3074F SYST BP LT 130 MM HG: CPT | Performed by: NURSE PRACTITIONER

## 2024-03-28 PROCEDURE — G8484 FLU IMMUNIZE NO ADMIN: HCPCS | Performed by: NURSE PRACTITIONER

## 2024-03-28 RX ORDER — FERROUS SULFATE 325(65) MG
325 TABLET ORAL EVERY OTHER DAY
COMMUNITY

## 2024-03-28 NOTE — PROGRESS NOTES
opportunity of cooperating in the care of this individual.    Neeta Joel, PRATEEK - CNP, 3/28/2024, 2:12 PM

## 2024-03-28 NOTE — PATIENT INSTRUCTIONS
Okay to hold the Plavix for 5-7 days prior to the EGD and colonoscopy but do not stop the aspirin  Continue to weigh yourself every morning, Okay to take blood pressure just 3-4 times per week  Okay to proceed with sleep study/ evaluation  Stay on the torsemide 20 mg once daily  Follow up with Dr. Frazier next week as planned - he will likely want blood work/ other testing  Continue with cardiac rehab  Follow up with Dr. Trivedi in 3 months

## 2024-03-29 ENCOUNTER — HOSPITAL ENCOUNTER (OUTPATIENT)
Dept: CARDIAC REHAB | Age: 79
Setting detail: THERAPIES SERIES
Discharge: HOME | End: 2024-03-29
Payer: MEDICARE

## 2024-03-29 PROCEDURE — 93798 PHYS/QHP OP CAR RHAB W/ECG: CPT

## 2024-04-01 ENCOUNTER — HOSPITAL ENCOUNTER (OUTPATIENT)
Dept: CARDIAC REHAB | Age: 79
Setting detail: THERAPIES SERIES
Discharge: HOME | End: 2024-04-01
Payer: MEDICARE

## 2024-04-01 PROCEDURE — 93798 PHYS/QHP OP CAR RHAB W/ECG: CPT

## 2024-04-03 ENCOUNTER — HOSPITAL ENCOUNTER (OUTPATIENT)
Dept: CARDIAC REHAB | Age: 79
Setting detail: THERAPIES SERIES
Discharge: HOME | End: 2024-04-03
Payer: MEDICARE

## 2024-04-03 PROCEDURE — 93798 PHYS/QHP OP CAR RHAB W/ECG: CPT

## 2024-04-05 ENCOUNTER — HOSPITAL ENCOUNTER (OUTPATIENT)
Dept: CARDIAC REHAB | Age: 79
Setting detail: THERAPIES SERIES
Discharge: HOME | End: 2024-04-05
Payer: MEDICARE

## 2024-04-05 PROCEDURE — 93798 PHYS/QHP OP CAR RHAB W/ECG: CPT

## 2024-04-08 ENCOUNTER — HOSPITAL ENCOUNTER (OUTPATIENT)
Dept: CARDIAC REHAB | Age: 79
Setting detail: THERAPIES SERIES
Discharge: HOME | End: 2024-04-08
Payer: MEDICARE

## 2024-04-08 PROCEDURE — 93798 PHYS/QHP OP CAR RHAB W/ECG: CPT

## 2024-04-10 ENCOUNTER — HOSPITAL ENCOUNTER (OUTPATIENT)
Dept: CARDIAC REHAB | Age: 79
Setting detail: THERAPIES SERIES
Discharge: HOME | End: 2024-04-10
Payer: MEDICARE

## 2024-04-10 PROCEDURE — 93798 PHYS/QHP OP CAR RHAB W/ECG: CPT

## 2024-04-12 ENCOUNTER — TELEPHONE (OUTPATIENT)
Dept: CARDIOLOGY CLINIC | Age: 79
End: 2024-04-12

## 2024-04-12 ENCOUNTER — HOSPITAL ENCOUNTER (OUTPATIENT)
Dept: CARDIAC REHAB | Age: 79
Setting detail: THERAPIES SERIES
End: 2024-04-12
Payer: MEDICARE

## 2024-04-12 NOTE — TELEPHONE ENCOUNTER
Pt spouse contacted office informing us pt is in the hospital. Pt spouse requesting to speak w/ someone in regards to his medication. Pt spouse states says nephrologist switched up medication, and now the [facility] has made some changes and spouse would like to know NPDD's opinion on medications. PT will be released from [facility] today.

## 2024-04-12 NOTE — TELEPHONE ENCOUNTER
Per HIPAA form I was able to speak to pts wife Sandra, Pt and pts wife had concerns about changes made to his medications while he is admitted at [facility]. NPDE took call and informed Sandra to follow medication instructions [facility] gives them at discharge and to follow up with NPDD. Pt scheduled for [facility] on 4/23/2024 at 1pm. Location confirmed with sandra

## 2024-04-15 ENCOUNTER — HOSPITAL ENCOUNTER (OUTPATIENT)
Dept: CARDIAC REHAB | Age: 79
Setting detail: THERAPIES SERIES
Discharge: HOME | End: 2024-04-15
Payer: MEDICARE

## 2024-04-15 PROCEDURE — 93798 PHYS/QHP OP CAR RHAB W/ECG: CPT

## 2024-04-17 ENCOUNTER — HOSPITAL ENCOUNTER (OUTPATIENT)
Dept: CARDIAC REHAB | Age: 79
Setting detail: THERAPIES SERIES
Discharge: HOME | End: 2024-04-17
Payer: MEDICARE

## 2024-04-17 PROCEDURE — 93798 PHYS/QHP OP CAR RHAB W/ECG: CPT

## 2024-04-19 ENCOUNTER — HOSPITAL ENCOUNTER (OUTPATIENT)
Dept: CARDIAC REHAB | Age: 79
Setting detail: THERAPIES SERIES
Discharge: HOME | End: 2024-04-19
Payer: MEDICARE

## 2024-04-19 PROCEDURE — 93798 PHYS/QHP OP CAR RHAB W/ECG: CPT

## 2024-04-22 ENCOUNTER — HOSPITAL ENCOUNTER (OUTPATIENT)
Dept: CARDIAC REHAB | Age: 79
Setting detail: THERAPIES SERIES
Discharge: HOME | End: 2024-04-22
Payer: MEDICARE

## 2024-04-22 PROCEDURE — 93798 PHYS/QHP OP CAR RHAB W/ECG: CPT

## 2024-04-23 ENCOUNTER — OFFICE VISIT (OUTPATIENT)
Dept: CARDIOLOGY CLINIC | Age: 79
End: 2024-04-23
Payer: MEDICARE

## 2024-04-23 VITALS
BODY MASS INDEX: 39.18 KG/M2 | DIASTOLIC BLOOD PRESSURE: 64 MMHG | WEIGHT: 258.5 LBS | OXYGEN SATURATION: 97 % | HEART RATE: 67 BPM | SYSTOLIC BLOOD PRESSURE: 112 MMHG | HEIGHT: 68 IN

## 2024-04-23 DIAGNOSIS — G47.33 OSA (OBSTRUCTIVE SLEEP APNEA): ICD-10-CM

## 2024-04-23 DIAGNOSIS — N18.31 STAGE 3A CHRONIC KIDNEY DISEASE (HCC): ICD-10-CM

## 2024-04-23 DIAGNOSIS — Z95.1 S/P CABG X 4: ICD-10-CM

## 2024-04-23 DIAGNOSIS — D64.9 ANEMIA, UNSPECIFIED TYPE: ICD-10-CM

## 2024-04-23 DIAGNOSIS — I50.32 CHRONIC DIASTOLIC CONGESTIVE HEART FAILURE (HCC): ICD-10-CM

## 2024-04-23 DIAGNOSIS — I25.10 CORONARY ARTERY DISEASE INVOLVING NATIVE CORONARY ARTERY OF NATIVE HEART WITHOUT ANGINA PECTORIS: Primary | ICD-10-CM

## 2024-04-23 DIAGNOSIS — I10 ESSENTIAL HYPERTENSION: ICD-10-CM

## 2024-04-23 DIAGNOSIS — E78.2 MIXED HYPERLIPIDEMIA: ICD-10-CM

## 2024-04-23 PROCEDURE — 3074F SYST BP LT 130 MM HG: CPT | Performed by: NURSE PRACTITIONER

## 2024-04-23 PROCEDURE — 1036F TOBACCO NON-USER: CPT | Performed by: NURSE PRACTITIONER

## 2024-04-23 PROCEDURE — 1123F ACP DISCUSS/DSCN MKR DOCD: CPT | Performed by: NURSE PRACTITIONER

## 2024-04-23 PROCEDURE — G8417 CALC BMI ABV UP PARAM F/U: HCPCS | Performed by: NURSE PRACTITIONER

## 2024-04-23 PROCEDURE — 3078F DIAST BP <80 MM HG: CPT | Performed by: NURSE PRACTITIONER

## 2024-04-23 PROCEDURE — 99215 OFFICE O/P EST HI 40 MIN: CPT | Performed by: NURSE PRACTITIONER

## 2024-04-23 PROCEDURE — G8427 DOCREV CUR MEDS BY ELIG CLIN: HCPCS | Performed by: NURSE PRACTITIONER

## 2024-04-23 RX ORDER — LISINOPRIL 5 MG/1
5 TABLET ORAL DAILY
Qty: 30 TABLET | Refills: 2 | Status: SHIPPED | OUTPATIENT
Start: 2024-04-23

## 2024-04-23 RX ORDER — METOPROLOL SUCCINATE 25 MG/1
25 TABLET, EXTENDED RELEASE ORAL EVERY EVENING
Qty: 90 TABLET | Refills: 1
Start: 2024-04-23

## 2024-04-23 RX ORDER — LISINOPRIL 10 MG/1
10 TABLET ORAL DAILY
COMMUNITY
End: 2024-04-23 | Stop reason: SDUPTHER

## 2024-04-23 NOTE — PROGRESS NOTES
small.  They  were all normal.  3.  The LAD was a medium-caliber vessel that reached the apex, but did not  wrap around it.  It became a very small-caliber vessel close to the apex.  It  gave off 2 diagonal branches.  The first was very small, the second was  medium-caliber, and they were without significant disease.  4.  The right coronary artery was a large-caliber vessel and dominant.  There  were stents noted in the proximal to midsegment, which were patent with a 40%  in-stent restenosis of the stents in the mid-right coronary artery.  It gave  off a large-caliber right posterior descending artery that had a 40% stenosis  in the midsegment.  The right posterior descending artery reached the apex  and wrapped around it.  It also gave off a medium-caliber right  posterolateral branch.   IMPRESSION:  1.  Patent proximal to mid-right coronary artery stents with 40% discrete  in-stent restenosis of the mid-right coronary artery, stent, fractional flow  reserve across the lesion was 0.89, which is insignificant.   2.  Normal left ventricular systolic function with ejection fraction of 55%.  3.  Normal Left ventricular end-diastolic pressure, 11 mmHg.  RECOMMENDATIONS:  The patient's stents are patent, and in-stent restenosis is  insignificant.  He never had angina prior to his stent placement, but he is  having angina-equivalent symptoms and will start him on oral nitrates.  In  the meantime, he will continue with aspirin, Lipitor, Zetia, as well as  Lopressor.      Time Based Itemization  A total of 55 minutes was spent on today's patient encounter.  If applicable, non-patient-facing activities:  ( x)Preparing to see the patient and reviewing records  ( x) Individual interpretation of results  ( ) Discussion or coordination of care with other health care professionals  ( x) Ordering of unique tests, medications, or procedures  ( x) Documentation within the EHR     I appreciate the opportunity of cooperating in the

## 2024-04-23 NOTE — PATIENT INSTRUCTIONS
Stay on the torsemide 20 mg 3 days, 10 mg 4 days per week  Check BP and weight every morning   Change the lisinopril to 5 mg in mornings if systolic BP > 110  Continue the metoprolol succinate 25 mg once daily but take in the evenings (do not need to check BP prior to taking)  Agree with gradually increasing activity and stopping/ resting with symptoms of shortness of breath or fatigue  Continue to wear compression socks/ sleeves when up during the day, remove in evening's  Repeat blood work in 2 weeks  Follow up with me in 1 month   Proceed with sleep evaluation and treatment    If you have weight gain of 3 lbs in 1 day associated with increased swelling or trouble breathing, take an additional torsemide 10 mg  If you have weight loss of 3 lbs in 1 day, feel fatigued/ lightheadedness/ and BP lower, don't take the water pill that day.

## 2024-04-24 ENCOUNTER — HOSPITAL ENCOUNTER (OUTPATIENT)
Dept: CARDIAC REHAB | Age: 79
Setting detail: THERAPIES SERIES
Discharge: HOME | End: 2024-04-24
Payer: MEDICARE

## 2024-04-24 PROCEDURE — 93798 PHYS/QHP OP CAR RHAB W/ECG: CPT

## 2024-04-26 ENCOUNTER — HOSPITAL ENCOUNTER (OUTPATIENT)
Dept: CARDIAC REHAB | Age: 79
Setting detail: THERAPIES SERIES
Discharge: HOME | End: 2024-04-26
Payer: MEDICARE

## 2024-04-26 PROCEDURE — 93798 PHYS/QHP OP CAR RHAB W/ECG: CPT

## 2024-04-29 ENCOUNTER — HOSPITAL ENCOUNTER (OUTPATIENT)
Dept: CARDIAC REHAB | Age: 79
Setting detail: THERAPIES SERIES
Discharge: HOME | End: 2024-04-29
Payer: MEDICARE

## 2024-04-29 PROCEDURE — 93798 PHYS/QHP OP CAR RHAB W/ECG: CPT

## 2024-05-01 ENCOUNTER — HOSPITAL ENCOUNTER (OUTPATIENT)
Dept: CARDIAC REHAB | Age: 79
Setting detail: THERAPIES SERIES
Discharge: HOME | End: 2024-05-01
Payer: MEDICARE

## 2024-05-01 DIAGNOSIS — Z95.1 S/P CABG X 4: ICD-10-CM

## 2024-05-01 DIAGNOSIS — I10 ESSENTIAL HYPERTENSION: ICD-10-CM

## 2024-05-01 DIAGNOSIS — I25.10 CORONARY ARTERY DISEASE, UNSPECIFIED VESSEL OR LESION TYPE, UNSPECIFIED WHETHER ANGINA PRESENT, UNSPECIFIED WHETHER NATIVE OR TRANSPLANTED HEART: ICD-10-CM

## 2024-05-01 DIAGNOSIS — I50.43 ACUTE ON CHRONIC COMBINED SYSTOLIC (CONGESTIVE) AND DIASTOLIC (CONGESTIVE) HEART FAILURE (HCC): ICD-10-CM

## 2024-05-01 LAB
ANION GAP SERPL CALCULATED.3IONS-SCNC: 13 MMOL/L (ref 3–16)
BUN SERPL-MCNC: 36 MG/DL (ref 7–20)
CALCIUM SERPL-MCNC: 9.4 MG/DL (ref 8.3–10.6)
CHLORIDE SERPL-SCNC: 103 MMOL/L (ref 99–110)
CO2 SERPL-SCNC: 25 MMOL/L (ref 21–32)
CREAT SERPL-MCNC: 1.1 MG/DL (ref 0.8–1.3)
GFR SERPLBLD CREATININE-BSD FMLA CKD-EPI: 69 ML/MIN/{1.73_M2}
GLUCOSE SERPL-MCNC: 104 MG/DL (ref 70–99)
POTASSIUM SERPL-SCNC: 4.1 MMOL/L (ref 3.5–5.1)
SODIUM SERPL-SCNC: 141 MMOL/L (ref 136–145)

## 2024-05-01 PROCEDURE — 93798 PHYS/QHP OP CAR RHAB W/ECG: CPT

## 2024-05-02 ENCOUNTER — TELEPHONE (OUTPATIENT)
Dept: CARDIOLOGY CLINIC | Age: 79
End: 2024-05-02

## 2024-05-02 NOTE — TELEPHONE ENCOUNTER
----- Message from PRATEEK Farr - CNP sent at 5/2/2024 11:13 AM EDT -----  Kidney function panel overall stable.  Continue current treatment plan.  Thank you, Neeta

## 2024-05-02 NOTE — TELEPHONE ENCOUNTER
Created telephone encounter. Spoke with Lisa, who is on HIPAA form, relayed message per NPDD regarding labs. Pt wife verbalized understanding.

## 2024-05-03 ENCOUNTER — HOSPITAL ENCOUNTER (OUTPATIENT)
Dept: CARDIAC REHAB | Age: 79
Setting detail: THERAPIES SERIES
Discharge: HOME | End: 2024-05-03
Payer: MEDICARE

## 2024-05-03 PROCEDURE — 93798 PHYS/QHP OP CAR RHAB W/ECG: CPT

## 2024-05-06 ENCOUNTER — HOSPITAL ENCOUNTER (OUTPATIENT)
Dept: CARDIAC REHAB | Age: 79
Setting detail: THERAPIES SERIES
Discharge: HOME | End: 2024-05-06
Payer: MEDICARE

## 2024-05-06 PROCEDURE — 93798 PHYS/QHP OP CAR RHAB W/ECG: CPT

## 2024-05-08 ENCOUNTER — HOSPITAL ENCOUNTER (OUTPATIENT)
Dept: CARDIAC REHAB | Age: 79
Setting detail: THERAPIES SERIES
Discharge: HOME | End: 2024-05-08
Payer: MEDICARE

## 2024-05-08 PROCEDURE — 93798 PHYS/QHP OP CAR RHAB W/ECG: CPT

## 2024-05-10 ENCOUNTER — HOSPITAL ENCOUNTER (OUTPATIENT)
Dept: CARDIAC REHAB | Age: 79
Setting detail: THERAPIES SERIES
Discharge: HOME | End: 2024-05-10
Payer: MEDICARE

## 2024-05-10 PROCEDURE — 93798 PHYS/QHP OP CAR RHAB W/ECG: CPT

## 2024-05-13 ENCOUNTER — HOSPITAL ENCOUNTER (OUTPATIENT)
Dept: CARDIAC REHAB | Age: 79
Setting detail: THERAPIES SERIES
Discharge: HOME | End: 2024-05-13
Payer: MEDICARE

## 2024-05-13 PROCEDURE — 93798 PHYS/QHP OP CAR RHAB W/ECG: CPT

## 2024-05-15 ENCOUNTER — HOSPITAL ENCOUNTER (OUTPATIENT)
Dept: CARDIAC REHAB | Age: 79
Setting detail: THERAPIES SERIES
End: 2024-05-15
Payer: MEDICARE

## 2024-05-17 ENCOUNTER — APPOINTMENT (OUTPATIENT)
Dept: CARDIAC REHAB | Age: 79
End: 2024-05-17
Payer: MEDICARE

## 2024-05-20 ENCOUNTER — APPOINTMENT (OUTPATIENT)
Dept: CARDIAC REHAB | Age: 79
End: 2024-05-20
Payer: MEDICARE

## 2024-05-22 ENCOUNTER — APPOINTMENT (OUTPATIENT)
Dept: CARDIAC REHAB | Age: 79
End: 2024-05-22
Payer: MEDICARE

## 2024-05-23 ENCOUNTER — OFFICE VISIT (OUTPATIENT)
Dept: CARDIOLOGY CLINIC | Age: 79
End: 2024-05-23
Payer: MEDICARE

## 2024-05-23 VITALS
SYSTOLIC BLOOD PRESSURE: 112 MMHG | WEIGHT: 246 LBS | HEIGHT: 68 IN | OXYGEN SATURATION: 98 % | HEART RATE: 63 BPM | BODY MASS INDEX: 37.28 KG/M2 | DIASTOLIC BLOOD PRESSURE: 60 MMHG

## 2024-05-23 DIAGNOSIS — I25.10 CORONARY ARTERY DISEASE INVOLVING NATIVE CORONARY ARTERY OF NATIVE HEART WITHOUT ANGINA PECTORIS: Primary | ICD-10-CM

## 2024-05-23 DIAGNOSIS — I10 ESSENTIAL HYPERTENSION: ICD-10-CM

## 2024-05-23 DIAGNOSIS — D64.9 ANEMIA, UNSPECIFIED TYPE: ICD-10-CM

## 2024-05-23 DIAGNOSIS — N18.31 STAGE 3A CHRONIC KIDNEY DISEASE (HCC): ICD-10-CM

## 2024-05-23 DIAGNOSIS — G47.33 OSA (OBSTRUCTIVE SLEEP APNEA): ICD-10-CM

## 2024-05-23 DIAGNOSIS — I50.32 CHRONIC DIASTOLIC CONGESTIVE HEART FAILURE (HCC): ICD-10-CM

## 2024-05-23 DIAGNOSIS — Z95.1 S/P CABG X 4: ICD-10-CM

## 2024-05-23 DIAGNOSIS — E78.2 MIXED HYPERLIPIDEMIA: ICD-10-CM

## 2024-05-23 PROCEDURE — G8427 DOCREV CUR MEDS BY ELIG CLIN: HCPCS | Performed by: NURSE PRACTITIONER

## 2024-05-23 PROCEDURE — 3074F SYST BP LT 130 MM HG: CPT | Performed by: NURSE PRACTITIONER

## 2024-05-23 PROCEDURE — 3078F DIAST BP <80 MM HG: CPT | Performed by: NURSE PRACTITIONER

## 2024-05-23 PROCEDURE — G8417 CALC BMI ABV UP PARAM F/U: HCPCS | Performed by: NURSE PRACTITIONER

## 2024-05-23 PROCEDURE — 1123F ACP DISCUSS/DSCN MKR DOCD: CPT | Performed by: NURSE PRACTITIONER

## 2024-05-23 PROCEDURE — 99214 OFFICE O/P EST MOD 30 MIN: CPT | Performed by: NURSE PRACTITIONER

## 2024-05-23 PROCEDURE — 1036F TOBACCO NON-USER: CPT | Performed by: NURSE PRACTITIONER

## 2024-05-23 RX ORDER — TORSEMIDE 20 MG/1
TABLET ORAL
Qty: 135 TABLET | Refills: 1
Start: 2024-05-23

## 2024-05-23 NOTE — PATIENT INSTRUCTIONS
Continue the current regimen of heart medicines  No other testing at this time  Okay to proceed with GI Evalation as needed  Follow with Dr. Francisco Trivedi in July as scheduled

## 2024-05-23 NOTE — PROGRESS NOTES
[facility]   Cardiac Evaluation    Primary Care Doctor:  Francisco Miles DO    Chief Complaint   Patient presents with    1 Month Follow-Up    Hyperlipidemia    Hypertension    Coronary Artery Disease        Assessment:    1. Coronary artery disease involving native coronary artery of native heart without angina pectoris    2. S/P CABG x 4    3. Essential hypertension    4. Mixed hyperlipidemia    5. Chronic diastolic congestive heart failure (HCC)    6. SALLIE (obstructive sleep apnea)    7. Stage 3a chronic kidney disease (HCC)    8. Anemia, unspecified type        Plan:   Continue the current regimen of heart medicines  No other testing at this time  Okay to proceed with GI Evalation as needed  Follow with Dr. Francisco Trivedi in July as scheduled    Vitals:    05/23/24 1000   BP: 112/60   Pulse: 63   SpO2: 98%   Weight: 111.6 kg (246 lb)   Height: 1.727 m (5' 8\")       Primary Cardiologist: Dr. Francisco Trivedi     History of Present Illness:   I had the pleasure of seeing Kristopher Watkins (79 y.o.) in follow up for CAD with multiple prior PCI's, s/p CABG X4 on 12/5/2023, d CHF, HTN, HLD, SALLIE, anemia and CKD3a.  Last month I recommended to keep the torsemide 20 mg 3 times per week and 10 mg 4 times a week.  We adjusted the lisinopril to 5 mg in a.m. if systolic blood pressure was greater than 110 and to take the Toprol XL in the p.m.  Per chart review his wife called nephrology the following day with blood pressure readings and different medicine instructions.  All blood pressure medicines were stopped.    Weight is down 12 lbs since last OV (1 month) on our scale.  His weight at home has dropped from 245 lbs to 240 lbs.  BP stable - / 67-81, this am as 104/69 with pulse of 59.   He completed cardiac rehab.  Now going to [employer] - using TM and stationary bike for total of an hour.   He feels weight is down due to diet changes and exercise.   He is feeling good.     He had sleep study

## 2024-05-24 ENCOUNTER — APPOINTMENT (OUTPATIENT)
Dept: CARDIAC REHAB | Age: 79
End: 2024-05-24
Payer: MEDICARE

## 2024-05-29 ENCOUNTER — APPOINTMENT (OUTPATIENT)
Dept: CARDIAC REHAB | Age: 79
End: 2024-05-29
Payer: MEDICARE

## 2024-05-31 ENCOUNTER — APPOINTMENT (OUTPATIENT)
Dept: CARDIAC REHAB | Age: 79
End: 2024-05-31
Payer: MEDICARE

## 2024-07-02 ENCOUNTER — OFFICE VISIT (OUTPATIENT)
Age: 79
End: 2024-07-02

## 2024-07-02 VITALS
WEIGHT: 239 LBS | HEART RATE: 64 BPM | HEIGHT: 68 IN | DIASTOLIC BLOOD PRESSURE: 58 MMHG | OXYGEN SATURATION: 96 % | SYSTOLIC BLOOD PRESSURE: 96 MMHG | BODY MASS INDEX: 36.22 KG/M2

## 2024-07-02 DIAGNOSIS — I25.811 CORONARY ARTERY DISEASE INVOLVING NATIVE ARTERY OF TRANSPLANTED HEART WITHOUT ANGINA PECTORIS: ICD-10-CM

## 2024-07-02 DIAGNOSIS — I25.10 CORONARY ARTERY DISEASE, UNSPECIFIED VESSEL OR LESION TYPE, UNSPECIFIED WHETHER ANGINA PRESENT, UNSPECIFIED WHETHER NATIVE OR TRANSPLANTED HEART: ICD-10-CM

## 2024-07-02 DIAGNOSIS — Z95.1 S/P CABG X 4: Primary | ICD-10-CM

## 2024-07-02 DIAGNOSIS — I25.10 CORONARY ARTERY DISEASE INVOLVING NATIVE CORONARY ARTERY OF NATIVE HEART WITHOUT ANGINA PECTORIS: ICD-10-CM

## 2024-07-02 RX ORDER — AMOXICILLIN AND CLAVULANATE POTASSIUM 875; 125 MG/1; MG/1
1 TABLET, FILM COATED ORAL 2 TIMES DAILY
COMMUNITY

## 2024-07-02 RX ORDER — DOCUSATE SODIUM 100 MG/1
100 CAPSULE, LIQUID FILLED ORAL 2 TIMES DAILY
COMMUNITY

## 2024-07-02 RX ORDER — AZITHROMYCIN 250 MG/1
TABLET, FILM COATED ORAL
COMMUNITY
Start: 2024-06-26

## 2024-07-02 NOTE — PATIENT INSTRUCTIONS
Plan:  I recommend that the patient continue their currently prescribed medications. Their drug modifiable risk factors appear to be well controlled. I will continue to address the need/dosing of medications in future visits.   Patient given detailed instructions on addressing diet, regular exercise, weight control, smoking abstention, medication compliance, and stress minimization. The patient was provided written and verbal instructions regarding risk factor modification.    Follow up in 6 months with

## 2024-07-02 NOTE — PROGRESS NOTES
[facility]   Cardiovascular Evaluation    PATIENT: Kristopher Watkins  DATE: 2024  MRN: [id number]  CSN: [id number]  : 1945    Primary Care Doctor/Referring provider: Francisco Miles DO, No admitting provider for patient encounter.     Reason for evaluation/Chief complaint:   Follow-up, Coronary Artery Disease, and Shortness of Breath (Pressure while breathing in)      Subjective:    History of present illness on initial date of evaluation:   Kristopher Watkins is a 79 y.o. male patient who presents for cardiology follow up. He previously followed with Dr.Jason Lopes. He has a medical history significant of CAD, CHF, HTN, HLD, SALLIE. He had PTCA RCA , , , . [facility]  showed stable CAD, patent RCA stents. Echo 2020 EF 55%, mild MR, trace TR. He had recently seen Neeta Joel CNP for an acute appointment in relation to shortness of breath. He had a Lexiscan myoview 10/13/23 that showed no definite evidence of ischemia or scar however a small mild inferolateral defect.   LOV 10/24/23 he reports several weeks ago while he was push mowing he had to stop and catch his breath he had shooting pain across his shoulder blades. He reports this symptom complex is concerning as it prompted his heart catheterization in the past. Patient currently denies any weight gain, edema, palpitations, dizziness, and syncope. He is taking medications as prescribed, tolerating well.   Since last office visit he underwent left heart catheterization 2023 that revealed severe MV CAD. CT surgery was consulted. On 2023 he underwent CABG x 4 with left atrial appendage clipping. Echo 23 EF 55-60%, small pericardial effusion. Today he reports when he takes a deep breath he feels like its a balloon may pop in his chest. He denies any symptoms with exertion. Denies any breathing issues with exertion. He reports he works out at [employer] 1 hr on treadmill, denies limitations

## 2024-07-19 RX ORDER — METOPROLOL SUCCINATE 25 MG/1
25 TABLET, EXTENDED RELEASE ORAL DAILY
Qty: 90 TABLET | Refills: 3 | Status: SHIPPED | OUTPATIENT
Start: 2024-07-19

## 2024-07-19 NOTE — TELEPHONE ENCOUNTER
Last OV-7/2/2024 FXW  10/24/2023 VSP  Upcoming OV-12/10/2024 VSP    Labs-   BMP:5/1/2024    NPDD OOT  FXW OOT

## 2024-08-12 RX ORDER — LANOLIN ALCOHOL/MO/W.PET/CERES
400 CREAM (GRAM) TOPICAL DAILY
Qty: 90 TABLET | Refills: 3 | Status: SHIPPED | OUTPATIENT
Start: 2024-08-12

## 2024-09-09 RX ORDER — LISINOPRIL 5 MG/1
TABLET ORAL
Qty: 30 TABLET | Refills: 2 | Status: SHIPPED | OUTPATIENT
Start: 2024-09-09

## 2024-11-29 NOTE — PROGRESS NOTES
Readings from Last 3 Encounters:   12/10/24 112.8 kg (248 lb 9.6 oz)   07/02/24 108.4 kg (239 lb)   05/23/24 111.6 kg (246 lb)     No intake or output data in the 24 hours ending 12/10/24 1104    General Appearance:  Alert, cooperative, no distress, appears stated age   Head:  Normocephalic, without obvious abnormality, atraumatic   Eyes:  PERRL, conjunctiva/corneas clear       Nose: Nares normal, no drainage or sinus tenderness   Throat: Lips, mucosa, and tongue normal   Neck: Supple, symmetrical, trachea midline, no adenopathy, thyroid: not enlarged, symmetric, no tenderness/mass/nodules, no carotid bruit or JVD       Lungs:   Clear to auscultation bilaterally, respirations unlabored   Chest Wall:  No tenderness or deformity   Heart:  Regular rhythm and normal rate; S1, S2 are normal; no murmur noted; no rub or gallop   Abdomen:   Soft, non-tender, bowel sounds active all four quadrants,  no masses, no organomegaly           Extremities: Extremities normal, atraumatic, no cyanosis or edema   Pulses: 2+ and symmetric   Skin: Skin color, texture, turgor normal, no rashes or lesions   Pysch: Normal mood and affect   Neurologic: Normal gross motor and sensory exam.         Labs  No results for input(s): \"WBC\", \"HGB\", \"HCT\", \"MCV\", \"PLT\" in the last 72 hours.  No results for input(s): \"CREATININE\", \"BUN\", \"NA\", \"K\", \"CL\", \"CO2\" in the last 72 hours.  No results for input(s): \"INR\", \"PROTIME\" in the last 72 hours.  No results for input(s): \"PROBNP\" in the last 72 hours.  No results for input(s): \"CHOL\", \"LDL\", \"HDL\" in the last 72 hours.    Invalid input(s): \"TG\"  No results found for: \"TROPHS\"       Imaging:  I have reviewed the below testing personally and my interpretation is below.  EKG:        CXR:      Assessment:  79 y.o. patient with:  Active Problems:    * No active hospital problems. *  Resolved Problems:    * No resolved hospital problems. *      Problem List Items Addressed This Visit       Coronary

## 2024-12-10 ENCOUNTER — OFFICE VISIT (OUTPATIENT)
Dept: CARDIOLOGY CLINIC | Age: 79
End: 2024-12-10
Payer: MEDICARE

## 2024-12-10 VITALS
HEIGHT: 68 IN | SYSTOLIC BLOOD PRESSURE: 100 MMHG | OXYGEN SATURATION: 100 % | BODY MASS INDEX: 37.68 KG/M2 | HEART RATE: 68 BPM | WEIGHT: 248.6 LBS | DIASTOLIC BLOOD PRESSURE: 60 MMHG

## 2024-12-10 DIAGNOSIS — R07.9 CHEST PAIN, UNSPECIFIED TYPE: ICD-10-CM

## 2024-12-10 DIAGNOSIS — E78.2 MIXED HYPERLIPIDEMIA: ICD-10-CM

## 2024-12-10 DIAGNOSIS — R06.02 SHORTNESS OF BREATH: ICD-10-CM

## 2024-12-10 DIAGNOSIS — I25.119 ATHEROSCLEROSIS OF CORONARY ARTERY WITH ANGINA PECTORIS, UNSPECIFIED VESSEL OR LESION TYPE, UNSPECIFIED WHETHER NATIVE OR TRANSPLANTED HEART (HCC): ICD-10-CM

## 2024-12-10 DIAGNOSIS — I25.119 CORONARY ARTERY DISEASE INVOLVING NATIVE HEART WITH ANGINA PECTORIS, UNSPECIFIED VESSEL OR LESION TYPE (HCC): ICD-10-CM

## 2024-12-10 DIAGNOSIS — Z95.1 S/P CABG X 4: ICD-10-CM

## 2024-12-10 DIAGNOSIS — R07.89 CHEST PAIN, ATYPICAL: ICD-10-CM

## 2024-12-10 DIAGNOSIS — R05.9 COUGH, UNSPECIFIED TYPE: ICD-10-CM

## 2024-12-10 DIAGNOSIS — I25.10 CAD IN NATIVE ARTERY: ICD-10-CM

## 2024-12-10 DIAGNOSIS — I25.10 CORONARY ARTERY DISEASE, UNSPECIFIED VESSEL OR LESION TYPE, UNSPECIFIED WHETHER ANGINA PRESENT, UNSPECIFIED WHETHER NATIVE OR TRANSPLANTED HEART: Primary | ICD-10-CM

## 2024-12-10 DIAGNOSIS — I10 ESSENTIAL HYPERTENSION, BENIGN: ICD-10-CM

## 2024-12-10 PROCEDURE — 3078F DIAST BP <80 MM HG: CPT | Performed by: INTERNAL MEDICINE

## 2024-12-10 PROCEDURE — G8417 CALC BMI ABV UP PARAM F/U: HCPCS | Performed by: INTERNAL MEDICINE

## 2024-12-10 PROCEDURE — 1036F TOBACCO NON-USER: CPT | Performed by: INTERNAL MEDICINE

## 2024-12-10 PROCEDURE — G8427 DOCREV CUR MEDS BY ELIG CLIN: HCPCS | Performed by: INTERNAL MEDICINE

## 2024-12-10 PROCEDURE — 3074F SYST BP LT 130 MM HG: CPT | Performed by: INTERNAL MEDICINE

## 2024-12-10 PROCEDURE — 93000 ELECTROCARDIOGRAM COMPLETE: CPT | Performed by: INTERNAL MEDICINE

## 2024-12-10 PROCEDURE — 99214 OFFICE O/P EST MOD 30 MIN: CPT | Performed by: INTERNAL MEDICINE

## 2024-12-10 PROCEDURE — 1123F ACP DISCUSS/DSCN MKR DOCD: CPT | Performed by: INTERNAL MEDICINE

## 2024-12-10 PROCEDURE — 1159F MED LIST DOCD IN RCRD: CPT | Performed by: INTERNAL MEDICINE

## 2024-12-10 PROCEDURE — G8484 FLU IMMUNIZE NO ADMIN: HCPCS | Performed by: INTERNAL MEDICINE

## 2024-12-10 RX ORDER — LISINOPRIL 5 MG/1
5 TABLET ORAL DAILY
Qty: 90 TABLET | Refills: 3 | Status: CANCELLED | OUTPATIENT
Start: 2024-12-10

## 2024-12-10 RX ORDER — TORSEMIDE 20 MG/1
TABLET ORAL
Qty: 135 TABLET | Refills: 1 | Status: SHIPPED | OUTPATIENT
Start: 2024-12-10

## 2024-12-10 RX ORDER — ATORVASTATIN CALCIUM 40 MG/1
40 TABLET, FILM COATED ORAL NIGHTLY
Qty: 90 TABLET | Refills: 3 | Status: SHIPPED | OUTPATIENT
Start: 2024-12-10

## 2025-01-09 PROBLEM — R05.9 COUGH: Status: RESOLVED | Noted: 2024-12-10 | Resolved: 2025-01-09

## 2025-01-10 DIAGNOSIS — I10 ESSENTIAL HYPERTENSION, BENIGN: ICD-10-CM

## 2025-01-10 RX ORDER — TORSEMIDE 20 MG/1
TABLET ORAL
Qty: 135 TABLET | Refills: 1 | Status: SHIPPED | OUTPATIENT
Start: 2025-01-10

## 2025-01-24 ENCOUNTER — TELEPHONE (OUTPATIENT)
Dept: CARDIOLOGY CLINIC | Age: 80
End: 2025-01-24

## 2025-01-24 ENCOUNTER — HOSPITAL ENCOUNTER (OUTPATIENT)
Dept: CARDIOLOGY | Age: 80
Discharge: HOME | End: 2025-01-26
Attending: INTERNAL MEDICINE
Payer: MEDICARE

## 2025-01-24 VITALS
WEIGHT: 239 LBS | HEIGHT: 68 IN | DIASTOLIC BLOOD PRESSURE: 70 MMHG | SYSTOLIC BLOOD PRESSURE: 110 MMHG | BODY MASS INDEX: 36.22 KG/M2

## 2025-01-24 DIAGNOSIS — R07.9 CHEST PAIN, UNSPECIFIED TYPE: ICD-10-CM

## 2025-01-24 DIAGNOSIS — Z95.1 S/P CABG X 4: ICD-10-CM

## 2025-01-24 DIAGNOSIS — I25.10 CAD IN NATIVE ARTERY: ICD-10-CM

## 2025-01-24 LAB
ECHO AO ASC DIAM: 2.8 CM
ECHO AO ASCENDING AORTA INDEX: 1.27 CM/M2
ECHO AO ROOT DIAM: 2.7 CM
ECHO AO ROOT INDEX: 1.23 CM/M2
ECHO BSA: 2.28 M2
ECHO IVC PROX: 1.9 CM
ECHO LA AREA 4C: 20.5 CM2
ECHO LA DIAMETER INDEX: 1.5 CM/M2
ECHO LA DIAMETER: 3.3 CM
ECHO LA MAJOR AXIS: 5.8 CM
ECHO LA TO AORTIC ROOT RATIO: 1.22
ECHO LA VOL MOD A4C: 56 ML (ref 18–58)
ECHO LA VOLUME INDEX MOD A4C: 25 ML/M2 (ref 16–34)
ECHO LV EDV 3D: 108 ML
ECHO LV EDV INDEX 3D: 49 ML/M2
ECHO LV EJECTION FRACTION 3D: 58 %
ECHO LV ESV 3D: 45 ML
ECHO LV ESV INDEX 3D: 20 ML/M2
ECHO LV FRACTIONAL SHORTENING: 32 % (ref 28–44)
ECHO LV GLOBAL LONGITUDINAL STRAIN (GLS): -11 %
ECHO LV GLOBAL LONGITUDINAL STRAIN (GLS): -13.2 %
ECHO LV GLOBAL LONGITUDINAL STRAIN (GLS): -14.2 %
ECHO LV GLOBAL LONGITUDINAL STRAIN (GLS): -14.5 %
ECHO LV INTERNAL DIMENSION DIASTOLE INDEX: 1.55 CM/M2
ECHO LV INTERNAL DIMENSION DIASTOLIC: 3.4 CM (ref 4.2–5.9)
ECHO LV INTERNAL DIMENSION SYSTOLIC INDEX: 1.05 CM/M2
ECHO LV INTERNAL DIMENSION SYSTOLIC: 2.3 CM
ECHO LV IVSD: 0.9 CM (ref 0.6–1)
ECHO LV MASS 2D: 78.3 G (ref 88–224)
ECHO LV MASS 3D INDEX: 80 G/M2
ECHO LV MASS 3D: 176 G
ECHO LV MASS INDEX 2D: 35.6 G/M2 (ref 49–115)
ECHO LV POSTERIOR WALL DIASTOLIC: 0.8 CM (ref 0.6–1)
ECHO LV RELATIVE WALL THICKNESS RATIO: 0.47
ECHO RA AREA 4C: 12.1 CM2
ECHO RA END SYSTOLIC VOLUME APICAL 4 CHAMBER INDEX BSA: 10 ML/M2
ECHO RA VOLUME: 23 ML

## 2025-01-24 PROCEDURE — 93356 MYOCRD STRAIN IMG SPCKL TRCK: CPT | Performed by: INTERNAL MEDICINE

## 2025-01-24 PROCEDURE — 93308 TTE F-UP OR LMTD: CPT | Performed by: INTERNAL MEDICINE

## 2025-01-24 PROCEDURE — 93308 TTE F-UP OR LMTD: CPT

## 2025-01-24 PROCEDURE — 93325 DOPPLER ECHO COLOR FLOW MAPG: CPT | Performed by: INTERNAL MEDICINE

## 2025-01-24 NOTE — TELEPHONE ENCOUNTER
Pt stopped in main suite and advised he will need a letter so pt can get handicap sticker. While here, pt stated that VSP had pt stop taking Linsinopril and advised pt to keep log of bp readings.  Placed those readings in NPDD folder for review.

## 2025-01-24 NOTE — RESULT ENCOUNTER NOTE
Attempted to reach patient. No answer. Left VM with VSP results, ok per HIPAA. to contact office to discuss results if any questions.

## 2025-01-27 NOTE — TELEPHONE ENCOUNTER
Reviewed blood pressure readings.  These are stable.    Not sure why this is directed me and not to Dr. Trivedi as he adjusted medications and saw the patient last month.    Will defer letter for handicap placard to Dr. Trivedi versus PCP.    Thank you, Neeta

## 2025-02-26 ENCOUNTER — HOSPITAL ENCOUNTER (EMERGENCY)
Age: 80
Discharge: HOME | End: 2025-02-26
Payer: MEDICARE

## 2025-02-26 ENCOUNTER — APPOINTMENT (OUTPATIENT)
Dept: CT IMAGING | Age: 80
End: 2025-02-26
Payer: MEDICARE

## 2025-02-26 VITALS
SYSTOLIC BLOOD PRESSURE: 132 MMHG | HEART RATE: 64 BPM | OXYGEN SATURATION: 99 % | BODY MASS INDEX: 38.45 KG/M2 | TEMPERATURE: 98.1 F | WEIGHT: 259.6 LBS | DIASTOLIC BLOOD PRESSURE: 79 MMHG | HEIGHT: 69 IN | RESPIRATION RATE: 18 BRPM

## 2025-02-26 DIAGNOSIS — M54.6 ACUTE RIGHT-SIDED THORACIC BACK PAIN: Primary | ICD-10-CM

## 2025-02-26 LAB
ALBUMIN SERPL-MCNC: 4.5 G/DL (ref 3.4–5)
ALBUMIN/GLOB SERPL: 1.4 {RATIO} (ref 1.1–2.2)
ALP SERPL-CCNC: 93 U/L (ref 40–129)
ALT SERPL-CCNC: 17 U/L (ref 10–40)
ANION GAP SERPL CALCULATED.3IONS-SCNC: 14 MMOL/L (ref 3–16)
AST SERPL-CCNC: 23 U/L (ref 15–37)
BASOPHILS # BLD: 0.1 K/UL (ref 0–0.2)
BASOPHILS NFR BLD: 0.7 %
BILIRUB SERPL-MCNC: 0.4 MG/DL (ref 0–1)
BUN SERPL-MCNC: 29 MG/DL (ref 7–20)
CALCIUM SERPL-MCNC: 9.4 MG/DL (ref 8.3–10.6)
CHLORIDE SERPL-SCNC: 104 MMOL/L (ref 99–110)
CO2 SERPL-SCNC: 25 MMOL/L (ref 21–32)
CREAT SERPL-MCNC: 1.4 MG/DL (ref 0.8–1.3)
DEPRECATED RDW RBC AUTO: 15.3 % (ref 12.4–15.4)
EKG ATRIAL RATE: 65 BPM
EKG DIAGNOSIS: NORMAL
EKG P AXIS: 53 DEGREES
EKG P-R INTERVAL: 182 MS
EKG Q-T INTERVAL: 424 MS
EKG QRS DURATION: 82 MS
EKG QTC CALCULATION (BAZETT): 440 MS
EKG R AXIS: 82 DEGREES
EKG T AXIS: -24 DEGREES
EKG VENTRICULAR RATE: 65 BPM
EOSINOPHIL # BLD: 0.2 K/UL (ref 0–0.6)
EOSINOPHIL NFR BLD: 2.2 %
GFR SERPLBLD CREATININE-BSD FMLA CKD-EPI: 51 ML/MIN/{1.73_M2}
GLUCOSE SERPL-MCNC: 106 MG/DL (ref 70–99)
HCT VFR BLD AUTO: 36.5 % (ref 40.5–52.5)
HGB BLD-MCNC: 12.1 G/DL (ref 13.5–17.5)
LYMPHOCYTES # BLD: 1.2 K/UL (ref 1–5.1)
LYMPHOCYTES NFR BLD: 16.3 %
MCH RBC QN AUTO: 30.1 PG (ref 26–34)
MCHC RBC AUTO-ENTMCNC: 33.2 G/DL (ref 31–36)
MCV RBC AUTO: 90.6 FL (ref 80–100)
MONOCYTES # BLD: 0.8 K/UL (ref 0–1.3)
MONOCYTES NFR BLD: 10.4 %
NEUTROPHILS # BLD: 5.2 K/UL (ref 1.7–7.7)
NEUTROPHILS NFR BLD: 70.4 %
PLATELET # BLD AUTO: 213 K/UL (ref 135–450)
PMV BLD AUTO: 7.7 FL (ref 5–10.5)
POTASSIUM SERPL-SCNC: 4.2 MMOL/L (ref 3.5–5.1)
PROT SERPL-MCNC: 7.7 G/DL (ref 6.4–8.2)
RBC # BLD AUTO: 4.02 M/UL (ref 4.2–5.9)
SODIUM SERPL-SCNC: 143 MMOL/L (ref 136–145)
TROPONIN, HIGH SENSITIVITY: 15 NG/L (ref 0–22)
WBC # BLD AUTO: 7.4 K/UL (ref 4–11)

## 2025-02-26 PROCEDURE — 93005 ELECTROCARDIOGRAM TRACING: CPT | Performed by: PHYSICIAN ASSISTANT

## 2025-02-26 PROCEDURE — 80053 COMPREHEN METABOLIC PANEL: CPT

## 2025-02-26 PROCEDURE — 96375 TX/PRO/DX INJ NEW DRUG ADDON: CPT

## 2025-02-26 PROCEDURE — 6360000004 HC RX CONTRAST MEDICATION: Performed by: PHYSICIAN ASSISTANT

## 2025-02-26 PROCEDURE — 6370000000 HC RX 637 (ALT 250 FOR IP): Performed by: PHYSICIAN ASSISTANT

## 2025-02-26 PROCEDURE — 84484 ASSAY OF TROPONIN QUANT: CPT

## 2025-02-26 PROCEDURE — 99285 EMERGENCY DEPT VISIT HI MDM: CPT

## 2025-02-26 PROCEDURE — 85025 COMPLETE CBC W/AUTO DIFF WBC: CPT

## 2025-02-26 PROCEDURE — 6360000002 HC RX W HCPCS: Performed by: PHYSICIAN ASSISTANT

## 2025-02-26 PROCEDURE — 71260 CT THORAX DX C+: CPT

## 2025-02-26 PROCEDURE — 93010 ELECTROCARDIOGRAM REPORT: CPT | Performed by: INTERNAL MEDICINE

## 2025-02-26 PROCEDURE — 96374 THER/PROPH/DIAG INJ IV PUSH: CPT

## 2025-02-26 RX ORDER — OXYCODONE AND ACETAMINOPHEN 5; 325 MG/1; MG/1
1 TABLET ORAL EVERY 8 HOURS PRN
Qty: 10 TABLET | Refills: 0 | Status: SHIPPED | OUTPATIENT
Start: 2025-02-26 | End: 2025-03-02

## 2025-02-26 RX ORDER — IOPAMIDOL 755 MG/ML
75 INJECTION, SOLUTION INTRAVASCULAR
Status: COMPLETED | OUTPATIENT
Start: 2025-02-26 | End: 2025-02-26

## 2025-02-26 RX ORDER — METHOCARBAMOL 750 MG/1
750 TABLET, FILM COATED ORAL 4 TIMES DAILY PRN
Qty: 28 TABLET | Refills: 0 | Status: SHIPPED | OUTPATIENT
Start: 2025-02-26 | End: 2025-03-05

## 2025-02-26 RX ORDER — KETOROLAC TROMETHAMINE 30 MG/ML
15 INJECTION, SOLUTION INTRAMUSCULAR; INTRAVENOUS ONCE
Status: COMPLETED | OUTPATIENT
Start: 2025-02-26 | End: 2025-02-26

## 2025-02-26 RX ORDER — OXYCODONE AND ACETAMINOPHEN 5; 325 MG/1; MG/1
1 TABLET ORAL ONCE
Status: COMPLETED | OUTPATIENT
Start: 2025-02-26 | End: 2025-02-26

## 2025-02-26 RX ORDER — ORPHENADRINE CITRATE 30 MG/ML
30 INJECTION INTRAMUSCULAR; INTRAVENOUS ONCE
Status: COMPLETED | OUTPATIENT
Start: 2025-02-26 | End: 2025-02-26

## 2025-02-26 RX ADMIN — IOPAMIDOL 75 ML: 755 INJECTION, SOLUTION INTRAVENOUS at 12:10

## 2025-02-26 RX ADMIN — KETOROLAC TROMETHAMINE 15 MG: 30 INJECTION, SOLUTION INTRAMUSCULAR at 10:51

## 2025-02-26 RX ADMIN — OXYCODONE HYDROCHLORIDE AND ACETAMINOPHEN 1 TABLET: 5; 325 TABLET ORAL at 10:53

## 2025-02-26 RX ADMIN — ORPHENADRINE CITRATE 30 MG: 60 INJECTION INTRAMUSCULAR; INTRAVENOUS at 10:52

## 2025-02-26 ASSESSMENT — PAIN - FUNCTIONAL ASSESSMENT: PAIN_FUNCTIONAL_ASSESSMENT: 0-10

## 2025-02-26 ASSESSMENT — LIFESTYLE VARIABLES
HOW OFTEN DO YOU HAVE A DRINK CONTAINING ALCOHOL: NEVER
HOW MANY STANDARD DRINKS CONTAINING ALCOHOL DO YOU HAVE ON A TYPICAL DAY: PATIENT DOES NOT DRINK

## 2025-02-26 ASSESSMENT — PAIN DESCRIPTION - LOCATION: LOCATION: BACK

## 2025-02-26 ASSESSMENT — PAIN SCALES - GENERAL: PAINLEVEL_OUTOF10: 8

## 2025-02-26 NOTE — ED PROVIDER NOTES
Patient seen and evaluated by KATHLEEN:    EKG: Normal sinus rhythm with a rate of 65.  Normal axis.  Normal intervals and durations.  Nonspecific ST and T wave changes noted.     Wu Sepulveda II, DO  02/26/25 3268    
(30 mg IntraVENous Given 2/26/25 1052)   oxyCODONE-acetaminophen (PERCOCET) 5-325 MG per tablet 1 tablet (1 tablet Oral Given 2/26/25 1053)   iopamidol (ISOVUE-370) 76 % injection 75 mL (75 mLs IntraVENous Given 2/26/25 1210)       Patient was evaluated by me     CC/HPI Summary, DDx, ED course, and Reassessment: Patient here with right thoracic back pain x 3 weeks.  No memorable injury, but patient admits he is active, \"always doing something\" and may have done something to cause the pain.  Significant cardiac history.  No chest pain today.  Differential diagnoses: Musculoskeletal pain, fracture thoracic vertebrae, PE, aortic dissection, ACS, gallbladder disease    The patient was evaluated in ED room T1  Vital signs Blood pressure 132/79, pulse 64, temperature 98.1 °F (36.7 °C), temperature source Oral, resp. rate 18, height 1.753 m (5' 9\"), weight 117.8 kg (259 lb 9.6 oz), SpO2 99%.    Upon seeing the patient I did order meds for what sounds like may very well be musculoskeletal pain.  Patient ordered Toradol 15 mg IV, Norflex 30 mg IV, Percocet 5/325 orally    EKG on arrival shows normal sinus rhythm rate 65  CBC white count 7.4, H&H 12.1 and 36.5  CMP BUN is 29 and creatinine 1.4  Troponin 15  CT chest PE study:  1.  No acute pulmonary embolism.  2.  Left greater than right lower lobe consolidation, atelectasis versus pneumonia.  3.  Trace bilateral pleural effusions.  CT T-spine:  No acute fracture or traumatic malalignment    Patient was reassessed and he is feeling much better after the meds which were given.  Again, the patient's pain/symptomatology sounds most consistent with musculoskeletal pain.  Given his complicated medical history, a broad workup was done.  Will ultimately plan to send the patient home with meds for symptom control.    Exclusion criteria - the patient is NOT to be included for SEP-1 Core Measure due to:  Infection is not suspected     Consults/discussion with other professionals:

## 2025-07-28 RX ORDER — METOPROLOL SUCCINATE 25 MG/1
25 TABLET, EXTENDED RELEASE ORAL DAILY
Qty: 90 TABLET | Refills: 1 | Status: SHIPPED | OUTPATIENT
Start: 2025-07-28

## 2025-07-28 NOTE — TELEPHONE ENCOUNTER
Last Office Visit: 12/10/24 Provider: ELIZABETH  **Is provider OOT? No    Next Office Visit: Visit date not found Provider:   **If no OV, when does pt need to be seen? in 1 year(s)

## 2025-07-28 NOTE — TELEPHONE ENCOUNTER
NANCY called [facility] VM, Pt needs refill on Metoprolol     metoprolol succinate (TOPROL XL) 25 MG extended release tablet   25 MG  90 day supply     [id number] - [place], [place] - [address] - P [phone] - F [phone]  [address] [facility] [address]  Phone: [phone]  Fax: [phone]

## 2025-08-18 ENCOUNTER — TELEPHONE (OUTPATIENT)
Dept: CARDIOLOGY CLINIC | Age: 80
End: 2025-08-18

## (undated) DEVICE — Device

## (undated) DEVICE — CANNULA PERF 7FR L5.5IN AORT ROOT RADPQ STD TIP W/ VENT LN

## (undated) DEVICE — PEEL-AWAY TOGA, 2X LARGE: Brand: FLYTE

## (undated) DEVICE — LEAD PACEMKR MYOCARDIAL UNIPOLAR TEMP

## (undated) DEVICE — STERILE PVP: Brand: MEDLINE INDUSTRIES, INC.

## (undated) DEVICE — GLOVE SURG SZ 65 THK91MIL LTX FREE SYN POLYISOPRENE

## (undated) DEVICE — KIT INT FIX FEM TIB CKPT MAKOPLASTY

## (undated) DEVICE — GLOVE ORANGE PI 7 1/2   MSG9075

## (undated) DEVICE — STERILE LATEX POWDER-FREE SURGICAL GLOVESWITH NITRILE COATING: Brand: PROTEXIS

## (undated) DEVICE — KIT BLWR MISTER 5P 15L W/ TBNG SET IRRIG MIST TO IMPROVE

## (undated) DEVICE — 35 ML SYRINGE LUER-LOCK TIP: Brand: MONOJECT

## (undated) DEVICE — GLOVE ORANGE PI 8   MSG9080

## (undated) DEVICE — SUTURE PROL SZ 6-0 L24IN NONABSORBABLE BLU L13MM C-1 3/8 8726H

## (undated) DEVICE — DRESSING FOAM W4XL12IN SIL RECT ADH WTRPRF FLM BK W/ BORD

## (undated) DEVICE — SUTURE VCRL SZ 3-0 L27IN ABSRB UD L26MM SH 1/2 CIR J416H

## (undated) DEVICE — SUTURE ETHBND EXCEL SZ 0 L18IN NONABSORBABLE GRN L36MM CT-1 CX21D

## (undated) DEVICE — YANKAUER,OPEN TIP,W/O VENT,STERILE: Brand: MEDLINE INDUSTRIES, INC.

## (undated) DEVICE — COVER LT HNDL PLAS RIG 2 PER PK

## (undated) DEVICE — LEAD PACE 2.6FR L50CM UPLR TEMP ATR NONSTEROID ELUT PIN

## (undated) DEVICE — CUTTER SURG OD42MM PAT KNEE DISP FOR RM SYS XCELERATE

## (undated) DEVICE — DRAIN SURG 24FR L5/16IN DIA8MM SIL RND HUBLESS FULL FLUT

## (undated) DEVICE — PENCIL SMK EVAC ALL IN 1 DSGN ENH VISIBILITY IMPROVED AIR

## (undated) DEVICE — CLIP LIG M BLU TI HRT SHP WIRE HORZ 600 PER BX

## (undated) DEVICE — ADHESIVE SKIN CLSR 0.7ML TOP DERMBND ADV

## (undated) DEVICE — CORD RETRCT SIL

## (undated) DEVICE — SURGICAL PROCEDURE PACK RESTORIS MCK CAPMAKOPFJ] MAKO]

## (undated) DEVICE — LINE TBL CSC-14 FOR CARDPLG DEL SYS

## (undated) DEVICE — VESSEL HARVESTING KIT 7 MM ENDOSCP FOR PWR SUPL

## (undated) DEVICE — CONTAINER,SPECIMEN,OR STERILE,4OZ: Brand: MEDLINE

## (undated) DEVICE — KIT TRK KNEE PROC VIZADISC

## (undated) DEVICE — BOOT POS LEG DEMAYO

## (undated) DEVICE — SSC BONE WAX: Brand: SSC BONE WAX

## (undated) DEVICE — SUTURE ETHBND EXCEL SZ 2-0 L36IN NONABSORBABLE GRN SH-2 X559H

## (undated) DEVICE — APPLICATOR MEDICATED 10.5 CC SOLUTION HI LT ORNG CHLORAPREP

## (undated) DEVICE — [HIGH FLOW INSUFFLATOR,  DO NOT USE IF PACKAGE IS DAMAGED,  KEEP DRY,  KEEP AWAY FROM SUNLIGHT,  PROTECT FROM HEAT AND RADIOACTIVE SOURCES.]: Brand: PNEUMOSURE

## (undated) DEVICE — CANNULA PERF AD 20FR L8.5IN ART 3/8IN CONN NVENT MTL TIP

## (undated) DEVICE — SUTURE PROL SZ 7-0 L24IN NONABSORBABLE BLU L9.3MM BV-1 3/8 M8702

## (undated) DEVICE — HOOD, PEEL-AWAY: Brand: FLYTE

## (undated) DEVICE — PUNCH AORT DIA4MM LNG HNDL

## (undated) DEVICE — RETRACTOR SURG INSRT SUT HLD OCTOBASE

## (undated) DEVICE — SUTURE SZ 7 L18IN NONABSORBABLE SIL CCS L48MM 1/2 CIR STRNM M655G

## (undated) DEVICE — SUTURE MCRYL + SZ 4-0 L18IN ABSRB UD L19MM PS-2 3/8 CIR MCP496G

## (undated) DEVICE — PACK PROCEDURE SURG OPN HRT A BASIC

## (undated) DEVICE — PIN BNE FIX L140MM DIA3.2MM SELF DRL

## (undated) DEVICE — GLOVE ORANGE PI 8 1/2   MSG9085

## (undated) DEVICE — 3 BONE CEMENT MIXER: Brand: MIXEVAC

## (undated) DEVICE — SUTURE NONABSORBABLE MONOFILAMENT 7-0 BV-1 1X24 IN PROLENE 8702H

## (undated) DEVICE — PIN GUIDE ORTHO 3.2X89MM FLTD DISP PK OF 4 PER PATIENT

## (undated) DEVICE — BLADE SURG SAW STD S STL OSC W/ SERR EDGE DISP

## (undated) DEVICE — PIN FIX L170MM DIA4MM BNE SELF DRL

## (undated) DEVICE — COVER US PRB W12XL244CM SURGICAL INTRAOPERATIVE PLAS TAPR L

## (undated) DEVICE — SMARTGOWN SURGICAL GOWN, XL: Brand: CONVERTORS

## (undated) DEVICE — BLADE SURG SAW S STL NAR OSC W/ SERR EDGE DISP

## (undated) DEVICE — SUTURE PROL 6-0 L24IN NONABSORBABLE BLU L13MM C-1 3/8 CIR M8726

## (undated) DEVICE — LIQUIBAND RAPID ADHESIVE 36/CS 0.8ML: Brand: MEDLINE

## (undated) DEVICE — SUTURE PDS + SZ 0 L36IN ABSRB VLT CT 1 L36MM 1 2 CIR PDP346H

## (undated) DEVICE — TUBING AUTOTRNS SUCT 1/4 IN LEN W/ ASPIR ANTICOAG SORIN

## (undated) DEVICE — SOLUTION IRRIG 2000ML 0.9% SOD CHL USP UROMATIC PLAS CONT

## (undated) DEVICE — KIT,ANTI FOG,W/SPONGE & FLUID,SOFT PACK: Brand: MEDLINE

## (undated) DEVICE — 20 ML SYRINGE LUER-LOCK TIP: Brand: MONOJECT

## (undated) DEVICE — BATTERY PACK KLS SD 2000 ST SNGLE USE LTHM QT001 EA

## (undated) DEVICE — CONNECTOR PERF W0.25XH3/8IN BASE Y SHP REDUC W/O LUERLOCK

## (undated) DEVICE — KIT DRP FOR RIO ROBOTIC ARM ASST SYS

## (undated) DEVICE — BLADE SURG MIC STR DBL BVL SHRP ALL ARND SHRPTOME

## (undated) DEVICE — SUTURE NONABSORBABLE MONOFILAMENT 4-0 RB-1 36 IN BLU PROLENE 8557H

## (undated) DEVICE — SOLUTION IV IRRIG POUR BRL 0.9% SODIUM CHL 2F7124

## (undated) DEVICE — HANDPIECE SET WITH HIGH FLOW TIP AND SUCTION TUBE: Brand: INTERPULSE

## (undated) DEVICE — DRAIN SURG SGL COLL PT TB FOR ATS BG OASIS

## (undated) DEVICE — PIN FIX L140MM DIA4MM BNE

## (undated) DEVICE — PIN BONE FIX L110MM DIA3.2MM